# Patient Record
Sex: FEMALE | Race: WHITE | NOT HISPANIC OR LATINO | Employment: FULL TIME | URBAN - METROPOLITAN AREA
[De-identification: names, ages, dates, MRNs, and addresses within clinical notes are randomized per-mention and may not be internally consistent; named-entity substitution may affect disease eponyms.]

---

## 2017-01-03 ENCOUNTER — CLINICAL SUPPORT (OUTPATIENT)
Dept: CARDIOLOGY | Facility: CLINIC | Age: 62
End: 2017-01-03

## 2017-01-03 ENCOUNTER — TELEPHONE (OUTPATIENT)
Dept: CARDIOLOGY | Facility: CLINIC | Age: 62
End: 2017-01-03

## 2017-01-03 DIAGNOSIS — I48.0 PAROXYSMAL ATRIAL FIBRILLATION (HCC): Primary | ICD-10-CM

## 2017-01-03 PROCEDURE — 93000 ELECTROCARDIOGRAM COMPLETE: CPT | Performed by: INTERNAL MEDICINE

## 2017-01-04 RX ORDER — SOTALOL HYDROCHLORIDE 80 MG/1
80 TABLET ORAL 2 TIMES DAILY
Qty: 60 TABLET | Refills: 5 | Status: SHIPPED | OUTPATIENT
Start: 2017-01-04 | End: 2017-01-09 | Stop reason: HOSPADM

## 2017-01-04 NOTE — TELEPHONE ENCOUNTER
Dr Johansen spoke with patient. She will start Sotalol 80 mg BID and come to the office on Friday to have an EKG. Patient is aware of instructions.

## 2017-01-05 ENCOUNTER — HOSPITAL ENCOUNTER (INPATIENT)
Facility: HOSPITAL | Age: 62
LOS: 4 days | Discharge: HOME | End: 2017-01-09
Attending: EMERGENCY MEDICINE | Admitting: HOSPITALIST

## 2017-01-05 ENCOUNTER — APPOINTMENT (OUTPATIENT)
Dept: GENERAL RADIOLOGY | Facility: HOSPITAL | Age: 62
End: 2017-01-05

## 2017-01-05 DIAGNOSIS — I48.91 ATRIAL FIBRILLATION, UNSPECIFIED TYPE (HCC): ICD-10-CM

## 2017-01-05 DIAGNOSIS — D64.9 SYMPTOMATIC ANEMIA: Primary | ICD-10-CM

## 2017-01-05 DIAGNOSIS — K92.2 GASTROINTESTINAL HEMORRHAGE, UNSPECIFIED GASTROINTESTINAL HEMORRHAGE TYPE: ICD-10-CM

## 2017-01-05 DIAGNOSIS — D64.9 ANEMIA: ICD-10-CM

## 2017-01-05 LAB
ABO GROUP BLD: NORMAL
ALBUMIN SERPL-MCNC: 4.2 G/DL (ref 3.2–4.8)
ALBUMIN/GLOB SERPL: 1.6 G/DL (ref 1.5–2.5)
ALP SERPL-CCNC: 122 U/L (ref 25–100)
ALT SERPL W P-5'-P-CCNC: 49 U/L (ref 7–40)
ANION GAP SERPL CALCULATED.3IONS-SCNC: 14 MMOL/L (ref 3–11)
AST SERPL-CCNC: 84 U/L (ref 0–33)
BASOPHILS # BLD AUTO: 0.03 10*3/MM3 (ref 0–0.2)
BASOPHILS NFR BLD AUTO: 0.4 % (ref 0–1)
BILIRUB CONJ SERPL-MCNC: 0.3 MG/DL (ref 0–0.2)
BILIRUB INDIRECT SERPL-MCNC: 0.6 MG/DL (ref 0.1–1.1)
BILIRUB SERPL-MCNC: 0.9 MG/DL (ref 0.3–1.2)
BILIRUB SERPL-MCNC: 1 MG/DL (ref 0.3–1.2)
BILIRUB UR QL STRIP: NEGATIVE
BLD GP AB SCN SERPL QL: NEGATIVE
BUN BLD-MCNC: 18 MG/DL (ref 9–23)
BUN/CREAT SERPL: 16.4 (ref 7–25)
CALCIUM SPEC-SCNC: 8.8 MG/DL (ref 8.7–10.4)
CHLORIDE SERPL-SCNC: 99 MMOL/L (ref 99–109)
CLARITY UR: CLEAR
CO2 SERPL-SCNC: 20 MMOL/L (ref 20–31)
COLOR UR: YELLOW
CREAT BLD-MCNC: 1.1 MG/DL (ref 0.6–1.3)
DAT POLY-SP REAG RBC QL: NEGATIVE
DEPRECATED RDW RBC AUTO: 57.2 FL (ref 37–54)
DEVELOPER EXPIRATION DATE: ABNORMAL
DEVELOPER LOT NUMBER: ABNORMAL
EOSINOPHIL # BLD AUTO: 0.03 10*3/MM3 (ref 0.1–0.3)
EOSINOPHIL NFR BLD AUTO: 0.4 % (ref 0–3)
ERYTHROCYTE [DISTWIDTH] IN BLOOD BY AUTOMATED COUNT: 19.1 % (ref 11.3–14.5)
EXPIRATION DATE: ABNORMAL
FECAL OCCULT BLOOD SCREEN, POC: POSITIVE
FERRITIN SERPL-MCNC: 9 NG/ML (ref 10–291)
FOLATE SERPL-MCNC: >24 NG/ML (ref 3.2–20)
GFR SERPL CREATININE-BSD FRML MDRD: 50 ML/MIN/1.73
GLOBULIN UR ELPH-MCNC: 2.6 GM/DL
GLUCOSE BLD-MCNC: 88 MG/DL (ref 70–100)
GLUCOSE UR STRIP-MCNC: NEGATIVE MG/DL
HCT VFR BLD AUTO: 16.2 % (ref 34.5–44)
HCT VFR BLD AUTO: 19.4 % (ref 34.5–44)
HGB BLD-MCNC: 4.8 G/DL (ref 11.5–15.5)
HGB BLD-MCNC: 5.8 G/DL (ref 11.5–15.5)
HGB UR QL STRIP.AUTO: NEGATIVE
HOLD SPECIMEN: NORMAL
HOLD SPECIMEN: NORMAL
IMM GRANULOCYTES # BLD: 0.04 10*3/MM3 (ref 0–0.03)
IMM GRANULOCYTES NFR BLD: 0.6 % (ref 0–0.6)
IRON 24H UR-MRATE: 98 MCG/DL (ref 50–175)
IRON 24H UR-MRATE: <2 MCG/DL (ref 50–175)
IRON SATN MFR SERPL: ABNORMAL % (ref 15–50)
IRON SATN SFR SERPL: 25.79 % (ref 15–50)
KETONES UR QL STRIP: ABNORMAL
LDH SERPL-CCNC: 334 U/L (ref 120–246)
LEUKOCYTE ESTERASE UR QL STRIP.AUTO: NEGATIVE
LYMPHOCYTES # BLD AUTO: 0.79 10*3/MM3 (ref 0.6–4.8)
LYMPHOCYTES NFR BLD AUTO: 11.6 % (ref 24–44)
Lab: ABNORMAL
MAGNESIUM SERPL-MCNC: 1.9 MG/DL (ref 1.3–2.7)
MCH RBC QN AUTO: 23.6 PG (ref 27–31)
MCHC RBC AUTO-ENTMCNC: 29.6 G/DL (ref 32–36)
MCV RBC AUTO: 79.8 FL (ref 80–99)
MONOCYTES # BLD AUTO: 0.58 10*3/MM3 (ref 0–1)
MONOCYTES NFR BLD AUTO: 8.5 % (ref 0–12)
NEGATIVE CONTROL: NEGATIVE
NEUTROPHILS # BLD AUTO: 5.33 10*3/MM3 (ref 1.5–8.3)
NEUTROPHILS NFR BLD AUTO: 78.5 % (ref 41–71)
NITRITE UR QL STRIP: NEGATIVE
NRBC BLD MANUAL-RTO: 0.4 /100 WBC (ref 0–0)
PH UR STRIP.AUTO: 6.5 [PH] (ref 5–8)
PLATELET # BLD AUTO: 372 10*3/MM3 (ref 150–450)
PMV BLD AUTO: 9.8 FL (ref 6–12)
POSITIVE CONTROL: POSITIVE
POTASSIUM BLD-SCNC: 4.3 MMOL/L (ref 3.5–5.5)
PROT SERPL-MCNC: 6.8 G/DL (ref 5.7–8.2)
PROT UR QL STRIP: NEGATIVE
RBC # BLD AUTO: 2.03 10*6/MM3 (ref 3.89–5.14)
RETICS/RBC NFR AUTO: 2.76 % (ref 0.5–1.5)
RH BLD: POSITIVE
SODIUM BLD-SCNC: 133 MMOL/L (ref 132–146)
SP GR UR STRIP: 1.01 (ref 1–1.03)
TIBC SERPL-MCNC: 380 MCG/DL (ref 250–450)
TIBC SERPL-MCNC: 394 MCG/DL (ref 250–450)
TROPONIN I SERPL-MCNC: 0.02 NG/ML (ref 0–0.07)
UROBILINOGEN UR QL STRIP: ABNORMAL
VIT B12 BLD-MCNC: 768 PG/ML (ref 211–911)
WBC NRBC COR # BLD: 6.8 10*3/MM3 (ref 3.5–10.8)
WHOLE BLOOD HOLD SPECIMEN: NORMAL
WHOLE BLOOD HOLD SPECIMEN: NORMAL

## 2017-01-05 PROCEDURE — 82607 VITAMIN B-12: CPT | Performed by: INTERNAL MEDICINE

## 2017-01-05 PROCEDURE — 84155 ASSAY OF PROTEIN SERUM: CPT | Performed by: INTERNAL MEDICINE

## 2017-01-05 PROCEDURE — 83921 ORGANIC ACID SINGLE QUANT: CPT | Performed by: INTERNAL MEDICINE

## 2017-01-05 PROCEDURE — 99285 EMERGENCY DEPT VISIT HI MDM: CPT

## 2017-01-05 PROCEDURE — 83655 ASSAY OF LEAD: CPT | Performed by: INTERNAL MEDICINE

## 2017-01-05 PROCEDURE — 84165 PROTEIN E-PHORESIS SERUM: CPT | Performed by: INTERNAL MEDICINE

## 2017-01-05 PROCEDURE — 85018 HEMOGLOBIN: CPT | Performed by: INTERNAL MEDICINE

## 2017-01-05 PROCEDURE — 80053 COMPREHEN METABOLIC PANEL: CPT | Performed by: EMERGENCY MEDICINE

## 2017-01-05 PROCEDURE — 86920 COMPATIBILITY TEST SPIN: CPT

## 2017-01-05 PROCEDURE — 99223 1ST HOSP IP/OBS HIGH 75: CPT | Performed by: INTERNAL MEDICINE

## 2017-01-05 PROCEDURE — 83550 IRON BINDING TEST: CPT | Performed by: INTERNAL MEDICINE

## 2017-01-05 PROCEDURE — 82248 BILIRUBIN DIRECT: CPT | Performed by: INTERNAL MEDICINE

## 2017-01-05 PROCEDURE — 86901 BLOOD TYPING SEROLOGIC RH(D): CPT

## 2017-01-05 PROCEDURE — 86900 BLOOD TYPING SEROLOGIC ABO: CPT

## 2017-01-05 PROCEDURE — 83540 ASSAY OF IRON: CPT | Performed by: INTERNAL MEDICINE

## 2017-01-05 PROCEDURE — 99254 IP/OBS CNSLTJ NEW/EST MOD 60: CPT | Performed by: INTERNAL MEDICINE

## 2017-01-05 PROCEDURE — 85045 AUTOMATED RETICULOCYTE COUNT: CPT | Performed by: INTERNAL MEDICINE

## 2017-01-05 PROCEDURE — 86334 IMMUNOFIX E-PHORESIS SERUM: CPT | Performed by: INTERNAL MEDICINE

## 2017-01-05 PROCEDURE — 82746 ASSAY OF FOLIC ACID SERUM: CPT | Performed by: INTERNAL MEDICINE

## 2017-01-05 PROCEDURE — 83735 ASSAY OF MAGNESIUM: CPT | Performed by: EMERGENCY MEDICINE

## 2017-01-05 PROCEDURE — 83021 HEMOGLOBIN CHROMOTOGRAPHY: CPT | Performed by: INTERNAL MEDICINE

## 2017-01-05 PROCEDURE — 86880 COOMBS TEST DIRECT: CPT

## 2017-01-05 PROCEDURE — 83010 ASSAY OF HAPTOGLOBIN QUANT: CPT | Performed by: INTERNAL MEDICINE

## 2017-01-05 PROCEDURE — 36430 TRANSFUSION BLD/BLD COMPNT: CPT

## 2017-01-05 PROCEDURE — 85025 COMPLETE CBC W/AUTO DIFF WBC: CPT | Performed by: EMERGENCY MEDICINE

## 2017-01-05 PROCEDURE — 85660 RBC SICKLE CELL TEST: CPT | Performed by: INTERNAL MEDICINE

## 2017-01-05 PROCEDURE — 81003 URINALYSIS AUTO W/O SCOPE: CPT | Performed by: EMERGENCY MEDICINE

## 2017-01-05 PROCEDURE — 86850 RBC ANTIBODY SCREEN: CPT

## 2017-01-05 PROCEDURE — 93005 ELECTROCARDIOGRAM TRACING: CPT | Performed by: EMERGENCY MEDICINE

## 2017-01-05 PROCEDURE — 84484 ASSAY OF TROPONIN QUANT: CPT

## 2017-01-05 PROCEDURE — 82247 BILIRUBIN TOTAL: CPT | Performed by: INTERNAL MEDICINE

## 2017-01-05 PROCEDURE — 85014 HEMATOCRIT: CPT | Performed by: INTERNAL MEDICINE

## 2017-01-05 PROCEDURE — P9016 RBC LEUKOCYTES REDUCED: HCPCS

## 2017-01-05 PROCEDURE — 83615 LACTATE (LD) (LDH) ENZYME: CPT | Performed by: INTERNAL MEDICINE

## 2017-01-05 PROCEDURE — 71010 HC CHEST PA OR AP: CPT

## 2017-01-05 PROCEDURE — 82728 ASSAY OF FERRITIN: CPT | Performed by: INTERNAL MEDICINE

## 2017-01-05 RX ORDER — PANTOPRAZOLE SODIUM 40 MG/10ML
80 INJECTION, POWDER, LYOPHILIZED, FOR SOLUTION INTRAVENOUS ONCE
Status: COMPLETED | OUTPATIENT
Start: 2017-01-05 | End: 2017-01-05

## 2017-01-05 RX ORDER — SOTALOL HYDROCHLORIDE 80 MG/1
80 TABLET ORAL
Status: DISCONTINUED | OUTPATIENT
Start: 2017-01-06 | End: 2017-01-09 | Stop reason: HOSPADM

## 2017-01-05 RX ORDER — PANTOPRAZOLE SODIUM 40 MG/10ML
40 INJECTION, POWDER, LYOPHILIZED, FOR SOLUTION INTRAVENOUS EVERY 12 HOURS SCHEDULED
Status: DISCONTINUED | OUTPATIENT
Start: 2017-01-05 | End: 2017-01-07 | Stop reason: ALTCHOICE

## 2017-01-05 RX ORDER — CITALOPRAM 20 MG/1
20 TABLET ORAL DAILY
Status: DISCONTINUED | OUTPATIENT
Start: 2017-01-05 | End: 2017-01-09 | Stop reason: HOSPADM

## 2017-01-05 RX ORDER — SODIUM CHLORIDE 0.9 % (FLUSH) 0.9 %
1-10 SYRINGE (ML) INJECTION AS NEEDED
Status: DISCONTINUED | OUTPATIENT
Start: 2017-01-05 | End: 2017-01-09 | Stop reason: HOSPADM

## 2017-01-05 RX ORDER — ACETAMINOPHEN 325 MG/1
650 TABLET ORAL EVERY 4 HOURS PRN
Status: DISCONTINUED | OUTPATIENT
Start: 2017-01-05 | End: 2017-01-09 | Stop reason: HOSPADM

## 2017-01-05 RX ORDER — SODIUM CHLORIDE 0.9 % (FLUSH) 0.9 %
10 SYRINGE (ML) INJECTION AS NEEDED
Status: DISCONTINUED | OUTPATIENT
Start: 2017-01-05 | End: 2017-01-09 | Stop reason: HOSPADM

## 2017-01-05 RX ORDER — SOTALOL HYDROCHLORIDE 80 MG/1
80 TABLET ORAL 2 TIMES DAILY
Status: DISCONTINUED | OUTPATIENT
Start: 2017-01-05 | End: 2017-01-05

## 2017-01-05 RX ORDER — PANTOPRAZOLE SODIUM 40 MG/10ML
40 INJECTION, POWDER, LYOPHILIZED, FOR SOLUTION INTRAVENOUS EVERY 12 HOURS SCHEDULED
Status: DISCONTINUED | OUTPATIENT
Start: 2017-01-05 | End: 2017-01-05

## 2017-01-05 RX ORDER — ATORVASTATIN CALCIUM 40 MG/1
40 TABLET, FILM COATED ORAL DAILY
Status: DISCONTINUED | OUTPATIENT
Start: 2017-01-05 | End: 2017-01-09 | Stop reason: HOSPADM

## 2017-01-05 RX ORDER — SODIUM CHLORIDE 9 MG/ML
50 INJECTION, SOLUTION INTRAVENOUS CONTINUOUS
Status: DISCONTINUED | OUTPATIENT
Start: 2017-01-05 | End: 2017-01-07

## 2017-01-05 RX ORDER — LEVOTHYROXINE SODIUM 0.1 MG/1
100 TABLET ORAL DAILY
Status: DISCONTINUED | OUTPATIENT
Start: 2017-01-05 | End: 2017-01-09 | Stop reason: HOSPADM

## 2017-01-05 RX ADMIN — LEVOTHYROXINE SODIUM 100 MCG: 100 TABLET ORAL at 18:51

## 2017-01-05 RX ADMIN — SODIUM CHLORIDE 500 ML: 9 INJECTION, SOLUTION INTRAVENOUS at 11:18

## 2017-01-05 RX ADMIN — PANTOPRAZOLE SODIUM 40 MG: 40 INJECTION, POWDER, FOR SOLUTION INTRAVENOUS at 20:35

## 2017-01-05 RX ADMIN — PANTOPRAZOLE SODIUM 80 MG: 40 INJECTION, POWDER, FOR SOLUTION INTRAVENOUS at 12:00

## 2017-01-05 RX ADMIN — ATORVASTATIN CALCIUM 40 MG: 40 TABLET, FILM COATED ORAL at 18:51

## 2017-01-05 NOTE — PLAN OF CARE
Problem: Cardiac Output, Decreased (Adult)  Goal: Identify Related Risk Factors and Signs and Symptoms  Outcome: Outcome(s) achieved Date Met:  01/05/17

## 2017-01-05 NOTE — IP AVS SNAPSHOT
AFTER VISIT SUMMARY             Halie Perez           About your hospitalization     You were admitted on:  January 5, 2017 You last received care in the:  [address]       Procedures & Surgeries      Procedure(s) (LRB):  ESOPHAGOGASTRODUODENOSCOPY (N/A)     1/5/2017 - 1/7/2017     Surgeon(s):  Abhishek Benton MD  -------------------     Procedure(s) (LRB):  COLONOSCOPY (N/A)     1/5/2017 - 1/9/2017     Surgeon(s):  Michel White MD  -------------------      Medications    If you or your caregiver advised us that you are currently taking a medication and that medication is marked below as “Resume”, this simply indicates that we have reviewed those medications to make sure our new therapy recommendations do not interfere.  If you have concerns about medications other than those new ones which we are prescribing today, please consult the physician who prescribed them (or your primary physician).  Our review of your home medications is not meant to indicate that we are directing their use.             Your Medications      START taking these medications     ascorbic acid 500 MG tablet   Take 1 tablet by mouth 2 (Two) Times a Day.   Last time this was given:  1/9/2017  8:43 AM   Commonly known as:  VITAMIN C           ferrous sulfate 325 (65 FE) MG tablet   Take 1 tablet by mouth 2 (Two) Times a Day With Meals.   Last time this was given:  1/9/2017  8:43 AM           pantoprazole 40 MG EC tablet   Take 1 tablet by mouth Every 12 (Twelve) Hours.   Last time this was given:  1/8/2017  9:36 PM   Commonly known as:  PROTONIX             CHANGE how you take these medications     sotalol 80 MG tablet   Take 1 tablet by mouth Daily.   Last time this was given:  1/9/2017  8:42 AM   Commonly known as:  BETAPACE   What changed:  when to take this             CONTINUE taking these medications     apixaban 5 MG tablet tablet   Take 1 tablet by mouth 2 (Two) Times a Day.   Commonly known as:   ELIQUIS           atorvastatin 40 MG tablet   Take 1 tablet by mouth Daily.   Last time this was given:  1/9/2017  8:42 AM   Commonly known as:  LIPITOR           citalopram 20 MG tablet   Take 20 mg by mouth Daily.   Last time this was given:  1/9/2017  8:43 AM   Commonly known as:  CeleXA           levothyroxine 100 MCG tablet   Take 100 mcg by mouth Daily.   Last time this was given:  1/9/2017  8:43 AM   Commonly known as:  SYNTHROID, LEVOTHROID           Melatonin 10 MG tablet   Take 30 mg by mouth Every Night.                Where to Get Your Medications      Information about where to get these medications is not yet available     ! Ask your nurse or doctor about these medications     ascorbic acid 500 MG tablet    ferrous sulfate 325 (65 FE) MG tablet    pantoprazole 40 MG EC tablet    sotalol 80 MG tablet                  Your Medications      Your Medication List           Morning Noon Evening Bedtime As Needed    apixaban 5 MG tablet tablet   Take 1 tablet by mouth 2 (Two) Times a Day.   Commonly known as:  ELIQUIS                                      ascorbic acid 500 MG tablet   Take 1 tablet by mouth 2 (Two) Times a Day.   Commonly known as:  VITAMIN C                                      atorvastatin 40 MG tablet   Take 1 tablet by mouth Daily.   Commonly known as:  LIPITOR                                      citalopram 20 MG tablet   Take 20 mg by mouth Daily.   Commonly known as:  CeleXA                                   ferrous sulfate 325 (65 FE) MG tablet   Take 1 tablet by mouth 2 (Two) Times a Day With Meals.                                      levothyroxine 100 MCG tablet   Take 100 mcg by mouth Daily.   Commonly known as:  SYNTHROID, LEVOTHROID                                   Melatonin 10 MG tablet   Take 30 mg by mouth Every Night.                                   pantoprazole 40 MG EC tablet   Take 1 tablet by mouth Every 12 (Twelve) Hours.   Commonly known as:  PROTONIX                                       sotalol 80 MG tablet   Take 1 tablet by mouth Daily.   Commonly known as:  BETAPACE                                            Instructions for After Discharge        Discharge References/Attachments     ANEMIA, NONSPECIFIC (ENGLISH)    BLOOD TRANSFUSION INFORMATION (ENGLISH)    ESOPHAGOGASTRODUODENOSCOPY, CARE AFTER (ENGLISH)    COLONOSCOPY, CARE AFTER (ENGLISH)    PLEURAL EFFUSION (ENGLISH)    ASCORBIC ACID, VITAMIN C TABLET (ENGLISH)    PANTOPRAZOLE TABLETS (ENGLISH)    IRON TABLETS, CAPSULES, EXTENDED-RELEASE TABLETS (ENGLISH)       Follow-ups for After Discharge        Follow-up Information     Follow up with Pradip Johansen MD Follow up in 4 week(s).    Specialties:  Cardiology, Cardiac Electrophysiology    Contact information:    [address]  [place] [id number]  [phone]          Follow up with Tori Iverson, APRN .    Specialty:  Family Medicine    Contact information:    [address]  [facility] [id number] [phone]        Referrals and Follow-ups to Schedule     Follow-Up    As directed    PCP on Friday, needs CBC on 1-12-17 (order given)  F/u with Dr. White per his recommendations             Scheduled Appointments     Jonah 10, 2017 10:30 AM EST   NEW HEMATOLOGY with René Valencia MD   [facility]  AND ONCOLOGY ([place])    [address]  [facility] [address] [phone]            Jan 18, 2017  7:45 AM EST   [facility] CARDIOLOGY NM INJ VT with [facility] ADMIN [facility] CARDIOLOGY ([place])    [address] [facility] [address] [phone]           No food or drink for 2 hours prior to your test. No caffeine or chocolate 24 hours prior to you test. (this includes coffee, tea, soda, all decaffeinated beverages, cappuccino flavorings, noooz or vivarian, diet medications, excedrin, anacin or any energy drinks) wear comfortable clothing and walking  shoes. Bring your insurance cards with you. Bring all medications in their original bottles. If you are diabetic, do not take your diabetic medications after consulting with your primary care physician. if you take insulin, only take half the dose after consulting with your primary care physician. please hold the following medications for 48 hours prior to your test after consulting with your primary care physician. (acebutolo, aggrenox, atenolol, bisoprolol, betaxolol, betapace, calan, cardizem, carvadilol, coreg, corgard, corzide, dilacor xr, diltiazem, inderal, inderal la, isoptin, karlone, labetolol, levatol, nadolol, normodyne, penbutolol, pindolol, propanolol, sectral, sotalol, tenoretic, tiazic, timolol, bystolic, toprol xl, lopressor, metoprolol, trandata, verapamil, verelan, visken, zebeta, zisc, theophylline, vincenzo-dur, sio-phyline, qulbron-t, primatene, persantine, dipyrimadiole)            Jan 18, 2017  9:30 AM EST   [facility] CARDIOLOGY NM SCAN VT with [facility] SCAN ROOM   [facility] CARDIOLOGY [address] [address] [phone]            Jan 18, 2017 10:30 AM EST   [facility] CARDIOLOGY NM STRESS VT with [facility] STRESS LAB 2   [facility] CARDIOLOGY [place] [id number] [phone]           No food or drink for 2 hours prior to your test. No caffeine or chocolate 24 hours prior to you test. (this includes coffee, tea, soda, all decaffeinated beverages, cappuccino flavorings, noooz or vivarian, diet medications, excedrin, anacin or any energy drinks) wear comfortable clothing and walking shoes. Bring your insurance cards with you. Bring all medications in their original bottles. If you are diabetic, do not take your diabetic medications after consulting with your primary care physician. if you take insulin, only take half the dose after consulting with your primary care physician.  please hold the following medications for 48 hours prior to your test after consulting with your primary care physician. (acebutolo, aggrenox, atenolol, bisoprolol, betaxolol, betapace, calan, cardizem, carvadilol, coreg, corgard, corzide, dilacor xr, diltiazem, inderal, inderal la, isoptin, karlone, labetolol, levatol, nadolol, normodyne, penbutolol, pindolol, propanolol, sectral, sotalol, tenoretic, tiazic, timolol, bystolic, toprol xl, lopressor, metoprolol, trandata, verapamil, verelan, visken, zebeta, zisc, theophylline, vincenzo-dur, sio-phyline, qulbron-t, primatene, persantine, dipyrimadiole)            Jan 18, 2017 12:00 PM [facility] CARDIOLOGY NM SCAN VT with [facility] NM SCAN ROOM   [facility] CARDIOLOGY ([address] [address] [phone]              [email or web address] Signup     Our records indicate that you have an active [employer] account.    You can view your After Visit Summary by going to [employer]! and logging in with your [email or web address] username and password.  If you don't have a [email or web address] username and password but a parent or guardian has access to your record, the parent or guardian should login with their own [email or web address] username and password and access your record to view the After Visit Summary.    If you have questions, you can email [email or web address] or call [phone] to talk to our [email or web address] staff.  Remember, [employer] is NOT to be used for urgent needs.  For medical emergencies, dial 911.           Summary of Your Hospitalization        Reason for Hospitalization     Your primary diagnosis was:  Not on File    Your diagnoses also included:  Symptomatic Anemia      Care Providers     Provider Service Role Specialty    Jon Cuba MD Medicine Attending Provider Hospitalist    Pradip Johansen MD Cardiology Consulting Physician  Cardiology     Abhishek Benton MD Gastroenterology Consulting Physician   Gastroenterology     Abhishek Benton MD Gastroenterology Surgeon  Gastroenterology    Michel White MD Colorectal Consulting Physician  Colon and Rectal Surgery     Michel White MD Colorectal Surgeon  Colon and Rectal Surgery    René Valencia MD Oncology Consulting Physician  Hematology and Oncology       Your Allergies  Date Reviewed: 1/9/2017    Allergen Reactions    Sulfa Antibiotics Anaphylaxis      Pending Labs     Order Current Status    Green Top (No Gel) Collected (01/05/17 0952)    Celiac Comprehensive Panel In process    Methylmalonic Acid, Serum In process    [place] Draw In process      Patient Belongings Returned     Document Return of Belongings Flowsheet     Were the patient bedside belongings sent home?   Yes   Belongings Retrieved from Security & Sent Home   No (Comment)    Belongings Sent to Safe   --   Medications Retrieved from Pharmacy & Sent Home   No (Comment)              More Information      Anemia, Nonspecific  Anemia is a condition in which the concentration of red blood cells or hemoglobin in the blood is below normal. Hemoglobin is a substance in red blood cells that carries oxygen to the tissues of the body. Anemia results in not enough oxygen reaching these tissues.   CAUSES   Common causes of anemia include:   · Excessive bleeding. Bleeding may be internal or external. This includes excessive bleeding from periods (in women) or from the intestine.    · Poor nutrition.    · Chronic kidney, thyroid, and liver disease.   · Bone marrow disorders that decrease red blood cell production.  · Cancer and treatments for cancer.  · HIV, AIDS, and their treatments.  · Spleen problems that increase red blood cell destruction.  · Blood disorders.  · Excess destruction of red blood cells due to infection, medicines, and autoimmune disorders.  SIGNS AND SYMPTOMS   · Minor weakness.    · Dizziness.    · Headache.  · Palpitations.    · Shortness of breath, especially with exercise.     · Paleness.  · Cold sensitivity.  · Indigestion.  · Nausea.  · Difficulty sleeping.  · Difficulty concentrating.  Symptoms may occur suddenly or they may develop slowly.   DIAGNOSIS   Additional blood tests are often needed. These help your health care provider determine the best treatment. Your health care provider will check your stool for blood and look for other causes of blood loss.   TREATMENT   Treatment varies depending on the cause of the anemia. Treatment can include:   · Supplements of iron, vitamin B12, or folic acid.    · Hormone medicines.    · A blood transfusion. This may be needed if blood loss is severe.    · Hospitalization. This may be needed if there is significant continual blood loss.    · Dietary changes.  · Spleen removal.  HOME CARE INSTRUCTIONS  Keep all follow-up appointments. It often takes many weeks to correct anemia, and having your health care provider check on your condition and your response to treatment is very important.  SEEK IMMEDIATE MEDICAL CARE IF:   · You develop extreme weakness, shortness of breath, or chest pain.    · You become dizzy or have trouble concentrating.  · You develop heavy vaginal bleeding.    · You develop a rash.    · You have bloody or black, tarry stools.    · You faint.    · You vomit up blood.    · You vomit repeatedly.    · You have abdominal pain.  · You have a fever or persistent symptoms for more than 2-3 days.    · You have a fever and your symptoms suddenly get worse.    · You are dehydrated.    MAKE SURE YOU:  · Understand these instructions.  · Will watch your condition.  · Will get help right away if you are not doing well or get worse.     This information is not intended to replace advice given to you by your health care provider. Make sure you discuss any questions you have with your health care provider.     Document Released: 01/25/2006 Document Revised: 08/20/2014 Document Reviewed: 06/13/2014  [employer] Patient Education  ©2016 [employer]          Blood Transfusion   A blood transfusion is a procedure in which you receive donated blood through an IV tube. You may need a blood transfusion because of illness, surgery, or injury. The blood may come from a donor, or it may be your own blood that you donated previously.  The blood given in a transfusion is made up of different types of cells. You may receive:  · Red blood cells. These carry oxygen and replace lost blood.  · Platelets. These control bleeding.  · Plasma. This helps blood to clot.  If you have hemophilia or another clotting disorder, you may also receive other types of blood products.  LET YOUR HEALTH CARE PROVIDER KNOW ABOUT:  · Any allergies you have.  · All medicines you are taking, including vitamins, herbs, eye drops, creams, and over-the-counter medicines.  · Previous problems you or members of your family have had with the use of anesthetics.  · Any blood disorders you have.  · Previous surgeries you have had.  · Any medical conditions you may have.  · Any previous reactions you have had during a blood transfusion.    RISKS AND COMPLICATIONS  Generally, this is a safe procedure. However, problems may occur, including:  · Having an allergic reaction to something in the donated blood.  · Fever. This may be a reaction to the white blood cells in the transfused blood.  · Iron overload. This can happen from having many transfusions.  · Transfusion-related acute lung injury (TRALI). This is a rare reaction that causes lung damage. The cause is not known. TRALI can occur within hours of a transfusion or several days later.  · Sudden (acute) or delayed hemolytic reactions. This happens if your blood does not match the cells in your transfusion. Your body's defense system (immune system) may try to attack the new cells. This complication is rare.  · Infection. This is rare.  BEFORE THE PROCEDURE  · You may have a blood test to determine your blood type. This is necessary to  know what kind of blood your body will accept.  · If you are going to have a planned surgery, you may donate your own blood. This may be done in case you need to have a transfusion.  · If you have had an allergic reaction to a transfusion in the past, you may be given medicine to help prevent a reaction. Take this medicine only as directed by your health care provider.  · You will have your temperature, blood pressure, and pulse monitored before the transfusion.  PROCEDURE   · An IV will be started in your hand or arm.  · The bag of donated blood will be attached to your IV tube and given into your vein.  · Your temperature, blood pressure, and pulse will be monitored regularly during the transfusion. This monitoring is done to detect early signs of a transfusion reaction.  · If you have any signs or symptoms of a reaction, your transfusion will be stopped and you may be given medicine.  · When the transfusion is over, your IV will be removed.  · Pressure may be applied to the IV site for a few minutes.  · A bandage (dressing) will be applied.  The procedure may vary among health care providers and hospitals.  AFTER THE PROCEDURE  · Your blood pressure, temperature, and pulse will be monitored regularly.     This information is not intended to replace advice given to you by your health care provider. Make sure you discuss any questions you have with your health care provider.     Document Released: 12/15/2001 Document Revised: 01/08/2016 Document Reviewed: 10/28/2015  [employer] Interactive Patient Education ©2016 [employer]          Esophagogastroduodenoscopy, Care After  Refer to this sheet in the next few weeks. These instructions provide you with information about caring for yourself after your procedure. Your health care provider may also give you more specific instructions. Your treatment has been planned according to current medical practices, but problems sometimes occur. Call your health care provider if  you have any problems or questions after your procedure.  WHAT TO EXPECT AFTER THE PROCEDURE  After your procedure, it is typical to feel:  · Soreness in your throat.  · Pain with swallowing.  · Sick to your stomach (nauseous).  · Bloated.  · Dizzy.  · Fatigued.  HOME CARE INSTRUCTIONS  · Do not eat or drink anything until the numbing medicine (local anesthetic) has worn off and your gag reflex has returned. You will know that the local anesthetic has worn off when you can swallow comfortably.  · Do not drive or operate machinery until directed by your health care provider.  · Take medicines only as directed by your health care provider.  SEEK MEDICAL CARE IF:   · You cannot stop coughing.  · You are not urinating at all or less than usual.  SEEK IMMEDIATE MEDICAL CARE IF:  · You have difficulty swallowing.  · You cannot eat or drink.  · You have worsening throat or chest pain.  · You have dizziness or lightheadedness or you faint.  · You have nausea or vomiting.  · You have chills.  · You have a fever.  · You have severe abdominal pain.  · You have black, tarry, or bloody stools.     This information is not intended to replace advice given to you by your health care provider. Make sure you discuss any questions you have with your health care provider.     Document Released: 12/04/2013 Document Revised: 01/08/2016 Document Reviewed: 12/04/2013  [employer] Interactive Patient Education ©2016 [employer]          Colonoscopy, Care After  Refer to this sheet in the next few weeks. These instructions provide you with information on caring for yourself after your procedure. Your health care provider may also give you more specific instructions. Your treatment has been planned according to current medical practices, but problems sometimes occur. Call your health care provider if you have any problems or questions after your procedure.  WHAT TO EXPECT AFTER THE PROCEDURE   After your procedure, it is typical to have the  following:  · A small amount of blood in your stool.  · Moderate amounts of gas and mild abdominal cramping or bloating.  HOME CARE INSTRUCTIONS  · Do not drive, operate machinery, or sign important documents for 24 hours.  · You may shower and resume your regular physical activities, but move at a slower pace for the first 24 hours.  · Take frequent rest periods for the first 24 hours.  · Walk around or put a warm pack on your abdomen to help reduce abdominal cramping and bloating.  · Drink enough fluids to keep your urine clear or pale yellow.  · You may resume your normal diet as instructed by your health care provider. Avoid heavy or fried foods that are hard to digest.  · Avoid drinking alcohol for 24 hours or as instructed by your health care provider.  · Only take over-the-counter or prescription medicines as directed by your health care provider.  · If a tissue sample (biopsy) was taken during your procedure:    Do not take aspirin or blood thinners for 7 days, or as instructed by your health care provider.    Do not drink alcohol for 7 days, or as instructed by your health care provider.    Eat soft foods for the first 24 hours.  SEEK MEDICAL CARE IF:  You have persistent spotting of blood in your stool 2-3 days after the procedure.  SEEK IMMEDIATE MEDICAL CARE IF:  · You have more than a small spotting of blood in your stool.  · You pass large blood clots in your stool.  · Your abdomen is swollen (distended).  · You have nausea or vomiting.  · You have a fever.  · You have increasing abdominal pain that is not relieved with medicine.     This information is not intended to replace advice given to you by your health care provider. Make sure you discuss any questions you have with your health care provider.     Document Released: 08/01/2005 Document Revised: 10/08/2014 Document Reviewed: 08/25/2014  [employer] Interactive Patient Education ©2016 [employer]          Pleural Effusion  A pleural effusion is an  abnormal buildup of fluid in the layers of tissue between your lungs and the inside of your chest (pleural space). These two layers of tissue that line both your lungs and the inside of your chest are called pleura. Usually, there is no air in the space between the pleura, only a thin layer of fluid. If left untreated, a large amount of fluid can build up and cause the lung to collapse. A pleural effusion is usually caused by another disease that requires treatment.  The two main types of pleural effusion are:  · Transudative pleural effusion. This happens when fluid leaks into the pleural space because of a low protein count in your blood or high blood pressure in your vessels. Heart failure often causes this.  · Exudative infusion. This occurs when fluid collects in the pleural space from blocked blood vessels or lymph vessels. Some lung diseases, injuries, and cancers can cause this type of effusion.  CAUSES  Pleural effusion can be caused by:  · Heart failure.  · A blood clot in the lung (pulmonary embolism).  · Pneumonia.  · Cancer.  · Liver failure (cirrhosis).  · Kidney disease.  · Complications from surgery, such as from open heart surgery.  SIGNS AND SYMPTOMS  In some cases, pleural effusion may cause no symptoms. Symptoms can include:  · Shortness of breath, especially when lying down.  · Chest pain, often worse when taking a deep breath.  · Fever.  · Dry cough that is lasting (chronic).  · Hiccups.  · Rapid breathing.  An underlying condition that is causing the pleural effusion (such as heart failure, pneumonia, blood clots, tuberculosis, or cancer) may also cause additional symptoms.  DIAGNOSIS  Your health care provider may suspect pleural effusion based on your symptoms and medical history. Your health care provider will also do a physical exam and a chest X-ray. If the X-ray shows there is fluid in your chest, you may need to have this fluid removed using a needle (thoracentesis) so it can be  tested.  You may also have:  · Imaging studies of the chest, such as:    Ultrasound.    CT scan.  · Blood tests for kidney and liver function.  TREATMENT  Treatment depends on the cause of the pleural effusion. Treatment may include:  · Taking antibiotic medicines to clear up an infection that is causing the pleural effusion.  · Placing a tube in the chest to drain the effusion (tube thoracostomy). This procedure is often used when there is an infection in the fluid.  · Surgery to remove the fibrous outer layer of tissue from the pleural space (decortication).  · Thoracentesis, which can improve cough and shortness of breath.  · A procedure to put medicine into the chest cavity to seal the pleural space to prevent fluid buildup (pleurodesis).  · Chemotherapy and radiation therapy. These may be required in the case of cancerous (malignant) pleural effusion.  HOME CARE INSTRUCTIONS  · Take medicines only as directed by your health care provider.  · Keep track of how long you can gently exercise before you get short of breath. Try simply walking at first.  · Do not use any tobacco products, including cigarettes, chewing tobacco, or electronic cigarettes. If you need help quitting, ask your health care provider.  · Keep all follow-up visits as directed by your health care provider. This is important.  SEEK MEDICAL CARE IF:  · The amount of time that you are able to exercise decreases or does not improve with time.  · You have pain or signs of infection at the puncture site if you had thoracentesis. Watch for:    Drainage.    Redness.    Swelling.  · You have a fever.  SEEK IMMEDIATE MEDICAL CARE IF:  · You are short of breath.  · You develop chest pain.  · You develop a new cough.  MAKE SURE YOU:  · Understand these instructions.  · Will watch your condition.  · Will get help right away if you are not doing well or get worse.     This information is not intended to replace advice given to you by your health care  provider. Make sure you discuss any questions you have with your health care provider.     Document Released: 12/18/2006 Document Revised: 01/08/2016 Document Reviewed: 05/13/2015  [employer] Interactive Patient Education ©2016 [employer]          Ascorbic Acid, Vitamin C tablet  What is this medicine?  ASCORBIC ACID (a SKOR bik AS id) is a naturally occurring form of vitamin C. It is used to treat or prevent low levels of vitamin C and to treat scurvy.  This medicine may be used for other purposes; ask your health care provider or pharmacist if you have questions.  What should I tell my health care provider before I take this medicine?  They need to know if you have any of the following conditions:  -anemia  -diabetes  -glucose-6-phosphate dehydrogenase (G6PD) deficiency  -kidney stones  -low sodium diet  -an unusual or allergic reaction to ascorbic acid, tartrazine, other medicines, foods, dyes, or preservatives  -pregnant or trying to get pregnant  -breast-feeding  How should I use this medicine?  Take this medicine by mouth with a glass of water. Follow the directions on the package or prescription label. You may take this medicine with or without food. If it upsets your stomach take it with food. Take your medicine at regular intervals. Do not take your medicine more often than directed.  Talk to your pediatrician regarding the use of this medicine in children. While this drug may be prescribed for selected conditions, precautions do apply.  Overdosage: If you think you have taken too much of this medicine contact a poison control center or emergency room at once.  NOTE: This medicine is only for you. Do not share this medicine with others.  What if I miss a dose?  If you miss a dose, take it as soon as you can. If it is almost time for your next dose, take only that dose. Do not take double or extra doses.  What may interact with this medicine?  -deferoxamine  -iron supplements  This list may not describe all  possible interactions. Give your health care provider a list of all the medicines, herbs, non-prescription drugs, or dietary supplements you use. Also tell them if you smoke, drink alcohol, or use illegal drugs. Some items may interact with your medicine.  What should I watch for while using this medicine?  Follow a good diet. Taking a vitamin supplement does not replace the need for a balanced diet. Some foods that have vitamin C naturally are citrus fruits, green peppers, broccoli, cabbage, and tomatoes.  If you are diabetic very high doses of ascorbic acid can interfere with tests for sugar in the urine. Talk to your doctor or nicky care professional if you check your urine glucose levels.  What side effects may I notice from receiving this medicine?  Side effects that you should report to your doctor or health care professional as soon as possible:  -allergic reactions like skin rash, itching or hives, swelling of the face, lips, or tongue  -breathing problems  -diarrhea with headache or nausea  -flushing or redness of skin  -pain in lower back, side, or stomach  Side effects that usually do not require medical attention (report to your doctor or health care professional if they continue or are bothersome):  -bad taste in the mouth  -stomach upset  This list may not describe all possible side effects. Call your doctor for medical advice about side effects. You may report side effects to FDA at 2-660-FDA-5097.  Where should I keep my medicine?  Keep out of the reach of children.  Store at room temperature between 15 and 30 degrees C (59 and 86 degrees F) or as directed on the package label. Protect from heat and moisture. Throw away any unused medicine after the expiration date.  NOTE: This sheet is a summary. It may not cover all possible information. If you have questions about this medicine, talk to your doctor, pharmacist, or health care provider.     © 2016, Else[employer]. (2009-03-12  10:29:58)          Pantoprazole tablets  What is this medicine?  PANTOPRAZOLE (pan TOE pra zole) prevents the production of acid in the stomach. It is used to treat gastroesophageal reflux disease (GERD), inflammation of the esophagus, and Zollinger-Fuller syndrome.  This medicine may be used for other purposes; ask your health care provider or pharmacist if you have questions.  What should I tell my health care provider before I take this medicine?  They need to know if you have any of these conditions:  -liver disease  -low levels of magnesium in the blood  -an unusual or allergic reaction to omeprazole, lansoprazole, pantoprazole, rabeprazole, other medicines, foods, dyes, or preservatives  -pregnant or trying to get pregnant  -breast-feeding  How should I use this medicine?  Take this medicine by mouth. Swallow the tablets whole with a drink of water. Follow the directions on the prescription label. Do not crush, break, or chew. Take your medicine at regular intervals. Do not take your medicine more often than directed.  Talk to your pediatrician regarding the use of this medicine in children. While this drug may be prescribed for children as young as 5 years for selected conditions, precautions do apply.  Overdosage: If you think you have taken too much of this medicine contact a poison control center or emergency room at once.  NOTE: This medicine is only for you. Do not share this medicine with others.  What if I miss a dose?  If you miss a dose, take it as soon as you can. If it is almost time for your next dose, take only that dose. Do not take double or extra doses.  What may interact with this medicine?  Do not take this medicine with any of the following medications:  -atazanavir  -nelfinavir  This medicine may also interact with the following medications:  -ampicillin  -delavirdine  -erlotinib  -iron salts  -medicines for fungal infections like ketoconazole, itraconazole and  voriconazole  -methotrexate  -mycophenolate mofetil  -warfarin  This list may not describe all possible interactions. Give your health care provider a list of all the medicines, herbs, non-prescription drugs, or dietary supplements you use. Also tell them if you smoke, drink alcohol, or use illegal drugs. Some items may interact with your medicine.  What should I watch for while using this medicine?  It can take several days before your stomach pain gets better. Check with your doctor or health care professional if your condition does not start to get better, or if it gets worse.  You may need blood work done while you are taking this medicine.  What side effects may I notice from receiving this medicine?  Side effects that you should report to your doctor or health care professional as soon as possible:  -allergic reactions like skin rash, itching or hives, swelling of the face, lips, or tongue  -bone, muscle or joint pain  -breathing problems  -chest pain or chest tightness  -dark yellow or brown urine  -dizziness  -fast, irregular heartbeat  -feeling faint or lightheaded  -fever or sore throat  -muscle spasm  -palpitations  -redness, blistering, peeling or loosening of the skin, including inside the mouth  -seizures  -tremors  -unusual bleeding or bruising  -unusually weak or tired  -yellowing of the eyes or skin  Side effects that usually do not require medical attention (Report these to your doctor or health care professional if they continue or are bothersome.):  -constipation  -diarrhea  -dry mouth  -headache  -nausea  This list may not describe all possible side effects. Call your doctor for medical advice about side effects. You may report side effects to FDA at 3-750-FDA-6371.  Where should I keep my medicine?  Keep out of the reach of children.  Store at room temperature between 15 and 30 degrees C (59 and 86 degrees F). Protect from light and moisture. Throw away any unused medicine after the expiration  date.  NOTE: This sheet is a summary. It may not cover all possible information. If you have questions about this medicine, talk to your doctor, pharmacist, or health care provider.     © 2016, [employer] Standard. (2016-02-05 14:45:56)          Iron tablets, capsules, extended-release tablets  What is this medicine?  IRON (AHY norma) replaces iron that is essential to healthy red blood cells. Iron is used to treat iron deficiency anemia. Anemia may cause problems like tiredness, shortness of breath, or slowed growth in children. Only take iron if your doctor has told you to. Do not treat yourself with iron if you are feeling tired. Most healthy people get enough iron in their diets, particularly if they eat cereals, meat, poultry, and fish.  This medicine may be used for other purposes; ask your health care provider or pharmacist if you have questions.  What should I tell my health care provider before I take this medicine?  They need to know if you have any of these conditions:  -frequently drink alcohol  -bowel disease  -hemolytic anemia  -iron overload (hemochromatosis, hemosiderosis)  -liver disease  -problems with swallowing  -stomach ulcer or other stomach problems  -an unusual or allergic reaction to iron, other medicines, foods, dyes, or preservatives  -pregnant or trying to get pregnant  -breast-feeding  How should I use this medicine?  Take this medicine by mouth with a glass of water or fruit juice. Follow the directions on the prescription label. Swallow whole. Do not crush or chew. Take this medicine in an upright or sitting position. Try to take any bedtime doses at least 10 minutes before lying down. You may take this medicine with food. Take your medicine at regular intervals. Do not take your medicine more often than directed. Do not stop taking except on your doctor's advice.  Talk to your pediatrician regarding the use of this medicine in children. While this drug may be prescribed for selected  conditions, precautions do apply.  Overdosage: If you think you have taken too much of this medicine contact a poison control center or emergency room at once.  NOTE: This medicine is only for you. Do not share this medicine with others.  What if I miss a dose?  If you miss a dose, take it as soon as you can. If it is almost time for your next dose, take only that dose. Do not take double or extra doses.  What may interact with this medicine?  If you are taking this iron product, you should not take iron in any other medicine or dietary supplement.  This medicine may also interact with the following medications:  -alendronate  -antacids  -cefdinir  -chloramphenicol  -cholestyramine  -deferoxamine  -dimercaprol  -etidronate  -medicines for stomach ulcers or other stomach problems  -pancreatic enzymes  -quinolone antibiotics (examples: Cipro, Floxin, Levaquin, Tequin and others)  -risedronate  -tetracycline antibiotics (examples: doxycycline, tetracycline, minocycline, and others)  -thyroid hormones  This list may not describe all possible interactions. Give your health care provider a list of all the medicines, herbs, non-prescription drugs, or dietary supplements you use. Also tell them if you smoke, drink alcohol, or use illegal drugs. Some items may interact with your medicine.  What should I watch for while using this medicine?  Use iron supplements only as directed by your health care professional. You will need important blood work while you are taking this medicine. It may take 3 to 6 months of therapy to treat low iron levels. Pregnant women should follow the dose and length of iron treatment as directed by their doctors.  Do not use iron longer than prescribed, and do not take a higher dose than recommended. Long-term use may cause excess iron to build-up in the body.  Do not take iron with antacids. If you need to take an antacid, take it 2 hours after a dose of iron.  What side effects may I notice from  receiving this medicine?  Side effects that you should report to your doctor or health care professional as soon as possible:  -allergic reactions like skin rash, itching or hives, swelling of the face, lips, or tongue  -blue lips, nails, or palms  -dark colored stools (this may be due to the iron, but can indicate a more serious condition)  -drowsiness  -pain with or difficulty swallowing  -pale or clammy skin  -seizures  -stomach pain  -unusually weak or tired  -vomiting  -weak, fast, or irregular heartbeat  Side effects that usually do not require medical attention (report to your doctor or health care professional if they continue or are bothersome):  -constipation  -indigestion  -nausea or stomach upset  This list may not describe all possible side effects. Call your doctor for medical advice about side effects. You may report side effects to FDA at 9-548-FDA-4476.  Where should I keep my medicine?  Keep out of the reach of children. Even small amounts of iron can be harmful to a child.  Store at room temperature between 15 and 30 degrees C (59 and 86 degrees F). Keep container tightly closed. Throw away any unused medicine after the expiration date.  NOTE: This sheet is a summary. It may not cover all possible information. If you have questions about this medicine, talk to your doctor, pharmacist, or health care provider.     © 2016, [employer]. (2009-05-05 17:03:41)         PREVENTING SURGICAL SITE INFECTIONS     Surgical Site Infections FAQs  What is a Surgical Site Infection (SSI)?  A surgical site infection is an infection that occurs after surgery in the part of the body where the surgery took place. Most patients who have surgery do not develop an infection. However, infections develop in about 1 to 3 out of every 100 patients who have surgery.  Some of the common symptoms of a surgical site infection are:  · Redness and pain around the area where you had surgery  · Drainage of cloudy fluid  from your surgical wound  · Fever  Can SSIs be treated?  Yes. Most surgical site infections can be treated with antibiotics. The antibiotic given to you depends on the bacteria (germs) causing the infection. Sometimes patients with SSIs also need another surgery to treat the infection.  What are some of the things that hospitals are doing to prevent SSIs?  To prevent SSIs, doctors, nurses, and other healthcare providers:  · Clean their hands and arms up to their elbows with an antiseptic agent just before the surgery.  · Clean their hands with soap and water or an alcohol-based hand rub before and after caring for each patient.  · May remove some of your hair immediately before your surgery using electric clippers if the hair is in the same area where the procedure will occur. They should not shave you with a razor.  · Wear special hair covers, masks, gowns, and gloves during surgery to keep the surgery area clean.  · Give you antibiotics before your surgery starts. In most cases, you should get antibiotics within 60 minutes before the surgery starts and the antibiotics should be stopped within 24 hours after surgery.  · Clean the skin at the site of your surgery with a special soap that kills germs.  What can I do to help prevent SSIs?  Before your surgery:  · Tell your doctor about other medical problems you may have. Health problems such as allergies, diabetes, and obesity could affect your surgery and your treatment.  · Quit smoking. Patients who smoke get more infections. Talk to your doctor about how you can quit before your surgery.  · Do not shave near where you will have surgery. Shaving with a razor can irritate your skin and make it easier to develop an infection.  At the time of your surgery:  · Speak up if someone tries to shave you with a razor before surgery. Ask why you need to be shaved and talk with your surgeon if you have any concerns.  · Ask if you will get antibiotics before surgery.  After  your surgery:  · Make sure that your healthcare providers clean their hands before examining you, either with soap and water or an alcohol-based hand rub.    If you do not see your providers clean their hands, please ask them to do so.  · Family and friends who visit you should not touch the surgical wound or dressings.  · Family and friends should clean their hands with soap and water or an alcohol-based hand rub before and after visiting you. If you do not see them clean their hands, ask them to clean their hands.  What do I need to do when I go home from the hospital?  · Before you go home, your doctor or nurse should explain everything you need to know about taking care of your wound. Make sure you understand how to care for your wound before you leave the hospital.  · Always clean your hands before and after caring for your wound.  · Before you go home, make sure you know who to contact if you have questions or problems after you get home.  · If you have any symptoms of an infection, such as redness and pain at the surgery site, drainage, or fever, call your doctor immediately.  If you have additional questions, please ask your doctor or nurse.  Developed and co-sponsored by The Society for Healthcare Epidemiology of [place] (SHEA); Infectious Diseases Society of [place] (IDSA); American Hospital Association; Association for Professionals in Infection Control and Epidemiology (APIC); Centers for Disease Control and Prevention (CDC); and The Joint Commission.     This information is not intended to replace advice given to you by your health care provider. Make sure you discuss any questions you have with your health care provider.     Document Released: 12/23/2014 Document Revised: 01/08/2016 Document Reviewed: 03/02/2016  [employer] Interactive Patient Education ©2016 [employer]             SYMPTOMS OF A STROKE    Call 911 or have someone take you to the Emergency Department if you have any of the  following:    · Sudden numbness or weakness of your face, arm or leg especially on one side of the body  · Sudden confusion, diffiiculty speaking or trouble understanding   · Changes in your vision or loss of sight in one eye  · Sudden severe headache with no known cause  · sudden dizziness, trouble walking, loss of balance or coordination    It is important to seek emergency care right away if you have further stroke symptoms. If you get emergency help quickly, the powerful clot-dissolving medicines can reduce the disabilities caused by a stroke.     For more information:    American Stroke Association  0-476-2-STROKE  www.strokeassociation.org           IF YOU SMOKE OR USE TOBACCO PLEASE READ THE FOLLOWING:    Why is smoking bad for me?  Smoking increases the risk of heart disease, lung disease, vascular disease, stroke, and cancer.     If you smoke, STOP!    If you would like more information on quitting smoking, please visit the [employer] website: [email or web address]   This link will provide additional resources including the QUIT line and the [employer] the [employer] support groups.     For more information:    American Cancer Society  ([phone]    American Heart Association  1-975.834.5882               YOU ARE THE MOST IMPORTANT FACTOR IN YOUR RECOVERY.     Follow all instructions carefully.     I have reviewed my discharge instructions with my nurse, including the following information, if applicable:     Information about my illness and diagnosis   Follow up appointments (including lab draws)   Wound Care   Equipment Needs   Medications (new and continuing) along with side effects   Preventative information such as vaccines and smoking cessations   Diet   Pain   I know when to contact my Doctor's office or seek emergency care      I want my nurse to describe the side effects of my medications: YES NO   If the answer is no, I understand the side effects of my  medications: YES NO   My nurse described the side effects of my medications in a way that I could understand: YES NO   I have taken my personal belongings and my own medications with me at discharge: YES NO            I have received this information and my questions have been answered. I have discussed any concerns I see with this plan with the nurse or physician. I understand these instructions.    Signature of Patient or Responsible Person: _____________________________________    Date: _________________  Time: __________________    Signature of Healthcare Provider: _______________________________________  Date: _________________  Time: __________________

## 2017-01-05 NOTE — ED PROVIDER NOTES
Subjective   HPI Comments: This patient is a very nice 61-year-old female who is had atrial fibrillation ×8 weeks.  She is currently seen Dr. Pradip Johansen MD.  The patient has had weakness and presyncope over the last 2 months as well.  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 Keon Espinoza called me today and told me that the patient was recently found to be in atrial flutter and had a presyncopal episode today.  She appeared very weak and he was concerned that she might require intervention or perhaps cardioversion.  The patient corrected the story and indicated that her last known episode of atrial fibrillation, noticed on EKG, was last Friday.  Her EKG done here shows a normal sinus rhythm with a rate of 72.  She tells me she is simply felt very weak and very tired and has had no energy for last couple of weeks.  The symptoms are getting worse.  Patient has no known history of GI bleed.  Noted blood in her stools.  No active chest pain no shortness of breath no swelling of the legs recently.  She did have edema in the ankles with the initiation of Cardizem.  This has resolved after Cardizem was discontinued.  She recently was started on sotalol for her atrial fibrillation.  In general, patient states that she has not done very well for the last 8 weeks.  Current complaints primarily focused on being weak and tired.    Past medical history  Atrial fibrillation, anticoagulation, hypertension, thyroid disease, hypercholesterolemia    Family history  Anemia    Patient is a 61 y.o. female presenting with weakness.   History provided by:  Patient  Weakness - Generalized   Severity:  Moderate  Onset quality:  Gradual  Duration:  1 day  Timing:  Constant  Progression:  Worsening  Chronicity:  New  Relieved by:  Nothing  Worsened by:  Nothing  Ineffective treatments:  None tried  Associated symptoms: near-syncope    Associated symptoms: no abdominal pain, no chest  pain, no cough, no diarrhea, no dysuria, no fever, no frequency, no headaches, no hematochezia, no loss of consciousness, no melena, no myalgias, no nausea, no seizures, no shortness of breath, no stroke symptoms, no syncope, no urgency and no vomiting    Risk factors: anemia and new medications        Review of Systems   Constitutional: Positive for fatigue. Negative for fever and unexpected weight change.   HENT: Negative for dental problem, hearing loss and sinus pressure.    Eyes: Negative for visual disturbance.   Respiratory: Negative for cough, chest tightness and shortness of breath.    Cardiovascular: Positive for near-syncope. Negative for chest pain, palpitations, leg swelling and syncope.   Gastrointestinal: Negative for abdominal pain, blood in stool, diarrhea, hematochezia, melena, nausea and vomiting.   Genitourinary: Negative for difficulty urinating, dysuria, frequency, hematuria and urgency.   Musculoskeletal: Negative for myalgias, neck pain and neck stiffness.   Skin: Positive for pallor.   Neurological: Positive for weakness (generalized) and light-headedness. Negative for seizures, loss of consciousness, syncope, speech difficulty and headaches.   Psychiatric/Behavioral: Negative for confusion.   All other systems reviewed and are negative.      Past Medical History   Diagnosis Date   • History of anemia    • Hypertension    • Hypothyroidism    • Osteoarthritis        Allergies   Allergen Reactions   • Sulfa Antibiotics Anaphylaxis       Past Surgical History   Procedure Laterality Date   • Augmentation mammaplasty Bilateral 2008   • Hip arthroscopy Right 2012   • Tonsillectomy  1964       Family History   Problem Relation Age of Onset   • Atrial fibrillation Mother    • Diabetes Mother    • Atrial fibrillation Brother    • Abnormal EKG Brother    • Heart attack Father    • Stroke Father    • Diabetes Sister    • Breast cancer Neg Hx    • Ovarian cancer Neg Hx        Social History     Social  History   • Marital status:      Spouse name: N/A   • Number of children: N/A   • Years of education: N/A     Occupational History   • ARNP      Social History Main Topics   • Smoking status: Former Smoker     Types: Cigarettes   • Smokeless tobacco: Never Used      Comment: quit 35 years ago.    • Alcohol use 4.8 oz/week     8 Shots of liquor per week   • Drug use: No   • Sexual activity: Not Asked     Other Topics Concern   • None     Social History Narrative    5-10 servings of caffeine per day.          Objective   Physical Exam   Constitutional: She is oriented to person, place, and time. She appears well-developed.  Non-toxic appearance. No distress.   HENT:   Head: Normocephalic and atraumatic.   Right Ear: Tympanic membrane, external ear and ear canal normal.   Left Ear: Tympanic membrane, external ear and ear canal normal.   Nose: Nose normal. No nasal septal hematoma.   Mouth/Throat: Oropharynx is clear and moist. Mucous membranes are not dry. No oral lesions. No trismus in the jaw. No dental abscesses or uvula swelling. No posterior oropharyngeal erythema or tonsillar abscesses. No tonsillar exudate.   Eyes: EOM are normal. Pupils are equal, round, and reactive to light. Right conjunctiva is not injected. Left conjunctiva is not injected.   Neck: Normal range of motion. Neck supple. No JVD present. No tracheal tenderness present. No rigidity. Normal range of motion present.   Cardiovascular: Normal rate, regular rhythm and normal heart sounds.  Exam reveals no gallop and no friction rub.    Pulmonary/Chest: Effort normal and breath sounds normal. She has no wheezes. She has no rales. She exhibits no tenderness.   Abdominal: Soft. Bowel sounds are normal. She exhibits no distension, no pulsatile midline mass and no mass. There is no tenderness. There is no rigidity, no rebound, no guarding and no tenderness at McBurney's point.   No signs of acute abdomen.   Musculoskeletal: Normal range of motion.  She exhibits edema (mild lower extremity edema). She exhibits no tenderness or deformity.   Lymphadenopathy:     She has no cervical adenopathy.   Neurological: She is alert and oriented to person, place, and time. She has normal strength. She displays no tremor. No cranial nerve deficit. She exhibits normal muscle tone.   5/5 strength bilaterally with flexion and extension of fingers, wrist, elbows, knees and hips as well as plantar and dorsiflexion of the foot.   Skin: Skin is warm and dry. No rash noted. No erythema. There is pallor.   No diaphoresis, lesions, nevi, petechia, purpura   Psychiatric: She has a normal mood and affect. Her speech is normal and behavior is normal. Judgment and thought content normal. She is attentive.   Nursing note and vitals reviewed.      Procedures         ED Course  ED Course   Comment By Time   I have talked in great detail with the patient and her .  They're both very appreciative for care and also able to verbalize an understanding of the plan.  We reviewed the patient's most recent laboratory studies from November and found that the patient's hemoglobin and hematocrit were proximally 10 and 33 respectively.  Patient states that she is a vegetarian and is often times had anemia with no formal diagnosis or known etiology/causation.   confirms this.  The patient's anemia today is quite severe with a hemoglobin of 4.8 and hematocrit of 16.2.  I think this explains her presyncope earlier today as well as her generalized weakness over the last couple of months.  I've ordered a type and screen, IV fluid rehydration, as well as a stool occult blood test.  Patient has no history of vaginal bleeding blood in the stool hemoptysis or hematemesis.  No abdominal pain.  Of interesting note, she did recently start the medication eliquis.  I plan to admit the patient to the hospitalist on telemetry and we have been paged.  He'll have cardiology follow along as well.  Patient  will require transfusion. Jordan Saenz MD 01/05 1042   I discussed the case personally with Dr. Keon Irby M.D., with the patient's permission as well as the 's permission.  Dr. Espinoza now aware of the patient's symptomatically anemia. Jordan Saenz MD 01/05 1047   Dr. Saenz discussed case with Dr. Rios who will admit to telemetry. Guerrero Baldwin 01/05 1111   I talked to the hospitalist as noted below.  He requested 2 units of packed red blood cells.  These have been ordered.  He will admit the patient to telemetry.  Patient and family aware.  Vital signs stable with a blood pressure 120/66.  Heart rate in the 70s.  Oxygen saturation 99% as of the time of this dictation. Jordan Saenz MD 01/05 1113       Course of Care      Lab Results (last 24 hours)     Procedure Component Value Units Date/Time    CBC & Differential [[id number]] Collected:  01/05/17 0952    Specimen:  Blood Updated:  01/05/17 1021    Narrative:       The following orders were created for panel order CBC & Differential.  Procedure                               Abnormality         Status                     ---------                               -----------         ------                     CBC Auto Differential[[id number]]         Abnormal            Final result                 Please view results for these tests on the individual orders.    Comprehensive Metabolic Panel [[id number]]  (Abnormal) Collected:  01/05/17 0952    Specimen:  Blood Updated:  01/05/17 1024     Glucose 88 mg/dL      BUN 18 mg/dL      Creatinine 1.10 mg/dL      Sodium 133 mmol/L      Potassium 4.3 mmol/L      Chloride 99 mmol/L      CO2 20.0 mmol/L      Calcium 8.8 mg/dL      Total Protein 6.8 g/dL      Albumin 4.20 g/dL      ALT (SGPT) 49 (H) U/L      AST (SGOT) 84 (H) U/L      Alkaline Phosphatase 122 (H) U/L      Total Bilirubin 1.0 mg/dL      eGFR Non African Amer 50 (L) mL/min/1.73      Globulin 2.6 gm/dL      A/G Ratio 1.6 g/dL       BUN/Creatinine Ratio 16.4      Anion Gap 14.0 (H) mmol/L     Narrative:       [employer] Guidelines    Stage                           Description                             GFR                      1                               Normal or High                          90+  2                               Mild decrease                            60-89  3                               Moderate decrease                   30-59  4                               Severe decrease                       15-29  5                               Kidney failure                             <15    Magnesium [[id number]]  (Normal) Collected:  01/05/17 0952    Specimen:  Blood Updated:  01/05/17 1024     Magnesium 1.9 mg/dL     CBC Auto Differential [[id number]]  (Abnormal) Collected:  01/05/17 0952    Specimen:  Blood Updated:  01/05/17 1021     WBC 6.80 10*3/mm3      RBC 2.03 (L) 10*6/mm3      Hemoglobin 4.8 (C) g/dL       Verified by repeat analysis.        Hematocrit 16.2 (C) %       Verified by repeat analysis.        MCV 79.8 (L) fL      MCH 23.6 (L) pg      MCHC 29.6 (L) g/dL      RDW 19.1 (H) %      RDW-SD 57.2 (H) fl      MPV 9.8 fL      Platelets 372 10*3/mm3      Neutrophil % 78.5 (H) %      Lymphocyte % 11.6 (L) %      Monocyte % 8.5 %      Eosinophil % 0.4 %      Basophil % 0.4 %      Immature Grans % 0.6 %      Neutrophils, Absolute 5.33 10*3/mm3      Lymphocytes, Absolute 0.79 10*3/mm3      Monocytes, Absolute 0.58 10*3/mm3      Eosinophils, Absolute 0.03 (L) 10*3/mm3      Basophils, Absolute 0.03 10*3/mm3      Immature Grans, Absolute 0.04 (H) 10*3/mm3      nRBC 0.4 (H) /100 WBC     POC Troponin, Rapid [[id number]]  (Normal) Collected:  01/05/17 1011    Specimen:  Blood Updated:  01/05/17 1025     Troponin I 0.02 ng/mL       Serial Number: [id number]    : [id number]             Note: In addition to lab results from this visit, the labs listed above may include labs taken at another facility or during a  "different encounter within the last 24 hours. Please correlate lab times with ED admission and discharge times for further clarification of the services performed during this visit.    XR Chest 1 View    (Results Pending)       Vitals:    01/05/17 0939 01/05/17 0940   BP:  113/52   BP Location:  Right arm   Patient Position:  Sitting   Pulse: 75 72   Resp:  16   Temp:  98.4 °F (36.9 °C)   TempSrc:  Oral   SpO2:  97%   Weight:  160 lb (72.6 kg)   Height:  70\" (177.8 cm)       Medications   sodium chloride 0.9 % flush 10 mL (not administered)   sodium chloride 0.9 % bolus 500 mL (not administered)       ECG/EMG Results (last 24 hours)     Procedure Component Value Units Date/Time    ECG 12 Lead [[id number]] Collected:  01/05/17 0941     Updated:  01/05/17 1031                       MDM    Final diagnoses:   Symptomatic anemia   Atrial fibrillation, unspecified type     EMR Dragon/Transcription disclaimer:   Much of this encounter note is an electronic transcription/translation of spoken language to printed text. The electronic translation of spoken language may permit erroneous, or at times, nonsensical words or phrases to be inadvertently transcribed; Although I have reviewed the note for such errors, some may still exist.      Documentation assistance provided by lucas Baldwin.  Information recorded by the lucas was done at my direction and has been verified and validated by me.     Guerrero Baldwin  01/05/17 1040       Guerrero Baldwin  01/05/17 1049       Jordan Saenz MD  01/05/17 1707    "

## 2017-01-05 NOTE — CONSULTS
Halie Perez  1955  [phone]      01/05/17      [facility] EMERGENCY DEPARTMENT    Tori KENYONMarciano Kishor, APRN  [address] / [facility] [address]    Chief Complaint   Patient presents with   • Fatigue     Problem List:    1. Atrial Fibrillation  A. CHADSvasc =2 On Eliquis  B. Echocardiogram 11/18/2016: EF 60%, moderate MR  C. Event Monitor 11/18/16-12/17/16 showing episodes of PAF up to 110 bpm  D. Intolerant to Flecainide due to QRS widening  E. Started on Sotalol 1/4/16  2. HTN  3. Hypothyroidism  4. HLP  5. Surgical History  A. Tonsillectomy  B. Total hip replacement  C. Mammoplasy  6. Symptomatic Anemia with Hemoglobin 4.8      History of Present Illness:   61 year old WF known to cardiology for recent diagnosis of atrial fibrillation who is admitted due to symptomatic anemia. She was recently seen in clinic for atrial fibrillation and started on flecainide. However, this was discontinued due to QRS widening. She called our office complaining of severe SOB and fatigue. EKG was obtained showing atrial flutter and it was felt her symptoms were from this. Therefore, she was started on Sotalol yesterday and planned for EKG and possible ECV tomorrow. However, today she became acutely worse and was very weak with presyncope. She went to the ER and found to be severely anemic. She is admitted to the hospitalists. She has already been started on blood products and BP is improved. It was 80 systolic when she first came in.     Allergies   Allergen Reactions   • Sulfa Antibiotics Anaphylaxis       Prior to Admission Medications     Prescriptions Last Dose Informant Patient Reported? Taking?    apixaban (ELIQUIS) 5 MG tablet tablet 1/5/2017  No Yes    Take 1 tablet by mouth 2 (Two) Times a Day.    atorvastatin (LIPITOR) 40 MG tablet   No Yes    Take 1 tablet by mouth Daily.    citalopram (CeleXA) 20 MG tablet 1/5/2017  Yes Yes    Take 20 mg by mouth Daily.    levothyroxine (SYNTHROID,  LEVOTHROID) 100 MCG tablet 1/5/2017  Yes Yes    Take 100 mcg by mouth Daily.    Melatonin 10 MG tablet   Yes Yes    Take 30 mg by mouth Every Night.    sotalol (BETAPACE) 80 MG tablet 1/5/2017  No Yes    Take 1 tablet by mouth 2 (Two) Times a Day.            Current Facility-Administered Medications:   •  acetaminophen (TYLENOL) tablet 650 mg, 650 mg, Oral, Q4H PRN, Cr Earl MD  •  pantoprazole (PROTONIX) injection 40 mg, 40 mg, Intravenous, Q12H, Cr Earl MD  •  sodium chloride 0.9 % flush 1-10 mL, 1-10 mL, Intravenous, PRN, Cr Earl MD  •  sodium chloride 0.9 % flush 10 mL, 10 mL, Intravenous, PRN, Jordan Saenz MD  •  sodium chloride 0.9 % infusion, 75 mL/hr, Intravenous, Continuous, Cr Earl MD    Current Outpatient Prescriptions:   •  apixaban (ELIQUIS) 5 MG tablet tablet, Take 1 tablet by mouth 2 (Two) Times a Day., Disp: 30 tablet, Rfl: 1  •  atorvastatin (LIPITOR) 40 MG tablet, Take 1 tablet by mouth Daily., Disp: 30 tablet, Rfl: 1  •  citalopram (CeleXA) 20 MG tablet, Take 20 mg by mouth Daily., Disp: , Rfl:   •  levothyroxine (SYNTHROID, LEVOTHROID) 100 MCG tablet, Take 100 mcg by mouth Daily., Disp: , Rfl:   •  Melatonin 10 MG tablet, Take 30 mg by mouth Every Night., Disp: , Rfl:   •  sotalol (BETAPACE) 80 MG tablet, Take 1 tablet by mouth 2 (Two) Times a Day., Disp: 60 tablet, Rfl: 5    Social History     Social History   • Marital status:      Spouse name: N/A   • Number of children: N/A   • Years of education: N/A     Occupational History   • [employer]      Social History Main Topics   • Smoking status: Former Smoker     Types: Cigarettes   • Smokeless tobacco: Never Used      Comment: quit 35 years ago.    • Alcohol use 4.8 oz/week     8 Shots of liquor per week   • Drug use: No   • Sexual activity: Not Asked     Other Topics Concern   • None     Social History Narrative    5-10 servings of caffeine per day.        Family History   Problem  Relation Age of Onset   • Atrial fibrillation Mother    • Diabetes Mother    • Atrial fibrillation Brother    • Abnormal EKG Brother    • Heart attack Father    • Stroke Father    • Diabetes Sister    • Breast cancer Neg Hx    • Ovarian cancer Neg Hx        Review of Systems: Pertinent positives noted above in the HPI and problem list.  All other reviewed systems negative.     Vitals:    01/05/17 1145 01/05/17 1200 01/05/17 1227 01/05/17 1457   BP: 113/59 122/72 119/71 118/56   BP Location: Left arm Right arm Right arm Right arm   Patient Position: Sitting Sitting Lying Sitting   Pulse: 70 68 69 69   Resp:   18 18   Temp:   98.2 °F (36.8 °C) 97.9 °F (36.6 °C)   TempSrc:   Oral Oral   SpO2: 100% 100% 100%    Weight:       Height:           Physical Exam:  GEN: Well nourished, Well- developed  No acute distress, pale  HEENT: Normocephalic, Atraumatic, PERRLA, moist mucous membranes  NECK: supple, NO JVD, no thyromegaly, no lymphadenopathy  CARD: S1S2  RRR no murmur, gallop, rub  LUNGS: Clear to auscultataion, normal respiratory effort  ABDOMEN: Soft, nontender, normal bowel sounds  EXTREMITIES:No gross deformities,  No clubbing, cyanosis, or edema  SKIN: Warm, dry  NEURO: No focal deficits  PSYCHIATRIC: Normal affect and mood      Data:     Results from last 7 days  Lab Units 01/05/17  0952   WBC 10*3/mm3 6.80   HEMOGLOBIN g/dL 4.8*   HEMATOCRIT % 16.2*   PLATELETS 10*3/mm3 372       Results from last 7 days  Lab Units 01/05/17  0952   SODIUM mmol/L 133   POTASSIUM mmol/L 4.3   CHLORIDE mmol/L 99   TOTAL CO2 mmol/L 20.0   BUN mg/dL 18   CREATININE mg/dL 1.10   GLUCOSE mg/dL 88                                Tele: NSR   EKG: NSR 72 bpm, QTc 480      Assessment and Plan:   1. Symptomatic anemia    2. Atrial fibrillation, unspecified type    3. Gastrointestinal hemorrhage, unspecified gastrointestinal hemorrhage type      1. PAF/flutter- on Soltalol with acceptable QTc, continue  CHADSvasc = 2 Eliquis on hold due to GI  Bleed  2. Severe anemia with GIB, per hospitalists, transfuse3 units and consult GI.    Janine Reeves PA-C  [facility]  1/5/2017   3:09 PM      61-year-old white female with a recent history of atrial fibrillation with rapid ventricular rates as well as hypertensive heart disease and hyperlipidemia.  The patient was briefly treated with flecainide for her atrial fibrillation however developed QRS widening which was subsided discontinued.  Sotalol therapy was initiated last evening as the patient was extremely symptomatic from her atrial fibrillation.  The plan initially was for her to undergo external cardioversion tomorrow.  Today however she became very lightheaded and weak and dizzy and was found to have substantial low blood pressure.  She was subsequently admitted and was found to have severe anemia which is presently being evaluated.  She did convert to normal sinus rhythm however on her EKG she does have QT prolongation today.    Physical Exam   Constitutional: oriented to person, place, and time.  well-developed and well-nourished. No distress.   HENT: Normocephalic.   Eyes: Conjunctivae are normal. No scleral icterus.   Neck: Normal carotid pulses, no hepatojugular reflux and no JVD present. Carotid bruit is not present. No tracheal deviation, no edema and no erythema present. No thyromegaly present.   Cardiovascular: Normal rate, regular rhythm, S1 normal, S2 normal, normal heart sounds and intact distal pulses.   No extrasystoles are present. PMI is not displaced.  Exam reveals no gallop, no distant heart sounds and no friction rub.    No murmur heard.  Pulses:       Radial pulses are 2+ on the right side, and 2+ on the left side.       Dorsalis pedis pulses are 2+ on the right side, and 2+ on the left side.   Pulmonary/Chest: Effort normal and breath sounds normal. No respiratory distress. She has no decreased breath sounds.  no wheezes,  Rhonchi or rales.  no tenderness.    Abdominal: Soft. Bowel sounds are normal. She exhibits no distension and no mass. There is no hepatosplenomegaly. There is no tenderness. There is no rebound and no guarding.   Musculoskeletal:  exhibits no edema, tenderness or deformity.   Neurological: is alert and oriented to person, place, and time.   Skin: Skin is warm and dry. No rash noted. No diaphoretic. No cyanosis or erythema. No pallor. Nails show no clubbing.   Psychiatric: Normal mood and affect.Speech is normal and behavior is normal.    Laboratory evaluation as per PAs note.  Hemoglobin of 4.8.     Twelve-lead EKG shows normal sinus rhythm ventricular rate of 70 bpm /490 ms.    Impression/plan: #1 atrial fibrillation symptomatic in nature with intermittent rapid ventricular rates now in sinus rhythm on low dose sotalol.  However QT interval prolonged in the setting of significant anemia.  We'll lower her sotalol to 80 mg daily.  Repeat EKG in the morning.  #2 severe anemia with hemoglobin of approximately 5 in a patient on Eliquis.  Agree with holding Eliquis for now and await GI evaluation.      I, Pradip Johansen MD, personally performed the services described as documented by the above named individual. I have made any necessary edits and it is both accurate and complete 1/5/2017  5:13 PM

## 2017-01-05 NOTE — PLAN OF CARE
Problem: Patient Care Overview (Adult)  Goal: Plan of Care Review  Outcome: Ongoing (interventions implemented as appropriate)  PATIENT RECEIVING BLOOD

## 2017-01-05 NOTE — LETTER
January 9, 2017     Patient: Halie Perez   YOB: 1955   Date of Visit: 1/5/2017       To Whom It May Concern:    It is my medical opinion that Halie Perez {Work release (duty restriction):95402}.           Sincerely,        No name on file.    CC: No Recipients

## 2017-01-05 NOTE — H&P
CHIEF COMPLAINT: Weakness, dyspnea, severe anemia    HPI:  This is a pleasant 61-year-old  female who presents with progressive dyspnea, weakness of 2 months duration with acute worsening over the past 24 hours accompanied by orthostatic symptoms.  She was found to have low blood pressure with systolics in the 80s as well as a hemoglobin of 4 and a positive fecal occult blood testing    She denies melena, hematemesis or hematochezia.  She denies nonsteroidal anti-inflammatory drug usage or aspirin . she is on Eliquis for primary prevention of thromboembolic events in association with atrial fibrillation  She denies prior gastrointestinal bleeding episodes and she underwent a routine screening colonoscopy approximately 5-6 years ago with Dr. White that was reportedly normal  The patient apparently has a history of chronic anemia of unknown etiology.  Her last hemoglobin in November 2016 was 10    The patient is vegetarian and does not take B12 folate supplementation    Past medical history:  1.  Essential hypertension  2.  Hyperlipidemia on statin  3.  Hypothyroidism on levothyroxine  4.  Atrial fibrillation.  Paroxysmal. [id number] score of 2 on Eliquis  5.  Moderate mitral valve regurgitation with preserved ejection fraction 60%    Past surgical history:  Tonsillectomy, total hip replacement ×2, breast augmentation    Medications:  Sotalol 80 mg twice a day, apixaban or Eliquis 5 mg twice a day, Synthroid 100 µg daily and statin    Allergies:  Sulfa drugs causes anaphylaxis    Family history:  Mom with history of iron deficiency anemia    Review of systems:  14 systems were reviewed and are significant for weakness, dyspnea, orthostatic symptoms    Physical examination:  Vital signs reviewed and within normal range  Gen. appearance: Pleasant  female in no distress.  She is awake and alert. She is oriented to person place and time.  She appears pale and weak  HEENT: Pupils reactive and responsive to  light. Extraocular muscles are intact. Dry mucous membrane: Diminished skin turgor. No oral lesions thrush.  Chest: Clear to auscultation bilaterally.  No wheezing, rales or rhonchi  Cardiovascular: Regular rate and rhythm. No murmurs rubs or gallop no increased jugular venous pulsation  Abdomen: Soft. Non tender to palpation. No palpable masses. No CVA tenderness. Normal bowel sounds.   Extremities: No skin lesions or rashes. No edema. Intact peripheral pulses  Neurologic examination: Motor tone and strength are normal. Intact deep tendon reflexes. No focal neurological deficit.  Psychiatric: appropriate affect    LABORATORY DATA:  1.  Troponin of 0.02  2.  Creatinine of 1.1 mg/dL.  Bicarbonate is 20.  Anion gap 14.  Previous creatinine was 0.7  3.  Mildly elevated alkaline phosphatase at 122, mildly elevated AST and AST at 84 and 49.  Normal bilirubin  4.  TSH is 0.455  5.  Elevated total cholesterol at 267 with an LDL of 134  6.  Anemia with a hemoglobin of 4.8 and an MCV of 79.8.  Normal white blood cell count and platelet count.  Last hemoglobin from November 18, 2016 was 10.3  7.  Positive fecal occult blood testing      EKG:  Normal sinus rhythm.  No acute ischemic changes.  Prolonged QT corrected 519 ms    Transthoracic echocardiogram shows an ejection fraction of 60%.  Moderate mitral valve regurgitation.  Right ventricular systolic pressure at 41      RADIOLOGICAL STUDIES:  Chest x-ray shows no evidence of edema or effusions.  No pneumothorax.  Small right pleural effusion and right lower lobe atelectasis versus small interstitial infiltrates    ASSESSMENT AND PLAN:  This is a delightful 61-year-old  female with a past medical history significant for long-standing essential hypertension, hyperlipidemia on statin, hypothyroidism on levothyroxine, paroxysmal atrial fibrillation on chronic anticoagulation with Eliquis, chronic microcytic anemia of undetermined etiology with last known hemoglobin of  10 in November 2016 who presents with progressive weakness, dyspnea, orthostatic symptoms and found to have    1.  Acute on chronic microcytic anemia with evidence of positive fecal occult blood testing possibly consistent with occult gastrointestinal bleeding in the setting of oral anticoagulation with Eliquis for primary prevention of thromboembolic events due to her underlying paroxysmal atrial fibrillation  The chronic component could be due to B12 or folate deficiency as the patient is vegetarian  Plan:  Full workup to rule out hemolytic anemia, vitamin deficiencies, hemoglobinopathies, iron studies, reticulocyte count, haptoglobin, LDH, [employer], serum protein electro freeze with immunofixation  Transfuse 3 units of packed red blood cells  Monitor hemoglobin and hematocrit  Hold apixaban for now  Consider GI consultation based on above workup    2.  Hypertension, essential.  Patient's blood pressures borderline low.  We will hold her sotalol    3.  Paroxysmal atrial fibrillation.  Currently in sinus rhythm.  chadsvasc score of 2.  Hold Eliquis to have acute on chronic anemia with possible occult gastrointestinal bleeding.  Hold sotalol due to prolonged QT intervals.  Consult cardiology    4.  Hypothyroidism, continue levothyroxine    The plan of care was discussed with the patient.

## 2017-01-05 NOTE — Clinical Note
Level of Care: Telemetry [5]   Diagnosis: Symptomatic anemia [[id number]]   Admitting Physician: FIOR LANIER [[id number]]   Attending Physician: FIRO LANIER [[id number]]

## 2017-01-06 LAB
ABO + RH BLD: NORMAL
ALBUMIN SERPL-MCNC: 3.6 G/DL (ref 3.2–4.8)
ANION GAP SERPL CALCULATED.3IONS-SCNC: 7 MMOL/L (ref 3–11)
BH BB BLOOD EXPIRATION DATE: NORMAL
BH BB BLOOD TYPE BARCODE: 5100
BH BB DISPENSE STATUS: NORMAL
BH BB PRODUCT CODE: NORMAL
BH BB UNIT NUMBER: NORMAL
BUN BLD-MCNC: 18 MG/DL (ref 9–23)
BUN/CREAT SERPL: 18 (ref 7–25)
CALCIUM SPEC-SCNC: 8.7 MG/DL (ref 8.7–10.4)
CHLORIDE SERPL-SCNC: 108 MMOL/L (ref 99–109)
CO2 SERPL-SCNC: 24 MMOL/L (ref 20–31)
CREAT BLD-MCNC: 1 MG/DL (ref 0.6–1.3)
CROSSMATCH INTERPRETATION: NORMAL
GFR SERPL CREATININE-BSD FRML MDRD: 56 ML/MIN/1.73
GLUCOSE BLD-MCNC: 91 MG/DL (ref 70–100)
HCT VFR BLD AUTO: 23.6 % (ref 34.5–44)
HCT VFR BLD AUTO: 25.5 % (ref 34.5–44)
HCT VFR BLD AUTO: 26.3 % (ref 34.5–44)
HGB BLD-MCNC: 7.4 G/DL (ref 11.5–15.5)
HGB BLD-MCNC: 8 G/DL (ref 11.5–15.5)
HGB BLD-MCNC: 8.1 G/DL (ref 11.5–15.5)
PHOSPHATE SERPL-MCNC: 4.2 MG/DL (ref 2.4–5.1)
POTASSIUM BLD-SCNC: 3.8 MMOL/L (ref 3.5–5.5)
SODIUM BLD-SCNC: 139 MMOL/L (ref 132–146)
UNIT  ABO: NORMAL
UNIT  RH: NORMAL

## 2017-01-06 PROCEDURE — 93010 ELECTROCARDIOGRAM REPORT: CPT | Performed by: INTERNAL MEDICINE

## 2017-01-06 PROCEDURE — 83520 IMMUNOASSAY QUANT NOS NONAB: CPT | Performed by: INTERNAL MEDICINE

## 2017-01-06 PROCEDURE — 88346 IMFLUOR 1ST 1ANTB STAIN PX: CPT | Performed by: INTERNAL MEDICINE

## 2017-01-06 PROCEDURE — 80069 RENAL FUNCTION PANEL: CPT | Performed by: INTERNAL MEDICINE

## 2017-01-06 PROCEDURE — 83516 IMMUNOASSAY NONANTIBODY: CPT | Performed by: INTERNAL MEDICINE

## 2017-01-06 PROCEDURE — 85018 HEMOGLOBIN: CPT | Performed by: INTERNAL MEDICINE

## 2017-01-06 PROCEDURE — 99232 SBSQ HOSP IP/OBS MODERATE 35: CPT | Performed by: PHYSICIAN ASSISTANT

## 2017-01-06 PROCEDURE — 81382 HLA II TYPING 1 LOC HR: CPT | Performed by: INTERNAL MEDICINE

## 2017-01-06 PROCEDURE — 85018 HEMOGLOBIN: CPT | Performed by: FAMILY MEDICINE

## 2017-01-06 PROCEDURE — 93005 ELECTROCARDIOGRAM TRACING: CPT | Performed by: INTERNAL MEDICINE

## 2017-01-06 PROCEDURE — 93005 ELECTROCARDIOGRAM TRACING: CPT | Performed by: PHYSICIAN ASSISTANT

## 2017-01-06 PROCEDURE — 99232 SBSQ HOSP IP/OBS MODERATE 35: CPT | Performed by: HOSPITALIST

## 2017-01-06 PROCEDURE — 85014 HEMATOCRIT: CPT | Performed by: INTERNAL MEDICINE

## 2017-01-06 PROCEDURE — 85014 HEMATOCRIT: CPT | Performed by: FAMILY MEDICINE

## 2017-01-06 RX ORDER — ASCORBIC ACID 500 MG
500 TABLET ORAL 2 TIMES DAILY
Status: DISCONTINUED | OUTPATIENT
Start: 2017-01-06 | End: 2017-01-09 | Stop reason: HOSPADM

## 2017-01-06 RX ORDER — FERROUS SULFATE 325(65) MG
325 TABLET ORAL 2 TIMES DAILY WITH MEALS
Status: DISCONTINUED | OUTPATIENT
Start: 2017-01-06 | End: 2017-01-09 | Stop reason: HOSPADM

## 2017-01-06 RX ADMIN — CITALOPRAM HYDROBROMIDE 20 MG: 20 TABLET ORAL at 08:46

## 2017-01-06 RX ADMIN — SOTALOL HYDROCHLORIDE 80 MG: 80 TABLET ORAL at 08:46

## 2017-01-06 RX ADMIN — FERROUS SULFATE TAB 325 MG (65 MG ELEMENTAL FE) 325 MG: 325 (65 FE) TAB at 20:48

## 2017-01-06 RX ADMIN — LEVOTHYROXINE SODIUM 100 MCG: 100 TABLET ORAL at 08:46

## 2017-01-06 RX ADMIN — ATORVASTATIN CALCIUM 40 MG: 40 TABLET, FILM COATED ORAL at 08:46

## 2017-01-06 RX ADMIN — PANTOPRAZOLE SODIUM 40 MG: 40 INJECTION, POWDER, FOR SOLUTION INTRAVENOUS at 20:48

## 2017-01-06 RX ADMIN — OXYCODONE HYDROCHLORIDE AND ACETAMINOPHEN 500 MG: 500 TABLET ORAL at 20:48

## 2017-01-06 NOTE — PROGRESS NOTES
"[facility]  Progress Note       LOS: 1 day   Patient Care Team:  ARMANDO Pugh as PCP - General (Family Medicine)    Chief Complaint:  Follow up atrial fibrillation    Subjective   Feeling better today. Still a little dizzy when she first stands up. No CP or SOB. Sitting up in chair. No palpitations. Still in NSR. No side effects to sotalol so far      Review of Systems:   Pertinent positives in HPI, all others reviewed and negative.      Objective       Current Facility-Administered Medications:   •  acetaminophen (TYLENOL) tablet 650 mg, 650 mg, Oral, Q4H PRN, Cr Earl MD  •  atorvastatin (LIPITOR) tablet 40 mg, 40 mg, Oral, Daily, RAÚL Padron, 40 mg at 01/06/17 0846  •  citalopram (CeleXA) tablet 20 mg, 20 mg, Oral, Daily, RAÚL Padron, 20 mg at 01/06/17 0846  •  levothyroxine (SYNTHROID, LEVOTHROID) tablet 100 mcg, 100 mcg, Oral, Daily, RAÚL Padron, 100 mcg at 01/06/17 0846  •  melatonin sublingual tablet 5 mg, 5 mg, Sublingual, Nightly PRN, Jon Cuba MD  •  pantoprazole (PROTONIX) injection 40 mg, 40 mg, Intravenous, Q12H, Cr Earl MD, 40 mg at 01/05/17 2035  •  sodium chloride 0.9 % flush 1-10 mL, 1-10 mL, Intravenous, PRN, Cr Earl MD  •  sodium chloride 0.9 % flush 10 mL, 10 mL, Intravenous, PRN, Jordan Saenz MD  •  sodium chloride 0.9 % infusion, 75 mL/hr, Intravenous, Continuous, Cr Earl MD, 75 mL/hr at 01/06/17 0000  •  sotalol (BETAPACE) tablet 80 mg, 80 mg, Oral, Q24H, Pradip Johansen MD, 80 mg at 01/06/17 0846    Vital Sign Min/Max for last 24 hours  Temp  Min: 97.5 °F (36.4 °C)  Max: 98.5 °F (36.9 °C)   BP  Min: 106/63  Max: 132/75   Pulse  Min: 68  Max: 73   Resp  Min: 18  Max: 18   SpO2  Min: 99 %  Max: 100 %   No Data Recorded   Weight  Min: 168 lb (76.2 kg)  Max: 168 lb (76.2 kg)     Flowsheet Rows         First Filed Value    Admission Height  70\" (177.8 cm) Documented at " 01/05/2017 0940    Admission Weight  160 lb (72.6 kg) Documented at 01/05/2017 0940            Intake/Output Summary (Last 24 hours) at 01/06/17 1223  Last data filed at 01/06/17 0900   Gross per 24 hour   Intake 1424.17 ml   Output      0 ml   Net 1424.17 ml       Physical Exam:     General Appearance:    Alert, cooperative, in no acute distress   Lungs:     CTA    Heart:    RRR Nl S1 and S2 No murmurs   Chest Wall:    No abnormalities observed   Abdomen:     Normal bowel sounds, no masses, no organomegaly, soft        non-tender, non-distended, no guarding, no rebound                tenderness   Extremities:   Moves all extremities well, no edema, no cyanosis, no             redness   Pulses:   Pulses palpable and equal bilaterally   Skin:   No bleeding, bruising or rash        Results Review:     Results from last 7 days  Lab Units 01/06/17  0741 01/06/17  0320 01/05/17 2009 01/05/17  0952   WBC 10*3/mm3  --   --   --  6.80   HEMOGLOBIN g/dL 8.1* 7.4* 5.8* 4.8*   HEMATOCRIT % 25.5* 23.6* 19.4* 16.2*   PLATELETS 10*3/mm3  --   --   --  372       Results from last 7 days  Lab Units 01/06/17  0320 01/05/17  0952   SODIUM mmol/L 139 133   POTASSIUM mmol/L 3.8 4.3   CHLORIDE mmol/L 108 99   TOTAL CO2 mmol/L 24.0 20.0   BUN mg/dL 18 18   CREATININE mg/dL 1.00 1.10   GLUCOSE mg/dL 91 88                                Intake/Output Summary (Last 24 hours) at 01/06/17 1223  Last data filed at 01/06/17 0900   Gross per 24 hour   Intake 1424.17 ml   Output      0 ml   Net 1424.17 ml       I personally viewed and interpreted the patient's EKG/Telemetry data    EKG: NSR, HR 66  ms    Telemetry: NSR, No AF      Echo EF Estimated  Lab Results   Component Value Date    ECHOEFEST 60 11/18/2016         Present on Admission:  • Symptomatic anemia    Assessment/Plan   1. PAF- in NSR on Sotalol 80 daily, decreased yesterday from BID due to prolonged QT  CHADSvasc = 2 Eliquis on hold due to GIB. QTc better today Keep on daily  sotalol Needs EKG on Monday as outpt if goes home tomorrow. Ok to hold eliquis as needed  2. Severe anemia due to GIB- s/p 3 units blood with Hgb of 8 now up from 4.8  EGD planned tomorrow with RAÚL Brown  01/06/17  12:23 PM      I, Pradip Johansen MD, personally performed the services described as documented by the above named individual. I have made any necessary edits and it is both accurate and complete 1/6/2017  4:26 PM

## 2017-01-06 NOTE — PROGRESS NOTES
Discharge Planning Assessment  [facility]     Patient Name: Halie Perez  MRN: [id number]  Today's Date: 1/6/2017    Admit Date: 1/5/2017          Discharge Needs Assessment       01/06/17 1135    Living Environment    Lives With spouse    Living Arrangements house    Provides Primary Care For no one    Able to Return to Prior Living Arrangements yes    Discharge Needs Assessment    Concerns To Be Addressed no discharge needs identified    Readmission Within The Last 30 Days no previous admission in last 30 days    Equipment Currently Used at Home none    Equipment Needed After Discharge none    Transportation Available car;family or friend will provide    Discharge Disposition home or self-care            Discharge Plan       01/06/17 1136    Case Management/Social Work Plan    Plan Home    Patient/Family In Agreement With Plan yes    Additional Comments Mrs. Perez plans on returning home after discharge. She doesn't anticipate having any discharge needs.        Discharge Placement     No information found        Expected Discharge Date and Time     Expected Discharge Date Expected Discharge Time    Jan 8, 2017               Demographic Summary       01/06/17 1134    Referral Information    Admission Type inpatient    Arrived From home or self-care    Referral Source admission list    Record Reviewed history and physical            Functional Status       01/06/17 1135    Functional Status Current    Current Functional Level Comment see nursing assessment    Functional Status Prior    Ambulation 0-->independent    Transferring 0-->independent    Toileting 0-->independent    Bathing 0-->independent    Dressing 0-->independent    Eating 0-->independent    Communication 0-->understands/communicates without difficulty    Swallowing 0-->swallows foods/liquids without difficulty    IADL    Medications independent    Meal Preparation independent    Housekeeping independent    Laundry independent    Shopping  independent    Oral Care independent            Psychosocial     None            Abuse/Neglect     None            Legal     None            Substance Abuse     None            Patient Forms     None          Rafi Haq

## 2017-01-06 NOTE — CONSULTS
Gastroenterology Consult Note    Referring Provider: RYAN Iverson  Reason for Consultation: Anemia    Patient Care Team:  ARMANDO Pugh as PCP - General (Family Medicine)    Chief complaint Heme positive stool and anemia        History of present illness:   The patient is a 61-year-old female who presents with progressive dyspnea, weakness, for about 2 months duration.  Apparently, she is chronically anemic with iron deficiency.  It does not sound like she has ever been tested for celiac disease.  She was found to also have positive hemoccult tests and apparently a hemoglobin of 4.  She received several units of blood.  She denies any melena, hematemesis or hematochezia.  She denies nonsteroidal anti-inflammatories.  She does not take any aspirin.  She has been on Eliquis and she was diagnosed with atrial fibrillation in 11/2016.  She has had colonoscopy 5 or 6 years ago by Dr. White, which was reportedly normal.  She denies any family history of colon polyps or cancer.  Interestingly, her mother also has iron deficiency anemia and requires transfusions.  She also has noted some ankle swelling and again she thought this was related to the atrial fibrillation, which delayed her presentation.  She has otherwise been pretty healthy with really just hypertension, hyperlipidemia, hypothyroidism, new onset atrial fibrillation in 11/2016.        Allergies   Allergen Reactions   • Sulfa Antibiotics Anaphylaxis     [unfilled]  Past Medical History   Diagnosis Date   • History of anemia    • Hypertension    • Hypothyroidism    • Osteoarthritis      Past Surgical History   Procedure Laterality Date   • Augmentation mammaplasty Bilateral 2008   • Hip arthroscopy Right 2012   • Tonsillectomy  1964     Family History   Problem Relation Age of Onset   • Atrial fibrillation Mother    • Diabetes Mother    • Atrial fibrillation Brother    • Abnormal EKG Brother    • Heart attack Father    • Stroke Father    • Diabetes Sister     • Breast cancer Neg Hx    • Ovarian cancer Neg Hx      Social History     Social History   • Marital status:      Spouse name: N/A   • Number of children: N/A   • Years of education: N/A     Occupational History   • ARNP      Social History Main Topics   • Smoking status: Former Smoker     Types: Cigarettes   • Smokeless tobacco: Never Used      Comment: quit 35 years ago.    • Alcohol use 4.8 oz/week     8 Shots of liquor per week   • Drug use: No   • Sexual activity: Not on file     Other Topics Concern   • Not on file     Social History Narrative    5-10 servings of caffeine per day.      ------    Vital Signs   Temp:  [97.5 °F (36.4 °C)-98.5 °F (36.9 °C)] 97.5 °F (36.4 °C)  Heart Rate:  [68-78] 69  Resp:  [18] 18  BP: (101-129)/(46-74) 123/72    Physical Examination:  General Appearance:  Alert, cooperative, in no acute distress  Head: Normocephalic, without obvious abnormality, atraumatic  Eyes: Conjunctivae and sclerae normal, no icterus, no pallor, corneas clear, PERRLA  Ears: Ears appear intact with no abnormalities noted  Throat: oral mucosa moist  Neck: No adenopathy, supple, trachea midline, no thyromegaly,  no JVD  Lungs: Clear to auscultation,respirations regular, even and unlabored  Heart: Regular rhythm and normal rate, normal S1 and S2, no murmur, no gallop, no rub, no click  Chest Wall: No abnormalities observed  Abdomen:  Normal bowel sounds, no masses, no organomegaly, soft non-tender, non-distended, no guarding, no rebound tenderness  Extremities: Moves all extremities well, no edema, no cyanosis, no redness  Skin: No bleeding, bruising or rash  Neurologic: Cranial nerves 2 - 12 grossly intact, no focal neurological deficits    Review of Systems:  Per HPI and H and P    Results for orders placed or performed during the hospital encounter of 01/05/17   Comprehensive Metabolic Panel   Result Value Ref Range    Glucose 88 70 - 100 mg/dL    BUN 18 9 - 23 mg/dL    Creatinine 1.10 0.60 - 1.30  mg/dL    Sodium 133 132 - 146 mmol/L    Potassium 4.3 3.5 - 5.5 mmol/L    Chloride 99 99 - 109 mmol/L    CO2 20.0 20.0 - 31.0 mmol/L    Calcium 8.8 8.7 - 10.4 mg/dL    Total Protein 6.8 5.7 - 8.2 g/dL    Albumin 4.20 3.20 - 4.80 g/dL    ALT (SGPT) 49 (H) 7 - 40 U/L    AST (SGOT) 84 (H) 0 - 33 U/L    Alkaline Phosphatase 122 (H) 25 - 100 U/L    Total Bilirubin 1.0 0.3 - 1.2 mg/dL    eGFR Non African Amer 50 (L) >60 mL/min/1.73    Globulin 2.6 gm/dL    A/G Ratio 1.6 1.5 - 2.5 g/dL    BUN/Creatinine Ratio 16.4 7.0 - 25.0    Anion Gap 14.0 (H) 3.0 - 11.0 mmol/L   Magnesium   Result Value Ref Range    Magnesium 1.9 1.3 - 2.7 mg/dL   Urinalysis With / Culture If Indicated   Result Value Ref Range    Color, UA Yellow Yellow, Straw    Appearance, UA Clear Clear    pH, UA 6.5 5.0 - 8.0    Specific Gravity, UA 1.010 1.005 - 1.030    Glucose, UA Negative Negative    Ketones, UA Trace (A) Negative    Bilirubin, UA Negative Negative    Blood, UA Negative Negative    Protein, UA Negative Negative    Leuk Esterase, UA Negative Negative    Nitrite, UA Negative Negative    Urobilinogen, UA 0.2 E.U./dL 0.2 - 1.0 E.U./dL   CBC Auto Differential   Result Value Ref Range    WBC 6.80 3.50 - 10.80 10*3/mm3    RBC 2.03 (L) 3.89 - 5.14 10*6/mm3    Hemoglobin 4.8 (C) 11.5 - 15.5 g/dL    Hematocrit 16.2 (C) 34.5 - 44.0 %    MCV 79.8 (L) 80.0 - 99.0 fL    MCH 23.6 (L) 27.0 - 31.0 pg    MCHC 29.6 (L) 32.0 - 36.0 g/dL    RDW 19.1 (H) 11.3 - 14.5 %    RDW-SD 57.2 (H) 37.0 - 54.0 fl    MPV 9.8 6.0 - 12.0 fL    Platelets 372 150 - 450 10*3/mm3    Neutrophil % 78.5 (H) 41.0 - 71.0 %    Lymphocyte % 11.6 (L) 24.0 - 44.0 %    Monocyte % 8.5 0.0 - 12.0 %    Eosinophil % 0.4 0.0 - 3.0 %    Basophil % 0.4 0.0 - 1.0 %    Immature Grans % 0.6 0.0 - 0.6 %    Neutrophils, Absolute 5.33 1.50 - 8.30 10*3/mm3    Lymphocytes, Absolute 0.79 0.60 - 4.80 10*3/mm3    Monocytes, Absolute 0.58 0.00 - 1.00 10*3/mm3    Eosinophils, Absolute 0.03 (L) 0.10 - 0.30 10*3/mm3     Basophils, Absolute 0.03 0.00 - 0.20 10*3/mm3    Immature Grans, Absolute 0.04 (H) 0.00 - 0.03 10*3/mm3    nRBC 0.4 (H) 0.0 - 0.0 /100 WBC   Vitamin B12   Result Value Ref Range    Vitamin B-12 768 211 - 911 pg/mL   Bilirubin, Total & Direct   Result Value Ref Range    Total Bilirubin 0.9 0.3 - 1.2 mg/dL    Bilirubin, Direct 0.3 (H) 0.0 - 0.2 mg/dL    Bilirubin, Indirect 0.6 0.1 - 1.1 mg/dL   Folate   Result Value Ref Range    Folate >24.00 (H) 3.20 - 20.00 ng/mL   Ferritin   Result Value Ref Range    Ferritin 9.00 (L) 10.00 - 291.00 ng/mL   Iron Profile   Result Value Ref Range    Iron <2 (L) 50 - 175 mcg/dL    TIBC 394 250 - 450 mcg/dL    Iron Saturation  15 - 50 %   Lactate Dehydrogenase   Result Value Ref Range     (H) 120 - 246 U/L   Reticulocytes   Result Value Ref Range    Reticulocyte % 2.76 (H) 0.50 - 1.50 %   Transferrin Saturation   Result Value Ref Range    Iron 98 50 - 175 mcg/dL    TIBC 380 250 - 450 mcg/dL    Transferrin % 25.79 15.00 - 50.00 %   Hemoglobin & Hematocrit, Blood   Result Value Ref Range    Hemoglobin 5.8 (C) 11.5 - 15.5 g/dL    Hematocrit 19.4 (L) 34.5 - 44.0 %   Renal Function Panel   Result Value Ref Range    Glucose 91 70 - 100 mg/dL    BUN 18 9 - 23 mg/dL    Creatinine 1.00 0.60 - 1.30 mg/dL    Sodium 139 132 - 146 mmol/L    Potassium 3.8 3.5 - 5.5 mmol/L    Chloride 108 99 - 109 mmol/L    CO2 24.0 20.0 - 31.0 mmol/L    Calcium 8.7 8.7 - 10.4 mg/dL    Albumin 3.60 3.20 - 4.80 g/dL    Phosphorus 4.2 2.4 - 5.1 mg/dL    Anion Gap 7.0 3.0 - 11.0 mmol/L    BUN/Creatinine Ratio 18.0 7.0 - 25.0    eGFR Non African Amer 56 (L) >60 mL/min/1.73   Hemoglobin & Hematocrit, Blood   Result Value Ref Range    Hemoglobin 8.1 (L) 11.5 - 15.5 g/dL    Hematocrit 25.5 (L) 34.5 - 44.0 %   Hemoglobin & Hematocrit, Blood   Result Value Ref Range    Hemoglobin 7.4 (L) 11.5 - 15.5 g/dL    Hematocrit 23.6 (L) 34.5 - 44.0 %   POC Troponin, Rapid   Result Value Ref Range    Troponin I 0.02 0.00 -  0.07 ng/mL   POCT Occult Blood, stool   Result Value Ref Range    Fecal Occult Blood Positive     Lot Number [id number]     Expiration Date 12-18     DEVELOPER LOT NUMBER [id number]     DEVELOPER EXPIRATION DATE 11-19     Positive Control Positive Positive    Negative Control Negative Negative   Type & Screen   Result Value Ref Range    ABO Type O     RH type Positive     Antibody Screen Negative    Direct Antiglobulin Test   Result Value Ref Range    DELMY Negative    Prepare RBC, 3 Units   Result Value Ref Range    Product Code A4238H99     Unit Number [id number]     UNIT  ABO O     UNIT  RH POS     CROSSMATCH 1 INTERPRETATION Compatible     Dispense Status IS     Blood Type OPOS     Blood Expiration Date 201701252359     Blood Type Barcode 5100     Product Code M1845D87     Unit Number [id number]     UNIT  ABO O     UNIT  RH POS     CROSSMATCH 1 INTERPRETATION Compatible     Dispense Status IS     Blood Type OPOS     Blood Expiration Date 201701252359     Blood Type Barcode 5100     Product Code K8454M76     Unit Number [id number]     UNIT  ABO O     UNIT  RH POS     CROSSMATCH 1 INTERPRETATION Compatible     Dispense Status IS     Blood Type OPOS     Blood Expiration Date 201701252359     Blood Type Barcode 5100    Light Blue Top   Result Value Ref Range    Extra Tube hold for add-on    Green Top (Gel)   Result Value Ref Range    Extra Tube Hold for add-ons.    Lavender Top   Result Value Ref Range    Extra Tube hold for add-on    Gold Top - SST   Result Value Ref Range    Extra Tube Hold for add-ons.        Results Review:  Reviewed      Assessment/Plan   1.  Anemia and heme positive stool.  She has iron deficiency anemia and I certainly will check a celiac panel.  We will send off for comprehensive celiac panel.  She needs an upper endoscopy to rule out peptic ulcer disease.  On Eliquis even a relatively minor lesion could cause bleeding.  We will set the EGD up for the morning.  She will  be off Eliquis  for 48 hours and we will keep her n.p.o. after midnight tonight.  Ultimately, she probably will be best to have a repeat colonoscopy, although I would start with the upper endoscopy.  I discussed this with her.  I agree with the Protonix for now.  She seems very stable.     Thank you very much.        Active Problems:    Symptomatic anemia        I discussed the patients findings and my recommendations with patient and IM.     Abhishek Benton MD  01/06/17  10:17 AM

## 2017-01-06 NOTE — PLAN OF CARE
Problem: Patient Care Overview (Adult)  Goal: Plan of Care Review  Outcome: Ongoing (interventions implemented as appropriate)    01/06/17 0259   Coping/Psychosocial Response Interventions   Plan Of Care Reviewed With patient   Patient Care Overview   Progress improving

## 2017-01-06 NOTE — PROGRESS NOTES
Hospitalist Daily Progress Note    Date of Admission: 1/5/2017   LOS: 1 day   PCP: ARMANDO Edwards    Chief Complaint: acute on chronic anemia    Subjective      Sitting up today.  No fevers.  No chills.  No bleeding reported per rectum or other places.  Last colonoscopy was about 5 yrs ago with Dr. White.    History of Present Illness    Review of Systems  General: no fevers, chills  Respiratory: no cough, dyspnea  Cardiovascular: no chest pain, palpitations  Abdomen: No abdominal pain, nausea, vomiting, or diarrhea  Neurologic: No focal weakness    Objective   Physical Exam:  I have reviewed the vital signs.  Temp:  [97.5 °F (36.4 °C)-98.6 °F (37 °C)] 98.6 °F (37 °C)  Heart Rate:  [64-81] 64  Resp:  [18] 18  BP: (106-132)/(46-75) 113/62    Objective  General Appearance:    Alert, cooperative, no distress  Head:    Normocephalic, atraumatic  Eyes:    Sclerae anicteric  Neck:   Supple, no mass  Lungs: Clear to auscultation bilaterally, respirations unlabored  Heart: Regular rate and rhythm, S1 and S2 normal, no murmur, rub or gallop  Abdomen:  Soft, non-tender, bowel sounds active, nondistended  Extremities: No clubbing, cyanosis, or edema to lower extremities  Pulses:  2+ and symmetric in distal lower extremities  Skin: No rashes   Neurologic: Oriented x3, Normal strength to extremities    Results Review:    I have reviewed the labs, radiology results and diagnostic studies.      Results from last 7 days  Lab Units 01/06/17  1546  01/05/17  0952   WBC 10*3/mm3  --   --  6.80   HEMOGLOBIN g/dL 8.0*  < > 4.8*   PLATELETS 10*3/mm3  --   --  372   < > = values in this interval not displayed.    Results from last 7 days  Lab Units 01/06/17  0320   SODIUM mmol/L 139   POTASSIUM mmol/L 3.8   TOTAL CO2 mmol/L 24.0   CREATININE mg/dL 1.00   GLUCOSE mg/dL 91       I have reviewed the medications.    ---------------------------------------------------------------------------------------------  Assessment/Plan    Assessment & Plan  Assessment/Problem List    Active Problems:    Symptomatic anemia    61 year old female with severe, symptomatic anemia.     Plan    Acute on Chronic Anemia  - complex situation on anticoagulation at baseline  A Fib  - anticoagulation on hold until GI evaluation  - on Sotalol  GI blood Loss   - continue Protonix 40 mg IV BID  Trace Right Pleural Effusion  - unclear significance  Elevated LDH  - check retic / haptoglobin    Consulted Gastroenterology today  Discussed case with Dr. Benton    DVT prophylaxis:   SCDs / STACIE hose  Discharge Planning: I expect patient to be discharged to be determined    Jon Cuba MD 01/06/17 6:09 PM

## 2017-01-07 LAB
HAPTOGLOB SERPL-MCNC: 140 MG/DL (ref 34–200)
HCT VFR BLD AUTO: 24.7 % (ref 34.5–44)
HGB BLD-MCNC: 7.6 G/DL (ref 11.5–15.5)
LEAD BLD-MCNC: 1 UG/DL (ref 0–19)
RETICS/RBC NFR AUTO: 2.24 % (ref 0.5–1.5)

## 2017-01-07 PROCEDURE — 99232 SBSQ HOSP IP/OBS MODERATE 35: CPT | Performed by: HOSPITALIST

## 2017-01-07 PROCEDURE — 88305 TISSUE EXAM BY PATHOLOGIST: CPT | Performed by: INTERNAL MEDICINE

## 2017-01-07 PROCEDURE — 93005 ELECTROCARDIOGRAM TRACING: CPT | Performed by: INTERNAL MEDICINE

## 2017-01-07 PROCEDURE — 0DB98ZX EXCISION OF DUODENUM, VIA NATURAL OR ARTIFICIAL OPENING ENDOSCOPIC, DIAGNOSTIC: ICD-10-PCS | Performed by: INTERNAL MEDICINE

## 2017-01-07 PROCEDURE — 83010 ASSAY OF HAPTOGLOBIN QUANT: CPT | Performed by: HOSPITALIST

## 2017-01-07 PROCEDURE — 25010000002 FENTANYL CITRATE (PF) 100 MCG/2ML SOLUTION: Performed by: INTERNAL MEDICINE

## 2017-01-07 PROCEDURE — 85014 HEMATOCRIT: CPT | Performed by: HOSPITALIST

## 2017-01-07 PROCEDURE — 25010000002 MIDAZOLAM PER 1 MG: Performed by: INTERNAL MEDICINE

## 2017-01-07 PROCEDURE — 85045 AUTOMATED RETICULOCYTE COUNT: CPT | Performed by: HOSPITALIST

## 2017-01-07 PROCEDURE — 85018 HEMOGLOBIN: CPT | Performed by: HOSPITALIST

## 2017-01-07 RX ORDER — PANTOPRAZOLE SODIUM 40 MG/1
40 TABLET, DELAYED RELEASE ORAL EVERY 12 HOURS SCHEDULED
Status: DISCONTINUED | OUTPATIENT
Start: 2017-01-07 | End: 2017-01-09 | Stop reason: HOSPADM

## 2017-01-07 RX ORDER — MIDAZOLAM HYDROCHLORIDE 5 MG/ML
INJECTION INTRAMUSCULAR; INTRAVENOUS AS NEEDED
Status: COMPLETED | OUTPATIENT
Start: 2017-01-07 | End: 2017-01-07

## 2017-01-07 RX ORDER — FENTANYL CITRATE 50 UG/ML
INJECTION, SOLUTION INTRAMUSCULAR; INTRAVENOUS AS NEEDED
Status: COMPLETED | OUTPATIENT
Start: 2017-01-07 | End: 2017-01-07

## 2017-01-07 RX ADMIN — FERROUS SULFATE TAB 325 MG (65 MG ELEMENTAL FE) 325 MG: 325 (65 FE) TAB at 17:05

## 2017-01-07 RX ADMIN — FENTANYL CITRATE 25 MCG: 50 INJECTION, SOLUTION INTRAMUSCULAR; INTRAVENOUS at 10:19

## 2017-01-07 RX ADMIN — PANTOPRAZOLE SODIUM 40 MG: 40 TABLET, DELAYED RELEASE ORAL at 20:54

## 2017-01-07 RX ADMIN — MIDAZOLAM HYDROCHLORIDE 1 MG: 5 INJECTION, SOLUTION INTRAMUSCULAR; INTRAVENOUS at 10:20

## 2017-01-07 RX ADMIN — OXYCODONE HYDROCHLORIDE AND ACETAMINOPHEN 500 MG: 500 TABLET ORAL at 17:05

## 2017-01-07 RX ADMIN — MIDAZOLAM HYDROCHLORIDE 2 MG: 5 INJECTION, SOLUTION INTRAMUSCULAR; INTRAVENOUS at 10:23

## 2017-01-07 RX ADMIN — MIDAZOLAM HYDROCHLORIDE 2 MG: 5 INJECTION, SOLUTION INTRAMUSCULAR; INTRAVENOUS at 10:17

## 2017-01-07 RX ADMIN — SOTALOL HYDROCHLORIDE 80 MG: 80 TABLET ORAL at 09:02

## 2017-01-07 RX ADMIN — FENTANYL CITRATE 25 MCG: 50 INJECTION, SOLUTION INTRAMUSCULAR; INTRAVENOUS at 10:16

## 2017-01-07 NOTE — PLAN OF CARE
Problem: GI Endoscopy (Adult)  Goal: Signs and Symptoms of Listed Potential Problems Will be Absent or Manageable (GI Endoscopy)  Outcome: Ongoing (interventions implemented as appropriate)

## 2017-01-07 NOTE — OP NOTE
DATE OF PROCEDURE:  01/07/2017    REFERRING PHYSICIAN: Jon Cuba MD    PROCEDURE PERFORMED: Esophagogastroduodenoscopy.    INSTRUMENT: Olympus video gastroscope.    MEDICATIONS: Fentanyl 50 mcg, Versed 5 mg.     PREOPERATIVE DIAGNOSIS: Anemia.     POSTOPERATIVE DIAGNOSES:   1. Hiatal hernia.   2. Gastritis, no active bleeding.     INDICATIONS: The patient is a 61-year-old with severe anemia with a hemoglobin of 4 on admission. She is on Eliquis. She is here for an upper endoscopy to evaluate.     DESCRIPTION OF PROCEDURE: After the patient was informed of the risks, benefits, and alternatives to the procedure, informed consent was obtained. The endoscope was inserted in the oropharynx, down the esophagus, into the stomach and duodenum. The esophageal mucosa looked normal. I did not see any sign of esophagitis or varices. There was a crisp Z line. There was a hiatal hernia. The stomach was remarkable for gastritis. There were no ulcerations detected. There was no active bleeding. There was no heme in the stomach. I saw no AVMs. The duodenal bulb and descending duodenum appeared normal.  I  advanced a little further until about the 3rd portion of the duodenum. I could find no AVMs, active bleeding, or ulceration. Biopsies were taken to rule out celiac disease. Retroflexion was performed in the stomach. The patient tolerated the procedure well. No immediate complications.     IMPRESSION:  1. Hiatal hernia.   2. Gastritis.   3. Biopsies taken to rule out celiac disease.     PLAN: Followup biopsies.  We will prep her for a colonoscopy on Monday while she is off Eliquis.       Abhishek Benton M.D.  Carlito  DD: 01/07/2017 10:32:59  DT: 01/07/2017 11:27:41  Voice Rec. ID #[id number]  Voice Original ID #[id number]  Doc ID #[id number]  Rev. #0  cc:    Jon Cuba MD

## 2017-01-07 NOTE — PLAN OF CARE
Problem: Patient Care Overview (Adult)  Goal: Plan of Care Review  Outcome: Ongoing (interventions implemented as appropriate)    01/07/17 0303 01/07/17 0902 01/07/17 1511   Coping/Psychosocial Response Interventions   Plan Of Care Reviewed With --  patient --    Patient Care Overview   Progress improving --  --    Outcome Evaluation   Outcome Summary/Follow up Plan --  --  Pt went for EGD today. Pt will go for colonoscopy on Monday. Continue current care and monitor H/H. Possible transfusion tomorrow morning.          Problem: Cardiac Output, Decreased (Adult)  Goal: Adequate Cardiac Output/Effective Tissue Perfusion  Outcome: Ongoing (interventions implemented as appropriate)    Problem: GI Endoscopy (Adult)  Goal: Signs and Symptoms of Listed Potential Problems Will be Absent or Manageable (GI Endoscopy)  Outcome: Ongoing (interventions implemented as appropriate)

## 2017-01-07 NOTE — PROGRESS NOTES
Hospitalist Daily Progress Note    Date of Admission: 1/5/2017   LOS: 2 days   PCP: ARMANDO Edwards    Chief Complaint: acute on chronic anemia    Subjective      Seen before and after EGD.  Looks well.  Denies any blood loss over the last 24 hours.  Discussed need for every 12 hour hemoglobin testing.  Hemoglobin lower today with no evidence of blood loss.    Fatigue   Associated symptoms include fatigue.       Review of Systems   Constitutional: Positive for fatigue.     General: no fevers, chills  Respiratory: no cough, dyspnea  Cardiovascular: no chest pain, palpitations  Abdomen: No abdominal pain, nausea, vomiting, or diarrhea  Neurologic: No focal weakness    Objective   Physical Exam:  I have reviewed the vital signs.  Heart Rate:  [62-77] 67  Resp:  [16-17] 17  BP: (129-170)/(59-95) 140/87    Objective  General Appearance:    Alert, cooperative, no distress  Head:    Normocephalic, atraumatic  Eyes:    Sclerae anicteric  Neck:   Supple, no mass  Lungs: Clear to auscultation bilaterally, respirations unlabored  Heart: Regular rate and rhythm, S1 and S2 normal, no murmur, rub or gallop  Abdomen:  Soft, non-tender, bowel sounds active, nondistended  Extremities: No clubbing, cyanosis, or edema to lower extremities  Pulses:  2+ and symmetric in distal lower extremities  Skin: No rashes   Neurologic: Oriented x3, Normal strength to extremities    Results Review:    I have reviewed the labs, radiology results and diagnostic studies.      Results from last 7 days  Lab Units 01/07/17  0552  01/05/17  0952   WBC 10*3/mm3  --   --  6.80   HEMOGLOBIN g/dL 7.6*  < > 4.8*   PLATELETS 10*3/mm3  --   --  372   < > = values in this interval not displayed.    Results from last 7 days  Lab Units 01/06/17  0320   SODIUM mmol/L 139   POTASSIUM mmol/L 3.8   TOTAL CO2 mmol/L 24.0   CREATININE mg/dL 1.00   GLUCOSE mg/dL 91       I have reviewed the  medications.    ---------------------------------------------------------------------------------------------  Assessment/Plan   Assessment & Plan  Assessment/Problem List    Active Problems:    Symptomatic anemia    61 year old female with severe, symptomatic anemia.     Plan    Acute on Chronic Anemia  - appears to have multifactorial anemia  - clearly she appears iron deficient, but also has evidence of gi blood loss with occult + stools  - complex situation because she is on anticoagulation at baseline  A Fib  - anticoagulation on hold until GI evaluation  - on Sotalol daily now  GI blood Loss   - continue Protonix 40 mg IV BID  - unclear source of blood loss  Trace Right Pleural Effusion  - unclear significance  - CXR (PA/Lateral) as follow up on process  Elevated LDH  - unclear significance    Since no active bleeding or visible blood loss with discontinue H and H ever 12 hours and make daily  If bleeding seen may need to increase frequency of H and H monitoring again    Consulted Gastroenterology - Planned for Colonoscopy on Monday  Discussed case with Dr. Benton    DVT prophylaxis:   SCDs / STACIE hose  Discharge Planning: I expect patient to be discharged home Monday afternoon    Jon Cuba MD 01/07/17 2:38 PM

## 2017-01-07 NOTE — PLAN OF CARE
Problem: Patient Care Overview (Adult)  Goal: Plan of Care Review  Outcome: Ongoing (interventions implemented as appropriate)    01/07/17 0303   Coping/Psychosocial Response Interventions   Plan Of Care Reviewed With patient   Patient Care Overview   Progress improving

## 2017-01-07 NOTE — PROGRESS NOTES
Gastroenterology Note     LOS: 2 days   Patient Care Team:  ARMANDO Pugh as PCP - General (Family Medicine)      Subjective     Patient Complaints: None  Patient Denies:  Pain        Objective   NAD    Vital Signs  Temp:  [98.6 °F (37 °C)] 98.6 °F (37 °C)  Heart Rate:  [64-76] 75  Resp:  [16-18] 16  BP: (113-170)/(59-91) 148/74    Physical Exam:   General Appearance: Alert, cooperative, in no acute distress  Head: Normocephalic, without obvious abnormality, atraumatic  Throat: No oral lesions, no thrush, oral mucosa moist  Lungs: Clear to auscultation,respirations regular, even and unlabored Heart: Regular rhythm and normal rate, normal S1 and S2, no murmur, no gallop, no rub, no click  Abdomen: Normal bowel sounds, no masses, no organomegaly, soft non-tender, non-distended, no guarding, no rebound tenderness  Extremities:  Moves all extremities well, no edema, no cyanosis, no redness  Neurologic: Cranial nerves 2 - 12 grossly intact, no focal deficits       Results Review:       Results for orders placed or performed during the hospital encounter of 01/05/17   Comprehensive Metabolic Panel   Result Value Ref Range    Glucose 88 70 - 100 mg/dL    BUN 18 9 - 23 mg/dL    Creatinine 1.10 0.60 - 1.30 mg/dL    Sodium 133 132 - 146 mmol/L    Potassium 4.3 3.5 - 5.5 mmol/L    Chloride 99 99 - 109 mmol/L    CO2 20.0 20.0 - 31.0 mmol/L    Calcium 8.8 8.7 - 10.4 mg/dL    Total Protein 6.8 5.7 - 8.2 g/dL    Albumin 4.20 3.20 - 4.80 g/dL    ALT (SGPT) 49 (H) 7 - 40 U/L    AST (SGOT) 84 (H) 0 - 33 U/L    Alkaline Phosphatase 122 (H) 25 - 100 U/L    Total Bilirubin 1.0 0.3 - 1.2 mg/dL    eGFR Non African Amer 50 (L) >60 mL/min/1.73    Globulin 2.6 gm/dL    A/G Ratio 1.6 1.5 - 2.5 g/dL    BUN/Creatinine Ratio 16.4 7.0 - 25.0    Anion Gap 14.0 (H) 3.0 - 11.0 mmol/L   Magnesium   Result Value Ref Range    Magnesium 1.9 1.3 - 2.7 mg/dL   Urinalysis With / Culture If Indicated   Result Value Ref Range    Color, UA Yellow  Yellow, Straw    Appearance, UA Clear Clear    pH, UA 6.5 5.0 - 8.0    Specific Gravity, UA 1.010 1.005 - 1.030    Glucose, UA Negative Negative    Ketones, UA Trace (A) Negative    Bilirubin, UA Negative Negative    Blood, UA Negative Negative    Protein, UA Negative Negative    Leuk Esterase, UA Negative Negative    Nitrite, UA Negative Negative    Urobilinogen, UA 0.2 E.U./dL 0.2 - 1.0 E.U./dL   CBC Auto Differential   Result Value Ref Range    WBC 6.80 3.50 - 10.80 10*3/mm3    RBC 2.03 (L) 3.89 - 5.14 10*6/mm3    Hemoglobin 4.8 (C) 11.5 - 15.5 g/dL    Hematocrit 16.2 (C) 34.5 - 44.0 %    MCV 79.8 (L) 80.0 - 99.0 fL    MCH 23.6 (L) 27.0 - 31.0 pg    MCHC 29.6 (L) 32.0 - 36.0 g/dL    RDW 19.1 (H) 11.3 - 14.5 %    RDW-SD 57.2 (H) 37.0 - 54.0 fl    MPV 9.8 6.0 - 12.0 fL    Platelets 372 150 - 450 10*3/mm3    Neutrophil % 78.5 (H) 41.0 - 71.0 %    Lymphocyte % 11.6 (L) 24.0 - 44.0 %    Monocyte % 8.5 0.0 - 12.0 %    Eosinophil % 0.4 0.0 - 3.0 %    Basophil % 0.4 0.0 - 1.0 %    Immature Grans % 0.6 0.0 - 0.6 %    Neutrophils, Absolute 5.33 1.50 - 8.30 10*3/mm3    Lymphocytes, Absolute 0.79 0.60 - 4.80 10*3/mm3    Monocytes, Absolute 0.58 0.00 - 1.00 10*3/mm3    Eosinophils, Absolute 0.03 (L) 0.10 - 0.30 10*3/mm3    Basophils, Absolute 0.03 0.00 - 0.20 10*3/mm3    Immature Grans, Absolute 0.04 (H) 0.00 - 0.03 10*3/mm3    nRBC 0.4 (H) 0.0 - 0.0 /100 WBC   Vitamin B12   Result Value Ref Range    Vitamin B-12 768 211 - 911 pg/mL   Bilirubin, Total & Direct   Result Value Ref Range    Total Bilirubin 0.9 0.3 - 1.2 mg/dL    Bilirubin, Direct 0.3 (H) 0.0 - 0.2 mg/dL    Bilirubin, Indirect 0.6 0.1 - 1.1 mg/dL   Folate   Result Value Ref Range    Folate >24.00 (H) 3.20 - 20.00 ng/mL   Ferritin   Result Value Ref Range    Ferritin 9.00 (L) 10.00 - 291.00 ng/mL   Haptoglobin   Result Value Ref Range    Haptoglobin 140 34 - 200 mg/dL   Iron Profile   Result Value Ref Range    Iron <2 (L) 50 - 175 mcg/dL    TIBC 394 250 - 450  mcg/dL    Iron Saturation  15 - 50 %   Lactate Dehydrogenase   Result Value Ref Range     (H) 120 - 246 U/L   Reticulocytes   Result Value Ref Range    Reticulocyte % 2.76 (H) 0.50 - 1.50 %   Transferrin Saturation   Result Value Ref Range    Iron 98 50 - 175 mcg/dL    TIBC 380 250 - 450 mcg/dL    Transferrin % 25.79 15.00 - 50.00 %   Hemoglobin & Hematocrit, Blood   Result Value Ref Range    Hemoglobin 5.8 (C) 11.5 - 15.5 g/dL    Hematocrit 19.4 (L) 34.5 - 44.0 %   Renal Function Panel   Result Value Ref Range    Glucose 91 70 - 100 mg/dL    BUN 18 9 - 23 mg/dL    Creatinine 1.00 0.60 - 1.30 mg/dL    Sodium 139 132 - 146 mmol/L    Potassium 3.8 3.5 - 5.5 mmol/L    Chloride 108 99 - 109 mmol/L    CO2 24.0 20.0 - 31.0 mmol/L    Calcium 8.7 8.7 - 10.4 mg/dL    Albumin 3.60 3.20 - 4.80 g/dL    Phosphorus 4.2 2.4 - 5.1 mg/dL    Anion Gap 7.0 3.0 - 11.0 mmol/L    BUN/Creatinine Ratio 18.0 7.0 - 25.0    eGFR Non African Amer 56 (L) >60 mL/min/1.73   Hemoglobin & Hematocrit, Blood   Result Value Ref Range    Hemoglobin 8.1 (L) 11.5 - 15.5 g/dL    Hematocrit 25.5 (L) 34.5 - 44.0 %   Hemoglobin & Hematocrit, Blood   Result Value Ref Range    Hemoglobin 7.4 (L) 11.5 - 15.5 g/dL    Hematocrit 23.6 (L) 34.5 - 44.0 %   Hemoglobin & Hematocrit, Blood   Result Value Ref Range    Hemoglobin 8.0 (L) 11.5 - 15.5 g/dL    Hematocrit 26.3 (L) 34.5 - 44.0 %   Hemoglobin & Hematocrit, Blood   Result Value Ref Range    Hemoglobin 7.6 (L) 11.5 - 15.5 g/dL    Hematocrit 24.7 (L) 34.5 - 44.0 %   Reticulocytes   Result Value Ref Range    Reticulocyte % 2.24 (H) 0.50 - 1.50 %   POC Troponin, Rapid   Result Value Ref Range    Troponin I 0.02 0.00 - 0.07 ng/mL   POCT Occult Blood, stool   Result Value Ref Range    Fecal Occult Blood Positive     Lot Number [id number]     Expiration Date 12-18     DEVELOPER LOT NUMBER [id number]     DEVELOPER EXPIRATION DATE 11-19     Positive Control Positive Positive    Negative Control Negative Negative   Type &  Screen   Result Value Ref Range    ABO Type O     RH type Positive     Antibody Screen Negative    Direct Antiglobulin Test   Result Value Ref Range    DELMY Negative    Prepare RBC, 3 Units   Result Value Ref Range    Product Code G0454A94     Unit Number [id number]     UNIT  ABO O     UNIT  RH POS     CROSSMATCH 1 INTERPRETATION Compatible     Dispense Status PT     Blood Type OPOS     Blood Expiration Date 201701252359     Blood Type Barcode 5100     Product Code L6310V36     Unit Number [id number]     UNIT  ABO O     UNIT  RH POS     CROSSMATCH 1 INTERPRETATION Compatible     Dispense Status PT     Blood Type OPOS     Blood Expiration Date 201701252359     Blood Type Barcode 5100     Product Code N6645U63     Unit Number [id number]     UNIT  ABO O     UNIT  RH POS     CROSSMATCH 1 INTERPRETATION Compatible     Dispense Status PT     Blood Type OPOS     Blood Expiration Date 201701252359     Blood Type Barcode 5100    Light Blue Top   Result Value Ref Range    Extra Tube hold for add-on    Green Top (Gel)   Result Value Ref Range    Extra Tube Hold for add-ons.    Lavender Top   Result Value Ref Range    Extra Tube hold for add-on    Gold Top - SST   Result Value Ref Range    Extra Tube Hold for add-ons.          Assessment/Plan   1) Anemia, EGD done and dictated. Hiatal hernia noted, as well as gastritis. No ulcer seen. No heme or active bleeding. No Angiodysplasia noted. Biopsies of the duodenum taken to rule out celiac disease. Plan Colonoscopy Monday.   Abhishek Benton MD  01/07/17  10:33 AM

## 2017-01-08 ENCOUNTER — APPOINTMENT (OUTPATIENT)
Dept: GENERAL RADIOLOGY | Facility: HOSPITAL | Age: 62
End: 2017-01-08

## 2017-01-08 LAB
ALBUMIN SERPL-MCNC: 3.4 G/DL (ref 3.2–4.8)
ALBUMIN/GLOB SERPL: 1.5 G/DL (ref 1.5–2.5)
ALP SERPL-CCNC: 94 U/L (ref 25–100)
ALT SERPL W P-5'-P-CCNC: 20 U/L (ref 7–40)
ANION GAP SERPL CALCULATED.3IONS-SCNC: 9 MMOL/L (ref 3–11)
AST SERPL-CCNC: 20 U/L (ref 0–33)
BILIRUB SERPL-MCNC: 0.5 MG/DL (ref 0.3–1.2)
BUN BLD-MCNC: 6 MG/DL (ref 9–23)
BUN/CREAT SERPL: 8.6 (ref 7–25)
CALCIUM SPEC-SCNC: 8.9 MG/DL (ref 8.7–10.4)
CHLORIDE SERPL-SCNC: 108 MMOL/L (ref 99–109)
CO2 SERPL-SCNC: 26 MMOL/L (ref 20–31)
CREAT BLD-MCNC: 0.7 MG/DL (ref 0.6–1.3)
DEPRECATED RDW RBC AUTO: 54.4 FL (ref 37–54)
ERYTHROCYTE [DISTWIDTH] IN BLOOD BY AUTOMATED COUNT: 18.9 % (ref 11.3–14.5)
GFR SERPL CREATININE-BSD FRML MDRD: 85 ML/MIN/1.73
GLOBULIN UR ELPH-MCNC: 2.3 GM/DL
GLUCOSE BLD-MCNC: 91 MG/DL (ref 70–100)
HAPTOGLOB SERPL-MCNC: 135 MG/DL (ref 34–200)
HCT VFR BLD AUTO: 26.9 % (ref 34.5–44)
HGB BLD-MCNC: 8.2 G/DL (ref 11.5–15.5)
MCH RBC QN AUTO: 24 PG (ref 27–31)
MCHC RBC AUTO-ENTMCNC: 30.5 G/DL (ref 32–36)
MCV RBC AUTO: 78.9 FL (ref 80–99)
PLATELET # BLD AUTO: 298 10*3/MM3 (ref 150–450)
PMV BLD AUTO: 9.9 FL (ref 6–12)
POTASSIUM BLD-SCNC: 4.1 MMOL/L (ref 3.5–5.5)
PROT SERPL-MCNC: 5.7 G/DL (ref 5.7–8.2)
RBC # BLD AUTO: 3.41 10*6/MM3 (ref 3.89–5.14)
SODIUM BLD-SCNC: 143 MMOL/L (ref 132–146)
WBC NRBC COR # BLD: 4.49 10*3/MM3 (ref 3.5–10.8)

## 2017-01-08 PROCEDURE — 85027 COMPLETE CBC AUTOMATED: CPT | Performed by: HOSPITALIST

## 2017-01-08 PROCEDURE — 99233 SBSQ HOSP IP/OBS HIGH 50: CPT | Performed by: HOSPITALIST

## 2017-01-08 PROCEDURE — 93010 ELECTROCARDIOGRAM REPORT: CPT | Performed by: INTERNAL MEDICINE

## 2017-01-08 PROCEDURE — 71020 HC CHEST PA AND LATERAL: CPT

## 2017-01-08 PROCEDURE — 80053 COMPREHEN METABOLIC PANEL: CPT | Performed by: HOSPITALIST

## 2017-01-08 PROCEDURE — 93005 ELECTROCARDIOGRAM TRACING: CPT | Performed by: INTERNAL MEDICINE

## 2017-01-08 RX ADMIN — OXYCODONE HYDROCHLORIDE AND ACETAMINOPHEN 500 MG: 500 TABLET ORAL at 17:53

## 2017-01-08 RX ADMIN — PANTOPRAZOLE SODIUM 40 MG: 40 TABLET, DELAYED RELEASE ORAL at 21:36

## 2017-01-08 RX ADMIN — FERROUS SULFATE TAB 325 MG (65 MG ELEMENTAL FE) 325 MG: 325 (65 FE) TAB at 08:47

## 2017-01-08 RX ADMIN — CITALOPRAM HYDROBROMIDE 20 MG: 20 TABLET ORAL at 08:40

## 2017-01-08 RX ADMIN — FERROUS SULFATE TAB 325 MG (65 MG ELEMENTAL FE) 325 MG: 325 (65 FE) TAB at 17:52

## 2017-01-08 RX ADMIN — PANTOPRAZOLE SODIUM 40 MG: 40 TABLET, DELAYED RELEASE ORAL at 08:47

## 2017-01-08 RX ADMIN — Medication 5 MG: at 21:36

## 2017-01-08 RX ADMIN — SOTALOL HYDROCHLORIDE 80 MG: 80 TABLET ORAL at 08:40

## 2017-01-08 RX ADMIN — LEVOTHYROXINE SODIUM 100 MCG: 100 TABLET ORAL at 08:40

## 2017-01-08 RX ADMIN — ATORVASTATIN CALCIUM 40 MG: 40 TABLET, FILM COATED ORAL at 08:40

## 2017-01-08 RX ADMIN — POLYETHYLENE GLYCOL 3350, SODIUM SULFATE ANHYDROUS, SODIUM BICARBONATE, SODIUM CHLORIDE, POTASSIUM CHLORIDE 4000 ML: 236; 22.74; 6.74; 5.86; 2.97 POWDER, FOR SOLUTION ORAL at 15:04

## 2017-01-08 RX ADMIN — OXYCODONE HYDROCHLORIDE AND ACETAMINOPHEN 500 MG: 500 TABLET ORAL at 08:40

## 2017-01-08 RX ADMIN — ACETAMINOPHEN 650 MG: 325 TABLET, FILM COATED ORAL at 08:39

## 2017-01-08 NOTE — PROGRESS NOTES
Hospitalist Daily Progress Note    Date of Admission: 1/5/2017   LOS: 3 days   PCP: Tori Iverson, ARMANDO    Chief Complaint: acute on chronic anemia    Subjective       in room today.  Asking who will be doing colonoscopy.  No overnight events.  CXR (PA/Lateral) done today.  Denies Fevers.    Fatigue   Associated symptoms include fatigue.       Review of Systems   Constitutional: Positive for fatigue.     General: no fevers, chills  Respiratory: no cough, dyspnea  Cardiovascular: no chest pain, palpitations  Abdomen: No abdominal pain, nausea, vomiting, or diarrhea  Neurologic: No focal weakness    Objective   Physical Exam:  I have reviewed the vital signs.  Temp:  [97.9 °F (36.6 °C)-98.3 °F (36.8 °C)] 98.1 °F (36.7 °C)  Heart Rate:  [58-73] 60  Resp:  [16-17] 16  BP: (142-150)/(73-81) 147/73    Objective  General Appearance:    Alert, cooperative, no distress  Head:    Normocephalic, atraumatic  Eyes:    Sclerae anicteric  Neck:   Supple, no mass  Lungs: Clear to auscultation bilaterally, respirations unlabored  Heart: Regular rate and rhythm, S1 and S2 normal, no murmur, rub or gallop  Abdomen:  Soft, non-tender, bowel sounds active, nondistended  Extremities: No clubbing, cyanosis, or edema to lower extremities  Pulses:  2+ and symmetric in distal lower extremities  Skin: No rashes   Neurologic: Oriented x3, Normal strength to extremities    Results Review:    I have reviewed the labs, radiology results and diagnostic studies.      Results from last 7 days  Lab Units 01/08/17  0645   WBC 10*3/mm3 4.49   HEMOGLOBIN g/dL 8.2*   PLATELETS 10*3/mm3 298       Results from last 7 days  Lab Units 01/08/17  0645   SODIUM mmol/L 143   POTASSIUM mmol/L 4.1   TOTAL CO2 mmol/L 26.0   CREATININE mg/dL 0.70   GLUCOSE mg/dL 91       I have reviewed the medications.    ---------------------------------------------------------------------------------------------  Assessment/Plan   Assessment & Plan  Assessment/Problem  List    Active Problems:    Symptomatic anemia    61 year old female with severe, symptomatic anemia.     Plan    Acute on Chronic Anemia  - appears to have multifactorial anemia  - clearly she appears iron deficient, but also has evidence of gi blood loss with occult + stools  - complex situation because she is on anticoagulation at baseline  A Fib  - anticoagulation on hold until GI evaluation  - on Sotalol daily now  GI blood Loss   - continue Protonix 40 mg IV BID  - unclear source of blood loss  Bilateral Pleural Effusions  - unclear significance  - will defer further workup at this time after discussion with patient  Elevated LDH  - unclear significance    Since no active bleeding or visible blood loss with discontinue H and H ever 12 hours and make daily  If bleeding seen may need to increase frequency of H and H monitoring again    Bowel Prep for Colonoscopy today.  Colonoscopy tomorrow per GI  Discussed case with Dr. Benton today  If colonoscopy negative will need small bowel evaluation  Duodenal Biopsy - pending  Celiac Panel Pending    DVT prophylaxis:   SCDs / STACIE hose  Discharge Planning: I expect patient to be discharged home Monday afternoon    Jon Cuba MD 01/08/17 5:22 PM

## 2017-01-08 NOTE — PLAN OF CARE
Problem: Patient Care Overview (Adult)  Goal: Plan of Care Review  Outcome: Ongoing (interventions implemented as appropriate)    01/08/17 9168   Coping/Psychosocial Response Interventions   Plan Of Care Reviewed With patient   Patient Care Overview   Progress progress toward functional goals as expected   Outcome Evaluation   Outcome Summary/Follow up Plan Pt is prepping for colonoscopy tomorrow morning and possibly home afterwards.          Problem: Cardiac Output, Decreased (Adult)  Goal: Adequate Cardiac Output/Effective Tissue Perfusion  Outcome: Ongoing (interventions implemented as appropriate)    Problem: GI Endoscopy (Adult)  Goal: Signs and Symptoms of Listed Potential Problems Will be Absent or Manageable (GI Endoscopy)  Outcome: Ongoing (interventions implemented as appropriate)

## 2017-01-08 NOTE — PROGRESS NOTES
Gastroenterology Note     LOS: 3 days   Patient Care Team:  ARMANDO Pugh as PCP - General (Family Medicine)      Subjective     Patient Complaints: No complaints        Objective   NAD    Vital Signs  Temp:  [97.5 °F (36.4 °C)-98.3 °F (36.8 °C)] 97.9 °F (36.6 °C)  Heart Rate:  [58-73] 73  Resp:  [16-18] 16  BP: (140-150)/(78-87) 142/81    Physical Exam:   General Appearance: Alert, cooperative, in no acute distress  Head: Normocephalic, without obvious abnormality, atraumatic  Eyes: sclerae normal, no icterus, no pallor, corneas clear, PERRLA  Neurologic: Cranial nerves 2 - 12 grossly intact, no focal deficits       Results Review:       Results for orders placed or performed during the hospital encounter of 01/05/17   Comprehensive Metabolic Panel   Result Value Ref Range    Glucose 88 70 - 100 mg/dL    BUN 18 9 - 23 mg/dL    Creatinine 1.10 0.60 - 1.30 mg/dL    Sodium 133 132 - 146 mmol/L    Potassium 4.3 3.5 - 5.5 mmol/L    Chloride 99 99 - 109 mmol/L    CO2 20.0 20.0 - 31.0 mmol/L    Calcium 8.8 8.7 - 10.4 mg/dL    Total Protein 6.8 5.7 - 8.2 g/dL    Albumin 4.20 3.20 - 4.80 g/dL    ALT (SGPT) 49 (H) 7 - 40 U/L    AST (SGOT) 84 (H) 0 - 33 U/L    Alkaline Phosphatase 122 (H) 25 - 100 U/L    Total Bilirubin 1.0 0.3 - 1.2 mg/dL    eGFR Non African Amer 50 (L) >60 mL/min/1.73    Globulin 2.6 gm/dL    A/G Ratio 1.6 1.5 - 2.5 g/dL    BUN/Creatinine Ratio 16.4 7.0 - 25.0    Anion Gap 14.0 (H) 3.0 - 11.0 mmol/L   Magnesium   Result Value Ref Range    Magnesium 1.9 1.3 - 2.7 mg/dL   Urinalysis With / Culture If Indicated   Result Value Ref Range    Color, UA Yellow Yellow, Straw    Appearance, UA Clear Clear    pH, UA 6.5 5.0 - 8.0    Specific Gravity, UA 1.010 1.005 - 1.030    Glucose, UA Negative Negative    Ketones, UA Trace (A) Negative    Bilirubin, UA Negative Negative    Blood, UA Negative Negative    Protein, UA Negative Negative    Leuk Esterase, UA Negative Negative    Nitrite, UA Negative Negative     Urobilinogen, UA 0.2 E.U./dL 0.2 - 1.0 E.U./dL   CBC Auto Differential   Result Value Ref Range    WBC 6.80 3.50 - 10.80 10*3/mm3    RBC 2.03 (L) 3.89 - 5.14 10*6/mm3    Hemoglobin 4.8 (C) 11.5 - 15.5 g/dL    Hematocrit 16.2 (C) 34.5 - 44.0 %    MCV 79.8 (L) 80.0 - 99.0 fL    MCH 23.6 (L) 27.0 - 31.0 pg    MCHC 29.6 (L) 32.0 - 36.0 g/dL    RDW 19.1 (H) 11.3 - 14.5 %    RDW-SD 57.2 (H) 37.0 - 54.0 fl    MPV 9.8 6.0 - 12.0 fL    Platelets 372 150 - 450 10*3/mm3    Neutrophil % 78.5 (H) 41.0 - 71.0 %    Lymphocyte % 11.6 (L) 24.0 - 44.0 %    Monocyte % 8.5 0.0 - 12.0 %    Eosinophil % 0.4 0.0 - 3.0 %    Basophil % 0.4 0.0 - 1.0 %    Immature Grans % 0.6 0.0 - 0.6 %    Neutrophils, Absolute 5.33 1.50 - 8.30 10*3/mm3    Lymphocytes, Absolute 0.79 0.60 - 4.80 10*3/mm3    Monocytes, Absolute 0.58 0.00 - 1.00 10*3/mm3    Eosinophils, Absolute 0.03 (L) 0.10 - 0.30 10*3/mm3    Basophils, Absolute 0.03 0.00 - 0.20 10*3/mm3    Immature Grans, Absolute 0.04 (H) 0.00 - 0.03 10*3/mm3    nRBC 0.4 (H) 0.0 - 0.0 /100 WBC   Vitamin B12   Result Value Ref Range    Vitamin B-12 768 211 - 911 pg/mL   Bilirubin, Total & Direct   Result Value Ref Range    Total Bilirubin 0.9 0.3 - 1.2 mg/dL    Bilirubin, Direct 0.3 (H) 0.0 - 0.2 mg/dL    Bilirubin, Indirect 0.6 0.1 - 1.1 mg/dL   Folate   Result Value Ref Range    Folate >24.00 (H) 3.20 - 20.00 ng/mL   Ferritin   Result Value Ref Range    Ferritin 9.00 (L) 10.00 - 291.00 ng/mL   Haptoglobin   Result Value Ref Range    Haptoglobin 140 34 - 200 mg/dL   Iron Profile   Result Value Ref Range    Iron <2 (L) 50 - 175 mcg/dL    TIBC 394 250 - 450 mcg/dL    Iron Saturation  15 - 50 %   Lactate Dehydrogenase   Result Value Ref Range     (H) 120 - 246 U/L   Reticulocytes   Result Value Ref Range    Reticulocyte % 2.76 (H) 0.50 - 1.50 %   Transferrin Saturation   Result Value Ref Range    Iron 98 50 - 175 mcg/dL    TIBC 380 250 - 450 mcg/dL    Transferrin % 25.79 15.00 - 50.00 %   Lead, Blood    Result Value Ref Range    Lead 1 0 - 19 ug/dL   Hemoglobin & Hematocrit, Blood   Result Value Ref Range    Hemoglobin 5.8 (C) 11.5 - 15.5 g/dL    Hematocrit 19.4 (L) 34.5 - 44.0 %   Renal Function Panel   Result Value Ref Range    Glucose 91 70 - 100 mg/dL    BUN 18 9 - 23 mg/dL    Creatinine 1.00 0.60 - 1.30 mg/dL    Sodium 139 132 - 146 mmol/L    Potassium 3.8 3.5 - 5.5 mmol/L    Chloride 108 99 - 109 mmol/L    CO2 24.0 20.0 - 31.0 mmol/L    Calcium 8.7 8.7 - 10.4 mg/dL    Albumin 3.60 3.20 - 4.80 g/dL    Phosphorus 4.2 2.4 - 5.1 mg/dL    Anion Gap 7.0 3.0 - 11.0 mmol/L    BUN/Creatinine Ratio 18.0 7.0 - 25.0    eGFR Non African Amer 56 (L) >60 mL/min/1.73   Hemoglobin & Hematocrit, Blood   Result Value Ref Range    Hemoglobin 8.1 (L) 11.5 - 15.5 g/dL    Hematocrit 25.5 (L) 34.5 - 44.0 %   Hemoglobin & Hematocrit, Blood   Result Value Ref Range    Hemoglobin 7.4 (L) 11.5 - 15.5 g/dL    Hematocrit 23.6 (L) 34.5 - 44.0 %   Hemoglobin & Hematocrit, Blood   Result Value Ref Range    Hemoglobin 8.0 (L) 11.5 - 15.5 g/dL    Hematocrit 26.3 (L) 34.5 - 44.0 %   Hemoglobin & Hematocrit, Blood   Result Value Ref Range    Hemoglobin 7.6 (L) 11.5 - 15.5 g/dL    Hematocrit 24.7 (L) 34.5 - 44.0 %   Reticulocytes   Result Value Ref Range    Reticulocyte % 2.24 (H) 0.50 - 1.50 %   Haptoglobin   Result Value Ref Range    Haptoglobin 135 34 - 200 mg/dL   CBC (No Diff)   Result Value Ref Range    WBC 4.49 3.50 - 10.80 10*3/mm3    RBC 3.41 (L) 3.89 - 5.14 10*6/mm3    Hemoglobin 8.2 (L) 11.5 - 15.5 g/dL    Hematocrit 26.9 (L) 34.5 - 44.0 %    MCV 78.9 (L) 80.0 - 99.0 fL    MCH 24.0 (L) 27.0 - 31.0 pg    MCHC 30.5 (L) 32.0 - 36.0 g/dL    RDW 18.9 (H) 11.3 - 14.5 %    RDW-SD 54.4 (H) 37.0 - 54.0 fl    MPV 9.9 6.0 - 12.0 fL    Platelets 298 150 - 450 10*3/mm3   Comprehensive Metabolic Panel   Result Value Ref Range    Glucose 91 70 - 100 mg/dL    BUN 6 (L) 9 - 23 mg/dL    Creatinine 0.70 0.60 - 1.30 mg/dL    Sodium 143 132 - 146 mmol/L     Potassium 4.1 3.5 - 5.5 mmol/L    Chloride 108 99 - 109 mmol/L    CO2 26.0 20.0 - 31.0 mmol/L    Calcium 8.9 8.7 - 10.4 mg/dL    Total Protein 5.7 5.7 - 8.2 g/dL    Albumin 3.40 3.20 - 4.80 g/dL    ALT (SGPT) 20 7 - 40 U/L    AST (SGOT) 20 0 - 33 U/L    Alkaline Phosphatase 94 25 - 100 U/L    Total Bilirubin 0.5 0.3 - 1.2 mg/dL    eGFR Non African Amer 85 >60 mL/min/1.73    Globulin 2.3 gm/dL    A/G Ratio 1.5 1.5 - 2.5 g/dL    BUN/Creatinine Ratio 8.6 7.0 - 25.0    Anion Gap 9.0 3.0 - 11.0 mmol/L   POC Troponin, Rapid   Result Value Ref Range    Troponin I 0.02 0.00 - 0.07 ng/mL   POCT Occult Blood, stool   Result Value Ref Range    Fecal Occult Blood Positive     Lot Number [id number]     Expiration Date 12-18     DEVELOPER LOT NUMBER [id number]     DEVELOPER EXPIRATION DATE 11-19     Positive Control Positive Positive    Negative Control Negative Negative   Type & Screen   Result Value Ref Range    ABO Type O     RH type Positive     Antibody Screen Negative    Direct Antiglobulin Test   Result Value Ref Range    DELMY Negative    Prepare RBC, 3 Units   Result Value Ref Range    Product Code K3247X90     Unit Number [id number]     UNIT  ABO O     UNIT  RH POS     CROSSMATCH 1 INTERPRETATION Compatible     Dispense Status PT     Blood Type OPOS     Blood Expiration Date 201701252359     Blood Type Barcode 5100     Product Code M7507P86     Unit Number [id number]     UNIT  ABO O     UNIT  RH POS     CROSSMATCH 1 INTERPRETATION Compatible     Dispense Status PT     Blood Type OPOS     Blood Expiration Date 201701252359     Blood Type Barcode 5100     Product Code P9054R48     Unit Number [id number]     UNIT  ABO O     UNIT  RH POS     CROSSMATCH 1 INTERPRETATION Compatible     Dispense Status PT     Blood Type OPOS     Blood Expiration Date 201701252359     Blood Type Barcode 5100    Light Blue Top   Result Value Ref Range    Extra Tube hold for add-on    Green Top (Gel)   Result Value Ref Range    Extra Tube  Hold for add-ons.    Lavender Top   Result Value Ref Range    Extra Tube hold for add-on    Gold Top - SST   Result Value Ref Range    Extra Tube Hold for add-ons.    Results reviewed    Assessment/Plan   Anemia, will set up for a colonoscopy tomorrow. She would likely be best to have a small bowel evaluation if colon is negative.     Abhishek Benton MD  01/08/17  11:12 AM

## 2017-01-08 NOTE — PLAN OF CARE
Problem: Patient Care Overview (Adult)  Goal: Plan of Care Review  Outcome: Ongoing (interventions implemented as appropriate)    01/08/17 0359   Coping/Psychosocial Response Interventions   Plan Of Care Reviewed With patient   Patient Care Overview   Progress progress towards functional goals is fair

## 2017-01-09 ENCOUNTER — ANESTHESIA EVENT (OUTPATIENT)
Dept: GASTROENTEROLOGY | Facility: HOSPITAL | Age: 62
End: 2017-01-09

## 2017-01-09 ENCOUNTER — ANESTHESIA (OUTPATIENT)
Dept: GASTROENTEROLOGY | Facility: HOSPITAL | Age: 62
End: 2017-01-09

## 2017-01-09 VITALS
HEART RATE: 59 BPM | BODY MASS INDEX: 24.05 KG/M2 | WEIGHT: 168 LBS | HEIGHT: 70 IN | TEMPERATURE: 97.7 F | DIASTOLIC BLOOD PRESSURE: 58 MMHG | OXYGEN SATURATION: 95 % | SYSTOLIC BLOOD PRESSURE: 124 MMHG | RESPIRATION RATE: 18 BRPM

## 2017-01-09 LAB
ALBUMIN SERPL-MCNC: 3.5 G/DL (ref 2.9–4.4)
ALBUMIN SERPL-MCNC: 3.5 G/DL (ref 2.9–4.4)
ALBUMIN/GLOB SERPL: 1.3 {RATIO} (ref 0.7–1.7)
ALBUMIN/GLOB SERPL: 1.4 {RATIO} (ref 0.7–1.7)
ALPHA1 GLOB FLD ELPH-MCNC: 0.3 G/DL (ref 0–0.4)
ALPHA1 GLOB FLD ELPH-MCNC: 0.3 G/DL (ref 0–0.4)
ALPHA2 GLOB SERPL ELPH-MCNC: 0.7 G/DL (ref 0.4–1)
ALPHA2 GLOB SERPL ELPH-MCNC: 0.7 G/DL (ref 0.4–1)
B-GLOBULIN SERPL ELPH-MCNC: 0.8 G/DL (ref 0.7–1.3)
B-GLOBULIN SERPL ELPH-MCNC: 0.8 G/DL (ref 0.7–1.3)
CYTO UR: NORMAL
DEPRECATED RDW RBC AUTO: 53.3 FL (ref 37–54)
ERYTHROCYTE [DISTWIDTH] IN BLOOD BY AUTOMATED COUNT: 18.7 % (ref 11.3–14.5)
GAMMA GLOB SERPL ELPH-MCNC: 0.8 G/DL (ref 0.4–1.8)
GAMMA GLOB SERPL ELPH-MCNC: 0.8 G/DL (ref 0.4–1.8)
GLOBULIN SER CALC-MCNC: 2.6 G/DL (ref 2.2–3.9)
GLOBULIN SER CALC-MCNC: 2.6 G/DL (ref 2.2–3.9)
HCT VFR BLD AUTO: 28.3 % (ref 34.5–44)
HGB A MFR BLD: 98.2 % (ref 94–98)
HGB A2 MFR BLD COLUMN CHROM: 1.8 % (ref 0.7–3.1)
HGB BLD-MCNC: 8.8 G/DL (ref 11.5–15.5)
HGB C MFR BLD: 0 %
HGB F MFR BLD: 0 % (ref 0–2)
HGB FRACT BLD-IMP: ABNORMAL
HGB S BLD QL SOLY: NEGATIVE
HGB S MFR BLD: 0 %
IGA SERPL-MCNC: 108 MG/DL (ref 87–352)
IGG SERPL-MCNC: 708 MG/DL (ref 700–1600)
IGM SERPL-MCNC: 48 MG/DL (ref 26–217)
INTERPRETATION SERPL IEP-IMP: ABNORMAL
LAB AP CASE REPORT: NORMAL
LAB AP CLINICAL INFORMATION: NORMAL
Lab: ABNORMAL
Lab: ABNORMAL
Lab: NORMAL
M-SPIKE: 0.4 G/DL
M-SPIKE: 0.4 G/DL
MCH RBC QN AUTO: 24.7 PG (ref 27–31)
MCHC RBC AUTO-ENTMCNC: 31.1 G/DL (ref 32–36)
MCV RBC AUTO: 79.5 FL (ref 80–99)
PATH REPORT.FINAL DX SPEC: NORMAL
PATH REPORT.GROSS SPEC: NORMAL
PLATELET # BLD AUTO: 310 10*3/MM3 (ref 150–450)
PMV BLD AUTO: 10 FL (ref 6–12)
PROT SERPL-MCNC: 6.1 G/DL (ref 6–8.5)
PROT SERPL-MCNC: 6.1 G/DL (ref 6–8.5)
RBC # BLD AUTO: 3.56 10*6/MM3 (ref 3.89–5.14)
WBC NRBC COR # BLD: 3.61 10*3/MM3 (ref 3.5–10.8)

## 2017-01-09 PROCEDURE — 0DJD8ZZ INSPECTION OF LOWER INTESTINAL TRACT, VIA NATURAL OR ARTIFICIAL OPENING ENDOSCOPIC: ICD-10-PCS | Performed by: COLON & RECTAL SURGERY

## 2017-01-09 PROCEDURE — 85027 COMPLETE CBC AUTOMATED: CPT | Performed by: HOSPITALIST

## 2017-01-09 PROCEDURE — 99239 HOSP IP/OBS DSCHRG MGMT >30: CPT | Performed by: NURSE PRACTITIONER

## 2017-01-09 PROCEDURE — 25010000002 PROPOFOL 10 MG/ML EMULSION: Performed by: NURSE ANESTHETIST, CERTIFIED REGISTERED

## 2017-01-09 RX ORDER — LIDOCAINE HYDROCHLORIDE 10 MG/ML
1 INJECTION, SOLUTION EPIDURAL; INFILTRATION; INTRACAUDAL; PERINEURAL ONCE
Status: CANCELLED | OUTPATIENT
Start: 2017-01-09 | End: 2017-01-09

## 2017-01-09 RX ORDER — PROPOFOL 10 MG/ML
VIAL (ML) INTRAVENOUS AS NEEDED
Status: DISCONTINUED | OUTPATIENT
Start: 2017-01-09 | End: 2017-01-09 | Stop reason: SURG

## 2017-01-09 RX ORDER — PANTOPRAZOLE SODIUM 40 MG/1
40 TABLET, DELAYED RELEASE ORAL EVERY 12 HOURS SCHEDULED
Start: 2017-01-09 | End: 2017-03-20

## 2017-01-09 RX ORDER — FENTANYL CITRATE 50 UG/ML
50 INJECTION, SOLUTION INTRAMUSCULAR; INTRAVENOUS
Status: DISCONTINUED | OUTPATIENT
Start: 2017-01-09 | End: 2017-01-09

## 2017-01-09 RX ORDER — FAMOTIDINE 20 MG/1
20 TABLET, FILM COATED ORAL ONCE
Status: CANCELLED | OUTPATIENT
Start: 2017-01-09 | End: 2017-01-09

## 2017-01-09 RX ORDER — ASCORBIC ACID 500 MG
500 TABLET ORAL 2 TIMES DAILY
Start: 2017-01-09 | End: 2018-04-03

## 2017-01-09 RX ORDER — SODIUM CHLORIDE 0.9 % (FLUSH) 0.9 %
1-10 SYRINGE (ML) INJECTION AS NEEDED
Status: CANCELLED | OUTPATIENT
Start: 2017-01-09

## 2017-01-09 RX ORDER — ONDANSETRON 2 MG/ML
4 INJECTION INTRAMUSCULAR; INTRAVENOUS ONCE AS NEEDED
Status: DISCONTINUED | OUTPATIENT
Start: 2017-01-09 | End: 2017-01-09

## 2017-01-09 RX ORDER — SOTALOL HYDROCHLORIDE 80 MG/1
80 TABLET ORAL
Start: 2017-01-09 | End: 2017-02-15 | Stop reason: SDUPTHER

## 2017-01-09 RX ORDER — PROPOFOL 10 MG/ML
VIAL (ML) INTRAVENOUS CONTINUOUS PRN
Status: DISCONTINUED | OUTPATIENT
Start: 2017-01-09 | End: 2017-01-09 | Stop reason: SURG

## 2017-01-09 RX ORDER — SODIUM CHLORIDE, SODIUM LACTATE, POTASSIUM CHLORIDE, CALCIUM CHLORIDE 600; 310; 30; 20 MG/100ML; MG/100ML; MG/100ML; MG/100ML
9 INJECTION, SOLUTION INTRAVENOUS CONTINUOUS
Status: CANCELLED | OUTPATIENT
Start: 2017-01-09

## 2017-01-09 RX ORDER — FERROUS SULFATE 325(65) MG
325 TABLET ORAL 2 TIMES DAILY WITH MEALS
Start: 2017-01-09 | End: 2019-06-19

## 2017-01-09 RX ADMIN — OXYCODONE HYDROCHLORIDE AND ACETAMINOPHEN 500 MG: 500 TABLET ORAL at 08:43

## 2017-01-09 RX ADMIN — PROPOFOL 50 MG: 10 INJECTION, EMULSION INTRAVENOUS at 14:16

## 2017-01-09 RX ADMIN — ATORVASTATIN CALCIUM 40 MG: 40 TABLET, FILM COATED ORAL at 08:42

## 2017-01-09 RX ADMIN — CITALOPRAM HYDROBROMIDE 20 MG: 20 TABLET ORAL at 08:43

## 2017-01-09 RX ADMIN — LEVOTHYROXINE SODIUM 100 MCG: 100 TABLET ORAL at 08:43

## 2017-01-09 RX ADMIN — SOTALOL HYDROCHLORIDE 80 MG: 80 TABLET ORAL at 08:42

## 2017-01-09 RX ADMIN — FERROUS SULFATE TAB 325 MG (65 MG ELEMENTAL FE) 325 MG: 325 (65 FE) TAB at 08:43

## 2017-01-09 RX ADMIN — PROPOFOL 150 MCG/KG/MIN: 10 INJECTION, EMULSION INTRAVENOUS at 14:16

## 2017-01-09 NOTE — ANESTHESIA PREPROCEDURE EVALUATION
Anesthesia Evaluation      Airway   Mallampati: II  TM distance: >3 FB  Neck ROM: full  no difficulty expected  Dental      Pulmonary     breath sounds clear to auscultation  Cardiovascular   (+) hypertension, dysrhythmias,     ECG reviewed  Rhythm: regular  Rate: normal    Neuro/Psych  GI/Hepatic/Renal/Endo    (+)  hypothyroidism,     Musculoskeletal     Abdominal    Substance History      OB/GYN          Other       ROS/Med Hx Other: TTE: ef 60%                        Anesthesia Plan    ASA 2     general     intravenous induction   Anesthetic plan and risks discussed with patient.    Plan discussed with CRNA.

## 2017-01-09 NOTE — DISCHARGE SUMMARY
[facility] Medicine Services  DISCHARGE SUMMARY       Date of Admission: 1/5/2017  Date of Discharge:  1/9/2017  Primary Care Physician: ARMANDO Edwards    Discharge Diagnoses:  Active Hospital Problems (** Indicates Principal Problem)    Diagnosis Date Noted   • Symptomatic anemia [D64.9]  Atrial Fibrillation  GI blood Loss  Bilateral Pleural Effusions  Elevated LDH 01/05/2017      Resolved Hospital Problems    Diagnosis Date Noted Date Resolved   No resolved problems to display.       Presenting Problem/History of Present Illness  Symptomatic anemia [D64.9]     Chief Complaint on Day of Discharge: feels better, wants to go home    History of Present Illness on Day of Discharge: sitting on side of bed, wants to go home, has passed gas since colonoscopy, no abdominal pain, no other complaints    Hospital Course  Patient is a 61 y.o. female presented with progressive dyspnea, weakness of 2 months duration with acute worsening over 24 hours prior to admission accompanied by orthostatic symptoms.  She presented to the emergency department for further management.  There she was found to have to be hypotensive with systolic blood pressure in the 80s as well as a hemoglobin of 4.8 with a positive fecal occult blood testing.  Of note she is on Eliquis for primary prevention of thrombi oh embolic events and associated with atrial fibrillation.  She also takes sotalol.  She was admitted to the hospitalist service.  She was found to have a QTC prolongation.  Sotalol was decreased to 80 mg once a day.  Cardiology did follow along.  QTC has corrected.  She is status post 3 units of blood.  Hemoglobin has remained stable since transfusion.  She did undergo a EGD that showed a hiatal hernia and gastritis.  No source for bleeding.  On day of discharge she underwent a colonoscopy.  This showed  pandiverticulosis with internal and external hemorrhoids.  No source for bleeding.  She will follow-up  with Dr. White for a PillCam study.  Her anemia appears multifactorial.  She had a significant iron deficiency anemia as well as positive fecal occult.  Patient is also a vegetarian and has poor iron intake in her diet.  She's been started on oral iron and counseled on foods high in iron.  She is to follow-up with Dr. Valencia with hematology on 1/10/17 to evaluate anemia.  Long discussion between patient, attending (Dr. Cuba), Dr. White about resuming Eliquis.  All involved feel that it is prudent to resume Eliquis as her anemia appears multifactorial and CVA would be detremental given her high functioning status. She will have close f/u including monitoring blood counts.  She was also incidentally found to have bilateral pleural effusions.  Unclear significance.  Patient wishes to defer further workup at this time.     Procedures Performed  Procedure(s):  COLONOSCOPY       Consults:   Consults     Date and Time Order Name Status Description    1/6/2017 0907 Inpatient Consult to Gastroenterology      1/5/2017 1252 Inpatient Consult to Cardiology Completed           Pertinent Test Results:  Results for DOMONIQUE BECKFORD (MRN [id number]) as of 1/9/2017 16:04   Ref. Range 1/9/2017 06:43   WBC Latest Ref Range: 3.50 - 10.80 10*3/mm3 3.61   RBC Latest Ref Range: 3.89 - 5.14 10*6/mm3 3.56 (L)   Hemoglobin Latest Ref Range: 11.5 - 15.5 g/dL 8.8 (L)   Hematocrit Latest Ref Range: 34.5 - 44.0 % 28.3 (L)   RDW Latest Ref Range: 11.3 - 14.5 % 18.7 (H)   MCV Latest Ref Range: 80.0 - 99.0 fL 79.5 (L)   MCH Latest Ref Range: 27.0 - 31.0 pg 24.7 (L)   MCHC Latest Ref Range: 32.0 - 36.0 g/dL 31.1 (L)   MPV Latest Ref Range: 6.0 - 12.0 fL 10.0   Platelets Latest Ref Range: 150 - 450 10*3/mm3 310   RDW-SD Latest Ref Range: 37.0 - 54.0 fl 53.3   Results for DOMONIQUE BECKFORD (MRN [id number]) as of 1/9/2017 16:04   Ref. Range 1/8/2017 06:45   Glucose Latest Ref Range: 70 - 100 mg/dL 91   Sodium Latest Ref Range: 132 - 146 mmol/L 143    Potassium Latest Ref Range: 3.5 - 5.5 mmol/L 4.1   CO2 Latest Ref Range: 20.0 - 31.0 mmol/L 26.0   Chloride Latest Ref Range: 99 - 109 mmol/L 108   Anion Gap Latest Ref Range: 3.0 - 11.0 mmol/L 9.0   Creatinine Latest Ref Range: 0.60 - 1.30 mg/dL 0.70   BUN Latest Ref Range: 9 - 23 mg/dL 6 (L)   BUN/Creatinine Ratio Latest Ref Range: 7.0 - 25.0  8.6   Calcium Latest Ref Range: 8.7 - 10.4 mg/dL 8.9   eGFR Non African Amer Latest Ref Range: >60 mL/min/1.73 85   Alkaline Phosphatase Latest Ref Range: 25 - 100 U/L 94   Total Protein Latest Ref Range: 5.7 - 8.2 g/dL 5.7   ALT (SGPT) Latest Ref Range: 7 - 40 U/L 20   AST (SGOT) Latest Ref Range: 0 - 33 U/L 20   Total Bilirubin Latest Ref Range: 0.3 - 1.2 mg/dL 0.5   Albumin Latest Ref Range: 3.20 - 4.80 g/dL 3.40   Globulin Latest Units: gm/dL 2.3   A/G Ratio Latest Ref Range: 1.5 - 2.5 g/dL 1.5   Results for DOMONIQUE BECKFORD (MRN [id number]) as of 1/9/2017 16:04   Ref. Range 1/5/2017 09:52   Iron Latest Ref Range: 50 - 175 mcg/dL <2 (L)   Ferritin Latest Ref Range: 10.00 - 291.00 ng/mL 9.00 (L)   Iron Saturation Latest Ref Range: 15 - 50 % Pend   TIBC Latest Ref Range: 250 - 450 mcg/dL 394   Folate Latest Ref Range: 3.20 - 20.00 ng/mL >24.00 (H)     Results for DOMONIQUE BECKFORD (MRN [id number]) as of 1/9/2017 16:04   Ref. Range 1/5/2017 09:52   Magnesium Latest Ref Range: 1.3 - 2.7 mg/dL 1.9   Vitamin B-12 Latest Ref Range: 211 - 911 pg/mL 768   Results for DOMONIQUE BECKFORD (MRN [id number]) as of 1/9/2017 16:04   Ref. Range 1/5/2017 09:52   External LDH Latest Ref Range: 120 - 246 U/L 334 (H)         EXAMINATION: XR CHEST PA AND LATERAL - 01/08/2017      INDICATION: D64.9-Anemia, unspecified; I48.91-Unspecified atrial  fibrillation; K92.2-Gastrointestinal hemorrhage, unspecified;  D64.9-Anemia, unspecified.        COMPARISON: 01/05/2017.      FINDINGS: PA and lateral chest reveals slight worsening of the disease  at the left lung base. There are small  "bilateral pleural effusions left  greater than right. Degenerative change is seen within the spine.  Chronic and emphysematous changes are seen in the lung fields  bilaterally. Slight improvement is seen in aeration of the right lung  base.        IMPRESSION:  Worsening of the aeration of the left lung base in the  interval. Small bilateral pleural effusions, left greater than right.  Slight improvement seen in aeration of the right lung base.      DICTATED: 01/08/2017  EDITED: 01/08/2017      This report was finalized on 1/9/2017 9:11 AM by Dr. Jayashree Aguilar MD  Condition on Discharge: stable    Physical Exam on Discharge:  Visit Vitals   • /58   • Pulse 59   • Temp 97.7 °F (36.5 °C) (Temporal Artery )   • Resp 18   • Ht 70\" (177.8 cm)   • Wt 168 lb (76.2 kg)  Comment: stand   • LMP  (LMP Unknown)   • SpO2 95%   • BMI 24.11 kg/m2     Physical Exam   Constitutional: She is oriented to person, place, and time. No distress.   thin   HENT:   Head: Normocephalic and atraumatic.   Eyes: Pupils are equal, round, and reactive to light. No scleral icterus.   Neck: Neck supple. No tracheal deviation present.   Cardiovascular: Normal rate, regular rhythm and normal heart sounds.    Pulmonary/Chest: Effort normal and breath sounds normal.   Abdominal: Soft. Bowel sounds are normal.   Musculoskeletal: She exhibits no edema.   Neurological: She is alert and oriented to person, place, and time.   Skin: Skin is warm and dry.   Psychiatric: She has a normal mood and affect. Her behavior is normal.         Discharge Disposition  Home or Self Care    Discharge Medications   Halie Perez   Home Medication Instructions AASHISH:[id number]    Printed on:01/09/17 5243   Medication Information                      apixaban (ELIQUIS) 5 MG tablet tablet  Take 1 tablet by mouth 2 (Two) Times a Day.             ascorbic acid (VITAMIN C) 500 MG tablet  Take 1 tablet by mouth 2 (Two) Times a Day.             atorvastatin " (LIPITOR) 40 MG tablet  Take 1 tablet by mouth Daily.             citalopram (CeleXA) 20 MG tablet  Take 20 mg by mouth Daily.             ferrous sulfate 325 (65 FE) MG tablet  Take 1 tablet by mouth 2 (Two) Times a Day With Meals.             levothyroxine (SYNTHROID, LEVOTHROID) 100 MCG tablet  Take 100 mcg by mouth Daily.             Melatonin 10 MG tablet  Take 30 mg by mouth Every Night.             pantoprazole (PROTONIX) 40 MG EC tablet  Take 1 tablet by mouth Every 12 (Twelve) Hours.             sotalol (BETAPACE) 80 MG tablet  Take 1 tablet by mouth Daily.                 Discharge Diet: Iron rich foods    Discharge Care Plan / Instructions: resume Eliquis with close hematology and PCP follow up, repeat CBC, Continue PPI, Iron, Vitamin C    Activity at Discharge: as tolerated    Follow-up Appointments  Future Appointments  Date Time Provider Department Center   1/10/2017 10:30 AM René Valencia MD [facility]   1/18/2017 7:45 AM [facility]   1/18/2017 9:30 AM [facility] SC[facility]OM  [facility]   1/18/2017 10:30 AM [facility]   1/18/2017 12:00 PM [facility] SC[facility]  [facility]     Referrals and Follow-ups to Schedule     Follow-Up    As directed    PCP on Friday, needs CBC on 1-12-17 (order given)  F/u with Dr. White per his recommendations  Dr. Valencia 1-10-17 @ 10am                  ARMANDO Phelps 01/09/17 3:59 PM    Time: 35 minutes    Please note that portions of this note may have been completed with a voice recognition program. Efforts were made to edit the dictations, but occasionally words are mistranscribed.

## 2017-01-09 NOTE — PROGRESS NOTES
Continued Stay Note  [facility]     Patient Name: Halie Perez  MRN: [id number]  Today's Date: 1/9/2017    Admit Date: 1/5/2017          Discharge Plan       01/09/17 1022    Case Management/Social Work Plan    Plan Home    Patient/Family In Agreement With Plan yes    Additional Comments Spoke with patient at bedside.  Plan remains to return home at discharge, anticipates discharge later today.  Denies any discharge needs.  CM will continue to follow.               Discharge Codes     None        Expected Discharge Date and Time     Expected Discharge Date Expected Discharge Time    Jan 8, 2017             Melania Hernández RN

## 2017-01-09 NOTE — BRIEF OP NOTE
COLONOSCOPY  Procedure Note    Halie Perez  1/5/2017 - 1/9/2017    Pre-op Diagnosis:   Profound anemia  Guaiac-positive stool  Post-op Diagnosis:     Post-Op Diagnosis Codes:     * Hemorrhoids, external [K64.4]     * Internal hemorrhoids [K64.8]     * Diverticulosis large intestine w/o perforation or abscess w/o bleeding [K57.30]    Procedure/CPT® Codes:  MT COLONOSCOPY,DIAGNOSTIC [48835]    Procedure(s):  COLONOSCOPY    Surgeon(s):  Michel White MD    Anesthesia: Monitor Anesthesia Care    Staff:   Endo Nurse: Nicky Vivas, JAVED; Melania Rasheed RN    Estimated Blood Loss: none  Specimens:   none                       Findings: 1.  Pandiverticulosis                    2.  Internal and external hemorrhoids    Complications: none  Summary:  After the patient was properly identified she was brought to the procedure room and placed in a comfortable left lateral position.  After timeout was performed from a fall was administered.  A digital rectal exams performed.  The patient had large external hemorrhoids.  Sphincter tone was normal.  There are no masses palpated.  Following this the Olympus video colonoscope was inserted into the lubricated anus and advanced to the cecum without difficulty.  The ileocecal valve and appendiceal orifice were identified and the terminal ileum was intubated for approximately 10 cm.  The colonoscope was then slowly withdrawn examining the mucosal surfaces.  There were diverticula noted throughout the colon.  There is no dark or fresh blood noted.  There were prominent internal hemorrhoids noted.  There was no stigmata of bleeding.  The colonoscope was fully withdrawn.  Plan will be to arrange for outpatient pill camera study to evaluate the small bowel.    Michel White MD     Date: 1/9/2017  Time: 3:28 PM

## 2017-01-09 NOTE — PLAN OF CARE
Problem: Patient Care Overview (Adult)  Goal: Plan of Care Review  Outcome: Ongoing (interventions implemented as appropriate)    01/09/17 1233   Coping/Psychosocial Response Interventions   Plan Of Care Reviewed With patient   Patient Care Overview   Progress progress toward functional goals as expected   Outcome Evaluation   Outcome Summary/Follow up Plan Colonoscopy today and possible discharge afterwards.          Problem: Cardiac Output, Decreased (Adult)  Goal: Adequate Cardiac Output/Effective Tissue Perfusion  Outcome: Ongoing (interventions implemented as appropriate)    Problem: GI Endoscopy (Adult)  Goal: Signs and Symptoms of Listed Potential Problems Will be Absent or Manageable (GI Endoscopy)  Outcome: Ongoing (interventions implemented as appropriate)

## 2017-01-09 NOTE — ANESTHESIA POSTPROCEDURE EVALUATION
Patient: Halie Perez    Procedure Summary     Date Anesthesia Start Anesthesia Stop Room / Location    01/09/17 1412 1446  [facility] /  [facility]       Procedure Diagnosis Surgeon Provider    COLONOSCOPY (N/A ) No diagnosis on file. MD Jon Hunter MD          Anesthesia Type: general  Last vitals  BP      Temp      Pulse     Resp      SpO2        Post Anesthesia Care and Evaluation    Patient location during evaluation: PACU  Patient participation: complete - patient participated  Level of consciousness: awake and alert  Pain management: adequate  Airway patency: patent  Anesthetic complications: No anesthetic complications  Cardiovascular status: hemodynamically stable and acceptable  Respiratory status: nonlabored ventilation, acceptable and nasal cannula  Hydration status: acceptable    Comments: Pt transported to PACU with O2 via nasal cannula at 4 L/MIN. Vital signs stable.  Pt shows no signs of distress.  Report given to PACU RN.

## 2017-01-09 NOTE — PLAN OF CARE
Problem: Cardiac Output, Decreased (Adult)  Goal: Adequate Cardiac Output/Effective Tissue Perfusion  Outcome: Ongoing (interventions implemented as appropriate)

## 2017-01-10 ENCOUNTER — CONSULT (OUTPATIENT)
Dept: ONCOLOGY | Facility: CLINIC | Age: 62
End: 2017-01-10

## 2017-01-10 ENCOUNTER — LAB (OUTPATIENT)
Dept: LAB | Facility: HOSPITAL | Age: 62
End: 2017-01-10

## 2017-01-10 VITALS
TEMPERATURE: 97.2 F | HEIGHT: 70 IN | DIASTOLIC BLOOD PRESSURE: 67 MMHG | WEIGHT: 161 LBS | RESPIRATION RATE: 16 BRPM | SYSTOLIC BLOOD PRESSURE: 156 MMHG | HEART RATE: 67 BPM | BODY MASS INDEX: 23.05 KG/M2

## 2017-01-10 DIAGNOSIS — D64.9 SYMPTOMATIC ANEMIA: ICD-10-CM

## 2017-01-10 DIAGNOSIS — D64.9 SYMPTOMATIC ANEMIA: Primary | ICD-10-CM

## 2017-01-10 DIAGNOSIS — D47.2 MONOCLONAL GAMMOPATHY: ICD-10-CM

## 2017-01-10 LAB
ERYTHROCYTE [DISTWIDTH] IN BLOOD BY AUTOMATED COUNT: 20.7 % (ref 11.3–14.5)
HCT VFR BLD AUTO: 32 % (ref 34.5–44)
HGB BLD-MCNC: 9.5 G/DL (ref 11.5–15.5)
LYMPHOCYTES # BLD AUTO: 1 10*3/MM3 (ref 0.6–4.8)
LYMPHOCYTES NFR BLD AUTO: 25.4 % (ref 24–44)
MCH RBC QN AUTO: 23.9 PG (ref 27–31)
MCHC RBC AUTO-ENTMCNC: 29.7 G/DL (ref 32–36)
MCV RBC AUTO: 80.4 FL (ref 80–99)
METHYLMALONATE SERPL-SCNC: 253 NMOL/L (ref 0–378)
MONOCYTES # BLD AUTO: 0.5 10*3/MM3 (ref 0–1)
MONOCYTES NFR BLD AUTO: 11.9 % (ref 0–12)
NEUTROPHILS # BLD AUTO: 2.4 10*3/MM3 (ref 1.5–8.3)
NEUTROPHILS NFR BLD AUTO: 62.7 % (ref 41–71)
PLATELET # BLD AUTO: 352 10*3/MM3 (ref 150–450)
PMV BLD AUTO: 8.2 FL (ref 6–12)
RBC # BLD AUTO: 3.98 10*6/MM3 (ref 3.89–5.14)
WBC NRBC COR # BLD: 3.8 10*3/MM3 (ref 3.5–10.8)

## 2017-01-10 PROCEDURE — 83883 ASSAY NEPHELOMETRY NOT SPEC: CPT | Performed by: INTERNAL MEDICINE

## 2017-01-10 PROCEDURE — 36415 COLL VENOUS BLD VENIPUNCTURE: CPT

## 2017-01-10 PROCEDURE — 85025 COMPLETE CBC W/AUTO DIFF WBC: CPT

## 2017-01-10 PROCEDURE — 99205 OFFICE O/P NEW HI 60 MIN: CPT | Performed by: INTERNAL MEDICINE

## 2017-01-10 NOTE — PROGRESS NOTES
CHIEF COMPLAINT: Iron deficiency anemia with monoclonal gammopathy    REASON FOR REFERRAL: Same      RECORDS OBTAINED  Records of the patients history including those obtained from  recent hospital records were reviewed and summarized in detail.    HISTORY OF PRESENT ILLNESS:  The patient is a 61 y.o.  female, referred for post recent hospitalization management of iron deficiency anemia and 400 mg/dL monoclonal protein in the serum.  Her mother has had a history of GI bleeding due to gastric antral vascular ectasia.  The patient has atrial fibrillation and since being on Eliquis her hemoglobin has dropped.  Since being transfused up for hemoglobin down into the force her hemoglobin now is up to 8.8 as of yesterday.  EGD showed no evidence of celiac disease on duodenal biopsy by Dr. [place].  Dr. White found hemorrhoids on colonoscopy but no bleeding.  She is awaiting approval for capsule endoscopy.  She is taking ferrous sulfate 1 tablet twice a day with vitamin C and is on a proton pump inhibitor.  She had not noted any melanotic or frankly bloody stools but she is heme positive.  Her iron level was less than 2 with a ferritin of 9 and a transferrin saturation of 25 with a normal total iron binding capacity.  Her bilirubin was normal as were her liver enzymes.  Hemoglobin electrophoresis was essentially normal other than a mildly elevated hemoglobin a.  Her reticulocyte count was an adequate at 2.24% with a haptoglobin of 135.  Her folate was greater than 24 with a B12 of 768.  Her methylmalonic acid is pending.       REVIEW OF SYSTEMS:  A 14 point review of systems was performed and is negative except as noted above.    Past Medical History   Diagnosis Date   • History of anemia    • Hypertension    • Hypothyroidism    • Osteoarthritis      Past Surgical History   Procedure Laterality Date   • Augmentation mammaplasty Bilateral 2008   • Hip arthroscopy Right      2005 & 2012   • Tonsillectomy  1964   • Endoscopy N/A  1/7/2017     Procedure: ESOPHAGOGASTRODUODENOSCOPY;  Surgeon: Abhishek [place] MD;  Location: [facility] ENDOSCOPY;  Service:    • Colonoscopy  01/09/2017       Current Outpatient Prescriptions on File Prior to Visit   Medication Sig Dispense Refill   • apixaban (ELIQUIS) 5 MG tablet tablet Take 1 tablet by mouth 2 (Two) Times a Day. 30 tablet 1   • ascorbic acid (VITAMIN C) 500 MG tablet Take 1 tablet by mouth 2 (Two) Times a Day.     • atorvastatin (LIPITOR) 40 MG tablet Take 1 tablet by mouth Daily. 30 tablet 1   • citalopram (CeleXA) 20 MG tablet Take 20 mg by mouth Daily.     • ferrous sulfate 325 (65 FE) MG tablet Take 1 tablet by mouth 2 (Two) Times a Day With Meals.     • levothyroxine (SYNTHROID, LEVOTHROID) 100 MCG tablet Take 100 mcg by mouth Daily.     • Melatonin 10 MG tablet Take 30 mg by mouth Every Night.     • pantoprazole (PROTONIX) 40 MG EC tablet Take 1 tablet by mouth Every 12 (Twelve) Hours.     • sotalol (BETAPACE) 80 MG tablet Take 1 tablet by mouth Daily.       Current Facility-Administered Medications on File Prior to Visit   Medication Dose Route Frequency Provider Last Rate Last Dose   • [DISCONTINUED] acetaminophen (TYLENOL) tablet 650 mg  650 mg Oral Q4H PRN Cr Earl MD   650 mg at 01/08/17 0839   • [DISCONTINUED] atorvastatin (LIPITOR) tablet 40 mg  40 mg Oral Daily RAÚL Padron   40 mg at 01/09/17 0842   • [DISCONTINUED] citalopram (CeleXA) tablet 20 mg  20 mg Oral Daily RAÚL Padron   20 mg at 01/09/17 0843   • [DISCONTINUED] FentaNYL Citrate (PF) (SUBLIMAZE) injection 50 mcg  50 mcg Intravenous Q5 Min PRN Cierra Horton CRNA       • [DISCONTINUED] ferrous sulfate tablet 325 mg  325 mg Oral BID With Meals Jon Cuba MD   325 mg at 01/09/17 0843   • [DISCONTINUED] lactated ringers bolus 500 mL  500 mL Intravenous Once PRN Cierra Horton CRNA       • [DISCONTINUED] levothyroxine (SYNTHROID, LEVOTHROID) tablet 100 mcg  100 mcg Oral Daily Janine MORRIS  RAÚL Reeves   100 mcg at 01/09/17 0843   • [DISCONTINUED] melatonin sublingual tablet 5 mg  5 mg Sublingual Nightly PRN Jon Cuba MD   5 mg at 01/08/17 2136   • [DISCONTINUED] ondansetron (ZOFRAN) injection 4 mg  4 mg Intravenous Once PRN Cierra Horton, CRNA       • [DISCONTINUED] pantoprazole (PROTONIX) EC tablet 40 mg  40 mg Oral Q12H ARMANDO Webster   40 mg at 01/08/17 2136   • [DISCONTINUED] Propofol (DIPRIVAN) injection   Intravenous PRN Cierra Horton CRNA   50 mg at 01/09/17 1416   • [DISCONTINUED] Propofol (DIPRIVAN) injection   Intravenous Continuous PRN Cierra Horton CRNA   Stopped at 01/09/17 1440   • [DISCONTINUED] sodium chloride 0.9 % flush 1-10 mL  1-10 mL Intravenous PRN Cr Earl MD       • [DISCONTINUED] sodium chloride 0.9 % flush 10 mL  10 mL Intravenous PRN Jordan Saenz MD       • [DISCONTINUED] sotalol (BETAPACE) tablet 80 mg  80 mg Oral Q24H Pradip Johansen MD   80 mg at 01/09/17 0842   • [DISCONTINUED] vitamin C (ASCORBIC ACID) tablet 500 mg  500 mg Oral BID Jon Cuba MD   500 mg at 01/09/17 0843       Allergies   Allergen Reactions   • Sulfa Antibiotics Anaphylaxis       Social History     Social History   • Marital status:      Spouse name: N/A   • Number of children: N/A   • Years of education: N/A     Occupational History   • [employer]      Social History Main Topics   • Smoking status: Former Smoker     Packs/day: 1.00     Years: 20.00     Types: Cigarettes   • Smokeless tobacco: Never Used      Comment: quit 35+ years ago.    • Alcohol use 4.8 oz/week     8 Shots of liquor per week   • Drug use: No   • Sexual activity: Not Asked     Other Topics Concern   • None     Social History Narrative    5-10 servings of caffeine per day.        Family History   Problem Relation Age of Onset   • Atrial fibrillation Mother    • Diabetes Mother    • Atrial fibrillation Brother    • Abnormal EKG Brother    • Heart attack Father    • Stroke Father    •  "Diabetes Sister    • Breast cancer Neg Hx    • Ovarian cancer Neg Hx        PHYSICAL EXAM:    Visit Vitals   • /67   • Pulse 67   • Temp 97.2 °F (36.2 °C) (Temporal Artery )   • Resp 16   • Ht 70\" (177.8 cm)   • Wt 161 lb (73 kg)   • LMP  (LMP Unknown)   • BMI 23.1 kg/m2       ECOG: (0) Fully active, able to carry on all predisease performance without restriction  General: well appearing female in no acute distress  HEENT: sclera anicteric, oropharynx clear  Lymphatics: no cervical, supraclavicular, inguinal, or axillary adenopathy  Cardiovascular: regular rate and rhythm, no murmurs  Neck: Supple; No thyromegaly  Lungs: clear to auscultation bilaterally. No respiratory distress.   Abdomen: soft, nontender, nondistended.  No palpable organomegaly  Extremities: no cyanosis, clubbing, edema, or cords  Skin: no rashes, lesions, bruising, or petechiae  Neuro: Alert and oriented x 4; Moving all extremities.  Psych: No anxiety or depression        Assessment/Plan     1. Iron deficiency anemia  2. Monoclonal gammopathy    Discussion: I would not be surprised if she had arteriovenous malformation bleeding such as what her mother dealt with and that the Eliquis has complicated this.  She needs the Eliquis for her atrial fibrillation.  There is no antidote for this however.  The risk of bleeding on any given day is less with Eliquis than with Coumadin but there is no antidote available for Eliquis.  For the time being we'll see how she does with aggressive oral iron and she will go to 2 tablets twice a day of ferrous sulfate along with vitamin C to improve absorption.  With reference to the monoclonal gammopathy, this is a relatively low level and she has no abnormalities in her creatinine or total protein or albumin levels and her quantitative immunoglobulins are not significantly abnormal.  For the time being were going to check her serum free light chains along with a 24-hour urine immunoelectrophoresis now and we'll " repeat her serum protein electrophoresis, 24-hour urine immunoelectrophoresis, and serum free light chains prior to return in 2 months.  In the meantime I'll check her blood counts weekly and transfuse as need be.  If we aren't able to keep her iron up with oral iron due to losses better faster than we can replace, then we will give consideration to parenteral iron though there is some risk of anaphylaxis with that.  If that does not work, then we will have to give consideration to other measures up to and perhaps including ablative measures to do away with the atrial fibrillation that would avoid the need for anticoagulation but hopefully won't come to that and we can just do anticoagulation with rate control as we are doing presently.  60 of total 65 minutes of visits spent discussing the complexity of care with the patient and decision tree as outlined above as well as her history and her mother's history.    Errors in dictation may reflect use of voice recognition software and not all errors in transcription may have been detected prior to signing.    René Valencia MD    1/10/2017

## 2017-01-10 NOTE — OP NOTE
COLONOSCOPY Procedure Note     Halie Perez  1/5/2017 - 1/9/2017     Pre-op Diagnosis:   Profound anemia  Guaiac-positive stool  Post-op Diagnosis:   Post-Op Diagnosis Codes:  * Hemorrhoids, external [K64.4]  * Internal hemorrhoids [K64.8]  * Diverticulosis large intestine w/o perforation or abscess w/o bleeding [K57.30]     Procedure/CPT® Codes:  GA COLONOSCOPY,DIAGNOSTIC [40177]     Procedure(s):  COLONOSCOPY     Surgeon(s):  Michel White MD     Anesthesia: Monitor Anesthesia Care     Staff:   Endo Nurse: Nicky Vivas, JAVED; Melania Rasheed RN     Estimated Blood Loss: none  Specimens: none                 Findings: 1.  Pandiverticulosis  2.  Internal and external hemorrhoids     Complications: none  Summary:  After the patient was properly identified she was brought to the procedure room and placed in a comfortable left lateral position. After timeout was performed from a fall was administered. A digital rectal exams performed. The patient had large external hemorrhoids. Sphincter tone was normal. There are no masses palpated. Following this the Olympus video colonoscope was inserted into the lubricated anus and advanced to the cecum without difficulty. The ileocecal valve and appendiceal orifice were identified and the terminal ileum was intubated for approximately 10 cm. The colonoscope was then slowly withdrawn examining the mucosal surfaces. There were diverticula noted throughout the colon. There is no dark or fresh blood noted. There were prominent internal hemorrhoids noted. There was no stigmata of bleeding. The colonoscope was fully withdrawn. Plan will be to arrange for outpatient pill camera study to evaluate the small bowel.     Michel White MD      Date: 1/9/2017 Time: 3:28 PM

## 2017-01-10 NOTE — MR AVS SNAPSHOT
Halie CAICEDO KyraJasmynradu   1/10/2017 10:30 AM   Consult    Dept Phone:  [phone]   Encounter #:  [id number]    Provider:  René Valencia MD   Department:  [facility] HEMATOLOGY  AND ONCOLOGY                Your Full Care Plan              Your Updated Medication List          This list is accurate as of: 1/10/17 11:33 AM.  Always use your most recent med list.                apixaban 5 MG tablet tablet   Commonly known as:  ELIQUIS   Take 1 tablet by mouth 2 (Two) Times a Day.       ascorbic acid 500 MG tablet   Commonly known as:  VITAMIN C   Take 1 tablet by mouth 2 (Two) Times a Day.       atorvastatin 40 MG tablet   Commonly known as:  LIPITOR   Take 1 tablet by mouth Daily.       citalopram 20 MG tablet   Commonly known as:  CeleXA       ferrous sulfate 325 (65 FE) MG tablet   Take 1 tablet by mouth 2 (Two) Times a Day With Meals.       levothyroxine 100 MCG tablet   Commonly known as:  SYNTHROID, LEVOTHROID       Melatonin 10 MG tablet       pantoprazole 40 MG EC tablet   Commonly known as:  PROTONIX   Take 1 tablet by mouth Every 12 (Twelve) Hours.       sotalol 80 MG tablet   Commonly known as:  BETAPACE   Take 1 tablet by mouth Daily.               Instructions     None    Patient Instructions History      MyChart Signup     Our records indicate that you have an active [employer] account.    You can view your After Visit Summary by going to [employer] and logging in with your [employer] username and password.  If you don't have a [employer] username and password but a parent or guardian has access to your record, the parent or guardian should login with their own [employer] username and password and access your record to view the After Visit Summary.    If you have questions, you can email [email or web address] or call [phone] to talk to our [employer] staff.  Remember, [employer] is NOT to be used for urgent needs.  For medical emergencies,  dial 911.               Other Info from Your Visit           Your appointments     Date & Time Provider Appointment Department    Jan 18, 2017  7:45 AM [facility] ADMIN RM [facility] CARDIOLOGY NM INJ [facility] CARDIOLOGY    No food or drink for 2 hours prior to your test. No caffeine or chocolate 24 hours prior to you test. (this includes coffee, tea, soda, all decaffeinated beverages, cappuccino flavorings, noooz or vivarian, diet medications, excedrin, anacin or any energy drinks) wear comfortable clothing and walking shoes. Bring your insurance cards with you. Bring all medications in their original bottles. If you are diabetic, do not take your diabetic medications after consulting with your primary care physician. if you take insulin, only take half the dose after consulting with your primary care physician. please hold the following medications for 48 hours prior to your test after consulting with your primary care physician. (acebutolo, aggrenox, atenolol, bisoprolol, betaxolol, betapace, calan, cardizem, carvadilol, coreg, corgard, corzide, dilacor xr, diltiazem, inderal, inderal la, isoptin, karlone, labetolol, levatol, nadolol, normodyne, penbutolol, pindolol, propanolol, sectral, sotalol, tenoretic, tiazic, timolol, bystolic, toprol xl, lopressor, metoprolol, trandata, verapamil, verelan, visken, zebeta, zisc, theophylline, vincenzo-dur, sio-phyline, qulbron-t, primatene, persantine, dipyrimadiole)    Jan 18, 2017  9:30 AM [facility] SCAN ROOM [facility] CARDIOLOGY NM SCAN [facility] CARDIOLOGY    [facility] 2017 10:30 [facility] STRESS [facility] CARDIOLOGY    No food or drink for 2 hours prior to your test. No caffeine or chocolate 24 hours prior to you test. (this includes coffee, tea, soda, all decaffeinated beverages, cappuccino flavorings, noooz or vivarian, diet medications, excedrin, anacin or any  "energy drinks) wear comfortable clothing and walking shoes. Bring your insurance cards with you. Bring all medications in their original bottles. If you are diabetic, do not take your diabetic medications after consulting with your primary care physician. if you take insulin, only take half the dose after consulting with your primary care physician. please hold the following medications for 48 hours prior to your test after consulting with your primary care physician. (acebutolo, aggrenox, atenolol, bisoprolol, betaxolol, betapace, calan, cardizem, carvadilol, coreg, corgard, corzide, dilacor xr, diltiazem, inderal, inderal la, isoptin, karlone, labetolol, levatol, nadolol, normodyne, penbutolol, pindolol, propanolol, sectral, sotalol, tenoretic, tiazic, timolol, bystolic, toprol xl, lopressor, metoprolol, trandata, verapamil, verelan, visken, zebeta, zisc, theophylline, vincenzo-dur, sio-phyline, qulbron-t, primatene, persantine, dipyrimadiole)    Jan 18, 2017 12:00 PM EST [facility] NM SCAN ROOM [facility] CARDIOLOGY NM SCAN [facility] CARDIOLOGY    Mar 07, 2017  3:15 PM SAMANTHA Valencia MD FOLLOW UP [facility] HEMATOLOGY  AND ONCOLOGY        [facility] CARDIOLOGY  [place]  [address] [place], [place] [id number] [phone] [facility] HEMATOLOGY  AND ONCOLOGY  [place]  [address] [place] [address] [address]  [place] [id number] [phone]          Vital Signs     Blood Pressure Pulse Temperature Respirations Height Weight    156/67 67 97.2 °F (36.2 °C) (Temporal Artery ) 16 70\" (177.8 cm) 161 lb (73 kg)    Last Menstrual Period Body Mass Index Smoking Status             (LMP Unknown) 23.1 kg/m2 Former Smoker         Problems and Diagnoses Noted     Monoclonal gammopathy    Symptomatic anemia        "

## 2017-01-10 NOTE — LETTER
January 10, 2017     No Recipients    Patient: Halie Perez   YOB: 1955   Date of Visit: 1/10/2017       Dear Dr. Moreira Recipients:    Halie Perez was in my office today. Below is a copy of my note.    If you have questions, please do not hesitate to call me. I look forward to following Halie along with you.         Sincerely,        René Valencia MD        CC: No Recipients    CHIEF COMPLAINT: Iron deficiency anemia with monoclonal gammopathy    REASON FOR REFERRAL: Same      RECORDS OBTAINED  Records of the patients history including those obtained from  recent hospital records were reviewed and summarized in detail.    HISTORY OF PRESENT ILLNESS:  The patient is a 61 y.o.  female, referred for post recent hospitalization management of iron deficiency anemia and 400 mg/dL monoclonal protein in the serum.  Her mother has had a history of GI bleeding due to gastric antral vascular ectasia.  The patient has atrial fibrillation and since being on Eliquis her hemoglobin has dropped.  Since being transfused up for hemoglobin down into the force her hemoglobin now is up to 8.8 as of yesterday.  EGD showed no evidence of celiac disease on duodenal biopsy by Dr. Benton.  Dr. White found hemorrhoids on colonoscopy but no bleeding.  She is awaiting approval for capsule endoscopy.  She is taking ferrous sulfate 1 tablet twice a day with vitamin C and is on a proton pump inhibitor.  She had not noted any melanotic or frankly bloody stools but she is heme positive.  Her iron level was less than 2 with a ferritin of 9 and a transferrin saturation of 25 with a normal total iron binding capacity.  Her bilirubin was normal as were her liver enzymes.  Hemoglobin electrophoresis was essentially normal other than a mildly elevated hemoglobin a.  Her reticulocyte count was an adequate at 2.24% with a haptoglobin of 135.  Her folate was greater than 24 with a B12 of 768.  Her methylmalonic acid is pending.            REVIEW OF SYSTEMS:  A 14 point review of systems was performed and is negative except as noted above.    Past Medical History   Diagnosis Date   • History of anemia    • Hypertension    • Hypothyroidism    • Osteoarthritis      Past Surgical History   Procedure Laterality Date   • Augmentation mammaplasty Bilateral 2008   • Hip arthroscopy Right      2005 & 2012   • Tonsillectomy  1964   • Endoscopy N/A 1/7/2017     Procedure: ESOPHAGOGASTRODUODENOSCOPY;  Surgeon: Abhishek Benton MD;  Location: [facility] ENDOSCOPY;  Service:    • Colonoscopy  01/09/2017       Current Outpatient Prescriptions on File Prior to Visit   Medication Sig Dispense Refill   • apixaban (ELIQUIS) 5 MG tablet tablet Take 1 tablet by mouth 2 (Two) Times a Day. 30 tablet 1   • ascorbic acid (VITAMIN C) 500 MG tablet Take 1 tablet by mouth 2 (Two) Times a Day.     • atorvastatin (LIPITOR) 40 MG tablet Take 1 tablet by mouth Daily. 30 tablet 1   • citalopram (CeleXA) 20 MG tablet Take 20 mg by mouth Daily.     • ferrous sulfate 325 (65 FE) MG tablet Take 1 tablet by mouth 2 (Two) Times a Day With Meals.     • levothyroxine (SYNTHROID, LEVOTHROID) 100 MCG tablet Take 100 mcg by mouth Daily.     • Melatonin 10 MG tablet Take 30 mg by mouth Every Night.     • pantoprazole (PROTONIX) 40 MG EC tablet Take 1 tablet by mouth Every 12 (Twelve) Hours.     • sotalol (BETAPACE) 80 MG tablet Take 1 tablet by mouth Daily.       Current Facility-Administered Medications on File Prior to Visit   Medication Dose Route Frequency Provider Last Rate Last Dose   • [DISCONTINUED] acetaminophen (TYLENOL) tablet 650 mg  650 mg Oral Q4H PRN Cr Earl MD   650 mg at 01/08/17 0839   • [DISCONTINUED] atorvastatin (LIPITOR) tablet 40 mg  40 mg Oral Daily RAÚL Padron   40 mg at 01/09/17 0842   • [DISCONTINUED] citalopram (CeleXA) tablet 20 mg  20 mg Oral Daily RAÚL Padron   20 mg at 01/09/17 0843   • [DISCONTINUED] FentaNYL Citrate (PF)  (SUBLIMAZE) injection 50 mcg  50 mcg Intravenous Q5 Min PRN Cierra Horton CRNA       • [DISCONTINUED] ferrous sulfate tablet 325 mg  325 mg Oral BID With Meals Jon Cuba MD   325 mg at 01/09/17 0843   • [DISCONTINUED] lactated ringers bolus 500 mL  500 mL Intravenous Once PRN Cierra Horton CRNA       • [DISCONTINUED] levothyroxine (SYNTHROID, LEVOTHROID) tablet 100 mcg  100 mcg Oral Daily RAÚL Padron   100 mcg at 01/09/17 0843   • [DISCONTINUED] melatonin sublingual tablet 5 mg  5 mg Sublingual Nightly PRN Jon Cuba MD   5 mg at 01/08/17 2136   • [DISCONTINUED] ondansetron (ZOFRAN) injection 4 mg  4 mg Intravenous Once PRN Cierra Horton CRNA       • [DISCONTINUED] pantoprazole (PROTONIX) EC tablet 40 mg  40 mg Oral Q12H Rose Sebastian APRN   40 mg at 01/08/17 2136   • [DISCONTINUED] Propofol (DIPRIVAN) injection   Intravenous PRN Cierra Horton CRNA   50 mg at 01/09/17 1416   • [DISCONTINUED] Propofol (DIPRIVAN) injection   Intravenous Continuous PRN Cierra Horton CRNA   Stopped at 01/09/17 1440   • [DISCONTINUED] sodium chloride 0.9 % flush 1-10 mL  1-10 mL Intravenous PRN Cr Earl MD       • [DISCONTINUED] sodium chloride 0.9 % flush 10 mL  10 mL Intravenous PRN Jordan Saenz MD       • [DISCONTINUED] sotalol (BETAPACE) tablet 80 mg  80 mg Oral Q24H Pradip Johansen MD   80 mg at 01/09/17 0842   • [DISCONTINUED] vitamin C (ASCORBIC ACID) tablet 500 mg  500 mg Oral BID Jon Cuba MD   500 mg at 01/09/17 0843       Allergies   Allergen Reactions   • Sulfa Antibiotics Anaphylaxis       Social History     Social History   • Marital status:      Spouse name: N/A   • Number of children: N/A   • Years of education: N/A     Occupational History   • [employer]      Social History Main Topics   • Smoking status: Former Smoker     Packs/day: 1.00     Years: 20.00     Types: Cigarettes   • Smokeless tobacco: Never Used      Comment: quit 35+ years ago.    •  "Alcohol use 4.8 oz/week     8 Shots of liquor per week   • Drug use: No   • Sexual activity: Not Asked     Other Topics Concern   • None     Social History Narrative    5-10 servings of caffeine per day.        Family History   Problem Relation Age of Onset   • Atrial fibrillation Mother    • Diabetes Mother    • Atrial fibrillation Brother    • Abnormal EKG Brother    • Heart attack Father    • Stroke Father    • Diabetes Sister    • Breast cancer Neg Hx    • Ovarian cancer Neg Hx        PHYSICAL EXAM:    Visit Vitals   • /67   • Pulse 67   • Temp 97.2 °F (36.2 °C) (Temporal Artery )   • Resp 16   • Ht 70\" (177.8 cm)   • Wt 161 lb (73 kg)   • LMP  (LMP Unknown)   • BMI 23.1 kg/m2       ECOG: (0) Fully active, able to carry on all predisease performance without restriction  General: well appearing female in no acute distress  HEENT: sclera anicteric, oropharynx clear  Lymphatics: no cervical, supraclavicular, inguinal, or axillary adenopathy  Cardiovascular: regular rate and rhythm, no murmurs  Neck: Supple; No thyromegaly  Lungs: clear to auscultation bilaterally. No respiratory distress.   Abdomen: soft, nontender, nondistended.  No palpable organomegaly  Extremities: no cyanosis, clubbing, edema, or cords  Skin: no rashes, lesions, bruising, or petechiae  Neuro: Alert and oriented x 4; Moving all extremities.  Psych: No anxiety or depression        Assessment/Plan     1. Iron deficiency anemia  2. Monoclonal gammopathy    Discussion: I would not be surprised if she had arteriovenous malformation bleeding such as what her mother dealt with and that the Eliquis has complicated this.  She needs the Eliquis for her atrial fibrillation.  There is no antidote for this however.  The risk of bleeding on any given day is less with Eliquis than with Coumadin but there is no antidote available for Eliquis.  For the time being we'll see how she does with aggressive oral iron and she will go to 2 tablets twice a day of " ferrous sulfate along with vitamin C to improve absorption.  With reference to the monoclonal gammopathy, this is a relatively low level and she has no abnormalities in her creatinine or total protein or albumin levels and her quantitative immunoglobulins are not significantly abnormal.  For the time being were going to check her serum free light chains along with a 24-hour urine immunoelectrophoresis now and we'll repeat her serum protein electrophoresis, 24-hour urine immunoelectrophoresis, and serum free light chains prior to return in 2 months.  In the meantime I'll check her blood counts weekly and transfuse as need be.  If we aren't able to keep her iron up with oral iron due to losses better faster than we can replace, then we will give consideration to parenteral iron though there is some risk of anaphylaxis with that.  If that does not work, then we will have to give consideration to other measures up to and perhaps including ablative measures to do away with the atrial fibrillation that would avoid the need for anticoagulation but hopefully won't come to that and we can just do anticoagulation with rate control as we are doing presently.    Errors in dictation may reflect use of voice recognition software and not all errors in transcription may have been detected prior to signing.    René Valencia MD    1/10/2017

## 2017-01-11 LAB
KAPPA LC SERPL-MCNC: 22.74 MG/L (ref 3.3–19.4)
KAPPA LC/LAMBDA SER: 0.17 {RATIO} (ref 0.26–1.65)
LAMBDA LC FREE SERPL-MCNC: 132.98 MG/L (ref 5.71–26.3)

## 2017-01-12 ENCOUNTER — LAB (OUTPATIENT)
Dept: LAB | Facility: HOSPITAL | Age: 62
End: 2017-01-12

## 2017-01-12 DIAGNOSIS — D47.2 MONOCLONAL GAMMOPATHY: ICD-10-CM

## 2017-01-12 DIAGNOSIS — D64.9 SYMPTOMATIC ANEMIA: ICD-10-CM

## 2017-01-12 PROCEDURE — 86335 IMMUNFIX E-PHORSIS/URINE/CSF: CPT | Performed by: INTERNAL MEDICINE

## 2017-01-12 PROCEDURE — 84156 ASSAY OF PROTEIN URINE: CPT | Performed by: INTERNAL MEDICINE

## 2017-01-12 PROCEDURE — 84166 PROTEIN E-PHORESIS/URINE/CSF: CPT | Performed by: INTERNAL MEDICINE

## 2017-01-12 PROCEDURE — 81050 URINALYSIS VOLUME MEASURE: CPT | Performed by: INTERNAL MEDICINE

## 2017-01-13 LAB — REF LAB TEST METHOD: NORMAL

## 2017-01-16 LAB
ALBUMIN 24H MFR UR ELPH: 11 %
ALPHA1 GLOB 24H MFR UR ELPH: 7.4 %
ALPHA2 GLOB 24H MFR UR ELPH: 15.1 %
B-GLOBULIN MFR UR ELPH: 24.1 %
GAMMA GLOB 24H MFR UR ELPH: 42.3 %
HIV 1 & 2 AB SER-IMP: NORMAL
INTERPRETATION UR IFE-IMP: NORMAL
M PROTEIN 24H MFR UR ELPH: NORMAL %
PROT 24H UR-MRATE: <88 MG/24 HR (ref 30–150)
PROT UR-MCNC: <4 MG/DL

## 2017-01-18 ENCOUNTER — HOSPITAL ENCOUNTER (OUTPATIENT)
Dept: CARDIOLOGY | Facility: HOSPITAL | Age: 62
Discharge: HOME | End: 2017-01-18
Attending: INTERNAL MEDICINE

## 2017-01-18 ENCOUNTER — HOSPITAL ENCOUNTER (OUTPATIENT)
Dept: CARDIOLOGY | Facility: HOSPITAL | Age: 62
Discharge: HOME | End: 2017-01-18
Attending: INTERNAL MEDICINE | Admitting: INTERNAL MEDICINE

## 2017-01-18 ENCOUNTER — LAB (OUTPATIENT)
Dept: LAB | Facility: HOSPITAL | Age: 62
End: 2017-01-18

## 2017-01-18 VITALS
RESPIRATION RATE: 20 BRPM | WEIGHT: 160 LBS | HEIGHT: 70 IN | HEART RATE: 61 BPM | SYSTOLIC BLOOD PRESSURE: 138 MMHG | OXYGEN SATURATION: 100 % | BODY MASS INDEX: 22.9 KG/M2 | DIASTOLIC BLOOD PRESSURE: 80 MMHG

## 2017-01-18 DIAGNOSIS — D47.2 MONOCLONAL GAMMOPATHY: ICD-10-CM

## 2017-01-18 DIAGNOSIS — D64.9 SYMPTOMATIC ANEMIA: ICD-10-CM

## 2017-01-18 DIAGNOSIS — R06.02 SHORTNESS OF BREATH: ICD-10-CM

## 2017-01-18 LAB
BASOPHILS # BLD AUTO: 0.04 10*3/MM3 (ref 0–0.2)
BASOPHILS NFR BLD AUTO: 0.8 % (ref 0–1)
BH CV NUCLEAR PRIOR STUDY: 2
BH CV STRESS BP STAGE 1: NORMAL
BH CV STRESS BP STAGE 2: NORMAL
BH CV STRESS BP STAGE 4: NORMAL
BH CV STRESS COMMENTS STAGE 1: NORMAL
BH CV STRESS COMMENTS STAGE 2: 1
BH CV STRESS DOSE REGADENOSON STAGE 1: 0.4
BH CV STRESS DURATION MIN STAGE 1: 1
BH CV STRESS DURATION MIN STAGE 2: 1
BH CV STRESS DURATION MIN STAGE 3: 1
BH CV STRESS DURATION MIN STAGE 4: 1
BH CV STRESS DURATION SEC STAGE 1: 0
BH CV STRESS DURATION SEC STAGE 2: 0
BH CV STRESS DURATION SEC STAGE 4: 0
BH CV STRESS HR STAGE 1: 98
BH CV STRESS HR STAGE 2: 98
BH CV STRESS HR STAGE 3: 92
BH CV STRESS HR STAGE 4: 88
BH CV STRESS O2 STAGE 1: 100
BH CV STRESS O2 STAGE 2: 100
BH CV STRESS O2 STAGE 3: 100
BH CV STRESS O2 STAGE 4: 97
BH CV STRESS PROTOCOL 1: NORMAL
BH CV STRESS RECOVERY BP: NORMAL MMHG
BH CV STRESS RECOVERY HR: 89 BPM
BH CV STRESS RECOVERY O2: 97 %
BH CV STRESS STAGE 1: 1
BH CV STRESS STAGE 2: 2
BH CV STRESS STAGE 3: 3
BH CV STRESS STAGE 4: 4
DEPRECATED RDW RBC AUTO: 77.7 FL (ref 37–54)
EOSINOPHIL # BLD AUTO: 0.24 10*3/MM3 (ref 0.1–0.3)
EOSINOPHIL NFR BLD AUTO: 4.9 % (ref 0–3)
ERYTHROCYTE [DISTWIDTH] IN BLOOD BY AUTOMATED COUNT: 24.9 % (ref 11.3–14.5)
FERRITIN SERPL-MCNC: 76 NG/ML (ref 10–291)
HCT VFR BLD AUTO: 39.4 % (ref 34.5–44)
HGB BLD-MCNC: 11.5 G/DL (ref 11.5–15.5)
IMM GRANULOCYTES # BLD: 0.02 10*3/MM3 (ref 0–0.03)
IMM GRANULOCYTES NFR BLD: 0.4 % (ref 0–0.6)
IRON 24H UR-MRATE: 344 MCG/DL (ref 50–175)
IRON SATN MFR SERPL: 96 % (ref 15–50)
LV EF NUC BP: 60 %
LYMPHOCYTES # BLD AUTO: 1.25 10*3/MM3 (ref 0.6–4.8)
LYMPHOCYTES NFR BLD AUTO: 25.4 % (ref 24–44)
MAXIMAL PREDICTED HEART RATE: 158 BPM
MCH RBC QN AUTO: 25.8 PG (ref 27–31)
MCHC RBC AUTO-ENTMCNC: 29.2 G/DL (ref 32–36)
MCV RBC AUTO: 88.3 FL (ref 80–99)
MONOCYTES # BLD AUTO: 0.47 10*3/MM3 (ref 0–1)
MONOCYTES NFR BLD AUTO: 9.6 % (ref 0–12)
NEUTROPHILS # BLD AUTO: 2.9 10*3/MM3 (ref 1.5–8.3)
NEUTROPHILS NFR BLD AUTO: 58.9 % (ref 41–71)
PERCENT MAX PREDICTED HR: 63.29 %
PLATELET # BLD AUTO: 403 10*3/MM3 (ref 150–450)
PMV BLD AUTO: 9.7 FL (ref 6–12)
RBC # BLD AUTO: 4.46 10*6/MM3 (ref 3.89–5.14)
STRESS BASELINE BP: NORMAL MMHG
STRESS BASELINE HR: 61 BPM
STRESS O2 SAT REST: 100 %
STRESS PERCENT HR: 74 %
STRESS POST ESTIMATED WORKLOAD: 1 METS
STRESS POST EXERCISE DUR MIN: 4 MIN
STRESS POST EXERCISE DUR SEC: 0 SEC
STRESS POST O2 SAT PEAK: 100 %
STRESS POST PEAK BP: NORMAL MMHG
STRESS POST PEAK HR: 100 BPM
STRESS TARGET HR: 134 BPM
TIBC SERPL-MCNC: 360 MCG/DL (ref 250–450)
WBC NRBC COR # BLD: 4.92 10*3/MM3 (ref 3.5–10.8)

## 2017-01-18 PROCEDURE — 83540 ASSAY OF IRON: CPT | Performed by: INTERNAL MEDICINE

## 2017-01-18 PROCEDURE — 86334 IMMUNOFIX E-PHORESIS SERUM: CPT | Performed by: INTERNAL MEDICINE

## 2017-01-18 PROCEDURE — 93017 CV STRESS TEST TRACING ONLY: CPT

## 2017-01-18 PROCEDURE — 83883 ASSAY NEPHELOMETRY NOT SPEC: CPT | Performed by: INTERNAL MEDICINE

## 2017-01-18 PROCEDURE — 78452 HT MUSCLE IMAGE SPECT MULT: CPT

## 2017-01-18 PROCEDURE — A9500 TC99M SESTAMIBI: HCPCS | Performed by: INTERNAL MEDICINE

## 2017-01-18 PROCEDURE — 84165 PROTEIN E-PHORESIS SERUM: CPT | Performed by: INTERNAL MEDICINE

## 2017-01-18 PROCEDURE — 0 TECHNETIUM SESTAMIBI: Performed by: INTERNAL MEDICINE

## 2017-01-18 PROCEDURE — 85025 COMPLETE CBC W/AUTO DIFF WBC: CPT | Performed by: INTERNAL MEDICINE

## 2017-01-18 PROCEDURE — 78452 HT MUSCLE IMAGE SPECT MULT: CPT | Performed by: INTERNAL MEDICINE

## 2017-01-18 PROCEDURE — 83550 IRON BINDING TEST: CPT | Performed by: INTERNAL MEDICINE

## 2017-01-18 PROCEDURE — 36415 COLL VENOUS BLD VENIPUNCTURE: CPT

## 2017-01-18 PROCEDURE — 25010000002 REGADENOSON 0.4 MG/5ML SOLUTION: Performed by: INTERNAL MEDICINE

## 2017-01-18 PROCEDURE — 93018 CV STRESS TEST I&R ONLY: CPT | Performed by: INTERNAL MEDICINE

## 2017-01-18 PROCEDURE — 84155 ASSAY OF PROTEIN SERUM: CPT | Performed by: INTERNAL MEDICINE

## 2017-01-18 PROCEDURE — 82728 ASSAY OF FERRITIN: CPT | Performed by: INTERNAL MEDICINE

## 2017-01-18 RX ADMIN — Medication 1 DOSE: at 07:45

## 2017-01-18 RX ADMIN — Medication 1 DOSE: at 09:25

## 2017-01-18 RX ADMIN — REGADENOSON 0.4 MG: 0.08 INJECTION, SOLUTION INTRAVENOUS at 09:25

## 2017-01-19 LAB
ALBUMIN SERPL-MCNC: 4 G/DL (ref 2.9–4.4)
ALBUMIN/GLOB SERPL: 1.4 {RATIO} (ref 0.7–1.7)
ALPHA1 GLOB FLD ELPH-MCNC: 0.3 G/DL (ref 0–0.4)
ALPHA2 GLOB SERPL ELPH-MCNC: 0.7 G/DL (ref 0.4–1)
B-GLOBULIN SERPL ELPH-MCNC: 1 G/DL (ref 0.7–1.3)
GAMMA GLOB SERPL ELPH-MCNC: 1.1 G/DL (ref 0.4–1.8)
GLOBULIN SER CALC-MCNC: 3 G/DL (ref 2.2–3.9)
IGA SERPL-MCNC: 125 MG/DL (ref 87–352)
IGG SERPL-MCNC: 929 MG/DL (ref 700–1600)
IGM SERPL-MCNC: 68 MG/DL (ref 26–217)
INTERPRETATION SERPL IEP-IMP: ABNORMAL
KAPPA LC SERPL-MCNC: 24.43 MG/L (ref 3.3–19.4)
KAPPA LC/LAMBDA SER: 0.23 {RATIO} (ref 0.26–1.65)
LAMBDA LC FREE SERPL-MCNC: 106.27 MG/L (ref 5.71–26.3)
Lab: ABNORMAL
M-SPIKE: 0.6 G/DL
PROT SERPL-MCNC: 7 G/DL (ref 6–8.5)

## 2017-02-01 ENCOUNTER — LAB (OUTPATIENT)
Dept: LAB | Facility: HOSPITAL | Age: 62
End: 2017-02-01

## 2017-02-01 DIAGNOSIS — D47.2 MONOCLONAL GAMMOPATHY: ICD-10-CM

## 2017-02-01 DIAGNOSIS — D64.9 SYMPTOMATIC ANEMIA: ICD-10-CM

## 2017-02-01 LAB
BASOPHILS # BLD AUTO: 0.03 10*3/MM3 (ref 0–0.2)
BASOPHILS NFR BLD AUTO: 0.8 % (ref 0–1)
DEPRECATED RDW RBC AUTO: ABNORMAL FL (ref 37–54)
EOSINOPHIL # BLD AUTO: 0.1 10*3/MM3 (ref 0.1–0.3)
EOSINOPHIL NFR BLD AUTO: 2.8 % (ref 0–3)
ERYTHROCYTE [DISTWIDTH] IN BLOOD BY AUTOMATED COUNT: ABNORMAL % (ref 11.3–14.5)
HCT VFR BLD AUTO: 38.1 % (ref 34.5–44)
HGB BLD-MCNC: 11.7 G/DL (ref 11.5–15.5)
IMM GRANULOCYTES # BLD: 0 10*3/MM3 (ref 0–0.03)
IMM GRANULOCYTES NFR BLD: 0 % (ref 0–0.6)
LYMPHOCYTES # BLD AUTO: 1.56 10*3/MM3 (ref 0.6–4.8)
LYMPHOCYTES NFR BLD AUTO: 43.3 % (ref 24–44)
MACROCYTES BLD QL SMEAR: NORMAL
MCH RBC QN AUTO: 27.8 PG (ref 27–31)
MCHC RBC AUTO-ENTMCNC: 30.7 G/DL (ref 32–36)
MCV RBC AUTO: 90.5 FL (ref 80–99)
MICROCYTES BLD QL: NORMAL
MONOCYTES # BLD AUTO: 0.36 10*3/MM3 (ref 0–1)
MONOCYTES NFR BLD AUTO: 10 % (ref 0–12)
NEUTROPHILS # BLD AUTO: 1.55 10*3/MM3 (ref 1.5–8.3)
NEUTROPHILS NFR BLD AUTO: 43.1 % (ref 41–71)
PLAT MORPH BLD: NORMAL
PLATELET # BLD AUTO: 230 10*3/MM3 (ref 150–450)
PMV BLD AUTO: 9.9 FL (ref 6–12)
RBC # BLD AUTO: 4.21 10*6/MM3 (ref 3.89–5.14)
WBC MORPH BLD: NORMAL
WBC NRBC COR # BLD: 3.6 10*3/MM3 (ref 3.5–10.8)

## 2017-02-01 PROCEDURE — 36415 COLL VENOUS BLD VENIPUNCTURE: CPT

## 2017-02-01 PROCEDURE — 85025 COMPLETE CBC W/AUTO DIFF WBC: CPT | Performed by: INTERNAL MEDICINE

## 2017-02-01 PROCEDURE — 85007 BL SMEAR W/DIFF WBC COUNT: CPT | Performed by: INTERNAL MEDICINE

## 2017-02-15 ENCOUNTER — OFFICE VISIT (OUTPATIENT)
Dept: CARDIOLOGY | Facility: CLINIC | Age: 62
End: 2017-02-15

## 2017-02-15 VITALS
DIASTOLIC BLOOD PRESSURE: 86 MMHG | HEIGHT: 70 IN | HEART RATE: 56 BPM | SYSTOLIC BLOOD PRESSURE: 130 MMHG | BODY MASS INDEX: 23.05 KG/M2 | WEIGHT: 161 LBS

## 2017-02-15 DIAGNOSIS — I48.0 PAROXYSMAL ATRIAL FIBRILLATION (HCC): Primary | ICD-10-CM

## 2017-02-15 DIAGNOSIS — I10 ESSENTIAL HYPERTENSION: ICD-10-CM

## 2017-02-15 PROCEDURE — 99213 OFFICE O/P EST LOW 20 MIN: CPT | Performed by: INTERNAL MEDICINE

## 2017-02-15 PROCEDURE — 93000 ELECTROCARDIOGRAM COMPLETE: CPT | Performed by: INTERNAL MEDICINE

## 2017-02-15 RX ORDER — SOTALOL HYDROCHLORIDE 80 MG/1
80 TABLET ORAL
Qty: 30 TABLET | Refills: 5
Start: 2017-02-15 | End: 2018-04-05 | Stop reason: HOSPADM

## 2017-02-15 NOTE — PROGRESS NOTES
Halie Perez  1955  [phone]      02/15/2017    [facility] CARDIOLOGY     Tori KENYONMarciano Kishor, APRN  [address] [place] [id number]    Chief Complaint   Patient presents with   • Atrial Fibrillation       Problem List:   Problem List:     1. Atrial Fibrillation  A. CHADSvasc =2 On Eliquis  B. Echocardiogram 11/18/2016: EF 60%, moderate MR  C. Event Monitor 11/18/16-12/17/16 showing episodes of PAF up to 110 bpm  D. Intolerant to Flecainide due to QRS widening  E. Started on Sotalol 1/4/16, now on Sotalol 80 mg daily  F. Lexiscan Cardiolite 1/18/17 : EF 60%, Abnormal myocardial perfusion scan showing medium sized area of moderate to severe hypoperfusion involving the mid anterior wall and the anterior apical segment with mild reversibility. This finding is consistent with breast attenuation artifact versus infarction with ischemia. Clinical correlation is recommended    2. HTN  3. Hypothyroidism  4. HLP  5. Surgical History  A. Tonsillectomy  B. Total hip replacement  C. Mammoplasy  6. Symptomatic Anemia with Hemoglobin 4.8   1/2017- normal colonscopy, EGD, and pill camera    Allergies  Allergies   Allergen Reactions   • Sulfa Antibiotics Anaphylaxis       Current Medications    Current Outpatient Prescriptions:   •  apixaban (ELIQUIS) 5 MG tablet tablet, Take 1 tablet by mouth 2 (Two) Times a Day., Disp: 30 tablet, Rfl: 1  •  ascorbic acid (VITAMIN C) 500 MG tablet, Take 1 tablet by mouth 2 (Two) Times a Day., Disp: , Rfl:   •  atorvastatin (LIPITOR) 40 MG tablet, Take 1 tablet by mouth Daily., Disp: 30 tablet, Rfl: 1  •  citalopram (CeleXA) 20 MG tablet, Take 20 mg by mouth Daily., Disp: , Rfl:   •  ferrous sulfate 325 (65 FE) MG tablet, Take 1 tablet by mouth 2 (Two) Times a Day With Meals., Disp: , Rfl:   •  levothyroxine (SYNTHROID, LEVOTHROID) 100 MCG tablet, Take 100 mcg by mouth Daily., Disp: , Rfl:   •  Melatonin 10 MG tablet, Take 30 mg by mouth Every  "Night., Disp: , Rfl:   •  pantoprazole (PROTONIX) 40 MG EC tablet, Take 1 tablet by mouth Every 12 (Twelve) Hours., Disp: , Rfl:   •  sotalol (BETAPACE) 80 MG tablet, Take 1 tablet by mouth Daily., Disp: , Rfl:     History of Present Illness   HPI    Pt presents for follow up of atrial fibrillation on Sotalol. Since we last saw the pt in the hospital, pt denies any AF episodes on Sotalol, SOB, CP, LH, and dizziness. Denies any hospitalizations, ER visits, bleeding, or TIA/CVA symptoms. Overall feels well. She has cut back on her caffeine to 2 coffee's a day.     ROS:  General:  Denies fatigue, weight gain or loss  Cardiovascular:  Denies CP, PND, syncope, near syncope, edema or palpitations.  Pulmonary:  Denies HOFFMAN, cough, or wheezing      Vitals:    02/15/17 1356   BP: 130/86   BP Location: Left arm   Patient Position: Sitting   Pulse: 56   Weight: 161 lb (73 kg)   Height: 70\" (177.8 cm)     PE:  NAD  Neck: no JVD, no carotid bruits, no TM  Heart RRR, NL S1, S2, S4 present, no rubs, murmurs  Lungs: CTAB,   Abd: soft, non-tender, NL BS  Ext: No musculoskeletal deformities    Diagnostic Data:    ECG 12 Lead  Date/Time: 2/15/2017 2:15 PM  Performed by: KI JOHANSEN  Authorized by: KI JOHANSEN   Rhythm: sinus rhythm  BPM: 56              1. Paroxysmal atrial fibrillation    2. Essential hypertension          Plan:  1) AF- controlled on Sotalol  Continue present medications. She has also significantly cut back on her caffeine.   2) Anticoagulation  Continue Eliquis  3) HTN- controlled  Wt loss, exercise, salt reduction  4) Abnormal stress test: prob artifact: asymptomatic will monitor for now    F/up in 6 months    Scribed for Ki Johansen MD by Janine Reeves PA-C. 2/15/2017  2:15 PM     Ki WRIGHT MD, personally performed the services described in this documentation as scribed by the above named individual in my presence, and it is both accurate and complete.  2/15/2017  2:32 PM    "

## 2017-02-22 ENCOUNTER — LAB (OUTPATIENT)
Dept: LAB | Facility: HOSPITAL | Age: 62
End: 2017-02-22

## 2017-02-22 DIAGNOSIS — D64.9 SYMPTOMATIC ANEMIA: ICD-10-CM

## 2017-02-22 DIAGNOSIS — D47.2 MONOCLONAL GAMMOPATHY: ICD-10-CM

## 2017-02-22 LAB
BASOPHILS # BLD AUTO: 0.02 10*3/MM3 (ref 0–0.2)
BASOPHILS NFR BLD AUTO: 0.5 % (ref 0–1)
DEPRECATED RDW RBC AUTO: 74.4 FL (ref 37–54)
EOSINOPHIL # BLD AUTO: 0.12 10*3/MM3 (ref 0.1–0.3)
EOSINOPHIL NFR BLD AUTO: 2.8 % (ref 0–3)
ERYTHROCYTE [DISTWIDTH] IN BLOOD BY AUTOMATED COUNT: 22.6 % (ref 11.3–14.5)
HCT VFR BLD AUTO: 32.4 % (ref 34.5–44)
HGB BLD-MCNC: 10.1 G/DL (ref 11.5–15.5)
IMM GRANULOCYTES # BLD: 0.01 10*3/MM3 (ref 0–0.03)
IMM GRANULOCYTES NFR BLD: 0.2 % (ref 0–0.6)
LYMPHOCYTES # BLD AUTO: 0.93 10*3/MM3 (ref 0.6–4.8)
LYMPHOCYTES NFR BLD AUTO: 21.4 % (ref 24–44)
MCH RBC QN AUTO: 28.9 PG (ref 27–31)
MCHC RBC AUTO-ENTMCNC: 31.2 G/DL (ref 32–36)
MCV RBC AUTO: 92.6 FL (ref 80–99)
MONOCYTES # BLD AUTO: 0.57 10*3/MM3 (ref 0–1)
MONOCYTES NFR BLD AUTO: 13.1 % (ref 0–12)
NEUTROPHILS # BLD AUTO: 2.7 10*3/MM3 (ref 1.5–8.3)
NEUTROPHILS NFR BLD AUTO: 62 % (ref 41–71)
PLATELET # BLD AUTO: 230 10*3/MM3 (ref 150–450)
PMV BLD AUTO: 9.7 FL (ref 6–12)
RBC # BLD AUTO: 3.5 10*6/MM3 (ref 3.89–5.14)
WBC NRBC COR # BLD: 4.35 10*3/MM3 (ref 3.5–10.8)

## 2017-02-22 PROCEDURE — 36415 COLL VENOUS BLD VENIPUNCTURE: CPT

## 2017-02-22 PROCEDURE — 85025 COMPLETE CBC W/AUTO DIFF WBC: CPT | Performed by: INTERNAL MEDICINE

## 2017-02-27 ENCOUNTER — LAB REQUISITION (OUTPATIENT)
Dept: LAB | Facility: HOSPITAL | Age: 62
End: 2017-02-27

## 2017-02-27 DIAGNOSIS — D47.2 MONOCLONAL GAMMOPATHY: ICD-10-CM

## 2017-02-27 DIAGNOSIS — D64.9 ANEMIA: ICD-10-CM

## 2017-02-27 LAB
BASOPHILS # BLD AUTO: 0.02 10*3/MM3 (ref 0–0.2)
BASOPHILS NFR BLD AUTO: 0.5 % (ref 0–1)
DEPRECATED RDW RBC AUTO: 76.2 FL (ref 37–54)
EOSINOPHIL # BLD AUTO: 0.13 10*3/MM3 (ref 0.1–0.3)
EOSINOPHIL NFR BLD AUTO: 3.4 % (ref 0–3)
ERYTHROCYTE [DISTWIDTH] IN BLOOD BY AUTOMATED COUNT: 23 % (ref 11.3–14.5)
HCT VFR BLD AUTO: 25.6 % (ref 34.5–44)
HGB BLD-MCNC: 8.1 G/DL (ref 11.5–15.5)
IMM GRANULOCYTES # BLD: 0.01 10*3/MM3 (ref 0–0.03)
IMM GRANULOCYTES NFR BLD: 0.3 % (ref 0–0.6)
LYMPHOCYTES # BLD AUTO: 1.27 10*3/MM3 (ref 0.6–4.8)
LYMPHOCYTES NFR BLD AUTO: 32.7 % (ref 24–44)
MCH RBC QN AUTO: 29.7 PG (ref 27–31)
MCHC RBC AUTO-ENTMCNC: 31.6 G/DL (ref 32–36)
MCV RBC AUTO: 93.8 FL (ref 80–99)
MONOCYTES # BLD AUTO: 0.56 10*3/MM3 (ref 0–1)
MONOCYTES NFR BLD AUTO: 14.4 % (ref 0–12)
NEUTROPHILS # BLD AUTO: 1.89 10*3/MM3 (ref 1.5–8.3)
NEUTROPHILS NFR BLD AUTO: 48.7 % (ref 41–71)
PLATELET # BLD AUTO: 267 10*3/MM3 (ref 150–450)
PMV BLD AUTO: 9.4 FL (ref 6–12)
RBC # BLD AUTO: 2.73 10*6/MM3 (ref 3.89–5.14)
WBC NRBC COR # BLD: 3.88 10*3/MM3 (ref 3.5–10.8)

## 2017-02-27 PROCEDURE — 36415 COLL VENOUS BLD VENIPUNCTURE: CPT | Performed by: INTERNAL MEDICINE

## 2017-02-27 PROCEDURE — 85025 COMPLETE CBC W/AUTO DIFF WBC: CPT | Performed by: INTERNAL MEDICINE

## 2017-03-02 ENCOUNTER — APPOINTMENT (OUTPATIENT)
Dept: LAB | Facility: HOSPITAL | Age: 62
End: 2017-03-02

## 2017-03-05 ENCOUNTER — LAB (OUTPATIENT)
Dept: LAB | Facility: HOSPITAL | Age: 62
End: 2017-03-05

## 2017-03-05 ENCOUNTER — HOSPITAL ENCOUNTER (INPATIENT)
Facility: HOSPITAL | Age: 62
LOS: 4 days | Discharge: HOME | End: 2017-03-09
Attending: INTERNAL MEDICINE | Admitting: INTERNAL MEDICINE

## 2017-03-05 DIAGNOSIS — D47.2 MONOCLONAL GAMMOPATHY: ICD-10-CM

## 2017-03-05 DIAGNOSIS — K64.9 BLEEDING HEMORRHOID: ICD-10-CM

## 2017-03-05 DIAGNOSIS — D64.9 SYMPTOMATIC ANEMIA: ICD-10-CM

## 2017-03-05 LAB
ABO GROUP BLD: NORMAL
BASOPHILS # BLD AUTO: 0.04 10*3/MM3 (ref 0–0.2)
BASOPHILS NFR BLD AUTO: 0.6 % (ref 0–1)
BLD GP AB SCN SERPL QL: NEGATIVE
DEPRECATED RDW RBC AUTO: 86 FL (ref 37–54)
EOSINOPHIL # BLD AUTO: 0.08 10*3/MM3 (ref 0.1–0.3)
EOSINOPHIL NFR BLD AUTO: 1.1 % (ref 0–3)
ERYTHROCYTE [DISTWIDTH] IN BLOOD BY AUTOMATED COUNT: 23.8 % (ref 11.3–14.5)
HCT VFR BLD AUTO: 21.5 % (ref 34.5–44)
HGB BLD-MCNC: 6.6 G/DL (ref 11.5–15.5)
IMM GRANULOCYTES # BLD: 0.02 10*3/MM3 (ref 0–0.03)
IMM GRANULOCYTES NFR BLD: 0.3 % (ref 0–0.6)
LYMPHOCYTES # BLD AUTO: 0.89 10*3/MM3 (ref 0.6–4.8)
LYMPHOCYTES NFR BLD AUTO: 12.3 % (ref 24–44)
MCH RBC QN AUTO: 30.8 PG (ref 27–31)
MCHC RBC AUTO-ENTMCNC: 30.7 G/DL (ref 32–36)
MCV RBC AUTO: 100.5 FL (ref 80–99)
MONOCYTES # BLD AUTO: 0.58 10*3/MM3 (ref 0–1)
MONOCYTES NFR BLD AUTO: 8 % (ref 0–12)
NEUTROPHILS # BLD AUTO: 5.61 10*3/MM3 (ref 1.5–8.3)
NEUTROPHILS NFR BLD AUTO: 77.7 % (ref 41–71)
PLATELET # BLD AUTO: 280 10*3/MM3 (ref 150–450)
PMV BLD AUTO: 7.7 FL (ref 6–12)
RBC # BLD AUTO: 2.14 10*6/MM3 (ref 3.89–5.14)
RH BLD: POSITIVE
WBC NRBC COR # BLD: 7.22 10*3/MM3 (ref 3.5–10.8)

## 2017-03-05 PROCEDURE — 85025 COMPLETE CBC W/AUTO DIFF WBC: CPT

## 2017-03-05 PROCEDURE — 86920 COMPATIBILITY TEST SPIN: CPT

## 2017-03-05 PROCEDURE — P9016 RBC LEUKOCYTES REDUCED: HCPCS

## 2017-03-05 PROCEDURE — 99223 1ST HOSP IP/OBS HIGH 75: CPT | Performed by: INTERNAL MEDICINE

## 2017-03-05 PROCEDURE — 36430 TRANSFUSION BLD/BLD COMPNT: CPT

## 2017-03-05 PROCEDURE — 86900 BLOOD TYPING SEROLOGIC ABO: CPT | Performed by: INTERNAL MEDICINE

## 2017-03-05 PROCEDURE — 86850 RBC ANTIBODY SCREEN: CPT | Performed by: INTERNAL MEDICINE

## 2017-03-05 PROCEDURE — 86900 BLOOD TYPING SEROLOGIC ABO: CPT

## 2017-03-05 PROCEDURE — 36415 COLL VENOUS BLD VENIPUNCTURE: CPT

## 2017-03-05 PROCEDURE — 86901 BLOOD TYPING SEROLOGIC RH(D): CPT | Performed by: INTERNAL MEDICINE

## 2017-03-05 RX ORDER — ATORVASTATIN CALCIUM 40 MG/1
40 TABLET, FILM COATED ORAL DAILY
Status: DISCONTINUED | OUTPATIENT
Start: 2017-03-05 | End: 2017-03-09 | Stop reason: HOSPADM

## 2017-03-05 RX ORDER — DOCUSATE SODIUM 100 MG/1
100 CAPSULE, LIQUID FILLED ORAL 2 TIMES DAILY PRN
Status: DISCONTINUED | OUTPATIENT
Start: 2017-03-05 | End: 2017-03-09 | Stop reason: HOSPADM

## 2017-03-05 RX ORDER — LEVOTHYROXINE SODIUM 0.1 MG/1
100 TABLET ORAL
Status: DISCONTINUED | OUTPATIENT
Start: 2017-03-05 | End: 2017-03-09 | Stop reason: HOSPADM

## 2017-03-05 RX ORDER — SOTALOL HYDROCHLORIDE 80 MG/1
80 TABLET ORAL
Status: DISCONTINUED | OUTPATIENT
Start: 2017-03-05 | End: 2017-03-09 | Stop reason: HOSPADM

## 2017-03-05 RX ORDER — PANTOPRAZOLE SODIUM 40 MG/1
40 TABLET, DELAYED RELEASE ORAL EVERY 12 HOURS SCHEDULED
Status: DISCONTINUED | OUTPATIENT
Start: 2017-03-05 | End: 2017-03-09 | Stop reason: HOSPADM

## 2017-03-05 RX ORDER — ACETAMINOPHEN 325 MG/1
650 TABLET ORAL EVERY 4 HOURS PRN
Status: DISCONTINUED | OUTPATIENT
Start: 2017-03-05 | End: 2017-03-08

## 2017-03-05 RX ORDER — SODIUM CHLORIDE 0.9 % (FLUSH) 0.9 %
1-10 SYRINGE (ML) INJECTION AS NEEDED
Status: DISCONTINUED | OUTPATIENT
Start: 2017-03-05 | End: 2017-03-09 | Stop reason: HOSPADM

## 2017-03-05 RX ORDER — CITALOPRAM 20 MG/1
20 TABLET ORAL DAILY
Status: DISCONTINUED | OUTPATIENT
Start: 2017-03-05 | End: 2017-03-09 | Stop reason: HOSPADM

## 2017-03-05 RX ADMIN — ATORVASTATIN CALCIUM 40 MG: 40 TABLET, FILM COATED ORAL at 20:23

## 2017-03-05 RX ADMIN — Medication 5 MG: at 20:23

## 2017-03-06 LAB
ANION GAP SERPL CALCULATED.3IONS-SCNC: 5 MMOL/L (ref 3–11)
BUN BLD-MCNC: 10 MG/DL (ref 9–23)
BUN/CREAT SERPL: 12.5 (ref 7–25)
CALCIUM SPEC-SCNC: 8.3 MG/DL (ref 8.7–10.4)
CHLORIDE SERPL-SCNC: 111 MMOL/L (ref 99–109)
CO2 SERPL-SCNC: 26 MMOL/L (ref 20–31)
CREAT BLD-MCNC: 0.8 MG/DL (ref 0.6–1.3)
DEPRECATED RDW RBC AUTO: 73.9 FL (ref 37–54)
ERYTHROCYTE [DISTWIDTH] IN BLOOD BY AUTOMATED COUNT: 22.7 % (ref 11.3–14.5)
GFR SERPL CREATININE-BSD FRML MDRD: 73 ML/MIN/1.73
GLUCOSE BLD-MCNC: 70 MG/DL (ref 70–100)
HCT VFR BLD AUTO: 27.8 % (ref 34.5–44)
HGB BLD-MCNC: 8.9 G/DL (ref 11.5–15.5)
MCH RBC QN AUTO: 29.7 PG (ref 27–31)
MCHC RBC AUTO-ENTMCNC: 32 G/DL (ref 32–36)
MCV RBC AUTO: 92.7 FL (ref 80–99)
PLATELET # BLD AUTO: 237 10*3/MM3 (ref 150–450)
PMV BLD AUTO: 9.1 FL (ref 6–12)
POTASSIUM BLD-SCNC: 4.4 MMOL/L (ref 3.5–5.5)
RBC # BLD AUTO: 3 10*6/MM3 (ref 3.89–5.14)
SODIUM BLD-SCNC: 142 MMOL/L (ref 132–146)
WBC NRBC COR # BLD: 3.91 10*3/MM3 (ref 3.5–10.8)

## 2017-03-06 PROCEDURE — 86900 BLOOD TYPING SEROLOGIC ABO: CPT

## 2017-03-06 PROCEDURE — 99253 IP/OBS CNSLTJ NEW/EST LOW 45: CPT | Performed by: INTERNAL MEDICINE

## 2017-03-06 PROCEDURE — 36430 TRANSFUSION BLD/BLD COMPNT: CPT

## 2017-03-06 PROCEDURE — P9016 RBC LEUKOCYTES REDUCED: HCPCS

## 2017-03-06 PROCEDURE — 99232 SBSQ HOSP IP/OBS MODERATE 35: CPT | Performed by: FAMILY MEDICINE

## 2017-03-06 PROCEDURE — 85027 COMPLETE CBC AUTOMATED: CPT | Performed by: INTERNAL MEDICINE

## 2017-03-06 PROCEDURE — 80048 BASIC METABOLIC PNL TOTAL CA: CPT | Performed by: INTERNAL MEDICINE

## 2017-03-06 RX ORDER — SODIUM PHOSPHATE, DIBASIC AND SODIUM PHOSPHATE, MONOBASIC 7; 19 G/133ML; G/133ML
2 ENEMA RECTAL ONCE
Status: COMPLETED | OUTPATIENT
Start: 2017-03-08 | End: 2017-03-08

## 2017-03-06 RX ADMIN — SOTALOL HYDROCHLORIDE 80 MG: 80 TABLET ORAL at 08:36

## 2017-03-06 RX ADMIN — ATORVASTATIN CALCIUM 40 MG: 40 TABLET, FILM COATED ORAL at 08:36

## 2017-03-06 RX ADMIN — CITALOPRAM HYDROBROMIDE 20 MG: 20 TABLET ORAL at 08:37

## 2017-03-06 RX ADMIN — PANTOPRAZOLE SODIUM 40 MG: 40 TABLET, DELAYED RELEASE ORAL at 20:39

## 2017-03-06 RX ADMIN — PANTOPRAZOLE SODIUM 40 MG: 40 TABLET, DELAYED RELEASE ORAL at 08:36

## 2017-03-06 RX ADMIN — Medication 5 MG: at 20:39

## 2017-03-06 RX ADMIN — LEVOTHYROXINE SODIUM 100 MCG: 100 TABLET ORAL at 05:50

## 2017-03-06 NOTE — PROGRESS NOTES
Discharge Planning Assessment  [facility]     Patient Name: Halie Perez  MRN: [id number]  Today's Date: 3/6/2017    Admit Date: 3/5/2017          Discharge Needs Assessment       03/06/17 0922    Living Environment    Lives With spouse    Living Arrangements house    Provides Primary Care For no one    Quality Of Family Relationships supportive;helpful;involved    Able to Return to Prior Living Arrangements yes    Discharge Needs Assessment    Concerns To Be Addressed no discharge needs identified    Readmission Within The Last 30 Days no previous admission in last 30 days    Anticipated Changes Related to Illness none    Equipment Currently Used at Home none    Equipment Needed After Discharge none    Transportation Available car;family or friend will provide            Discharge Plan       03/06/17 0922    Case Management/Social Work Plan    Plan Pt reports her goal for discharge is to return home    Patient/Family In Agreement With Plan yes    Additional Comments Pt lives with her spouse in [facility]. She denies use of DME, HH drug/alcohol abuse or history of falls. She was independent with all ADLs prior to admit.  She is followed by her PCP and reports her medications are covered by her insurance.  She reports her goal for discharge is to return home.  CM to follow.        Discharge Placement     No information found        Expected Discharge Date and Time     Expected Discharge Date Expected Discharge Time    Mar 7, 2017               Demographic Summary       03/06/17 0921    Referral Information    Admission Type inpatient    Referral Source physician    Reason For Consult discharge planning    Record Reviewed history and physical    Contact Information    Permission Granted to Share Information With ;family/designee    Primary Care Physician Information    Name Dr Iverson            Functional Status       03/06/17 0921    Functional Status Prior    Ambulation 0-->independent     Transferring 0-->independent    Toileting 0-->independent    Bathing 0-->independent    Dressing 0-->independent    Eating 0-->independent    Communication 0-->understands/communicates without difficulty    Swallowing 0-->swallows foods/liquids without difficulty    IADL    Medications independent    Meal Preparation independent    Housekeeping independent    Laundry independent    Shopping independent    Oral Care independent    Activity Tolerance    Current Activity Limitations none    Cognitive/Perceptual/Developmental    Current Mental Status/Cognitive Functioning no deficits noted    Recent Changes in Mental Status/Cognitive Functioning no changes    Employment/Financial    Shift Worked first shift            Psychosocial     None            Abuse/Neglect     None            Legal     None            Substance Abuse     None            Patient Forms     None          Kenna Royal RN

## 2017-03-06 NOTE — PLAN OF CARE
Problem: Patient Care Overview (Adult)  Goal: Plan of Care Review  Outcome: Ongoing (interventions implemented as appropriate)  Goal: Adult Individualization and Mutuality  Outcome: Ongoing (interventions implemented as appropriate)  Goal: Discharge Needs Assessment  Outcome: Ongoing (interventions implemented as appropriate)    Problem: Fluid Volume Deficit (Adult)  Goal: Identify Related Risk Factors and Signs and Symptoms  Outcome: Ongoing (interventions implemented as appropriate)  Goal: Fluid/Electrolyte Balance  Outcome: Ongoing (interventions implemented as appropriate)  Goal: Comfort/Well Being  Outcome: Ongoing (interventions implemented as appropriate)    Problem: Activity Intolerance (Adult)  Goal: Identify Related Risk Factors and Signs and Symptoms  Outcome: Ongoing (interventions implemented as appropriate)  Goal: Activity Tolerance  Outcome: Ongoing (interventions implemented as appropriate)  Goal: Effective Energy Conservation Techniques  Outcome: Ongoing (interventions implemented as appropriate)

## 2017-03-06 NOTE — CONSULTS
HEMATOLOGY/ONCOLOGY INPATIENT CONSULT    REASON FOR CONSULT: Anemia    HISTORY OF PRESENT ILLNESS; I am asked to see this 61 y.o.  female, referred for normochromic to slightly macrocytic anemia.She has large prolapsing internal and external hemorrhoids with active bleeding on Eliquis for a fib due for hemorrhoidectomy.    Prior hematologic workup as follows:  3/5/17 hemoglobin 6.6  with normal white count and platelet count 77% neutrophils.  Free kappa light chains 24 with lambda light chains of 106 on January 18 compared to 22 and 133 respectively on January 10 with a kappa to lambda ratio of 0.23 and 0.17 respectively with normal immunoglobulin G a and M levels.  Normal hemoglobin electrophoresis save for a slight elevation of hemoglobin A January 5, 2017 iron low at less than 2 up to 344 by January 18.  Was 98 on January 5.  Ferritin low on January 5 at 9 up to 76 by January 18.  Total iron binding capacity was normal throughout.  Folic acid level level high at   >24.  January 5, 2017 monoclonal spike of 400 mg/dL of immunoglobulin G lambda monoclonal protein.  Was 600 on January 18.  Bilirubin has been normal as been liver enzymes.  Reticulocyte count inadequate at 2.24% haptoglobin normal at 135.  Transferrin saturation was normal 26% on January 5.  [employer] celiac panel was negative duodenal biopsies 1/9/17 showed no abnormality.    Past Medical History   Diagnosis Date   • History of anemia    • Hypertension    • Hypothyroidism    • Osteoarthritis      Past Surgical History   Procedure Laterality Date   • Augmentation mammaplasty Bilateral 2008   • Hip arthroscopy Right      2005 & 2012   • Tonsillectomy  1964   • Endoscopy N/A 1/7/2017     Procedure: ESOPHAGOGASTRODUODENOSCOPY;  Surgeon: Abhishek Benton MD;  Location:  [facility] ENDOSCOPY;  Service:    • Colonoscopy  01/09/2017   • Colonoscopy N/A 1/9/2017     Procedure: COLONOSCOPY;  Surgeon: Michel White MD;  Location:  [facility] ENDOSCOPY;   Service:    • Joint replacement       2 right hips       No current facility-administered medications on file prior to encounter.      Current Outpatient Prescriptions on File Prior to Encounter   Medication Sig Dispense Refill   • apixaban (ELIQUIS) 5 MG tablet tablet Take 1 tablet by mouth 2 (Two) Times a Day. 30 tablet 1   • ascorbic acid (VITAMIN C) 500 MG tablet Take 1 tablet by mouth 2 (Two) Times a Day.     • atorvastatin (LIPITOR) 40 MG tablet Take 1 tablet by mouth Daily. 30 tablet 1   • citalopram (CeleXA) 20 MG tablet Take 20 mg by mouth Daily.     • ferrous sulfate 325 (65 FE) MG tablet Take 1 tablet by mouth 2 (Two) Times a Day With Meals.     • levothyroxine (SYNTHROID, LEVOTHROID) 100 MCG tablet Take 100 mcg by mouth Daily.     • Melatonin 10 MG tablet Take 30 mg by mouth Every Night.     • pantoprazole (PROTONIX) 40 MG EC tablet Take 1 tablet by mouth Every 12 (Twelve) Hours. (Patient taking differently: Take 40 mg by mouth 1 (One) Time.)     • sotalol (BETAPACE) 80 MG tablet Take 1 tablet by mouth Daily. 30 tablet 5       Allergies   Allergen Reactions   • Sulfa Antibiotics Anaphylaxis       Social History     Social History   • Marital status:      Spouse name: N/A   • Number of children: N/A   • Years of education: N/A     Occupational History   • [employer]      Social History Main Topics   • Smoking status: Former Smoker     Packs/day: 1.00     Years: 20.00     Types: Cigarettes   • Smokeless tobacco: Never Used      Comment: quit 35+ years ago.    • Alcohol use 1.2 oz/week     2 Shots of liquor per week      Comment: 2 drinks a day   • Drug use: No   • Sexual activity: Defer     Other Topics Concern   • Not on file     Social History Narrative    5-10 servings of caffeine per day.        Family History   Problem Relation Age of Onset   • Atrial fibrillation Mother    • Diabetes Mother    • Atrial fibrillation Brother    • Abnormal EKG Brother    • Heart attack Father    • Stroke Father    •  "Diabetes Sister    • Breast cancer Neg Hx    • Ovarian cancer Neg Hx          REVIEW OF SYSTEMS:  A 14 point review of systems was performed and is negative except as noted above.      PHYSICAL EXAM:    Visit Vitals   • /50   • Pulse 91   • Temp 98 °F (36.7 °C) (Oral)   • Resp 18   • Ht 70\" (177.8 cm)   • Wt 160 lb (72.6 kg)   • LMP  (LMP Unknown)   • SpO2 98%   • BMI 22.96 kg/m2       ECOG: (2) Ambulatory and capable of self care, unable to carry out work activity, up and about > 50% or waking hours  General: well appearing female in no acute distress  HEENT: sclera anicteric, oropharynx clear  Lymphatics: no cervical, supraclavicular, inguinal, or axillary adenopathy  Neck: Supple. No thyromegaly.  Cardiovascular: regular rate and rhythm, no murmurs  Lungs: clear to auscultation bilaterally. No respiratory distress  Abdomen: soft, nontender, nondistended.  No palpable organomegaly  Extremities: no lower extremity edema, cyanosis, or clubbing  Skin: no rashes, lesions, bruising, or petechiae  Neuro: Alert and oriented x3. Moves all extremities.        Results from last 7 days  Lab Units 03/06/17  0739 03/05/17  1318   WBC 10*3/mm3 3.91 7.22   HEMOGLOBIN g/dL 8.9* 6.6*   PLATELETS 10*3/mm3 237 280       Results from last 7 days  Lab Units 03/06/17  0739   SODIUM mmol/L 142   POTASSIUM mmol/L 4.4   TOTAL CO2 mmol/L 26.0   BUN mg/dL 10   CREATININE mg/dL 0.80   GLUCOSE mg/dL 70     Estimated Creatinine Clearance: 78.8 mL/min (by C-G formula based on Cr of 0.8).      Imaging Results (last 7 days)     ** No results found for the last 168 hours. **             ASSESSMENT & PLAN:      1. Macrocytic anemia  2. Monoclonal gammopathy    Discussion:  I suspect her anemia is due to acute blood loss given the relatively rapid Decrement in counts.  Nonetheless, when she does have a procedure in the OR for her hemorrhoids, I would like to coordinate a bone marrow biopsy in light of her persistent monoclonal gammopathy and I " will check a 24 hour urine immunoelectrophoresis while she is here.    Errors in dictation may reflect use of voice recognition software and not all errors in transcription may have been detected prior to signing.    René Valencia MD    3/6/2017

## 2017-03-06 NOTE — H&P
Hospital Medicine History and Physical    Primary Care Physician: ARMANDO Edwrads    Chief complaint :  Fatigue, anemia    Subjective     History of Present Illness     61 yr old, chronically anticoagulated, has known bleeding hemorrhoids with a pending plan for surgical repair.  Has been bleeding further and feeling very tired lately and was found to have hgb of 6.6 by colorectal surgeon at an office visit today.  No chest pain.  No syncope.  No other complaints.    During recent admission she had EGD and colonoscopy showing no bleeding source aside from hemorrhoids.  PillCam was also unrevealing for other sources.    She denies recent abd pain, back pain, chest pain, fevers, or other symptoms    Review of Systems   Otherwise complete ROS is negative except as mentioned in the HPI.    Past Medical History:   Past Medical History   Diagnosis Date   • History of anemia    • Hypertension    • Hypothyroidism    • Osteoarthritis        Past Surgical History:  Past Surgical History   Procedure Laterality Date   • Augmentation mammaplasty Bilateral 2008   • Hip arthroscopy Right      2005 & 2012   • Tonsillectomy  1964   • Endoscopy N/A 1/7/2017     Procedure: ESOPHAGOGASTRODUODENOSCOPY;  Surgeon: Abhishek Benton MD;  Location:  [facility];  Service:    • Colonoscopy  01/09/2017   • Colonoscopy N/A 1/9/2017     Procedure: COLONOSCOPY;  Surgeon: Michel White MD;  Location:  [facility];  Service:        Family History: family history includes Abnormal EKG in her brother; Atrial fibrillation in her brother and mother; Diabetes in her mother and sister; Heart attack in her father; Stroke in her father. There is no history of Breast cancer or Ovarian cancer.     Social History:  reports that she has quit smoking. Her smoking use included Cigarettes. She has a 20.00 pack-year smoking history. She has never used smokeless tobacco. She reports that she drinks about 4.8 oz of alcohol per week  She reports  that she does not use illicit drugs.    Medications:  Prescriptions Prior to Admission   Medication Sig Dispense Refill Last Dose   • apixaban (ELIQUIS) 5 MG tablet tablet Take 1 tablet by mouth 2 (Two) Times a Day. 30 tablet 1 Taking   • ascorbic acid (VITAMIN C) 500 MG tablet Take 1 tablet by mouth 2 (Two) Times a Day.   Taking   • atorvastatin (LIPITOR) 40 MG tablet Take 1 tablet by mouth Daily. 30 tablet 1 Taking   • citalopram (CeleXA) 20 MG tablet Take 20 mg by mouth Daily.   Taking   • ferrous sulfate 325 (65 FE) MG tablet Take 1 tablet by mouth 2 (Two) Times a Day With Meals.   Taking   • levothyroxine (SYNTHROID, LEVOTHROID) 100 MCG tablet Take 100 mcg by mouth Daily.   Taking   • Melatonin 10 MG tablet Take 30 mg by mouth Every Night.   Taking   • pantoprazole (PROTONIX) 40 MG EC tablet Take 1 tablet by mouth Every 12 (Twelve) Hours.   Taking   • sotalol (BETAPACE) 80 MG tablet Take 1 tablet by mouth Daily. 30 tablet 5      Allergies   Allergen Reactions   • Sulfa Antibiotics Anaphylaxis       Objective    Physical Exam:   Vital Signs:   Visit Vitals   • LMP  (LMP Unknown)     Physical Exam  Gen:  WD/WN WF in NAD at rest, very pleasant  Neuro: alert and oriented, clear speech, follows commands, grossly nonfocal, good historian  HEENT:  NC/AT PERRL, OP benign  Neck:  Supple, no LAD  Heart RRR no murmur, rub, or gallop  Lungs CTA nonlabord n w r r  Back no spine tenderness or CVA tenderness  Abd:  Soft, nontender, no rebound or guarding, pos BS  Extrem:  No c/c/e  Skin warm and dry  Psych nl mood and affect, good judgment and insight      Results Reviewed:  I have personally reviewed current lab, radiology, and data and agree.    Results from last 7 days  Lab Units 03/05/17  1318   WBC 10*3/mm3 7.22   HEMOGLOBIN g/dL 6.6*   PLATELETS 10*3/mm3 280               Assessment/Plan   Assessment & Plan  Active Problems:    Anemia      61 yr old woman with history of recurent anemia, admitted from home at the  request of her CRS surgeon due to fatigue and malaise.  She is chronically anticoagulated and has bleeding hemorrhoids.     Plan:    Anemia  -- transfuse two units prbc  -- consider iv iron    Hemorrhoids, bleeding on eliquis  -- hold eliquis   -- Dr White to operate this admission  -- full liquids    afib  -- tomassoni patient    Pleural effusions last admission  -- unclear etiol, small  -- not interested in a repeat CXR at this time    vte proph   -- [facility] only    I discussed the patients findings and my recommendations with:patient and spouse    Sirisha Lord MD 03/05/17 7:06 PM

## 2017-03-06 NOTE — PROGRESS NOTES
[facility] Medicine Services    ASSESSMENT / PLAN          Anemia of acute blood loss , acute on chronic, with recurrent bleeding  --s/p  transfusion two units prbc  --      Hemorrhoids, bleeding on eliquis  -- hold eliquis   -- Dr White to operate , pending comnsult -- full liquids     Paroxysmal afib, currently in sinus, per patinet she was on holter in January and did have 4 episodes recorded ifor afib   -- tomassoni patient        vte proph   -- [facility]     Length of Stay 1 Days   Code- fc     Condition--serious   Prognosis-- Fair   Expected Discharge disposition in        1-2    Days to home     SUBJECTIVE--HPI/ Events overnight / CC- Hospital Follow up visit/ ROS-not detailed ,  as performed below    No active bleeding today   Gen -no fevers, chills  CV-no chest pain, palpitations  Resp-no cough, dyspnea  GI-no N/V/D, abd pain      OBJECTIVE        Vital Sign Min/Max for last 24 hours  Temp  Min: 97.7 °F (36.5 °C)  Max: 98.9 °F (37.2 °C)   BP  Min: 85/68  Max: 138/71   Pulse  Min: 57  Max: 91   Resp  Min: 16  Max: 20   SpO2  Min: 97 %  Max: 100 %   No Data Recorded      Intake/Output Summary (Last 24 hours) at 03/06/17 1756  Last data filed at 03/06/17 1330   Gross per 24 hour   Intake 1200.84 ml   Output      0 ml   Net 1200.84 ml           Gen/Constitutional-no acute distress, + Pallor   CV-RRR, S1 S2 normal, no m/r/g  Resp-CTAB, no wheezes  Abd-soft, NT, ND, +BS  Ext-no edema  Neuro-A&Ox3, no focal deficits  Psych-appropriate mood  Skin, no new rashes over last 24 hours    Medications    Current Facility-Administered Medications:   •  acetaminophen (TYLENOL) tablet 650 mg, 650 mg, Oral, Q4H PRN, Sirisha Lord MD  •  atorvastatin (LIPITOR) tablet 40 mg, 40 mg, Oral, Daily, Sirisha Lord MD, 40 mg at 03/06/17 0836  •  citalopram (CeleXA) tablet 20 mg, 20 mg, Oral, Daily, Sirisha Lord MD, 20 mg at 03/06/17 0837  •  docusate sodium (COLACE) capsule 100 mg, 100 mg, Oral, BID PRN,  Sirisha Lord MD  •  [START ON 3/8/2017] fleet enema 2 enema, 2 enema, Rectal, Once, Michel White MD  •  levothyroxine (SYNTHROID, LEVOTHROID) tablet 100 mcg, 100 mcg, Oral, Q AM, Sirisha Lord MD, 100 mcg at 03/06/17 0550  •  melatonin sublingual tablet 5 mg, 5 mg, Sublingual, Nightly, Sirisha Lord MD, 5 mg at 03/05/17 2023  •  pantoprazole (PROTONIX) EC tablet 40 mg, 40 mg, Oral, Q12H, Sirisha Lord MD, 40 mg at 03/06/17 0836  •  sodium chloride 0.9 % flush 1-10 mL, 1-10 mL, Intravenous, PRN, Sirisha Lord MD  •  sotalol (BETAPACE) tablet 80 mg, 80 mg, Oral, Q24H, Sirisha Lord MD, 80 mg at 03/06/17 0836  I have reviewed the labs, culture data, radiology results, and diagnostic studies.    Results from last 7 days  Lab Units 03/06/17  0739   SODIUM mmol/L 142   POTASSIUM mmol/L 4.4   CHLORIDE mmol/L 111*   TOTAL CO2 mmol/L 26.0   BUN mg/dL 10   CREATININE mg/dL 0.80   CALCIUM mg/dL 8.3*   GLUCOSE mg/dL 70       Results from last 7 days  Lab Units 03/06/17  0739 03/05/17  1318   WBC 10*3/mm3 3.91 7.22   HEMOGLOBIN g/dL 8.9* 6.6*   HEMATOCRIT % 27.8* 21.5*   PLATELETS 10*3/mm3 237 280           Culture Data:    Radiology Results:  Imaging Results (last 24 hours)     ** No results found for the last 24 hours. **        *. Please note that portions of this note were completed with a voice recognition program. Efforts were made to edit the dictations, but occasionally words are mistranscribed.  Rafat Rios MD03/06/175:56 PM

## 2017-03-06 NOTE — PLAN OF CARE
Problem: Patient Care Overview (Adult)  Goal: Plan of Care Review  Outcome: Ongoing (interventions implemented as appropriate)    03/06/17 0985   Coping/Psychosocial Response Interventions   Plan Of Care Reviewed With patient   Patient Care Overview   Progress progress toward functional goals as expected

## 2017-03-06 NOTE — CONSULTS
LOS: 1 day   Patient Care Team:  ARMANDO Pugh as PCP - General (Family Medicine)  Michel White MD as Consulting Physician (Colon and Rectal Surgery)    Chief Complaint:    Anemia       Subjective     Interval History:   The patient is a 61-year-old white female who's had trouble with significant anemia and rectal bleeding mostly recently requiring blood transfusion.  She is on Eliquis for atrial fibrillation. She has had an extensive GI workup including upper and lower endoscopy and pill camera study. She has been found to prominent bleeding internal hemorrhoids. She was scheduled to have outpatient hemorrhoidectomy, but her hemoglobin has dropped back down to 6.6.  The patient had significant malaise and weakness. It was felt she needed admission for transfusion.  Plans are made to stop her Eliquis and schedule her for hemorrhoidectomy this week.              Past Medical History   Diagnosis Date   • History of anemia    • Hypertension    • Hypothyroidism    • Osteoarthritis      Past Surgical History   Procedure Laterality Date   • Augmentation mammaplasty Bilateral 2008   • Hip arthroscopy Right      2005 & 2012   • Tonsillectomy  1964   • Endoscopy N/A 1/7/2017     Procedure: ESOPHAGOGASTRODUODENOSCOPY;  Surgeon: Abhishek Benton MD;  Location:  [facility];  Service:    • Colonoscopy  01/09/2017   • Colonoscopy N/A 1/9/2017     Procedure: COLONOSCOPY;  Surgeon: Michel White MD;  Location:  [facility];  Service:    • Joint replacement       2 right hips     No current facility-administered medications on file prior to encounter.      Current Outpatient Prescriptions on File Prior to Encounter   Medication Sig Dispense Refill   • apixaban (ELIQUIS) 5 MG tablet tablet Take 1 tablet by mouth 2 (Two) Times a Day. 30 tablet 1   • ascorbic acid (VITAMIN C) 500 MG tablet Take 1 tablet by mouth 2 (Two) Times a Day.     • atorvastatin (LIPITOR) 40 MG tablet Take 1 tablet by mouth Daily.  "30 tablet 1   • citalopram (CeleXA) 20 MG tablet Take 20 mg by mouth Daily.     • ferrous sulfate 325 (65 FE) MG tablet Take 1 tablet by mouth 2 (Two) Times a Day With Meals.     • levothyroxine (SYNTHROID, LEVOTHROID) 100 MCG tablet Take 100 mcg by mouth Daily.     • Melatonin 10 MG tablet Take 30 mg by mouth Every Night.     • pantoprazole (PROTONIX) 40 MG EC tablet Take 1 tablet by mouth Every 12 (Twelve) Hours. (Patient taking differently: Take 40 mg by mouth 1 (One) Time.)     • sotalol (BETAPACE) 80 MG tablet Take 1 tablet by mouth Daily. 30 tablet 5     Allergies   Allergen Reactions   • Sulfa Antibiotics Anaphylaxis     Family History   Problem Relation Age of Onset   • Atrial fibrillation Mother    • Diabetes Mother    • Atrial fibrillation Brother    • Abnormal EKG Brother    • Heart attack Father    • Stroke Father    • Diabetes Sister    • Breast cancer Neg Hx    • Ovarian cancer Neg Hx      Social History     Social History   • Marital status:      Spouse name: N/A   • Number of children: N/A   • Years of education: N/A     Occupational History   • ARNP      Social History Main Topics   • Smoking status: Former Smoker     Packs/day: 1.00     Years: 20.00     Types: Cigarettes   • Smokeless tobacco: Never Used      Comment: quit 35+ years ago.    • Alcohol use 1.2 oz/week     2 Shots of liquor per week      Comment: 2 drinks a day   • Drug use: No   • Sexual activity: Defer     Other Topics Concern   • Not on file     Social History Narrative    5-10 servings of caffeine per day.        Review of Systems:    All systems were reviewed and negative except for:  Constitution:  positive for malaise    Objective     Vital Signs  Blood pressure 106/50, pulse 91, temperature 98 °F (36.7 °C), temperature source Oral, resp. rate 18, height 70\" (177.8 cm), weight 160 lb (72.6 kg), SpO2 98 %.    Physical Exam:  The patient is a well-developed well-nourished white female in no apparent distress  Neck exam-the " patient is atraumatic normocephalic.  Neck is supple without mass.  Chest is clear to auscultation  Heart currently has regular rate and rhythm  Abdomen is soft and nontender without mass or organomegaly.  There are no palpable inguinal lymph nodes.  Extremities WITH stenosis edema  Mental status- the patient is alert and oriented            Results Review:       WBC WBC   Date Value Ref Range Status   03/06/2017 3.91 3.50 - 10.80 10*3/mm3 Final   03/05/2017 7.22 3.50 - 10.80 10*3/mm3 Final      HGB HEMOGLOBIN   Date Value Ref Range Status   03/06/2017 8.9 (L) 11.5 - 15.5 g/dL Final   03/05/2017 6.6 (L) 11.5 - 15.5 g/dL Final      HCT HEMATOCRIT   Date Value Ref Range Status   03/06/2017 27.8 (L) 34.5 - 44.0 % Final   03/05/2017 21.5 (L) 34.5 - 44.0 % Final        Sodium SODIUM   Date Value Ref Range Status   03/06/2017 142 132 - 146 mmol/L Final      Potassium POTASSIUM   Date Value Ref Range Status   03/06/2017 4.4 3.5 - 5.5 mmol/L Final      Chloride CHLORIDE   Date Value Ref Range Status   03/06/2017 111 (H) 99 - 109 mmol/L Final      Bicarbonate No results found for: PLASMABICARB   BUN BUN   Date Value Ref Range Status   03/06/2017 10 9 - 23 mg/dL Final      Creatinine CREATININE   Date Value Ref Range Status   03/06/2017 0.80 0.60 - 1.30 mg/dL Final                                      Imaging Results (last 24 hours)     ** No results found for the last 24 hours. **            Assessment/Plan   Profound anemia with bleeding internal hemorrhoids in a patient onEliquis for atrial fibrillation   Plan will be for hemorrhoidectomy Wednesday afternoon.  Bone marrow aspirate will be obtained at the same time.  The patient will remain off of her anticoagulant.  I discussed the procedure with the patient including risks such as bleeding or infection.  I've also discussed the risk of remote postoperative bleeding.  The patient understands and desires proceed.        Michel White MD  03/06/17  5:24 PM

## 2017-03-07 LAB
ABO + RH BLD: NORMAL
ABO + RH BLD: NORMAL
BH BB BLOOD EXPIRATION DATE: NORMAL
BH BB BLOOD EXPIRATION DATE: NORMAL
BH BB BLOOD TYPE BARCODE: 5100
BH BB BLOOD TYPE BARCODE: 5100
BH BB DISPENSE STATUS: NORMAL
BH BB DISPENSE STATUS: NORMAL
BH BB PRODUCT CODE: NORMAL
BH BB PRODUCT CODE: NORMAL
BH BB UNIT NUMBER: NORMAL
BH BB UNIT NUMBER: NORMAL
CROSSMATCH INTERPRETATION: NORMAL
CROSSMATCH INTERPRETATION: NORMAL
HCT VFR BLD AUTO: 33.3 % (ref 34.5–44)
HGB BLD-MCNC: 10.5 G/DL (ref 11.5–15.5)
UNIT  ABO: NORMAL
UNIT  ABO: NORMAL
UNIT  RH: NORMAL
UNIT  RH: NORMAL

## 2017-03-07 PROCEDURE — 99232 SBSQ HOSP IP/OBS MODERATE 35: CPT | Performed by: INTERNAL MEDICINE

## 2017-03-07 PROCEDURE — 99231 SBSQ HOSP IP/OBS SF/LOW 25: CPT | Performed by: FAMILY MEDICINE

## 2017-03-07 PROCEDURE — 84156 ASSAY OF PROTEIN URINE: CPT | Performed by: INTERNAL MEDICINE

## 2017-03-07 PROCEDURE — 84166 PROTEIN E-PHORESIS/URINE/CSF: CPT | Performed by: INTERNAL MEDICINE

## 2017-03-07 PROCEDURE — 81050 URINALYSIS VOLUME MEASURE: CPT | Performed by: INTERNAL MEDICINE

## 2017-03-07 PROCEDURE — 85014 HEMATOCRIT: CPT | Performed by: FAMILY MEDICINE

## 2017-03-07 PROCEDURE — 86335 IMMUNFIX E-PHORSIS/URINE/CSF: CPT | Performed by: INTERNAL MEDICINE

## 2017-03-07 PROCEDURE — 85018 HEMOGLOBIN: CPT | Performed by: FAMILY MEDICINE

## 2017-03-07 RX ADMIN — PANTOPRAZOLE SODIUM 40 MG: 40 TABLET, DELAYED RELEASE ORAL at 21:18

## 2017-03-07 RX ADMIN — ATORVASTATIN CALCIUM 40 MG: 40 TABLET, FILM COATED ORAL at 08:04

## 2017-03-07 RX ADMIN — SOTALOL HYDROCHLORIDE 80 MG: 80 TABLET ORAL at 08:04

## 2017-03-07 RX ADMIN — ACETAMINOPHEN 650 MG: 325 TABLET, FILM COATED ORAL at 14:11

## 2017-03-07 RX ADMIN — PANTOPRAZOLE SODIUM 40 MG: 40 TABLET, DELAYED RELEASE ORAL at 08:04

## 2017-03-07 RX ADMIN — LEVOTHYROXINE SODIUM 100 MCG: 100 TABLET ORAL at 05:18

## 2017-03-07 RX ADMIN — CITALOPRAM HYDROBROMIDE 20 MG: 20 TABLET ORAL at 08:04

## 2017-03-07 RX ADMIN — Medication 5 MG: at 21:18

## 2017-03-07 NOTE — PROGRESS NOTES
HEMATOLOGY/ONCOLOGY PROGRESS NOTE     CC: Anemia    SUBJECTIVE: Feels pretty good today.  Due for hemorrhoidectomy tomorrow.        Past Medical History, Past Surgical History, Social History, Family History have been reviewed and are without significant changes except as mentioned.      Medications:  The current medication list was reviewed in the EMR    ALLERGIES:   Allergies   Allergen Reactions   • Sulfa Antibiotics Anaphylaxis       ROS:  A comprehensive 14 point review of systems was performed and was negative except as mentioned.      Objective      Vitals:    03/07/17 0603 03/07/17 0700 03/07/17 0806 03/07/17 1600   BP: 123/64 132/87 113/59 130/67   BP Location: Left arm Right arm Left arm Left arm   Patient Position: Lying Sitting Sitting Sitting   Pulse: 88  66    Resp: 18 15  17   Temp: 97.7 °F (36.5 °C) 98.1 °F (36.7 °C)  98.4 °F (36.9 °C)   TempSrc: Oral Oral  Oral   SpO2:       Weight:       Height:            ECOG: (1) Restricted in physically strenuous activity, ambulatory and able to do work of light nature    General: well appearing, in no acute distress  HEENT: sclera anicteric, oropharynx clear  Lymphatics: no cervical, supraclavicular, inguinal, or axillary adenopathy  Cardiovascular: regular rate and rhythm, no murmurs  Lungs: clear to auscultation bilaterally  Abdomen: soft, nontender, nondistended.  No palpable organomegaly  ExtremIties: no lower extremity edema  Skin: no rashes, lesions, bruising, or petechiae  Neuro: Alert and oriented x 3. Moves all extremities.    RECENT LABS:        Results from last 7 days  Lab Units 03/07/17  1053 03/06/17  0739 03/05/17  1318   WBC 10*3/mm3  --  3.91 7.22   HEMOGLOBIN g/dL 10.5* 8.9* 6.6*   PLATELETS 10*3/mm3  --  237 280       Results from last 7 days  Lab Units 03/06/17  0739   SODIUM mmol/L 142   POTASSIUM mmol/L 4.4   TOTAL CO2 mmol/L 26.0   BUN mg/dL 10   CREATININE mg/dL 0.80   GLUCOSE mg/dL 70     Estimated Creatinine Clearance: 78.8 mL/min (by  C-G formula based on Cr of 0.8).      Imaging Results (last 72 hours)     ** No results found for the last 72 hours. **          ASSESSMENT & PLAN:    1. Anemia  2. Monoclonal gammopathy  3. Hemorrhoids  4. Atrial fibrillation    Discussion: I spoken with pathology today to try to coordinate her bone marrow biopsy during her hemorrhoidectomy tomorrow.  That may mean keeping hematology staff around a little longer.  Hopefully we can accomplish this while she is under anesthesia.  I doubt this is going to be anything other than a benign monoclonal gammopathy but now is a good time to lay that to rest.  Without further transfusion, today her hemoglobin is over 10.  Of course she is off of the Eliquis in preparation for her surgery        Errors in dictation may reflect use of voice recognition software and not all errors in transcription may have been detected prior to signing.            René Valencia MD    3/7/2017

## 2017-03-07 NOTE — PROGRESS NOTES
[facility] Medicine Services    ASSESSMENT / PLAN          Anemia of acute blood loss , acute on chronic, with recurrent bleeding  --s/p  transfusion two units prbc  -- Hg now stable  Hemorrhoidectomy tomorrow.      Hemorrhoids, bleeding on eliquis  -- hold eliquis         Paroxysmal afib, currently in sinus, per patinet she was on holter in January and did have 4 episodes recorded ifor afib   -- tomassoni patient        vte proph   -- [facility] only     Length of Stay 2 Days   Code- fc     Condition--serious   Prognosis-- Fair   Expected Discharge disposition in        1-2    Days to home     SUBJECTIVE--HPI/ Events overnight / CC- Hospital Follow up visit/ ROS-not detailed ,  as performed below    No active bleeding today , doing well   Gen -no fevers, chills  CV-no chest pain, palpitations  Resp-no cough, dyspnea  GI-no N/V/D, abd pain      OBJECTIVE        Vital Sign Min/Max for last 24 hours  Temp  Min: 97.7 °F (36.5 °C)  Max: 98.1 °F (36.7 °C)   BP  Min: 113/59  Max: 132/87   Pulse  Min: 66  Max: 89   Resp  Min: 15  Max: 18   No Data Recorded   No Data Recorded      Intake/Output Summary (Last 24 hours) at 03/07/17 1446  Last data filed at 03/07/17 0603   Gross per 24 hour   Intake    360 ml   Output      0 ml   Net    360 ml           Gen/Constitutional-no acute distress,    CV-RRR, S1 S2 normal, no m/r/g  Resp-CTAB, no wheezes  Abd-soft, NT, ND, +BS  Ext-no edema  Neuro-A&Ox3, no focal deficits  Psych-appropriate mood  Skin, no new rashes over last 24 hours    Medications    Current Facility-Administered Medications:   •  acetaminophen (TYLENOL) tablet 650 mg, 650 mg, Oral, Q4H PRN, Sirisha Lord MD, 650 mg at 03/07/17 1411  •  atorvastatin (LIPITOR) tablet 40 mg, 40 mg, Oral, Daily, Sirisha Lord MD, 40 mg at 03/07/17 0804  •  citalopram (CeleXA) tablet 20 mg, 20 mg, Oral, Daily, Sirisha Lord MD, 20 mg at 03/07/17 0804  •  docusate sodium (COLACE) capsule 100 mg, 100 mg, Oral, BID  PRN, Sirisha Lord MD  •  [START ON 3/8/2017] fleet enema 2 enema, 2 enema, Rectal, Once, Michel White MD  •  levothyroxine (SYNTHROID, LEVOTHROID) tablet 100 mcg, 100 mcg, Oral, Q AM, Sirisha Lord MD, 100 mcg at 03/07/17 0518  •  melatonin sublingual tablet 5 mg, 5 mg, Sublingual, Nightly, Sirisha Lord MD, 5 mg at 03/06/17 2039  •  pantoprazole (PROTONIX) EC tablet 40 mg, 40 mg, Oral, Q12H, Sirisha Lord MD, 40 mg at 03/07/17 0804  •  sodium chloride 0.9 % flush 1-10 mL, 1-10 mL, Intravenous, PRN, Sirisha Lord MD  •  sotalol (BETAPACE) tablet 80 mg, 80 mg, Oral, Q24H, Sirisha Lord MD, 80 mg at 03/07/17 0804  I have reviewed the labs, culture data, radiology results, and diagnostic studies.    Results from last 7 days  Lab Units 03/06/17  0739   SODIUM mmol/L 142   POTASSIUM mmol/L 4.4   CHLORIDE mmol/L 111*   TOTAL CO2 mmol/L 26.0   BUN mg/dL 10   CREATININE mg/dL 0.80   CALCIUM mg/dL 8.3*   GLUCOSE mg/dL 70       Results from last 7 days  Lab Units 03/07/17  1053 03/06/17  0739 03/05/17  1318   WBC 10*3/mm3  --  3.91 7.22   HEMOGLOBIN g/dL 10.5* 8.9* 6.6*   HEMATOCRIT % 33.3* 27.8* 21.5*   PLATELETS 10*3/mm3  --  237 280           Culture Data:    Radiology Results:  Imaging Results (last 24 hours)     ** No results found for the last 24 hours. **        *. Please note that portions of this note were completed with a voice recognition program. Efforts were made to edit the dictations, but occasionally words are mistranscribed.  Rafat Rios MD03/07/172:46 PM

## 2017-03-07 NOTE — PROGRESS NOTES
LOS: 2 days     Subjective     No bleeding overnight  Objective     Vital Signs  Vitals:    03/07/17 0603   BP: 123/64   Pulse: 88   Resp: 18   Temp: 97.7 °F (36.5 °C)   SpO2:      Results Review:     I reviewed the patient's new clinical results.  WBC WBC   Date Value Ref Range Status   03/06/2017 3.91 3.50 - 10.80 10*3/mm3 Final   03/05/2017 7.22 3.50 - 10.80 10*3/mm3 Final      HGB HEMOGLOBIN   Date Value Ref Range Status   03/06/2017 8.9 (L) 11.5 - 15.5 g/dL Final   03/05/2017 6.6 (L) 11.5 - 15.5 g/dL Final      HCT HEMATOCRIT   Date Value Ref Range Status   03/06/2017 27.8 (L) 34.5 - 44.0 % Final   03/05/2017 21.5 (L) 34.5 - 44.0 % Final      Platlets No results found for: LABPLAT     PT/INR:  No results found for: PROTIME/No results found for: INR    Sodium SODIUM   Date Value Ref Range Status   03/06/2017 142 132 - 146 mmol/L Final      Potassium POTASSIUM   Date Value Ref Range Status   03/06/2017 4.4 3.5 - 5.5 mmol/L Final      Chloride CHLORIDE   Date Value Ref Range Status   03/06/2017 111 (H) 99 - 109 mmol/L Final      Bicarbonate No results found for: PLASMABICARB   BUN BUN   Date Value Ref Range Status   03/06/2017 10 9 - 23 mg/dL Final      Creatinine CREATININE   Date Value Ref Range Status   03/06/2017 0.80 0.60 - 1.30 mg/dL Final      Calcium CALCIUM   Date Value Ref Range Status   03/06/2017 8.3 (L) 8.7 - 10.4 mg/dL Final          Assessment/Plan     Active Problems:    Anemia    Profound anemia  Bleeding internal hemorrhoids  External hemorrhoids    Plan for hemorrhoidectomy tomorrow afternoon.  Dr. Valencia will coordinate bone marrow biopsy at the same time.  Patient had no additional questions this morning  Patient is currently off Slava White MD  03/07/17  7:25 AM

## 2017-03-07 NOTE — PLAN OF CARE
Problem: Patient Care Overview (Adult)  Goal: Plan of Care Review  Outcome: Ongoing (interventions implemented as appropriate)    03/07/17 0950   Coping/Psychosocial Response Interventions   Plan Of Care Reviewed With patient   Patient Care Overview   Progress progress toward functional goals as expected

## 2017-03-08 ENCOUNTER — ANESTHESIA EVENT (OUTPATIENT)
Dept: PERIOP | Facility: HOSPITAL | Age: 62
End: 2017-03-08

## 2017-03-08 ENCOUNTER — ANESTHESIA (OUTPATIENT)
Dept: PERIOP | Facility: HOSPITAL | Age: 62
End: 2017-03-08

## 2017-03-08 LAB
ANION GAP SERPL CALCULATED.3IONS-SCNC: 4 MMOL/L (ref 3–11)
BASOPHILS # BLD AUTO: 0.04 10*3/MM3 (ref 0–0.2)
BASOPHILS NFR BLD AUTO: 1.2 % (ref 0–1)
BUN BLD-MCNC: 9 MG/DL (ref 9–23)
BUN/CREAT SERPL: 11.3 (ref 7–25)
CALCIUM SPEC-SCNC: 9.2 MG/DL (ref 8.7–10.4)
CHLORIDE SERPL-SCNC: 109 MMOL/L (ref 99–109)
CO2 SERPL-SCNC: 29 MMOL/L (ref 20–31)
CREAT BLD-MCNC: 0.8 MG/DL (ref 0.6–1.3)
DEPRECATED RDW RBC AUTO: 76.6 FL (ref 37–54)
EOSINOPHIL # BLD AUTO: 0.28 10*3/MM3 (ref 0.1–0.3)
EOSINOPHIL NFR BLD AUTO: 8.3 % (ref 0–3)
ERYTHROCYTE [DISTWIDTH] IN BLOOD BY AUTOMATED COUNT: 21.8 % (ref 11.3–14.5)
GFR SERPL CREATININE-BSD FRML MDRD: 73 ML/MIN/1.73
GLUCOSE BLD-MCNC: 86 MG/DL (ref 70–100)
HCT VFR BLD AUTO: 32.9 % (ref 34.5–44)
HGB BLD-MCNC: 10.3 G/DL (ref 11.5–15.5)
IMM GRANULOCYTES # BLD: 0 10*3/MM3 (ref 0–0.03)
IMM GRANULOCYTES NFR BLD: 0 % (ref 0–0.6)
LYMPHOCYTES # BLD AUTO: 1 10*3/MM3 (ref 0.6–4.8)
LYMPHOCYTES NFR BLD AUTO: 29.7 % (ref 24–44)
MCH RBC QN AUTO: 30 PG (ref 27–31)
MCHC RBC AUTO-ENTMCNC: 31.3 G/DL (ref 32–36)
MCV RBC AUTO: 95.9 FL (ref 80–99)
MONOCYTES # BLD AUTO: 0.43 10*3/MM3 (ref 0–1)
MONOCYTES NFR BLD AUTO: 12.8 % (ref 0–12)
NEUTROPHILS # BLD AUTO: 1.62 10*3/MM3 (ref 1.5–8.3)
NEUTROPHILS NFR BLD AUTO: 48 % (ref 41–71)
PLATELET # BLD AUTO: 255 10*3/MM3 (ref 150–450)
PMV BLD AUTO: 9.1 FL (ref 6–12)
POTASSIUM BLD-SCNC: 4.8 MMOL/L (ref 3.5–5.5)
RBC # BLD AUTO: 3.43 10*6/MM3 (ref 3.89–5.14)
SODIUM BLD-SCNC: 142 MMOL/L (ref 132–146)
WBC NRBC COR # BLD: 3.37 10*3/MM3 (ref 3.5–10.8)

## 2017-03-08 PROCEDURE — 85097 BONE MARROW INTERPRETATION: CPT | Performed by: FAMILY MEDICINE

## 2017-03-08 PROCEDURE — 88342 IMHCHEM/IMCYTCHM 1ST ANTB: CPT | Performed by: FAMILY MEDICINE

## 2017-03-08 PROCEDURE — 07DR3ZX EXTRACTION OF ILIAC BONE MARROW, PERCUTANEOUS APPROACH, DIAGNOSTIC: ICD-10-PCS | Performed by: PATHOLOGY

## 2017-03-08 PROCEDURE — 88304 TISSUE EXAM BY PATHOLOGIST: CPT | Performed by: COLON & RECTAL SURGERY

## 2017-03-08 PROCEDURE — 88305 TISSUE EXAM BY PATHOLOGIST: CPT | Performed by: FAMILY MEDICINE

## 2017-03-08 PROCEDURE — 25010000002 PROPOFOL 10 MG/ML EMULSION: Performed by: NURSE ANESTHETIST, CERTIFIED REGISTERED

## 2017-03-08 PROCEDURE — C1751 CATH, INF, PER/CENT/MIDLINE: HCPCS

## 2017-03-08 PROCEDURE — 85025 COMPLETE CBC W/AUTO DIFF WBC: CPT | Performed by: FAMILY MEDICINE

## 2017-03-08 PROCEDURE — 88311 DECALCIFY TISSUE: CPT | Performed by: FAMILY MEDICINE

## 2017-03-08 PROCEDURE — 80048 BASIC METABOLIC PNL TOTAL CA: CPT | Performed by: FAMILY MEDICINE

## 2017-03-08 PROCEDURE — 99232 SBSQ HOSP IP/OBS MODERATE 35: CPT | Performed by: NURSE PRACTITIONER

## 2017-03-08 PROCEDURE — 88313 SPECIAL STAINS GROUP 2: CPT | Performed by: FAMILY MEDICINE

## 2017-03-08 PROCEDURE — 25010000002 DEXAMETHASONE PER 1 MG: Performed by: NURSE ANESTHETIST, CERTIFIED REGISTERED

## 2017-03-08 PROCEDURE — 06BY0ZC EXCISION OF HEMORRHOIDAL PLEXUS, OPEN APPROACH: ICD-10-PCS | Performed by: COLON & RECTAL SURGERY

## 2017-03-08 PROCEDURE — 25010000002 MEPERIDINE PER 100 MG: Performed by: NURSE ANESTHETIST, CERTIFIED REGISTERED

## 2017-03-08 PROCEDURE — 25010000002 FENTANYL CITRATE (PF) 100 MCG/2ML SOLUTION: Performed by: NURSE ANESTHETIST, CERTIFIED REGISTERED

## 2017-03-08 PROCEDURE — 02HV33Z INSERTION OF INFUSION DEVICE INTO SUPERIOR VENA CAVA, PERCUTANEOUS APPROACH: ICD-10-PCS | Performed by: FAMILY MEDICINE

## 2017-03-08 PROCEDURE — 25010000002 ONDANSETRON PER 1 MG: Performed by: NURSE ANESTHETIST, CERTIFIED REGISTERED

## 2017-03-08 PROCEDURE — C1894 INTRO/SHEATH, NON-LASER: HCPCS

## 2017-03-08 RX ORDER — MAGNESIUM HYDROXIDE 1200 MG/15ML
LIQUID ORAL AS NEEDED
Status: DISCONTINUED | OUTPATIENT
Start: 2017-03-08 | End: 2017-03-08 | Stop reason: HOSPADM

## 2017-03-08 RX ORDER — PROMETHAZINE HYDROCHLORIDE 25 MG/1
25 SUPPOSITORY RECTAL ONCE AS NEEDED
Status: DISCONTINUED | OUTPATIENT
Start: 2017-03-08 | End: 2017-03-08 | Stop reason: HOSPADM

## 2017-03-08 RX ORDER — PROMETHAZINE HYDROCHLORIDE 25 MG/ML
6.25 INJECTION, SOLUTION INTRAMUSCULAR; INTRAVENOUS ONCE AS NEEDED
Status: DISCONTINUED | OUTPATIENT
Start: 2017-03-08 | End: 2017-03-08 | Stop reason: HOSPADM

## 2017-03-08 RX ORDER — IPRATROPIUM BROMIDE AND ALBUTEROL SULFATE 2.5; .5 MG/3ML; MG/3ML
3 SOLUTION RESPIRATORY (INHALATION) ONCE AS NEEDED
Status: DISCONTINUED | OUTPATIENT
Start: 2017-03-08 | End: 2017-03-08 | Stop reason: HOSPADM

## 2017-03-08 RX ORDER — MORPHINE SULFATE 2 MG/ML
2 INJECTION, SOLUTION INTRAMUSCULAR; INTRAVENOUS
Status: DISCONTINUED | OUTPATIENT
Start: 2017-03-08 | End: 2017-03-09 | Stop reason: HOSPADM

## 2017-03-08 RX ORDER — SODIUM CHLORIDE 0.9 % (FLUSH) 0.9 %
1-10 SYRINGE (ML) INJECTION AS NEEDED
Status: DISCONTINUED | OUTPATIENT
Start: 2017-03-08 | End: 2017-03-08 | Stop reason: HOSPADM

## 2017-03-08 RX ORDER — NALOXONE HCL 0.4 MG/ML
0.4 VIAL (ML) INJECTION AS NEEDED
Status: DISCONTINUED | OUTPATIENT
Start: 2017-03-08 | End: 2017-03-08 | Stop reason: HOSPADM

## 2017-03-08 RX ORDER — LIDOCAINE HYDROCHLORIDE 10 MG/ML
INJECTION, SOLUTION INFILTRATION; PERINEURAL AS NEEDED
Status: DISCONTINUED | OUTPATIENT
Start: 2017-03-08 | End: 2017-03-08 | Stop reason: SURG

## 2017-03-08 RX ORDER — PROMETHAZINE HYDROCHLORIDE 25 MG/1
25 TABLET ORAL ONCE AS NEEDED
Status: DISCONTINUED | OUTPATIENT
Start: 2017-03-08 | End: 2017-03-08 | Stop reason: HOSPADM

## 2017-03-08 RX ORDER — SODIUM CHLORIDE, SODIUM LACTATE, POTASSIUM CHLORIDE, CALCIUM CHLORIDE 600; 310; 30; 20 MG/100ML; MG/100ML; MG/100ML; MG/100ML
9 INJECTION, SOLUTION INTRAVENOUS CONTINUOUS
Status: DISCONTINUED | OUTPATIENT
Start: 2017-03-08 | End: 2017-03-08

## 2017-03-08 RX ORDER — DEXAMETHASONE SODIUM PHOSPHATE 4 MG/ML
INJECTION, SOLUTION INTRA-ARTICULAR; INTRALESIONAL; INTRAMUSCULAR; INTRAVENOUS; SOFT TISSUE AS NEEDED
Status: DISCONTINUED | OUTPATIENT
Start: 2017-03-08 | End: 2017-03-08 | Stop reason: SURG

## 2017-03-08 RX ORDER — HYDRALAZINE HYDROCHLORIDE 20 MG/ML
5 INJECTION INTRAMUSCULAR; INTRAVENOUS
Status: DISCONTINUED | OUTPATIENT
Start: 2017-03-08 | End: 2017-03-08 | Stop reason: HOSPADM

## 2017-03-08 RX ORDER — MEPERIDINE HYDROCHLORIDE 25 MG/ML
12.5 INJECTION INTRAMUSCULAR; INTRAVENOUS; SUBCUTANEOUS
Status: DISCONTINUED | OUTPATIENT
Start: 2017-03-08 | End: 2017-03-08 | Stop reason: HOSPADM

## 2017-03-08 RX ORDER — FENTANYL CITRATE 50 UG/ML
INJECTION, SOLUTION INTRAMUSCULAR; INTRAVENOUS AS NEEDED
Status: DISCONTINUED | OUTPATIENT
Start: 2017-03-08 | End: 2017-03-08 | Stop reason: SURG

## 2017-03-08 RX ORDER — OXYCODONE AND ACETAMINOPHEN 7.5; 325 MG/1; MG/1
1 TABLET ORAL EVERY 4 HOURS PRN
Status: DISCONTINUED | OUTPATIENT
Start: 2017-03-08 | End: 2017-03-09 | Stop reason: HOSPADM

## 2017-03-08 RX ORDER — PROPOFOL 10 MG/ML
VIAL (ML) INTRAVENOUS AS NEEDED
Status: DISCONTINUED | OUTPATIENT
Start: 2017-03-08 | End: 2017-03-08 | Stop reason: SURG

## 2017-03-08 RX ORDER — BUPIVACAINE HYDROCHLORIDE AND EPINEPHRINE 2.5; 5 MG/ML; UG/ML
INJECTION, SOLUTION EPIDURAL; INFILTRATION; INTRACAUDAL; PERINEURAL AS NEEDED
Status: DISCONTINUED | OUTPATIENT
Start: 2017-03-08 | End: 2017-03-08 | Stop reason: HOSPADM

## 2017-03-08 RX ORDER — OXYCODONE AND ACETAMINOPHEN 7.5; 325 MG/1; MG/1
1 TABLET ORAL ONCE AS NEEDED
Status: DISCONTINUED | OUTPATIENT
Start: 2017-03-08 | End: 2017-03-08 | Stop reason: HOSPADM

## 2017-03-08 RX ORDER — OXYCODONE HYDROCHLORIDE AND ACETAMINOPHEN 5; 325 MG/1; MG/1
1 TABLET ORAL ONCE AS NEEDED
Status: DISCONTINUED | OUTPATIENT
Start: 2017-03-08 | End: 2017-03-08 | Stop reason: HOSPADM

## 2017-03-08 RX ORDER — FENTANYL CITRATE 50 UG/ML
50 INJECTION, SOLUTION INTRAMUSCULAR; INTRAVENOUS
Status: DISCONTINUED | OUTPATIENT
Start: 2017-03-08 | End: 2017-03-08 | Stop reason: HOSPADM

## 2017-03-08 RX ORDER — LIDOCAINE HYDROCHLORIDE 10 MG/ML
1 INJECTION, SOLUTION EPIDURAL; INFILTRATION; INTRACAUDAL; PERINEURAL ONCE
Status: DISCONTINUED | OUTPATIENT
Start: 2017-03-08 | End: 2017-03-08

## 2017-03-08 RX ORDER — HYDROMORPHONE HYDROCHLORIDE 1 MG/ML
0.5 INJECTION, SOLUTION INTRAMUSCULAR; INTRAVENOUS; SUBCUTANEOUS
Status: DISCONTINUED | OUTPATIENT
Start: 2017-03-08 | End: 2017-03-08 | Stop reason: HOSPADM

## 2017-03-08 RX ORDER — FAMOTIDINE 20 MG/1
20 TABLET, FILM COATED ORAL ONCE
Status: COMPLETED | OUTPATIENT
Start: 2017-03-08 | End: 2017-03-08

## 2017-03-08 RX ORDER — ONDANSETRON 2 MG/ML
INJECTION INTRAMUSCULAR; INTRAVENOUS AS NEEDED
Status: DISCONTINUED | OUTPATIENT
Start: 2017-03-08 | End: 2017-03-08 | Stop reason: SURG

## 2017-03-08 RX ORDER — ONDANSETRON 2 MG/ML
4 INJECTION INTRAMUSCULAR; INTRAVENOUS ONCE AS NEEDED
Status: DISCONTINUED | OUTPATIENT
Start: 2017-03-08 | End: 2017-03-08 | Stop reason: HOSPADM

## 2017-03-08 RX ORDER — SODIUM CHLORIDE 0.9 % (FLUSH) 0.9 %
10-20 SYRINGE (ML) INJECTION AS NEEDED
Status: DISCONTINUED | OUTPATIENT
Start: 2017-03-08 | End: 2017-03-09 | Stop reason: HOSPADM

## 2017-03-08 RX ORDER — LABETALOL HYDROCHLORIDE 5 MG/ML
5 INJECTION, SOLUTION INTRAVENOUS
Status: DISCONTINUED | OUTPATIENT
Start: 2017-03-08 | End: 2017-03-08 | Stop reason: HOSPADM

## 2017-03-08 RX ADMIN — ATORVASTATIN CALCIUM 40 MG: 40 TABLET, FILM COATED ORAL at 08:20

## 2017-03-08 RX ADMIN — ONDANSETRON 4 MG: 2 INJECTION INTRAMUSCULAR; INTRAVENOUS at 16:29

## 2017-03-08 RX ADMIN — OXYCODONE HYDROCHLORIDE AND ACETAMINOPHEN 1 TABLET: 7.5; 325 TABLET ORAL at 22:21

## 2017-03-08 RX ADMIN — LEVOTHYROXINE SODIUM 100 MCG: 100 TABLET ORAL at 05:24

## 2017-03-08 RX ADMIN — DEXAMETHASONE SODIUM PHOSPHATE 4 MG: 4 INJECTION, SOLUTION INTRAMUSCULAR; INTRAVENOUS at 15:40

## 2017-03-08 RX ADMIN — PANTOPRAZOLE SODIUM 40 MG: 40 TABLET, DELAYED RELEASE ORAL at 20:50

## 2017-03-08 RX ADMIN — FENTANYL CITRATE 50 MCG: 50 INJECTION, SOLUTION INTRAMUSCULAR; INTRAVENOUS at 16:12

## 2017-03-08 RX ADMIN — PANTOPRAZOLE SODIUM 40 MG: 40 TABLET, DELAYED RELEASE ORAL at 08:20

## 2017-03-08 RX ADMIN — MEPERIDINE HYDROCHLORIDE 12.5 MG: 25 INJECTION, SOLUTION INTRAMUSCULAR; INTRAVENOUS; SUBCUTANEOUS at 16:55

## 2017-03-08 RX ADMIN — SODIUM CHLORIDE, POTASSIUM CHLORIDE, SODIUM LACTATE AND CALCIUM CHLORIDE: 600; 310; 30; 20 INJECTION, SOLUTION INTRAVENOUS at 15:59

## 2017-03-08 RX ADMIN — SODIUM PHOSPHATE, DIBASIC AND SODIUM PHOSPHATE, MONOBASIC 2 ENEMA: 7; 19 ENEMA RECTAL at 10:16

## 2017-03-08 RX ADMIN — SODIUM CHLORIDE, POTASSIUM CHLORIDE, SODIUM LACTATE AND CALCIUM CHLORIDE 9 ML/HR: 600; 310; 30; 20 INJECTION, SOLUTION INTRAVENOUS at 12:45

## 2017-03-08 RX ADMIN — LIDOCAINE HYDROCHLORIDE 50 MG: 10 INJECTION, SOLUTION INFILTRATION; PERINEURAL at 15:10

## 2017-03-08 RX ADMIN — PROPOFOL 150 MG: 10 INJECTION, EMULSION INTRAVENOUS at 15:11

## 2017-03-08 RX ADMIN — FENTANYL CITRATE 50 MCG: 50 INJECTION, SOLUTION INTRAMUSCULAR; INTRAVENOUS at 15:08

## 2017-03-08 RX ADMIN — CITALOPRAM HYDROBROMIDE 20 MG: 20 TABLET ORAL at 08:20

## 2017-03-08 RX ADMIN — SOTALOL HYDROCHLORIDE 80 MG: 80 TABLET ORAL at 08:20

## 2017-03-08 RX ADMIN — FAMOTIDINE 20 MG: 20 TABLET, FILM COATED ORAL at 10:16

## 2017-03-08 NOTE — PLAN OF CARE
Problem: Patient Care Overview (Adult)  Goal: Plan of Care Review  Outcome: Ongoing (interventions implemented as appropriate)    03/08/17 0431   Coping/Psychosocial Response Interventions   Plan Of Care Reviewed With patient   Patient Care Overview   Progress progress toward functional goals as expected

## 2017-03-08 NOTE — PROGRESS NOTES
[facility] Medicine Services    ASSESSMENT / PLAN          Anemia of acute blood loss , acute on chronic, with recurrent bleeding  --s/p  transfusion two units prbc  -- Hg now stable, recheck in am       Hemorrhoids, bleeding on eliquis  -- hold eliquis   --s/p hemorrhoidectomy        Paroxysmal afib, currently in sinus, per patient she was on holter in January and did have 4 episodes recorded ifor afib   -- tomassoni patient  -- eliquis on hold due to surgery and bleeding on arrival        vte proph   -- [facility] only     Length of Stay 3 Days   Code- full code     Condition--serious   Prognosis-- Fair   Expected Discharge disposition hopefully home tomorrow    SUBJECTIVE--HPI/ Events overnight / CC- Hospital Follow up visit/ ROS-not detailed ,  as performed below    In PACu s/p hemorrhoidectomy  Gen -no fevers, chills  CV-no chest pain, palpitations  Resp-no cough, dyspnea  GI-no N/V/D, abd pain      OBJECTIVE        Vital Sign Min/Max for last 24 hours  Temp  Min: 97.6 °F (36.4 °C)  Max: 98.6 °F (37 °C)   BP  Min: 123/99  Max: 142/83   Pulse  Min: 53  Max: 71   Resp  Min: 16  Max: 18   SpO2  Min: 96 %  Max: 99 %   No Data Recorded      Intake/Output Summary (Last 24 hours) at 03/08/17 1712  Last data filed at 03/08/17 1559   Gross per 24 hour   Intake   1250 ml   Output      0 ml   Net   1250 ml           Gen/Constitutional-no acute distress, talkative in PACU   CV-RRR, S1 S2 normal, no m/r/g  Resp-CTAB, no wheezes  Abd-soft, NT, ND, BS sluggish  Ext-no edema, pulses palpable  Neuro-A&Ox3, no focal deficits  Psych-appropriate mood     Medications    Current Facility-Administered Medications:   •  [MAR Hold] acetaminophen (TYLENOL) tablet 650 mg, 650 mg, Oral, Q4H PRN, Sirisha Lord MD, 650 mg at 03/07/17 1411  •  [MAR Hold] atorvastatin (LIPITOR) tablet 40 mg, 40 mg, Oral, Daily, Sirisha Lord MD, 40 mg at 03/08/17 0820  •  [MAR Hold] citalopram (CeleXA) tablet 20 mg, 20 mg, Oral, Daily,  Sirisha Lord MD, 20 mg at 03/08/17 0820  •  [MAR Hold] docusate sodium (COLACE) capsule 100 mg, 100 mg, Oral, BID PRN, Sirisha Lord MD  •  FentaNYL Citrate (PF) (SUBLIMAZE) injection 50 mcg, 50 mcg, Intravenous, Q5 Min PRN, Roderick Garcia CRNA  •  hydrALAZINE (APRESOLINE) injection 5 mg, 5 mg, Intravenous, Q10 Min PRN, Roderick Garcia CRNA  •  HYDROmorphone (DILAUDID) injection 0.5 mg, 0.5 mg, Intravenous, Q10 Min PRN, Roderick Garcia CRNA  •  ipratropium-albuterol (DUO-NEB) nebulizer solution 3 mL, 3 mL, Nebulization, Once PRN, Roderick Garcia CRNA  •  labetalol (NORMODYNE,TRANDATE) injection 5 mg, 5 mg, Intravenous, Q5 Min PRN, Roderick Garcia CRNA  •  lactated ringers bolus 500 mL, 500 mL, Intravenous, Once PRN, Roderick Garcia CRNA  •  lactated ringers infusion, 9 mL/hr, Intravenous, Continuous, Yennifer Vasquez MD, Last Rate: 9 mL/hr at 03/08/17 1245  •  [MAR Hold] levothyroxine (SYNTHROID, LEVOTHROID) tablet 100 mcg, 100 mcg, Oral, Q AM, Sirisha Lord MD, 100 mcg at 03/08/17 0524  •  [MAR Hold] melatonin sublingual tablet 5 mg, 5 mg, Sublingual, Nightly, Sirisha Lord MD, 5 mg at 03/07/17 2118  •  meperidine (DEMEROL) injection 12.5 mg, 12.5 mg, Intravenous, Q5 Min PRN, Roderick Garcia CRNA, 12.5 mg at 03/08/17 1655  •  naloxone (NARCAN) injection 0.4 mg, 0.4 mg, Intravenous, PRN, Roderick Garcia CRNA  •  ondansetron (ZOFRAN) injection 4 mg, 4 mg, Intravenous, Once PRN, Roderick Garcia CRNA  •  oxyCODONE-acetaminophen (PERCOCET) 5-325 MG per tablet 1 tablet, 1 tablet, Oral, Once PRN, Roderick Garcia CRNA  •  oxyCODONE-acetaminophen (PERCOCET) 7.5-325 MG per tablet 1 tablet, 1 tablet, Oral, Once PRN, Roderick Garcia CRNA  •  [MAR Hold] pantoprazole (PROTONIX) EC tablet 40 mg, 40 mg, Oral, Q12H, Sirisha Lord MD, 40 mg at 03/08/17 0820  •  promethazine (PHENERGAN) injection 6.25 mg, 6.25 mg, Intravenous, Once PRN **OR** promethazine (PHENERGAN) injection 6.25 mg, 6.25 mg, Intramuscular, Once PRN **OR**  promethazine (PHENERGAN) suppository 25 mg, 25 mg, Rectal, Once PRN **OR** promethazine (PHENERGAN) tablet 25 mg, 25 mg, Oral, Once PRN, Roderick Garcia CRNA  •  [MAR Hold] sodium chloride 0.9 % flush 1-10 mL, 1-10 mL, Intravenous, PRN, Sirisha Lord MD  •  sodium chloride 0.9 % flush 1-10 mL, 1-10 mL, Intravenous, PRN, Yennifer Vasquez MD  •  sodium chloride 0.9 % flush 1-10 mL, 1-10 mL, Intravenous, PRN, Roderick Garcia CRNA  •  [MAR Hold] sodium chloride 0.9 % flush 10-20 mL, 10-20 mL, Intracatheter, PRN, Rafat Rios MD  •  sotalol (BETAPACE) tablet 80 mg, 80 mg, Oral, Q24H, Sirisha Lord MD, 80 mg at 03/08/17 0820  I have reviewed the labs, culture data, radiology results, and diagnostic studies.    Results from last 7 days  Lab Units 03/08/17  0747 03/06/17  0739   SODIUM mmol/L 142 142   POTASSIUM mmol/L 4.8 4.4   CHLORIDE mmol/L 109 111*   TOTAL CO2 mmol/L 29.0 26.0   BUN mg/dL 9 10   CREATININE mg/dL 0.80 0.80   CALCIUM mg/dL 9.2 8.3*   GLUCOSE mg/dL 86 70       Results from last 7 days  Lab Units 03/08/17  0747 03/07/17  1053 03/06/17  0739 03/05/17  1318   WBC 10*3/mm3 3.37*  --  3.91 7.22   HEMOGLOBIN g/dL 10.3* 10.5* 8.9* 6.6*   HEMATOCRIT % 32.9* 33.3* 27.8* 21.5*   PLATELETS 10*3/mm3 255  --  237 280           Culture Data: none    Radiology Results: none    * Please note that portions of this note were completed with a voice recognition program. Efforts were made to edit the dictations, but occasionally words are mistranscribed.  Rose Sebastian, APRN03/08/175:12 PM

## 2017-03-08 NOTE — ANESTHESIA PREPROCEDURE EVALUATION
Anesthesia Evaluation     Patient summary reviewed and Nursing notes reviewed      Airway   Mallampati: I  TM distance: >3 FB  Neck ROM: full  no difficulty expected  Dental - normal exam     Pulmonary - normal exam    breath sounds clear to auscultation  (+) a smoker Former,   Cardiovascular - normal exam  Exercise tolerance: good (4-7 METS)    Rhythm: regular  Rate: normal    (+) hypertension, dysrhythmias Atrial Fib,       Neuro/Psych- negative ROS  GI/Hepatic/Renal/Endo    (+)  hypothyroidism,     Musculoskeletal     Abdominal  - normal exam  (-) obese    Abdomen: soft.   Substance History      OB/GYN          Other   (+) arthritis     ROS/Med Hx Other: ANEMIC                              Anesthesia Plan    ASA 3     general     intravenous induction   Anesthetic plan and risks discussed with patient.    Plan discussed with CRNA.

## 2017-03-08 NOTE — ANESTHESIA POSTPROCEDURE EVALUATION
Patient: Halie Perez    Procedure Summary     Date Anesthesia Start Anesthesia Stop Room / Location    03/08/17 1503 1643  [facility] / [facility]       Procedure Diagnosis Surgeon Provider    HEMORRHOIDECTOMY, (N/A Anus); BONE MARROW BIOPSY (Left ) Hemorrhoids with complication; Blood loss anemia MD Bennie Hunter MD          Anesthesia Type: general  Last vitals  BP      Temp      Pulse     Resp      SpO2        Post Anesthesia Care and Evaluation    Patient location during evaluation: PACU  Patient participation: complete - patient participated  Level of consciousness: awake and alert  Pain score: 0  Pain management: adequate  Airway patency: patent  Anesthetic complications: No anesthetic complications  PONV Status: none  Cardiovascular status: hemodynamically stable and acceptable  Respiratory status: nonlabored ventilation, acceptable and nasal cannula  Hydration status: acceptable

## 2017-03-08 NOTE — OP NOTE
HEMORRHOIDECTOMY, BONE MARROW BIOPSY  Procedure Note    Halie Perez  3/5/2017 - 3/8/2017    Pre-op Diagnosis:   Bleeding internal and external hemorrhoids  Blood loss anemia        Post-op Diagnosis:     Post-Op Diagnosis Codes:     * Hemorrhoids with complication [K64.8]     * Blood loss anemia [D50.0]    Procedure(s):  HEMORRHOIDECTOMY,  BONE MARROW BIOPSY    Surgeon(s):  MD Srikanth Hunter M.D.  Anesthesia: General    Staff:   Circulator: Jennifer Coto RN; Nancy DUVALL RN  Scrub Person: Lina Carr; Nerissa Hernández    Estimated Blood Loss: * No values recorded between 3/8/2017  3:03 PM and 3/8/2017  4:31 PM *    Specimens: 1.  Bone marrow                         2.  Hemorrhoids      Findings:  Patient had friable large internal hemorrhoids in the left lateral, right posterior lateral right anterolateral try segments.  It were some secondary complexes directly posteriorly and in the right lateral location.  There was a skin tag in the left anterolateral location.      Complications: none    Procedure;  After the patient was properly identified she was brought to the operating room and placed a couple supine position.  General anesthesia was obtained and the laryngeal mask was placed.  Timeout was performed.  The patient was rolled to a left lateral position and bone marrow was aspiration was performed by Dr. Moreira per his note.  Following this the patient was placed back in a supine position.  She was then placed in lithotomy position in candycane stirrups.  The buttocks were prepped and sterilely draped.  Findings were noted as above.  Local anesthesia was injected in the perianal skin and submucosal planes consisting of quarter percent Marcaine with 1-200,000 epinephrine total of 15 mL.  Following this 20 mL of Exparil diluted in 40 mL of normal saline was injected in the perineal skin and submucosal planes to a total of 50 mL.  Each of the major hemorrhoidal complexes was excised  with elliptical incision.  The skin edges and mucosal edges were undermined and the mucosal and skin edges were approximated with running 4-0 Vicryl.  The posterior midline fold was primarily external was excised longitudinally and approximated with running 4-0 Vicryl.  The skin tag in the left anterolateral location was excised the secondary complex in the right lateral location was cauterized with the electrocautery device.  The anal canal allowed passage of the large [place] retractor at the completion of the procedure.  Good hemostasis was noted.  A sterile dressing was applied.  The patient tolerated procedure well and was taken the recovery room in good condition.  Estimated blood loss 50 mL.        Michel White MD     Date: 3/8/2017  Time: 4:38 PM

## 2017-03-09 VITALS
DIASTOLIC BLOOD PRESSURE: 83 MMHG | SYSTOLIC BLOOD PRESSURE: 137 MMHG | OXYGEN SATURATION: 98 % | BODY MASS INDEX: 22.9 KG/M2 | HEART RATE: 61 BPM | WEIGHT: 160 LBS | RESPIRATION RATE: 16 BRPM | TEMPERATURE: 98.3 F | HEIGHT: 70 IN

## 2017-03-09 PROBLEM — K64.8 HEMORRHOIDS WITH COMPLICATION: Status: ACTIVE | Noted: 2017-03-09

## 2017-03-09 LAB
ALBUMIN 24H MFR UR ELPH: 15.8 %
ALBUMIN 24H MFR UR ELPH: 21.3 %
ALPHA1 GLOB 24H MFR UR ELPH: 7.1 %
ALPHA1 GLOB 24H MFR UR ELPH: 8.5 %
ALPHA2 GLOB 24H MFR UR ELPH: 17.7 %
ALPHA2 GLOB 24H MFR UR ELPH: 6.5 %
ANION GAP SERPL CALCULATED.3IONS-SCNC: 4 MMOL/L (ref 3–11)
B-GLOBULIN MFR UR ELPH: 28.8 %
B-GLOBULIN MFR UR ELPH: 30.4 %
BASOPHILS # BLD AUTO: 0.03 10*3/MM3 (ref 0–0.2)
BASOPHILS NFR BLD AUTO: 0.3 % (ref 0–1)
BUN BLD-MCNC: 8 MG/DL (ref 9–23)
BUN/CREAT SERPL: 10 (ref 7–25)
CALCIUM SPEC-SCNC: 9.4 MG/DL (ref 8.7–10.4)
CHLORIDE SERPL-SCNC: 106 MMOL/L (ref 99–109)
CO2 SERPL-SCNC: 27 MMOL/L (ref 20–31)
CREAT BLD-MCNC: 0.8 MG/DL (ref 0.6–1.3)
DEPRECATED RDW RBC AUTO: 73.4 FL (ref 37–54)
EOSINOPHIL # BLD AUTO: 0.02 10*3/MM3 (ref 0.1–0.3)
EOSINOPHIL NFR BLD AUTO: 0.2 % (ref 0–3)
ERYTHROCYTE [DISTWIDTH] IN BLOOD BY AUTOMATED COUNT: 21.1 % (ref 11.3–14.5)
GAMMA GLOB 24H MFR UR ELPH: 23.5 %
GAMMA GLOB 24H MFR UR ELPH: 40.3 %
GFR SERPL CREATININE-BSD FRML MDRD: 73 ML/MIN/1.73
GLUCOSE BLD-MCNC: 94 MG/DL (ref 70–100)
HCT VFR BLD AUTO: 29.6 % (ref 34.5–44)
HGB BLD-MCNC: 9.3 G/DL (ref 11.5–15.5)
HIV 1 & 2 AB SER-IMP: NORMAL
IMM GRANULOCYTES # BLD: 0.03 10*3/MM3 (ref 0–0.03)
IMM GRANULOCYTES NFR BLD: 0.3 % (ref 0–0.6)
INTERPRETATION UR IFE-IMP: NORMAL
LYMPHOCYTES # BLD AUTO: 0.95 10*3/MM3 (ref 0.6–4.8)
LYMPHOCYTES NFR BLD AUTO: 7.9 % (ref 24–44)
Lab: NORMAL
M PROTEIN 24H MFR UR ELPH: NORMAL %
M PROTEIN MFR UR ELPH: NORMAL %
MCH RBC QN AUTO: 30.2 PG (ref 27–31)
MCHC RBC AUTO-ENTMCNC: 31.4 G/DL (ref 32–36)
MCV RBC AUTO: 96.1 FL (ref 80–99)
MONOCYTES # BLD AUTO: 0.69 10*3/MM3 (ref 0–1)
MONOCYTES NFR BLD AUTO: 5.8 % (ref 0–12)
NEUTROPHILS # BLD AUTO: 10.24 10*3/MM3 (ref 1.5–8.3)
NEUTROPHILS NFR BLD AUTO: 85.5 % (ref 41–71)
NRBC BLD MANUAL-RTO: 0 /100 WBC (ref 0–0)
PLATELET # BLD AUTO: 242 10*3/MM3 (ref 150–450)
PMV BLD AUTO: 9.2 FL (ref 6–12)
POTASSIUM BLD-SCNC: 4.4 MMOL/L (ref 3.5–5.5)
PROT 24H UR-MRATE: 57.8 MG/24 HR (ref 30–150)
PROT 24H UR-MRATE: 60.5 MG/24 HR (ref 30–150)
PROT UR-MCNC: 4.2 MG/DL
PROT UR-MCNC: 4.4 MG/DL
RBC # BLD AUTO: 3.08 10*6/MM3 (ref 3.89–5.14)
SODIUM BLD-SCNC: 137 MMOL/L (ref 132–146)
WBC NRBC COR # BLD: 11.96 10*3/MM3 (ref 3.5–10.8)

## 2017-03-09 PROCEDURE — 85025 COMPLETE CBC W/AUTO DIFF WBC: CPT | Performed by: NURSE PRACTITIONER

## 2017-03-09 PROCEDURE — 88271 CYTOGENETICS DNA PROBE: CPT

## 2017-03-09 PROCEDURE — 88237 TISSUE CULTURE BONE MARROW: CPT

## 2017-03-09 PROCEDURE — 88275 CYTOGENETICS 100-300: CPT

## 2017-03-09 PROCEDURE — 99239 HOSP IP/OBS DSCHRG MGMT >30: CPT | Performed by: NURSE PRACTITIONER

## 2017-03-09 PROCEDURE — 25010000002 MORPHINE SULFATE (PF) 2 MG/ML SOLUTION: Performed by: COLON & RECTAL SURGERY

## 2017-03-09 PROCEDURE — 80048 BASIC METABOLIC PNL TOTAL CA: CPT | Performed by: NURSE PRACTITIONER

## 2017-03-09 PROCEDURE — 88264 CHROMOSOME ANALYSIS 20-25: CPT

## 2017-03-09 RX ORDER — PSEUDOEPHEDRINE HCL 30 MG
100 TABLET ORAL 2 TIMES DAILY
Start: 2017-03-09 | End: 2017-08-30

## 2017-03-09 RX ORDER — OXYCODONE AND ACETAMINOPHEN 7.5; 325 MG/1; MG/1
1 TABLET ORAL EVERY 4 HOURS PRN
Qty: 21 TABLET | Refills: 0
Start: 2017-03-09 | End: 2017-08-30

## 2017-03-09 RX ADMIN — CITALOPRAM HYDROBROMIDE 20 MG: 20 TABLET ORAL at 10:07

## 2017-03-09 RX ADMIN — MORPHINE SULFATE 2 MG: 2 INJECTION, SOLUTION INTRAMUSCULAR; INTRAVENOUS at 02:31

## 2017-03-09 RX ADMIN — OXYCODONE HYDROCHLORIDE AND ACETAMINOPHEN 1 TABLET: 7.5; 325 TABLET ORAL at 14:17

## 2017-03-09 RX ADMIN — PANTOPRAZOLE SODIUM 40 MG: 40 TABLET, DELAYED RELEASE ORAL at 10:07

## 2017-03-09 RX ADMIN — ATORVASTATIN CALCIUM 40 MG: 40 TABLET, FILM COATED ORAL at 10:06

## 2017-03-09 RX ADMIN — LEVOTHYROXINE SODIUM 100 MCG: 100 TABLET ORAL at 05:05

## 2017-03-09 RX ADMIN — SOTALOL HYDROCHLORIDE 80 MG: 80 TABLET ORAL at 10:06

## 2017-03-09 RX ADMIN — OXYCODONE HYDROCHLORIDE AND ACETAMINOPHEN 1 TABLET: 7.5; 325 TABLET ORAL at 05:05

## 2017-03-09 RX ADMIN — OXYCODONE HYDROCHLORIDE AND ACETAMINOPHEN 1 TABLET: 7.5; 325 TABLET ORAL at 10:08

## 2017-03-09 NOTE — PROGRESS NOTES
Halie Perez  1955  [id number]    Surgery Progress Note    Post Op Day 1  Date of visit: 3/9/2017    Subjective:  She feels well.  She is up and ambulating.  Tolerating a regular diet.  He is well-controlled.      Objective:    I/O last 3 completed shifts:  In: 2330 [P.O.:1080; I.V.:1250]  Out: -     Vital Signs (last 24 hours)       03/08 0700  -  03/09 0659 03/09 0700  -  03/09 1501   Most Recent    Temp (°F) 97.6 -  98.6      98.3     98.3 (36.8)    Heart Rate 51 -  79      61     61    Resp 16 -  20      16     16    /99 -  143/89       137/83    SpO2 (%) 94 -  99      98     98        Exam:  Wound is healing well.  There is some perioperative ecchymosis but no significant erythema or swelling.      Database: hemoglobin 9.3, white blood cell count 11.9      Assessment/ Plan:    Good postoperative day #1  post-hemorrhoidectomy  Patient is acceptable from a surgical standpoint to go home.  I would like for her to return to see me in 2-3 weeks in the office.  She is to call for pain fever or bleeding.  I've asked her take a laxative until her first bowel movement and to call has not had a bowel movement by 48 hours.          Problem List Items Addressed This Visit     Symptomatic anemia    Relevant Orders    VERONIQUE+Protein Electro, 24-Hr Urine    Monoclonal gammopathy    Relevant Orders    VERONIQUE+Protein Electro, 24-Hr Urine      Other Visit Diagnoses     Bleeding hemorrhoid        Relevant Orders    Tissue Exam            Michel White MD  3/9/2017  3:00 PM

## 2017-03-09 NOTE — PROGRESS NOTES
Discharge Planning Assessment  [facility]     Patient Name: Halie Perez  MRN: [id number]  Today's Date: 3/9/2017    Admit Date: 3/5/2017          Discharge Needs Assessment     None            Discharge Plan       03/09/17 1003    Case Management/Social Work Plan    Additional Comments Pt reports plan for discharge remains to return home.  CM to follow for any discharge needs.        Discharge Placement     No information found        Expected Discharge Date and Time     Expected Discharge Date Expected Discharge Time    Mar 7, 2017               Demographic Summary     None            Functional Status     None            Psychosocial     None            Abuse/Neglect     None            Legal     None            Substance Abuse     None            Patient Forms     None          Kenna Royal RN

## 2017-03-09 NOTE — DISCHARGE SUMMARY
[facility] Medicine Services  DISCHARGE SUMMARY       Date of Admission: 3/5/2017  Date of Discharge:  3/9/2017  Primary Care Physician: ARMANDO Edwards    Discharge Diagnoses:  Active Hospital Problems (** Indicates Principal Problem)    Diagnosis Date Noted   • **Anemia [D64.9] 03/05/2017   • Hemorrhoids with complication [K64.8] 03/09/2017   • Paroxysmal atrial fibrillation [I48.0] 02/15/2017   • Monoclonal gammopathy [D47.2] 01/10/2017   • Hypothyroidism [E03.9]    • Osteoarthritis [M19.90]    • Hypertension [I10]       Resolved Hospital Problems    Diagnosis Date Noted Date Resolved   No resolved problems to display.       Presenting Problem/History of Present Illness  Anemia [D64.9]     Chief Complaint on Day of Discharge:   Hospital follow up    History of Present Illness on Day of Discharge:   No complaints  No bleeding  Pain controlled with percocet    Hospital Course  Patient is a 61 y.o. female presented as a direct admission from Dr. White's office with a hemoglobin of 6.6.  She has a PMH of afib on eliquis, and known hemorrhoids.  She was admitted to hospitalist service.  Hemorrhoidectomy was completed while inpatient due to continued bleeding from hemorrhoids.  Bone marrow biopsy was completed while under anesthesia and she has a follow up with Dr. Valencia for pathology result.  Eliquis was held on admission.  She tolerated the procedure well and is felt to be stable and ready for dc home on 3.9.17.  Eliquis will be restarted per surgery.       Procedures Performed  Procedure(s):  HEMORRHOIDECTOMY,  BONE MARROW BIOPSY       Consults:   Consults     No orders found from 2/4/2017 to 3/6/2017.          Pertinent Test Results:  Component      Latest Ref Rng 3/5/2017 3/6/2017 3/8/2017 3/9/2017   WBC      3.50 - 10.80 10*3/mm3 7.22 3.91 3.37 (L) 11.96 (H)   RBC      3.89 - 5.14 10*6/mm3 2.14 (L) 3.00 (L) 3.43 (L) 3.08 (L)   Hemoglobin      11.5 - 15.5 g/dL 6.6 (L) 8.9 (L) 10.3  "(L) 9.3 (L)   Hematocrit      34.5 - 44.0 % 21.5 (L) 27.8 (L) 32.9 (L) 29.6 (L)   MCV      80.0 - 99.0 fL 100.5 (H) 92.7 95.9 96.1   MCH      27.0 - 31.0 pg 30.8 29.7 30.0 30.2   MCHC      32.0 - 36.0 g/dL 30.7 (L) 32.0 31.3 (L) 31.4 (L)   RDW      11.3 - 14.5 % 23.8 (H) 22.7 (H) 21.8 (H) 21.1 (H)   RDW-SD      37.0 - 54.0 fl 86.0 (H) 73.9 (H) 76.6 (H) 73.4 (H)   MPV      6.0 - 12.0 fL 7.7 9.1 9.1 9.2   Platelets      150 - 450 10*3/mm3 280 237 255 242   Neutrophil %      41.0 - 71.0 % 77.7 (H)  48.0 85.5 (H)   Lymphocyte %      24.0 - 44.0 % 12.3 (L)  29.7 7.9 (L)   Monocyte %      0.0 - 12.0 % 8.0  12.8 (H) 5.8   Eosinophil %      0.0 - 3.0 % 1.1  8.3 (H) 0.2   Basophil %      0.0 - 1.0 % 0.6  1.2 (H) 0.3   Immature Grans %      0.0 - 0.6 % 0.3  0.0 0.3   Neutrophils, Absolute      1.50 - 8.30 10*3/mm3 5.61  1.62 10.24 (H)   Lymphocytes, Absolute      0.60 - 4.80 10*3/mm3 0.89  1.00 0.95   Monocytes, Absolute      0.00 - 1.00 10*3/mm3 0.58  0.43 0.69   Eosinophils, Absolute      0.10 - 0.30 10*3/mm3 0.08 (L)  0.28 0.02 (L)   Basophils, Absolute      0.00 - 0.20 10*3/mm3 0.04  0.04 0.03   Immature Grans, Absolute      0.00 - 0.03 10*3/mm3 0.02  0.00 0.03   nRBC      0.0 - 0.0 /100 WBC    0.0   Glucose      70 - 100 mg/dL  70 86 94   BUN      9 - 23 mg/dL  10 9 8 (L)   Creatinine      0.60 - 1.30 mg/dL  0.80 0.80 0.80   Sodium      132 - 146 mmol/L  142 142 137   Potassium      3.5 - 5.5 mmol/L  4.4 4.8 4.4   Chloride      99 - 109 mmol/L  111 (H) 109 106   CO2      20.0 - 31.0 mmol/L  26.0 29.0 27.0   Calcium      8.7 - 10.4 mg/dL  8.3 (L) 9.2 9.4   eGFR Non African Amer      >60 mL/min/1.73  73 73 73   BUN/Creatinine Ratio      7.0 - 25.0  12.5 11.3 10.0   Anion Gap      3.0 - 11.0 mmol/L  5.0 4.0 4.0     Condition on Discharge:  stable    Physical Exam on Discharge:  Visit Vitals   • /83   • Pulse 61   • Temp 98.3 °F (36.8 °C) (Oral)   • Resp 16   • Ht 70\" (177.8 cm)   • Wt 160 lb (72.6 kg)   • LMP  (LMP " Unknown)   • SpO2 98%   • BMI 22.96 kg/m2     Physical Exam   Constitutional: She is oriented to person, place, and time. She appears well-developed and well-nourished. No distress.   HENT:   Head: Normocephalic.   Eyes: EOM are normal.   Neck: Neck supple.   Cardiovascular: Normal rate.  Exam reveals no gallop and no friction rub.    No murmur heard.  Pulmonary/Chest: Effort normal. No respiratory distress. She has no wheezes.   Abdominal: Soft. Bowel sounds are normal. She exhibits no distension. There is no tenderness.   Musculoskeletal: She exhibits no edema.   Neurological: She is alert and oriented to person, place, and time.   Skin: Skin is warm and dry.   Psychiatric: She has a normal mood and affect. Her behavior is normal.   Laughing with visitor at bedside   Vitals reviewed.        Discharge Disposition  Home or Self Care    Discharge Medications   Halie Perez   Home Medication Instructions AASHISH:[id number]    Printed on:03/09/17 8312   Medication Information                      apixaban (ELIQUIS) 5 MG tablet tablet  Take 1 tablet by mouth 2 (Two) Times a Day. Restart after 48h and after talking with Dr. Johansen             ascorbic acid (VITAMIN C) 500 MG tablet  Take 1 tablet by mouth 2 (Two) Times a Day.             atorvastatin (LIPITOR) 40 MG tablet  Take 1 tablet by mouth Daily.             citalopram (CeleXA) 20 MG tablet  Take 20 mg by mouth Daily.             docusate sodium 100 MG capsule  Take 100 mg by mouth 2 (Two) Times a Day.             ferrous sulfate 325 (65 FE) MG tablet  Take 1 tablet by mouth 2 (Two) Times a Day With Meals.             levothyroxine (SYNTHROID, LEVOTHROID) 100 MCG tablet  Take 100 mcg by mouth Daily.             Melatonin 10 MG tablet  Take 30 mg by mouth Every Night.             oxyCODONE-acetaminophen (PERCOCET) 7.5-325 MG per tablet  Take 1 tablet by mouth Every 4 (Four) Hours As Needed for moderate pain (4-6).             pantoprazole (PROTONIX) 40 MG  EC tablet  Take 1 tablet by mouth Every 12 (Twelve) Hours.             sotalol (BETAPACE) 80 MG tablet  Take 1 tablet by mouth Daily.                 Discharge Diet:   Diet Instructions     Diet: Regular, Specialty Diet; Thin Liquids, No Restrictions; Vegetarian       Discharge Diet:   Regular  Specialty Diet      Fluid Consistency:  Thin Liquids, No Restrictions   Specialty Diets:  Vegetarian                 Discharge Care Plan / Instructions:    Activity at Discharge:   Activity Instructions     Activity as Tolerated                     Follow-up Appointments  Future Appointments  Date Time Provider Department Center   8/30/2017 10:45 AM Pradip Johansen MD [facility] None     Additional Instructions for the Follow-ups that You Need to Schedule     Additional Discharge Follow-Up (Specify Provider)    As directed    To:  Zenia   Follow Up Details:  as scheduled       Discharge Follow-Up With Specified Provider    As directed    To:  Christopher   Follow Up Details:  per his recs       Discharge Follow-up with PCP    As directed    Follow Up Details:  1 week                 Test Results Pending at Discharge   Order Current Status    Bone Marrow Exam In process    Tissue Exam In process           ARMANDO Webster 03/09/17 3:25 PM    Time: Discharge 35 min    Please note that portions of this note may have been completed with a voice recognition program. Efforts were made to edit the dictations, but occasionally words are mistranscribed.

## 2017-03-09 NOTE — PLAN OF CARE
Problem: Patient Care Overview (Adult)  Goal: Plan of Care Review  Outcome: Ongoing (interventions implemented as appropriate)    03/09/17 0142   Coping/Psychosocial Response Interventions   Plan Of Care Reviewed With patient   Patient Care Overview   Progress improving   Outcome Evaluation   Outcome Summary/Follow up Plan good night, pain under controll       Goal: Adult Individualization and Mutuality  Outcome: Ongoing (interventions implemented as appropriate)  Goal: Discharge Needs Assessment  Outcome: Ongoing (interventions implemented as appropriate)    Problem: Fluid Volume Deficit (Adult)  Goal: Identify Related Risk Factors and Signs and Symptoms  Outcome: Ongoing (interventions implemented as appropriate)  Goal: Fluid/Electrolyte Balance  Outcome: Ongoing (interventions implemented as appropriate)  Goal: Comfort/Well Being  Outcome: Ongoing (interventions implemented as appropriate)    Problem: Activity Intolerance (Adult)  Goal: Identify Related Risk Factors and Signs and Symptoms  Outcome: Ongoing (interventions implemented as appropriate)  Goal: Activity Tolerance  Outcome: Ongoing (interventions implemented as appropriate)  Goal: Effective Energy Conservation Techniques  Outcome: Ongoing (interventions implemented as appropriate)    Problem: Perioperative Period (Adult)  Goal: Signs and Symptoms of Listed Potential Problems Will be Absent or Manageable (Perioperative Period)  Outcome: Ongoing (interventions implemented as appropriate)

## 2017-03-10 LAB
CYTO UR: NORMAL
LAB AP CASE REPORT: NORMAL
LAB AP CLINICAL INFORMATION: NORMAL
Lab: NORMAL
PATH REPORT.FINAL DX SPEC: NORMAL
PATH REPORT.GROSS SPEC: NORMAL

## 2017-03-14 PROCEDURE — 88185 FLOWCYTOMETRY/TC ADD-ON: CPT

## 2017-03-14 PROCEDURE — 88184 FLOWCYTOMETRY/ TC 1 MARKER: CPT

## 2017-03-20 ENCOUNTER — OFFICE VISIT (OUTPATIENT)
Dept: ONCOLOGY | Facility: CLINIC | Age: 62
End: 2017-03-20

## 2017-03-20 ENCOUNTER — LAB (OUTPATIENT)
Dept: LAB | Facility: HOSPITAL | Age: 62
End: 2017-03-20
Attending: INTERNAL MEDICINE

## 2017-03-20 VITALS
HEART RATE: 68 BPM | BODY MASS INDEX: 22.33 KG/M2 | RESPIRATION RATE: 16 BRPM | WEIGHT: 156 LBS | TEMPERATURE: 97.5 F | SYSTOLIC BLOOD PRESSURE: 135 MMHG | DIASTOLIC BLOOD PRESSURE: 64 MMHG | HEIGHT: 70 IN

## 2017-03-20 DIAGNOSIS — D64.9 SYMPTOMATIC ANEMIA: Primary | ICD-10-CM

## 2017-03-20 DIAGNOSIS — D64.9 SYMPTOMATIC ANEMIA: ICD-10-CM

## 2017-03-20 DIAGNOSIS — D47.2 MONOCLONAL GAMMOPATHY: ICD-10-CM

## 2017-03-20 LAB
ERYTHROCYTE [DISTWIDTH] IN BLOOD BY AUTOMATED COUNT: 18.4 % (ref 11.3–14.5)
HCT VFR BLD AUTO: 33.7 % (ref 34.5–44)
HGB BLD-MCNC: 10.5 G/DL (ref 11.5–15.5)
LAB AP ASPIRATE SMEAR: NORMAL
LAB AP CASE REPORT: NORMAL
LAB AP CBC AND DIFFERENTIAL: NORMAL
LAB AP CLINICAL INFORMATION: NORMAL
LAB AP CLOT SECTION: NORMAL
LAB AP CORE BIOPSY: NORMAL
LAB AP CYTOGENETICS REPORT,ADDENDUM: NORMAL
LAB AP DIAGNOSIS COMMENT: NORMAL
LAB AP FLOW CYTOMETRY SUMMARY: NORMAL
LAB AP INTEGRATED ONCOLOGY, ADDENDUM: NORMAL
LYMPHOCYTES # BLD AUTO: 1.3 10*3/MM3 (ref 0.6–4.8)
LYMPHOCYTES NFR BLD AUTO: 22.1 % (ref 24–44)
Lab: NORMAL
MCH RBC QN AUTO: 29.3 PG (ref 27–31)
MCHC RBC AUTO-ENTMCNC: 31.3 G/DL (ref 32–36)
MCV RBC AUTO: 93.5 FL (ref 80–99)
MONOCYTES # BLD AUTO: 0.6 10*3/MM3 (ref 0–1)
MONOCYTES NFR BLD AUTO: 10.5 % (ref 0–12)
NEUTROPHILS # BLD AUTO: 4 10*3/MM3 (ref 1.5–8.3)
NEUTROPHILS NFR BLD AUTO: 67.4 % (ref 41–71)
PATH REPORT.FINAL DX SPEC: NORMAL
PLATELET # BLD AUTO: 427 10*3/MM3 (ref 150–450)
PMV BLD AUTO: 7 FL (ref 6–12)
RBC # BLD AUTO: 3.6 10*6/MM3 (ref 3.89–5.14)
WBC NRBC COR # BLD: 6 10*3/MM3 (ref 3.5–10.8)

## 2017-03-20 PROCEDURE — 99213 OFFICE O/P EST LOW 20 MIN: CPT | Performed by: INTERNAL MEDICINE

## 2017-03-20 PROCEDURE — 85025 COMPLETE CBC W/AUTO DIFF WBC: CPT

## 2017-03-20 PROCEDURE — 36415 COLL VENOUS BLD VENIPUNCTURE: CPT

## 2017-03-20 RX ORDER — LISINOPRIL 40 MG/1
40 TABLET ORAL DAILY
COMMUNITY
Start: 2017-01-02 | End: 2018-04-05 | Stop reason: HOSPADM

## 2017-03-20 NOTE — PROGRESS NOTES
CHIEF COMPLAINT: Follow-up anemia    Problem List:  1.)  Monoclonal gammopathy  2.)  Macrocytic anemia: 3/9/17 CBC hemoglobin 9.3 white count 11,960 platelets of 242,000 down from hemoglobin of 10.3 on 3/8/17 post transfusion while inpatient for internal and external hemorrhoidectomy by Dr. White.  Hemoglobin was 6.6 on 3/5/17 with MCV of 100.5.  24-hour urine immunoelectrophoresis showed no monoclonal spike lambda light chains 106 with kappa light chains 24 and a kappa to lambda ratio of 0.23 with normal IgA, G, and M levels.  January 2017 serum immunoelectrophoresis showed 600 mg/dL of immunoglobulin G lambda light chains in the serum.  3/8/17 bone marrow biopsy done at the time of her hemorrhoidectomy showed normal marrow with 3% plasma cells and a small lambda clonal population identified with no stainable iron.  There was erythroid expansion with mild maturation dyssynchrony and occasional atypical nuclear features that may represent the compensatory response to her anemia though myelodysplasia could not entirely be ruled out.  Head reactive aggregates of lymphoid tissue.  Macrocytic anemia    HISTORY OF PRESENT ILLNESS:  The patient is a 61 y.o. female, here for follow up on management of recurrent anemia and monoclonal gammopathy.  While she was in the hospital for her hemorrhoidectomy we did a marrow biopsy and this is a low probability for myeloma.  Data is above.  She's feeling better since her transfusion.  Blood work today is still pending.  Past Medical History   Diagnosis Date   • Atrial fibrillation    • Hemorrhoids      bleeding hemorrhoids   • History of anemia    • Hypertension    • Hypothyroidism    • Osteoarthritis      Past Surgical History   Procedure Laterality Date   • Augmentation mammaplasty Bilateral 2008   • Hip arthroscopy Right      2005 & 2012   • Tonsillectomy  1964   • Endoscopy N/A 1/7/2017     Procedure: ESOPHAGOGASTRODUODENOSCOPY;  Surgeon: Abhishek Benton MD;  Location:   "[facility];  Service:    • Colonoscopy  01/09/2017   • Colonoscopy N/A 1/9/2017     Procedure: COLONOSCOPY;  Surgeon: Michel White MD;  Location:  [facility];  Service:    • Joint replacement       2 right hips   • Hemorrhoidectomy N/A 3/8/2017     Procedure: HEMORRHOIDECTOMY,;  Surgeon: Michel White MD;  Location:  [facility];  Service:    • Bone marrow biopsy Left 3/8/2017     Procedure: BONE MARROW BIOPSY;  Surgeon: Michel White MD;  Location:  [facility];  Service:        Allergies   Allergen Reactions   • Sulfa Antibiotics Anaphylaxis       Family History and Social History reviewed and changed as necessary      REVIEW OF SYSTEM:   Review of Systems   Constitutional: Negative for appetite change, chills, diaphoresis, fatigue, fever and unexpected weight change.   HENT:   Negative for mouth sores, sore throat and trouble swallowing.    Eyes: Negative for icterus.   Respiratory: Negative for cough, hemoptysis and shortness of breath.    Cardiovascular: Negative for chest pain, leg swelling and palpitations.   Gastrointestinal: Negative for abdominal distention, abdominal pain, blood in stool, constipation, diarrhea, nausea and vomiting.   Endocrine: Negative for hot flashes.   Genitourinary: Negative for bladder incontinence, difficulty urinating, dysuria, frequency and hematuria.    Musculoskeletal: Negative for gait problem, neck pain and neck stiffness.   Skin: Negative for rash.   Neurological: Negative for dizziness, gait problem, headaches, light-headedness and numbness.   Hematological: Negative for adenopathy. Does not bruise/bleed easily.   Psychiatric/Behavioral: Negative for depression. The patient is not nervous/anxious.    All other systems reviewed and are negative.       PHYSICAL EXAM    Vitals:    03/20/17 0900   BP: 135/64   Pulse: 68   Resp: 16   Temp: 97.5 °F (36.4 °C)   TempSrc: Temporal Artery    Weight: 156 lb (70.8 kg)   Height: 70\" (177.8 cm)     Constitutional: Appears " well-developed and well-nourished. No distress.   ECOG: (0) Fully active, able to carry on all predisease performance without restriction  HENT:   Head: Normocephalic.   Mouth/Throat: Oropharynx is clear and moist.   Eyes: Conjunctivae are normal. Pupils are equal, round, and reactive to light. No scleral icterus.   Neck: Neck supple. No JVD present. No thyromegaly present.   Cardiovascular: Normal rate, regular rhythm and normal heart sounds.    Pulmonary/Chest: Breath sounds normal. No respiratory distress.   Abdominal: Soft. Exhibits no distension and no mass. There is no hepatosplenomegaly. There is no tenderness. There is no rebound and no guarding.   Musculoskeletal:Exhibits no edema, tenderness or deformity.   Neurological: Alert and oriented to person, place, and time. Exhibits normal muscle tone.   Skin: No ecchymosis, no petechiae and no rash noted. Not diaphoretic. No cyanosis. Nails show no clubbing.   Psychiatric: Normal mood and affect.   Vitals reviewed.      Admission on 03/05/2017, Discharged on 03/09/2017   Component Date Value Ref Range Status   • ABO Type 03/05/2017 O   Final   • RH type 03/05/2017 Positive   Final   • Antibody Screen 03/05/2017 Negative   Final   • Product Code 03/07/2017 [id number]   Final   • Unit Number 03/07/2017 [id number]   Final   • UNIT  ABO 03/07/2017 O   Final   • UNIT  RH 03/07/2017 POS   Final   • CROSSMATCH 1 INTERPRETATION 03/07/2017 Compatible   Final   • Dispense Status 03/07/2017 PT   Final   • Blood Type 03/07/2017 OPOS   Final   • Blood Expiration Date 03/07/2017 019969123490   Final   • Blood Type Barcode 03/07/2017 5100   Final   • Product Code 03/07/2017 [id number]   Final   • Unit Number 03/07/2017 [id number]   Final   • UNIT  ABO 03/07/2017 O   Final   • UNIT  RH 03/07/2017 POS   Final   • CROSSMATCH 1 INTERPRETATION 03/07/2017 Compatible   Final   • Dispense Status 03/07/2017 PT   Final   • Blood Type 03/07/2017 OPOS   Final   • Blood Expiration  Date 03/07/2017 075710182302   Final   • Blood Type Barcode 03/07/2017 5100   Final   • WBC 03/06/2017 3.91  3.50 - 10.80 10*3/mm3 Final   • RBC 03/06/2017 3.00* 3.89 - 5.14 10*6/mm3 Final   • Hemoglobin 03/06/2017 8.9* 11.5 - 15.5 g/dL Final   • Hematocrit 03/06/2017 27.8* 34.5 - 44.0 % Final   • MCV 03/06/2017 92.7  80.0 - 99.0 fL Final   • MCH 03/06/2017 29.7  27.0 - 31.0 pg Final   • MCHC 03/06/2017 32.0  32.0 - 36.0 g/dL Final   • RDW 03/06/2017 22.7* 11.3 - 14.5 % Final   • RDW-SD 03/06/2017 73.9* 37.0 - 54.0 fl Final   • MPV 03/06/2017 9.1  6.0 - 12.0 fL Final   • Platelets 03/06/2017 237  150 - 450 10*3/mm3 Final   • Glucose 03/06/2017 70  70 - 100 mg/dL Final   • BUN 03/06/2017 10  9 - 23 mg/dL Final   • Creatinine 03/06/2017 0.80  0.60 - 1.30 mg/dL Final   • Sodium 03/06/2017 142  132 - 146 mmol/L Final   • Potassium 03/06/2017 4.4  3.5 - 5.5 mmol/L Final   • Chloride 03/06/2017 111* 99 - 109 mmol/L Final   • CO2 03/06/2017 26.0  20.0 - 31.0 mmol/L Final   • Calcium 03/06/2017 8.3* 8.7 - 10.4 mg/dL Final   • eGFR Non African Amer 03/06/2017 73  >60 mL/min/1.73 Final   • BUN/Creatinine Ratio 03/06/2017 12.5  7.0 - 25.0 Final   • Anion Gap 03/06/2017 5.0  3.0 - 11.0 mmol/L Final   • Total Protein, Urine 03/07/2017 4.4  Not Estab. mg/dL Final   • Protein, 24H Urine 03/07/2017 60.5  30.0 - 150.0 mg/24 hr Final   • Albumin, U 03/07/2017 21.3  % Final   • Alpha-1-Globulin, U 03/07/2017 7.1  % Final   • Alpha-2-Globulin, U 03/07/2017 17.7  % Final   • Beta Globulin, U 03/07/2017 30.4  % Final   • Gamma Globulin, Urine 03/07/2017 23.5  % Final   • M-Merlin, % 03/07/2017 Not Observed  Not Observed % Final   • Please note 03/07/2017 Comment   Final    Protein electrophoresis scan will follow via computer, mail, or   delivery.   • Hemoglobin 03/07/2017 10.5* 11.5 - 15.5 g/dL Final   • Hematocrit 03/07/2017 33.3* 34.5 - 44.0 % Final   • Glucose 03/08/2017 86  70 - 100 mg/dL Final   • BUN 03/08/2017 9  9 - 23  mg/dL Final   • Creatinine 03/08/2017 0.80  0.60 - 1.30 mg/dL Final   • Sodium 03/08/2017 142  132 - 146 mmol/L Final   • Potassium 03/08/2017 4.8  3.5 - 5.5 mmol/L Final   • Chloride 03/08/2017 109  99 - 109 mmol/L Final   • CO2 03/08/2017 29.0  20.0 - 31.0 mmol/L Final   • Calcium 03/08/2017 9.2  8.7 - 10.4 mg/dL Final   • eGFR Non African Amer 03/08/2017 73  >60 mL/min/1.73 Final   • BUN/Creatinine Ratio 03/08/2017 11.3  7.0 - 25.0 Final   • Anion Gap 03/08/2017 4.0  3.0 - 11.0 mmol/L Final   • WBC 03/08/2017 3.37* 3.50 - 10.80 10*3/mm3 Final   • RBC 03/08/2017 3.43* 3.89 - 5.14 10*6/mm3 Final   • Hemoglobin 03/08/2017 10.3* 11.5 - 15.5 g/dL Final   • Hematocrit 03/08/2017 32.9* 34.5 - 44.0 % Final   • MCV 03/08/2017 95.9  80.0 - 99.0 fL Final   • MCH 03/08/2017 30.0  27.0 - 31.0 pg Final   • MCHC 03/08/2017 31.3* 32.0 - 36.0 g/dL Final   • RDW 03/08/2017 21.8* 11.3 - 14.5 % Final   • RDW-SD 03/08/2017 76.6* 37.0 - 54.0 fl Final   • MPV 03/08/2017 9.1  6.0 - 12.0 fL Final   • Platelets 03/08/2017 255  150 - 450 10*3/mm3 Final   • Neutrophil % 03/08/2017 48.0  41.0 - 71.0 % Final   • Lymphocyte % 03/08/2017 29.7  24.0 - 44.0 % Final   • Monocyte % 03/08/2017 12.8* 0.0 - 12.0 % Final   • Eosinophil % 03/08/2017 8.3* 0.0 - 3.0 % Final   • Basophil % 03/08/2017 1.2* 0.0 - 1.0 % Final   • Immature Grans % 03/08/2017 0.0  0.0 - 0.6 % Final   • Neutrophils, Absolute 03/08/2017 1.62  1.50 - 8.30 10*3/mm3 Final   • Lymphocytes, Absolute 03/08/2017 1.00  0.60 - 4.80 10*3/mm3 Final   • Monocytes, Absolute 03/08/2017 0.43  0.00 - 1.00 10*3/mm3 Final   • Eosinophils, Absolute 03/08/2017 0.28  0.10 - 0.30 10*3/mm3 Final   • Basophils, Absolute 03/08/2017 0.04  0.00 - 0.20 10*3/mm3 Final   • Immature Grans, Absolute 03/08/2017 0.00  0.00 - 0.03 10*3/mm3 Final   • Total Protein, Urine 03/07/2017 4.2  Not Estab. mg/dL Final   • Protein, 24H Urine 03/07/2017 57.8  30.0 - 150.0 mg/24 hr Final   • Albumin, U 03/07/2017 15.8  % Final    • Alpha-1-Globulin, U 03/07/2017 8.5  % Final   • Alpha-2-Globulin, U 03/07/2017 6.5  % Final   • Beta Globulin, U 03/07/2017 28.8  % Final   • Gamma Globulin, Urine 03/07/2017 40.3  % Final   • M-Merlin, % U 03/07/2017 Not Observed  Not Observed % Final   • Immunofixation Result, Urine 03/07/2017 Comment   Final    No monoclonality detected.   • Note: 03/07/2017 Comment   Final    Protein electrophoresis scan will follow via computer, mail, or   delivery.   • Case Report 03/08/2017    Final                    Value:Surgical Pathology Report                         Case: [id number]                                  Authorizing Provider:  Rafat Rios MD          Collected:           03/08/2017 03:44 PM          Ordering Location:     [facility]   Received:            03/08/2017 03:44 PM                                 OR                                                                           Pathologist:           Cr Duarte MD                                                         Specimens:   1) - Iliac Crest, Left - Aspirate                                                                   2) - Iliac Crest, Left - Biopsy                                                           • Cytogenetics Report, Addendum 03/08/2017    Final                    Value:This result contains rich text formatting which cannot be displayed here.   • Integrated Oncology, Addendum 03/08/2017    Final                    Value:This result contains rich text formatting which cannot be displayed here.   • Final Diagnosis 03/08/2017    Final                    Value:This result contains rich text formatting which cannot be displayed here.   • Clinical Information 03/08/2017    Final                    Value:This result contains rich text formatting which cannot be displayed here.   • Comment 03/08/2017    Final                    Value:This result contains rich text formatting which cannot be displayed  here.   • CBC and Differential 03/08/2017    Final                    Value:This result contains rich text formatting which cannot be displayed here.   • Aspirate Smear 03/08/2017    Final                    Value:This result contains rich text formatting which cannot be displayed here.   • Core Biopsy  03/08/2017    Final                    Value:This result contains rich text formatting which cannot be displayed here.   • Clot Section 03/08/2017    Final                    Value:This result contains rich text formatting which cannot be displayed here.   • Flow Cytometry Summary 03/08/2017    Final                    Value:This result contains rich text formatting which cannot be displayed here.   • Case Report 03/08/2017    Final                    Value:Surgical Pathology Report                         Case: [id number]                                  Authorizing Provider:  Michel White MD         Collected:           03/08/2017 04:27 PM          Ordering Location:     [facility]   Received:            03/09/2017 08:32 AM                                 OR                                                                           Pathologist:           Cr Duarte MD                                                         Specimen:    Hemorrhoid(s), Hemorrhoids                                                                • Clinical Information 03/08/2017    Final                    Value:This result contains rich text formatting which cannot be displayed here.   • Final Diagnosis 03/08/2017    Final                    Value:This result contains rich text formatting which cannot be displayed here.   • Gross Description 03/08/2017    Final                    Value:This result contains rich text formatting which cannot be displayed here.   • Microscopic Description 03/08/2017    Final                    Value:This result contains rich text formatting which cannot be displayed here.   •  Glucose 03/09/2017 94  70 - 100 mg/dL Final   • BUN 03/09/2017 8* 9 - 23 mg/dL Final   • Creatinine 03/09/2017 0.80  0.60 - 1.30 mg/dL Final   • Sodium 03/09/2017 137  132 - 146 mmol/L Final   • Potassium 03/09/2017 4.4  3.5 - 5.5 mmol/L Final   • Chloride 03/09/2017 106  99 - 109 mmol/L Final   • CO2 03/09/2017 27.0  20.0 - 31.0 mmol/L Final   • Calcium 03/09/2017 9.4  8.7 - 10.4 mg/dL Final   • eGFR Non African Amer 03/09/2017 73  >60 mL/min/1.73 Final   • BUN/Creatinine Ratio 03/09/2017 10.0  7.0 - 25.0 Final   • Anion Gap 03/09/2017 4.0  3.0 - 11.0 mmol/L Final   • WBC 03/09/2017 11.96* 3.50 - 10.80 10*3/mm3 Final    Verified by repeat analysis.    • RBC 03/09/2017 3.08* 3.89 - 5.14 10*6/mm3 Final   • Hemoglobin 03/09/2017 9.3* 11.5 - 15.5 g/dL Final   • Hematocrit 03/09/2017 29.6* 34.5 - 44.0 % Final   • MCV 03/09/2017 96.1  80.0 - 99.0 fL Final   • MCH 03/09/2017 30.2  27.0 - 31.0 pg Final   • MCHC 03/09/2017 31.4* 32.0 - 36.0 g/dL Final   • RDW 03/09/2017 21.1* 11.3 - 14.5 % Final   • RDW-SD 03/09/2017 73.4* 37.0 - 54.0 fl Final   • MPV 03/09/2017 9.2  6.0 - 12.0 fL Final   • Platelets 03/09/2017 242  150 - 450 10*3/mm3 Final   • Neutrophil % 03/09/2017 85.5* 41.0 - 71.0 % Final   • Lymphocyte % 03/09/2017 7.9* 24.0 - 44.0 % Final   • Monocyte % 03/09/2017 5.8  0.0 - 12.0 % Final   • Eosinophil % 03/09/2017 0.2  0.0 - 3.0 % Final   • Basophil % 03/09/2017 0.3  0.0 - 1.0 % Final   • Immature Grans % 03/09/2017 0.3  0.0 - 0.6 % Final   • Neutrophils, Absolute 03/09/2017 10.24* 1.50 - 8.30 10*3/mm3 Final   • Lymphocytes, Absolute 03/09/2017 0.95  0.60 - 4.80 10*3/mm3 Final   • Monocytes, Absolute 03/09/2017 0.69  0.00 - 1.00 10*3/mm3 Final   • Eosinophils, Absolute 03/09/2017 0.02* 0.10 - 0.30 10*3/mm3 Final   • Basophils, Absolute 03/09/2017 0.03  0.00 - 0.20 10*3/mm3 Final   • Immature Grans, Absolute 03/09/2017 0.03  0.00 - 0.03 10*3/mm3 Final   • nRBC 03/09/2017 0.0  0.0 - 0.0 /100 WBC Final   Lab on  03/05/2017   Component Date Value Ref Range Status   • WBC 03/05/2017 7.22  3.50 - 10.80 10*3/mm3 Final   • RBC 03/05/2017 2.14* 3.89 - 5.14 10*6/mm3 Final   • Hemoglobin 03/05/2017 6.6* 11.5 - 15.5 g/dL Final   • Hematocrit 03/05/2017 21.5* 34.5 - 44.0 % Final   • MCV 03/05/2017 100.5* 80.0 - 99.0 fL Final   • MCH 03/05/2017 30.8  27.0 - 31.0 pg Final   • MCHC 03/05/2017 30.7* 32.0 - 36.0 g/dL Final   • RDW 03/05/2017 23.8* 11.3 - 14.5 % Final   • RDW-SD 03/05/2017 86.0* 37.0 - 54.0 fl Final   • MPV 03/05/2017 7.7  6.0 - 12.0 fL Final   • Platelets 03/05/2017 280  150 - 450 10*3/mm3 Final   • Neutrophil % 03/05/2017 77.7* 41.0 - 71.0 % Final   • Lymphocyte % 03/05/2017 12.3* 24.0 - 44.0 % Final   • Monocyte % 03/05/2017 8.0  0.0 - 12.0 % Final   • Eosinophil % 03/05/2017 1.1  0.0 - 3.0 % Final   • Basophil % 03/05/2017 0.6  0.0 - 1.0 % Final   • Immature Grans % 03/05/2017 0.3  0.0 - 0.6 % Final   • Neutrophils, Absolute 03/05/2017 5.61  1.50 - 8.30 10*3/mm3 Final   • Lymphocytes, Absolute 03/05/2017 0.89  0.60 - 4.80 10*3/mm3 Final   • Monocytes, Absolute 03/05/2017 0.58  0.00 - 1.00 10*3/mm3 Final   • Eosinophils, Absolute 03/05/2017 0.08* 0.10 - 0.30 10*3/mm3 Final   • Basophils, Absolute 03/05/2017 0.04  0.00 - 0.20 10*3/mm3 Final   • Immature Grans, Absolute 03/05/2017 0.02  0.00 - 0.03 10*3/mm3 Final   Lab Requisition on 02/27/2017   Component Date Value Ref Range Status   • WBC 02/27/2017 3.88  3.50 - 10.80 10*3/mm3 Final   • RBC 02/27/2017 2.73* 3.89 - 5.14 10*6/mm3 Final   • Hemoglobin 02/27/2017 8.1* 11.5 - 15.5 g/dL Final   • Hematocrit 02/27/2017 25.6* 34.5 - 44.0 % Final   • MCV 02/27/2017 93.8  80.0 - 99.0 fL Final   • MCH 02/27/2017 29.7  27.0 - 31.0 pg Final   • MCHC 02/27/2017 31.6* 32.0 - 36.0 g/dL Final   • RDW 02/27/2017 23.0* 11.3 - 14.5 % Final   • RDW-SD 02/27/2017 76.2* 37.0 - 54.0 fl Final   • MPV 02/27/2017 9.4  6.0 - 12.0 fL Final   • Platelets 02/27/2017 267  150 - 450 10*3/mm3 Final   •  Neutrophil % 02/27/2017 48.7  41.0 - 71.0 % Final   • Lymphocyte % 02/27/2017 32.7  24.0 - 44.0 % Final   • Monocyte % 02/27/2017 14.4* 0.0 - 12.0 % Final   • Eosinophil % 02/27/2017 3.4* 0.0 - 3.0 % Final   • Basophil % 02/27/2017 0.5  0.0 - 1.0 % Final   • Immature Grans % 02/27/2017 0.3  0.0 - 0.6 % Final   • Neutrophils, Absolute 02/27/2017 1.89  1.50 - 8.30 10*3/mm3 Final   • Lymphocytes, Absolute 02/27/2017 1.27  0.60 - 4.80 10*3/mm3 Final   • Monocytes, Absolute 02/27/2017 0.56  0.00 - 1.00 10*3/mm3 Final   • Eosinophils, Absolute 02/27/2017 0.13  0.10 - 0.30 10*3/mm3 Final   • Basophils, Absolute 02/27/2017 0.02  0.00 - 0.20 10*3/mm3 Final   • Immature Grans, Absolute 02/27/2017 0.01  0.00 - 0.03 10*3/mm3 Final   Lab on 02/22/2017   Component Date Value Ref Range Status   • WBC 02/22/2017 4.35  3.50 - 10.80 10*3/mm3 Final   • RBC 02/22/2017 3.50* 3.89 - 5.14 10*6/mm3 Final   • Hemoglobin 02/22/2017 10.1* 11.5 - 15.5 g/dL Final   • Hematocrit 02/22/2017 32.4* 34.5 - 44.0 % Final   • MCV 02/22/2017 92.6  80.0 - 99.0 fL Final   • MCH 02/22/2017 28.9  27.0 - 31.0 pg Final   • MCHC 02/22/2017 31.2* 32.0 - 36.0 g/dL Final   • RDW 02/22/2017 22.6* 11.3 - 14.5 % Final   • RDW-SD 02/22/2017 74.4* 37.0 - 54.0 fl Final   • MPV 02/22/2017 9.7  6.0 - 12.0 fL Final   • Platelets 02/22/2017 230  150 - 450 10*3/mm3 Final   • Neutrophil % 02/22/2017 62.0  41.0 - 71.0 % Final   • Lymphocyte % 02/22/2017 21.4* 24.0 - 44.0 % Final   • Monocyte % 02/22/2017 13.1* 0.0 - 12.0 % Final   • Eosinophil % 02/22/2017 2.8  0.0 - 3.0 % Final   • Basophil % 02/22/2017 0.5  0.0 - 1.0 % Final   • Immature Grans % 02/22/2017 0.2  0.0 - 0.6 % Final   • Neutrophils, Absolute 02/22/2017 2.70  1.50 - 8.30 10*3/mm3 Final   • Lymphocytes, Absolute 02/22/2017 0.93  0.60 - 4.80 10*3/mm3 Final   • Monocytes, Absolute 02/22/2017 0.57  0.00 - 1.00 10*3/mm3 Final   • Eosinophils, Absolute 02/22/2017 0.12  0.10 - 0.30 10*3/mm3 Final   • Basophils,  Absolute 02/22/2017 0.02  0.00 - 0.20 10*3/mm3 Final   • Immature Grans, Absolute 02/22/2017 0.01  0.00 - 0.03 10*3/mm3 Final       Assessment/Plan     1.  Macrocytic anemia  2. MGUS    Discussion: I do not think that this is anything more than a benign monoclonal gammopathy likely inflammatory in nature.  We'll continue to watch her and repeat her myeloma panel in 3 months.  In the meantime she is going to take iron even though she is macrocytic given the lack of iron in her marrow and given that she was having significant GI blood loss that hopefully he has been dealt with surgically now.  Her mother has a history of arteriovenous malformation bleeding.  It is possible that this is early myelodysplasia but with normal cytogenetics and in the face of a hemoglobin of 6.6, I suspect the erythroid dyssynchrony was reactive.  Blood counts will be ordered weekly if she can get as she needs and feels that need.  Otherwise we'll see her back in 3 months.       Errors in dictation may reflect use of voice recognition software and not all errors in transcription may have been detected prior to signing.    René Valencia MD    03/20/2017

## 2017-06-13 ENCOUNTER — LAB (OUTPATIENT)
Dept: LAB | Facility: HOSPITAL | Age: 62
End: 2017-06-13

## 2017-06-13 DIAGNOSIS — D47.2 MONOCLONAL GAMMOPATHY: ICD-10-CM

## 2017-06-13 DIAGNOSIS — D64.9 SYMPTOMATIC ANEMIA: ICD-10-CM

## 2017-06-13 LAB
ALBUMIN SERPL-MCNC: 4.9 G/DL (ref 3.2–4.8)
ALBUMIN/GLOB SERPL: 1.5 G/DL (ref 1.5–2.5)
ALP SERPL-CCNC: 84 U/L (ref 25–100)
ALT SERPL W P-5'-P-CCNC: 20 U/L (ref 7–40)
ANION GAP SERPL CALCULATED.3IONS-SCNC: 8 MMOL/L (ref 3–11)
AST SERPL-CCNC: 30 U/L (ref 0–33)
BILIRUB SERPL-MCNC: 0.8 MG/DL (ref 0.3–1.2)
BUN BLD-MCNC: 21 MG/DL (ref 9–23)
BUN/CREAT SERPL: 19.1 (ref 7–25)
CA-I SERPL ISE-MCNC: 1.31 MMOL/L (ref 1.12–1.32)
CALCIUM SPEC-SCNC: 10.3 MG/DL (ref 8.7–10.4)
CHLORIDE SERPL-SCNC: 104 MMOL/L (ref 99–109)
CO2 SERPL-SCNC: 26 MMOL/L (ref 20–31)
CREAT BLD-MCNC: 1.1 MG/DL (ref 0.6–1.3)
CRP SERPL-MCNC: <0.01 MG/DL (ref 0–1)
ERYTHROCYTE [DISTWIDTH] IN BLOOD BY AUTOMATED COUNT: 16.5 % (ref 11.3–14.5)
ERYTHROCYTE [SEDIMENTATION RATE] IN BLOOD: 8 MM/HR (ref 0–30)
GFR SERPL CREATININE-BSD FRML MDRD: 50 ML/MIN/1.73
GLOBULIN UR ELPH-MCNC: 3.2 GM/DL
GLUCOSE BLD-MCNC: 91 MG/DL (ref 70–100)
HCT VFR BLD AUTO: 44.6 % (ref 34.5–44)
HGB BLD-MCNC: 14.2 G/DL (ref 11.5–15.5)
LYMPHOCYTES # BLD AUTO: 1.6 10*3/MM3 (ref 0.6–4.8)
LYMPHOCYTES NFR BLD AUTO: 30 % (ref 24–44)
MCH RBC QN AUTO: 30.2 PG (ref 27–31)
MCHC RBC AUTO-ENTMCNC: 31.8 G/DL (ref 32–36)
MCV RBC AUTO: 94.8 FL (ref 80–99)
MONOCYTES # BLD AUTO: 0.2 10*3/MM3 (ref 0–1)
MONOCYTES NFR BLD AUTO: 3.1 % (ref 0–12)
NEUTROPHILS # BLD AUTO: 3.7 10*3/MM3 (ref 1.5–8.3)
NEUTROPHILS NFR BLD AUTO: 66.9 % (ref 41–71)
PLATELET # BLD AUTO: 259 10*3/MM3 (ref 150–450)
PMV BLD AUTO: 7.8 FL (ref 6–12)
POTASSIUM BLD-SCNC: 5.3 MMOL/L (ref 3.5–5.5)
PROT SERPL-MCNC: 8.1 G/DL (ref 5.7–8.2)
RBC # BLD AUTO: 4.7 10*6/MM3 (ref 3.89–5.14)
SODIUM BLD-SCNC: 138 MMOL/L (ref 132–146)
WBC NRBC COR # BLD: 5.5 10*3/MM3 (ref 3.5–10.8)

## 2017-06-13 PROCEDURE — 85025 COMPLETE CBC W/AUTO DIFF WBC: CPT

## 2017-06-13 PROCEDURE — 86334 IMMUNOFIX E-PHORESIS SERUM: CPT | Performed by: INTERNAL MEDICINE

## 2017-06-13 PROCEDURE — 84155 ASSAY OF PROTEIN SERUM: CPT | Performed by: INTERNAL MEDICINE

## 2017-06-13 PROCEDURE — 36415 COLL VENOUS BLD VENIPUNCTURE: CPT

## 2017-06-13 PROCEDURE — 83883 ASSAY NEPHELOMETRY NOT SPEC: CPT | Performed by: INTERNAL MEDICINE

## 2017-06-13 PROCEDURE — 82232 ASSAY OF BETA-2 PROTEIN: CPT | Performed by: INTERNAL MEDICINE

## 2017-06-13 PROCEDURE — 85652 RBC SED RATE AUTOMATED: CPT | Performed by: INTERNAL MEDICINE

## 2017-06-13 PROCEDURE — 80053 COMPREHEN METABOLIC PANEL: CPT | Performed by: INTERNAL MEDICINE

## 2017-06-13 PROCEDURE — 86140 C-REACTIVE PROTEIN: CPT | Performed by: INTERNAL MEDICINE

## 2017-06-13 PROCEDURE — 84165 PROTEIN E-PHORESIS SERUM: CPT | Performed by: INTERNAL MEDICINE

## 2017-06-13 PROCEDURE — 82330 ASSAY OF CALCIUM: CPT | Performed by: INTERNAL MEDICINE

## 2017-06-14 LAB
ALBUMIN SERPL-MCNC: 4.2 G/DL (ref 2.9–4.4)
ALBUMIN/GLOB SERPL: 1.3 {RATIO} (ref 0.7–1.7)
ALPHA1 GLOB FLD ELPH-MCNC: 0.2 G/DL (ref 0–0.4)
ALPHA2 GLOB SERPL ELPH-MCNC: 0.8 G/DL (ref 0.4–1)
B-GLOBULIN SERPL ELPH-MCNC: 1 G/DL (ref 0.7–1.3)
GAMMA GLOB SERPL ELPH-MCNC: 1.3 G/DL (ref 0.4–1.8)
GLOBULIN SER CALC-MCNC: 3.3 G/DL (ref 2.2–3.9)
IGA SERPL-MCNC: 131 MG/DL (ref 87–352)
IGG SERPL-MCNC: 1186 MG/DL (ref 700–1600)
IGM SERPL-MCNC: 68 MG/DL (ref 26–217)
INTERPRETATION SERPL IEP-IMP: ABNORMAL
Lab: ABNORMAL
M-SPIKE: 0.9 G/DL
PROT SERPL-MCNC: 7.5 G/DL (ref 6–8.5)

## 2017-06-15 LAB
B2 MICROGLOB SERPL-MCNC: 3.1 MG/L (ref 0.6–2.4)
KAPPA LC SERPL-MCNC: 25.7 MG/L (ref 3.3–19.4)
KAPPA LC/LAMBDA SER: 0.15 {RATIO} (ref 0.26–1.65)
LAMBDA LC FREE SERPL-MCNC: 173.4 MG/L (ref 5.7–26.3)

## 2017-06-20 ENCOUNTER — RESULTS ENCOUNTER (OUTPATIENT)
Dept: ONCOLOGY | Facility: CLINIC | Age: 62
End: 2017-06-20

## 2017-06-20 DIAGNOSIS — D47.2 MONOCLONAL GAMMOPATHY: ICD-10-CM

## 2017-06-20 DIAGNOSIS — D64.9 SYMPTOMATIC ANEMIA: ICD-10-CM

## 2017-06-26 ENCOUNTER — LAB (OUTPATIENT)
Dept: LAB | Facility: HOSPITAL | Age: 62
End: 2017-06-26

## 2017-06-26 DIAGNOSIS — D47.2 MONOCLONAL GAMMOPATHY: ICD-10-CM

## 2017-06-26 DIAGNOSIS — D64.9 SYMPTOMATIC ANEMIA: ICD-10-CM

## 2017-06-28 ENCOUNTER — OFFICE VISIT (OUTPATIENT)
Dept: ONCOLOGY | Facility: CLINIC | Age: 62
End: 2017-06-28

## 2017-06-28 VITALS
HEIGHT: 70 IN | HEART RATE: 61 BPM | TEMPERATURE: 97.4 F | RESPIRATION RATE: 14 BRPM | WEIGHT: 167 LBS | SYSTOLIC BLOOD PRESSURE: 149 MMHG | BODY MASS INDEX: 23.91 KG/M2 | DIASTOLIC BLOOD PRESSURE: 68 MMHG

## 2017-06-28 DIAGNOSIS — Z86.2 HISTORY OF ANEMIA: ICD-10-CM

## 2017-06-28 DIAGNOSIS — D47.2 MONOCLONAL GAMMOPATHY: Primary | ICD-10-CM

## 2017-06-28 PROCEDURE — 99213 OFFICE O/P EST LOW 20 MIN: CPT | Performed by: NURSE PRACTITIONER

## 2017-06-28 NOTE — PROGRESS NOTES
CHIEF COMPLAINT: Follow-up monoclonal gammopathy and anemia    Problem List:  1.)  Monoclonal gammopathy    a) March 2017 24-hour urine immunoelectrophoresis showed no monoclonal spike, lambda light chains 106 with kappa light chains 24 and a kappa to lambda ratio of 0.23 with normal IgA, G, and M levels.  January 2017 serum immunoelectrophoresis showed 600 mg/dL of immunoglobulin G lambda light chains in the serum.  3/8/17 bone marrow biopsy showed normal marrow with 3% plasma cells and a small lambda clonal population identified with no stainable iron.  There was erythroid expansion with mild maturation dyssynchrony and occasional atypical nuclear features that may represent the compensatory response to her anemia though myelodysplasia could not entirely be ruled out.  Had reactive aggregates of lymphoid tissue.     b) 6/13/2017 sedimentation rate 8, immunoglobulin free light chains free kappa light chains 25.7, free lambda light chains 173.4, kappa/lambda ratio 0.15.  Serum immunoelectrophoresis M-spike 0.9g/dL, C-reactive protein less than 0.01, CMP normal, ionized calcium 1.31, beta-2 microglobulin 3.1, CBC WBC 5500, hemoglobin 14.2, hematocrit 44.6%, MCV 94.8, platelets 259,000.  24-hour urine immunoelectrophoresis pending.  2.)  History of anemia, resolved after hemorrhoidectomy repair and on oral iron.  CBC on 6/13/2017 with a hemoglobin of 14.2, hematocrit 44.6%, MCV of 94.8.    HISTORY OF PRESENT ILLNESS:  The patient is a 61 y.o. female, here for follow up on management of recurrent anemia and monoclonal gammopathy.  Overall she is doing well and feeling much better.  She remains active and has no unusual dyspnea or shortness of breath no significant fatigue.  No fevers or chills or any recurrent infections.        Past Medical History:   Diagnosis Date   • Atrial fibrillation    • Hemorrhoids     bleeding hemorrhoids   • History of anemia    • Hypertension    • Hypothyroidism    • Osteoarthritis       Past Surgical History:   Procedure Laterality Date   • AUGMENTATION MAMMAPLASTY Bilateral 2008   • BONE MARROW BIOPSY Left 3/8/2017    Procedure: BONE MARROW BIOPSY;  Surgeon: Michel White MD;  Location:  [facility];  Service:    • COLONOSCOPY  01/09/2017   • COLONOSCOPY N/A 1/9/2017    Procedure: COLONOSCOPY;  Surgeon: Michel White MD;  Location:  [facility];  Service:    • ENDOSCOPY N/A 1/7/2017    Procedure: ESOPHAGOGASTRODUODENOSCOPY;  Surgeon: Abhishek Benton MD;  Location:  [facility];  Service:    • HEMORRHOIDECTOMY N/A 3/8/2017    Procedure: HEMORRHOIDECTOMY,;  Surgeon: Michel White MD;  Location:  [facility];  Service:    • HIP ARTHROSCOPY Right     2005 & 2012   • JOINT REPLACEMENT      2 right hips   • TONSILLECTOMY  1964       Allergies   Allergen Reactions   • Sulfa Antibiotics Anaphylaxis       Family History and Social History reviewed and changed as necessary      REVIEW OF SYSTEM:   Review of Systems   Constitutional: Negative for appetite change, chills, diaphoresis, fatigue, fever and unexpected weight change.   HENT:   Negative for mouth sores, sore throat and trouble swallowing.    Eyes: Negative for icterus.   Respiratory: Negative for cough, hemoptysis and shortness of breath.    Cardiovascular: Negative for chest pain, leg swelling and palpitations.   Gastrointestinal: Negative for abdominal distention, abdominal pain, blood in stool, constipation, diarrhea, nausea and vomiting.   Endocrine: Negative for hot flashes.   Genitourinary: Negative for bladder incontinence, difficulty urinating, dysuria, frequency and hematuria.    Musculoskeletal: Negative for gait problem, neck pain and neck stiffness.   Skin: Negative for rash.   Neurological: Negative for dizziness, gait problem, headaches, light-headedness and numbness.   Hematological: Negative for adenopathy. Does not bruise/bleed easily.   Psychiatric/Behavioral: Negative for depression. The patient is not  "nervous/anxious.    All other systems reviewed and are negative.       PHYSICAL EXAM    Vitals:    06/28/17 1329   BP: 149/68   Pulse: 61   Resp: 14   Temp: 97.4 °F (36.3 °C)   Weight: 167 lb (75.8 kg)   Height: 70\" (177.8 cm)     Constitutional: Appears well-developed and well-nourished. No distress.   ECOG: (0) Fully active, able to carry on all predisease performance without restriction  HENT:   Head: Normocephalic.   Mouth/Throat: Oropharynx is clear and moist.   Eyes: Conjunctivae are normal. Pupils are equal, round, and reactive to light. No scleral icterus.   Neck: Neck supple. No JVD present. No thyromegaly present.   Cardiovascular: Normal rate, regular rhythm and normal heart sounds.    Pulmonary/Chest: Breath sounds normal. No respiratory distress.   Abdominal: Soft. Exhibits no distension and no mass. There is no hepatosplenomegaly. There is no tenderness. There is no rebound and no guarding.   Musculoskeletal:Exhibits no edema, tenderness or deformity.   Neurological: Alert and oriented to person, place, and time. Exhibits normal muscle tone.   Skin: No ecchymosis, no petechiae and no rash noted. Not diaphoretic. No cyanosis. Nails show no clubbing.   Psychiatric: Normal mood and affect.   Vitals reviewed.      Lab on 06/13/2017   Component Date Value Ref Range Status   • Beta-2 06/13/2017 3.1* 0.6 - 2.4 mg/L Final   • Ionized Calcium 06/13/2017 1.31  1.12 - 1.32 mmol/L Final   • Glucose 06/13/2017 91  70 - 100 mg/dL Final   • BUN 06/13/2017 21  9 - 23 mg/dL Final   • Creatinine 06/13/2017 1.10  0.60 - 1.30 mg/dL Final   • Sodium 06/13/2017 138  132 - 146 mmol/L Final   • Potassium 06/13/2017 5.3  3.5 - 5.5 mmol/L Final   • Chloride 06/13/2017 104  99 - 109 mmol/L Final   • CO2 06/13/2017 26.0  20.0 - 31.0 mmol/L Final   • Calcium 06/13/2017 10.3  8.7 - 10.4 mg/dL Final   • Total Protein 06/13/2017 8.1  5.7 - 8.2 g/dL Final   • Albumin 06/13/2017 4.90* 3.20 - 4.80 g/dL Final   • ALT (SGPT) 06/13/2017 20 "  7 - 40 U/L Final   • AST (SGOT) 06/13/2017 30  0 - 33 U/L Final   • Alkaline Phosphatase 06/13/2017 84  25 - 100 U/L Final   • Total Bilirubin 06/13/2017 0.8  0.3 - 1.2 mg/dL Final   • eGFR Non African Amer 06/13/2017 50* >60 mL/min/1.73 Final   • Globulin 06/13/2017 3.2  gm/dL Final   • A/G Ratio 06/13/2017 1.5  1.5 - 2.5 g/dL Final   • BUN/Creatinine Ratio 06/13/2017 19.1  7.0 - 25.0 Final   • Anion Gap 06/13/2017 8.0  3.0 - 11.0 mmol/L Final   • C-Reactive Protein 06/13/2017 <0.01  0.00 - 1.00 mg/dL Final   • IgG 06/13/2017 1186  700 - 1600 mg/dL Final   • IgA 06/13/2017 131  87 - 352 mg/dL Final   • IgM 06/13/2017 68  26 - 217 mg/dL Final   • Total Protein 06/13/2017 7.5  6.0 - 8.5 g/dL Final   • Albumin 06/13/2017 4.2  2.9 - 4.4 g/dL Final   • Alpha-1-Globulin 06/13/2017 0.2  0.0 - 0.4 g/dL Final   • Alpha-2-Globulin 06/13/2017 0.8  0.4 - 1.0 g/dL Final   • Beta Globulin 06/13/2017 1.0  0.7 - 1.3 g/dL Final   • Gamma Globulin 06/13/2017 1.3  0.4 - 1.8 g/dL Final   • M-Merlin 06/13/2017 0.9* Not Observed g/dL Final   • Globulin 06/13/2017 3.3  2.2 - 3.9 g/dL Final   • A/G Ratio 06/13/2017 1.3  0.7 - 1.7 Final   • Immunofixation Reflex, Serum 06/13/2017 Comment   Final    Immunofixation shows IgG monoclonal protein with lambda light chain  specificity.   • Please note 06/13/2017 Comment   Final    Protein electrophoresis scan will follow via computer, mail, or   delivery.   • Free Light Chain, Kappa 06/13/2017 25.7* 3.3 - 19.4 mg/L Final                  **Please note reference interval change**   • Free Lambda Light Chains 06/13/2017 173.4* 5.7 - 26.3 mg/L Final                  **Please note reference interval change**   • Kappa/Lambda Ratio 06/13/2017 0.15* 0.26 - 1.65 Final   • Sed Rate 06/13/2017 8  0 - 30 mm/hr Final   • WBC 06/13/2017 5.50  3.50 - 10.80 10*3/mm3 Final   • RBC 06/13/2017 4.70  3.89 - 5.14 10*6/mm3 Final   • Hemoglobin 06/13/2017 14.2  11.5 - 15.5 g/dL Final   • Hematocrit 06/13/2017  44.6* 34.5 - 44.0 % Final   • RDW 06/13/2017 16.5* 11.3 - 14.5 % Final   • MCV 06/13/2017 94.8  80.0 - 99.0 fL Final   • MCH 06/13/2017 30.2  27.0 - 31.0 pg Final   • MCHC 06/13/2017 31.8* 32.0 - 36.0 g/dL Final   • MPV 06/13/2017 7.8  6.0 - 12.0 fL Final   • Platelets 06/13/2017 259  150 - 450 10*3/mm3 Final   • Neutrophil % 06/13/2017 66.9  41.0 - 71.0 % Final   • Lymphocyte % 06/13/2017 30.0  24.0 - 44.0 % Final   • Monocyte % 06/13/2017 3.1  0.0 - 12.0 % Final   • Neutrophils, Absolute 06/13/2017 3.70  1.50 - 8.30 10*3/mm3 Final   • Lymphocytes, Absolute 06/13/2017 1.60  0.60 - 4.80 10*3/mm3 Final   • Monocytes, Absolute 06/13/2017 0.20  0.00 - 1.00 10*3/mm3 Final       Assessment/Plan     1. Macrocytic anemia  2. MGUS    Discussion:Her anemia has resolved after her hemorrhoidectomy and on oral iron.  She is taking only 1 iron tablet daily along with vitamin C and will continue that.  She has had no further rectal bleeding.  Her M spike is 0.9 g/dL, urine immunoelectrophoresis is pending, overall stable.  Again felt to be likely inflammatory in nature but we will continue to monitor.  I will get a bone survey for completeness sake and as long as her labs are stable with not repeat this a more than annually and may go longer than that.  We will see her back in 6 months for follow-up with repeat myeloma panel prior to return.     Errors in dictation may reflect use of voice recognition software and not all errors in transcription may have been detected prior to signing.    Radha Martinez, APRN    03/20/2017

## 2017-08-30 ENCOUNTER — OFFICE VISIT (OUTPATIENT)
Dept: CARDIOLOGY | Facility: CLINIC | Age: 62
End: 2017-08-30

## 2017-08-30 VITALS
HEIGHT: 69 IN | BODY MASS INDEX: 23.82 KG/M2 | DIASTOLIC BLOOD PRESSURE: 64 MMHG | SYSTOLIC BLOOD PRESSURE: 122 MMHG | HEART RATE: 57 BPM | WEIGHT: 160.8 LBS

## 2017-08-30 DIAGNOSIS — I10 ESSENTIAL HYPERTENSION: ICD-10-CM

## 2017-08-30 DIAGNOSIS — I48.0 PAROXYSMAL ATRIAL FIBRILLATION (HCC): Primary | ICD-10-CM

## 2017-08-30 PROCEDURE — 93000 ELECTROCARDIOGRAM COMPLETE: CPT | Performed by: INTERNAL MEDICINE

## 2017-08-30 PROCEDURE — 99213 OFFICE O/P EST LOW 20 MIN: CPT | Performed by: INTERNAL MEDICINE

## 2017-08-30 NOTE — PROGRESS NOTES
Halie Perez  1955  [phone]      08/30/2017    [facility] CARDIOLOGY     Tori KENYONMarciano Kishor, APRN  [address] [place] [id number]    Chief Complaint   Patient presents with   • Atrial Fibrillation       Problem List:   1. Atrial Fibrillation  A. CHADSvasc =2 On Eliquis  B. Echocardiogram 11/18/2016: EF 60%, moderate MR  C. Event Monitor 11/18/16-12/17/16 showing episodes of PAF up to 110 bpm  D. Intolerant to Flecainide due to QRS widening  E. Started on Sotalol 1/4/16, now on Sotalol 80 mg daily  F. Lexiscan Cardiolite 1/18/17 : EF 60%, Abnormal myocardial perfusion scan showing medium sized area of moderate to severe hypoperfusion involving the mid anterior wall and the anterior apical segment with mild reversibility. This finding is consistent with breast attenuation artifact versus infarction with ischemia. Clinical correlation is recommended    2. HTN  3. Hypothyroidism  4. HLP  5. Surgical History  A. Tonsillectomy  B. Total hip replacement  C. Mammoplasy  6. Symptomatic Anemia with Hemoglobin 4.8   1/2017- normal colonscopy, EGD, and pill camera  Internal Hemorrhoids s/p hemorrhoidectomy    Allergies  Allergies   Allergen Reactions   • Sulfa Antibiotics Anaphylaxis       Current Medications    Current Outpatient Prescriptions:   •  apixaban (ELIQUIS) 5 MG tablet tablet, Take 1 tablet by mouth 2 (Two) Times a Day. Restart after 48h and after talking with Dr. Johansen, Disp: 30 tablet, Rfl: 1  •  ascorbic acid (VITAMIN C) 500 MG tablet, Take 1 tablet by mouth 2 (Two) Times a Day., Disp: , Rfl:   •  atorvastatin (LIPITOR) 40 MG tablet, Take 1 tablet by mouth Daily., Disp: 30 tablet, Rfl: 1  •  citalopram (CeleXA) 20 MG tablet, Take 20 mg by mouth Daily., Disp: , Rfl:   •  ferrous sulfate 325 (65 FE) MG tablet, Take 1 tablet by mouth 2 (Two) Times a Day With Meals., Disp: , Rfl:   •  levothyroxine (SYNTHROID, LEVOTHROID) 100 MCG tablet, Take 100 mcg by  "mouth Daily., Disp: , Rfl:   •  lisinopril (PRINIVIL,ZESTRIL) 40 MG tablet, Take 40 mg by mouth Daily., Disp: , Rfl:   •  Melatonin 10 MG tablet, Take 30 mg by mouth Every Night., Disp: , Rfl:   •  sotalol (BETAPACE) 80 MG tablet, Take 1 tablet by mouth Daily., Disp: 30 tablet, Rfl: 5    History of Present Illness   HPI    Pt presents for follow up of atrial fibrillation on Sotalol. Since we last saw the pt, pt denies any AF episodes, SOB, CP, LH, and dizziness. Denies any hospitalizations, ER visits, bleeding, or TIA/CVA symptoms. Overall feels well. She is doing well on her Eliquis. BP has been good.     ROS:  General:  Denies fatigue, weight gain or loss  Cardiovascular:  Denies CP, PND, syncope, near syncope, edema or palpitations.  Pulmonary:  Denies HOFFMAN, cough, or wheezing      Vitals:    08/30/17 1050   BP: 122/64   BP Location: Right arm   Patient Position: Sitting   Pulse: 57   Weight: 160 lb 12.8 oz (72.9 kg)   Height: 69\" (175.3 cm)     PE:  General: NAD  Neck: no JVD, no carotid bruits, no TM  Heart RRR, NL S1, S2, S4 present, no rubs, murmurs  Lungs: CTA, no wheezes, rhonchi, or rales  Abd: soft, non-tender, NL BS  Ext: No musculoskeletal deformities, no edema, cyanosis, or clubbing  Psych: normal mood and affect    Diagnostic Data:        ECG 12 Lead  Date/Time: 8/30/2017 11:07 AM  Performed by: KI SRINIVASAN  Authorized by: KI SRINIVASAN   Rhythm: sinus rhythm  BPM: 57  Conduction: incomplete RBBB            1. Paroxysmal atrial fibrillation    2. Essential hypertension          Plan:  1) AF- well controlled on Sotalol  Continue present medications.   2) Anticoagulation  Continue Eliquis  3) HTN- controlled  Wt loss, exercise, salt reduction    F/up in 8 months      Ki WRIGHT MD, personally performed the services described in this documentation as scribed by the above named individual in my presence, and it is both accurate and complete.  8/30/2017  11:06 AM    "

## 2017-09-29 ENCOUNTER — TRANSCRIBE ORDERS (OUTPATIENT)
Dept: ADMINISTRATIVE | Facility: HOSPITAL | Age: 62
End: 2017-09-29

## 2017-09-29 DIAGNOSIS — Z12.31 VISIT FOR SCREENING MAMMOGRAM: Primary | ICD-10-CM

## 2017-10-27 ENCOUNTER — HOSPITAL ENCOUNTER (OUTPATIENT)
Dept: MAMMOGRAPHY | Facility: HOSPITAL | Age: 62
Discharge: HOME | End: 2017-10-27
Admitting: NURSE PRACTITIONER

## 2017-10-27 DIAGNOSIS — Z12.31 VISIT FOR SCREENING MAMMOGRAM: ICD-10-CM

## 2017-10-27 PROCEDURE — 77063 BREAST TOMOSYNTHESIS BI: CPT

## 2017-10-27 PROCEDURE — G0202 SCR MAMMO BI INCL CAD: HCPCS

## 2017-10-31 PROCEDURE — 77063 BREAST TOMOSYNTHESIS BI: CPT | Performed by: RADIOLOGY

## 2017-10-31 PROCEDURE — 77067 SCR MAMMO BI INCL CAD: CPT | Performed by: RADIOLOGY

## 2017-11-29 ENCOUNTER — HOSPITAL ENCOUNTER (OUTPATIENT)
Dept: GENERAL RADIOLOGY | Facility: HOSPITAL | Age: 62
Discharge: HOME | End: 2017-11-29
Admitting: NURSE PRACTITIONER

## 2017-11-29 DIAGNOSIS — D47.2 MONOCLONAL GAMMOPATHY: ICD-10-CM

## 2017-11-29 PROCEDURE — 77075 RADEX OSSEOUS SURVEY COMPL: CPT

## 2017-12-18 ENCOUNTER — LAB (OUTPATIENT)
Dept: LAB | Facility: HOSPITAL | Age: 62
End: 2017-12-18

## 2017-12-18 DIAGNOSIS — D47.2 MONOCLONAL GAMMOPATHY: ICD-10-CM

## 2017-12-18 LAB
ALBUMIN SERPL-MCNC: 4.4 G/DL (ref 3.2–4.8)
ALBUMIN/GLOB SERPL: 1.5 G/DL (ref 1.5–2.5)
ALP SERPL-CCNC: 183 U/L (ref 25–100)
ALT SERPL W P-5'-P-CCNC: 14 U/L (ref 7–40)
ANION GAP SERPL CALCULATED.3IONS-SCNC: 5 MMOL/L (ref 3–11)
AST SERPL-CCNC: 27 U/L (ref 0–33)
BILIRUB SERPL-MCNC: 0.6 MG/DL (ref 0.3–1.2)
BUN BLD-MCNC: 12 MG/DL (ref 9–23)
BUN/CREAT SERPL: 17.1 (ref 7–25)
CA-I SERPL ISE-MCNC: 1.29 MMOL/L (ref 1.12–1.32)
CALCIUM SPEC-SCNC: 9.1 MG/DL (ref 8.7–10.4)
CHLORIDE SERPL-SCNC: 108 MMOL/L (ref 99–109)
CO2 SERPL-SCNC: 27 MMOL/L (ref 20–31)
CREAT BLD-MCNC: 0.7 MG/DL (ref 0.6–1.3)
CRP SERPL-MCNC: 0.05 MG/DL (ref 0–1)
ERYTHROCYTE [DISTWIDTH] IN BLOOD BY AUTOMATED COUNT: 13.1 % (ref 11.3–14.5)
ERYTHROCYTE [SEDIMENTATION RATE] IN BLOOD: 15 MM/HR (ref 0–30)
GFR SERPL CREATININE-BSD FRML MDRD: 85 ML/MIN/1.73
GLOBULIN UR ELPH-MCNC: 2.9 GM/DL
GLUCOSE BLD-MCNC: 80 MG/DL (ref 70–100)
HCT VFR BLD AUTO: 40.1 % (ref 34.5–44)
HGB BLD-MCNC: 12.8 G/DL (ref 11.5–15.5)
LYMPHOCYTES # BLD AUTO: 1.6 10*3/MM3 (ref 0.6–4.8)
LYMPHOCYTES NFR BLD AUTO: 28.7 % (ref 24–44)
MCH RBC QN AUTO: 31.4 PG (ref 27–31)
MCHC RBC AUTO-ENTMCNC: 32 G/DL (ref 32–36)
MCV RBC AUTO: 98.2 FL (ref 80–99)
MONOCYTES # BLD AUTO: 0.5 10*3/MM3 (ref 0–1)
MONOCYTES NFR BLD AUTO: 9 % (ref 0–12)
NEUTROPHILS # BLD AUTO: 3.4 10*3/MM3 (ref 1.5–8.3)
NEUTROPHILS NFR BLD AUTO: 62.3 % (ref 41–71)
PLATELET # BLD AUTO: 254 10*3/MM3 (ref 150–450)
PMV BLD AUTO: 7.6 FL (ref 6–12)
POTASSIUM BLD-SCNC: 4.5 MMOL/L (ref 3.5–5.5)
PROT SERPL-MCNC: 7.3 G/DL (ref 5.7–8.2)
RBC # BLD AUTO: 4.09 10*6/MM3 (ref 3.89–5.14)
SODIUM BLD-SCNC: 140 MMOL/L (ref 132–146)
WBC NRBC COR # BLD: 5.5 10*3/MM3 (ref 3.5–10.8)

## 2017-12-18 PROCEDURE — 82330 ASSAY OF CALCIUM: CPT

## 2017-12-18 PROCEDURE — 83883 ASSAY NEPHELOMETRY NOT SPEC: CPT

## 2017-12-18 PROCEDURE — 85025 COMPLETE CBC W/AUTO DIFF WBC: CPT

## 2017-12-18 PROCEDURE — 86334 IMMUNOFIX E-PHORESIS SERUM: CPT

## 2017-12-18 PROCEDURE — 36415 COLL VENOUS BLD VENIPUNCTURE: CPT

## 2017-12-18 PROCEDURE — 86140 C-REACTIVE PROTEIN: CPT

## 2017-12-18 PROCEDURE — 80053 COMPREHEN METABOLIC PANEL: CPT

## 2017-12-18 PROCEDURE — 82232 ASSAY OF BETA-2 PROTEIN: CPT

## 2017-12-18 PROCEDURE — 85652 RBC SED RATE AUTOMATED: CPT

## 2017-12-18 PROCEDURE — 84165 PROTEIN E-PHORESIS SERUM: CPT

## 2017-12-19 ENCOUNTER — OFFICE VISIT (OUTPATIENT)
Dept: ONCOLOGY | Facility: CLINIC | Age: 62
End: 2017-12-19

## 2017-12-19 VITALS
HEIGHT: 69 IN | DIASTOLIC BLOOD PRESSURE: 72 MMHG | TEMPERATURE: 97.8 F | SYSTOLIC BLOOD PRESSURE: 142 MMHG | HEART RATE: 48 BPM | BODY MASS INDEX: 24.57 KG/M2 | RESPIRATION RATE: 16 BRPM | WEIGHT: 165.9 LBS

## 2017-12-19 DIAGNOSIS — D47.2 MONOCLONAL GAMMOPATHY: Primary | ICD-10-CM

## 2017-12-19 LAB
ALBUMIN SERPL-MCNC: 4.3 G/DL (ref 2.9–4.4)
ALBUMIN/GLOB SERPL: 1.4 {RATIO} (ref 0.7–1.7)
ALPHA1 GLOB FLD ELPH-MCNC: 0.2 G/DL (ref 0–0.4)
ALPHA2 GLOB SERPL ELPH-MCNC: 0.8 G/DL (ref 0.4–1)
B-GLOBULIN SERPL ELPH-MCNC: 0.9 G/DL (ref 0.7–1.3)
B2 MICROGLOB SERPL-MCNC: 2.4 MG/L (ref 0.6–2.4)
GAMMA GLOB SERPL ELPH-MCNC: 1.2 G/DL (ref 0.4–1.8)
GLOBULIN SER CALC-MCNC: 3.1 G/DL (ref 2.2–3.9)
IGA SERPL-MCNC: 131 MG/DL (ref 87–352)
IGG SERPL-MCNC: 1241 MG/DL (ref 700–1600)
IGM SERPL-MCNC: 60 MG/DL (ref 26–217)
INTERPRETATION SERPL IEP-IMP: ABNORMAL
KAPPA LC SERPL-MCNC: 17.9 MG/L (ref 3.3–19.4)
KAPPA LC/LAMBDA SER: 0.12 {RATIO} (ref 0.26–1.65)
LAMBDA LC FREE SERPL-MCNC: 150.2 MG/L (ref 5.7–26.3)
Lab: ABNORMAL
M-SPIKE: 0.8 G/DL
PROT SERPL-MCNC: 7.4 G/DL (ref 6–8.5)

## 2017-12-19 PROCEDURE — 99212 OFFICE O/P EST SF 10 MIN: CPT | Performed by: NURSE PRACTITIONER

## 2017-12-19 NOTE — PROGRESS NOTES
CHIEF COMPLAINT: Follow-up for MGUS    Problem List:  Oncology/Hematology History    1.)  Monoclonal gammopathy    a) March 2017 24-hour urine immunoelectrophoresis showed no monoclonal spike, lambda light chains 106 with kappa light chains 24 and a kappa to lambda ratio of 0.23 with normal IgA, G, and M levels.  January 2017 serum immunoelectrophoresis showed 600 mg/dL of immunoglobulin G lambda light chains in the serum.  3/8/17 bone marrow biopsy showed normal marrow with 3% plasma cells and a small lambda clonal population identified with no stainable iron.  There was erythroid expansion with mild maturation dyssynchrony and occasional atypical nuclear features that may represent the compensatory response to her anemia though myelodysplasia could not entirely be ruled out.  Had reactive aggregates of lymphoid tissue.     b) 6/13/2017 sedimentation rate 8, immunoglobulin free light chains free kappa light chains 25.7, free lambda light chains 173.4, kappa/lambda ratio 0.15.  Serum immunoelectrophoresis M-spike 0.9g/dL, C-reactive protein less than 0.01, CMP normal, ionized calcium 1.31, beta-2 microglobulin 3.1, CBC WBC 5500, hemoglobin 14.2, hematocrit 44.6%, MCV 94.8, platelets 259,000.  24-hour urine immunoelectrophoresis pending.  2.)  History of anemia, resolved after hemorrhoidectomy repair and on oral iron.  CBC on 6/13/2017 with a hemoglobin of 14.2, hematocrit 44.6%, MCV of 94.8.            Monoclonal gammopathy     Initial Diagnosis     Monoclonal gammopathy            HISTORY OF PRESENT ILLNESS:  The patient is a 62 y.o. female, here for follow up on management of Monoclonal gammopathy of unknown significance.  Halie has been doing well since we saw her last with no new complaints.  She has been active, no unusual shortness of breath or cough.  No new aches or pains.  She is no longer anemic.      Past Medical History:   Diagnosis Date   • Atrial fibrillation    • Hemorrhoids     bleeding hemorrhoids    • History of anemia    • Hypertension    • Hypothyroidism    • Osteoarthritis      Past Surgical History:   Procedure Laterality Date   • AUGMENTATION MAMMAPLASTY Bilateral 2008   • BONE MARROW BIOPSY Left 3/8/2017    Procedure: BONE MARROW BIOPSY;  Surgeon: Michel White MD;  Location:  [facility];  Service:    • COLONOSCOPY  01/09/2017   • COLONOSCOPY N/A 1/9/2017    Procedure: COLONOSCOPY;  Surgeon: Michel White MD;  Location:  [facility];  Service:    • ENDOSCOPY N/A 1/7/2017    Procedure: ESOPHAGOGASTRODUODENOSCOPY;  Surgeon: Abhishek Benton MD;  Location:  [facility];  Service:    • HEMORRHOIDECTOMY N/A 3/8/2017    Procedure: HEMORRHOIDECTOMY,;  Surgeon: Michel White MD;  Location:  [facility];  Service:    • HIP ARTHROSCOPY Right     2005 & 2012   • JOINT REPLACEMENT      2 right hips   • TONSILLECTOMY  1964       Allergies   Allergen Reactions   • Sulfa Antibiotics Anaphylaxis       Family History and Social History reviewed and changed as necessary      REVIEW OF SYSTEM:   Review of Systems   Constitutional: Negative for appetite change, chills, diaphoresis, fatigue, fever and unexpected weight change.   HENT:   Negative for mouth sores, sore throat and trouble swallowing.    Eyes: Negative for icterus.   Respiratory: Negative for cough, hemoptysis and shortness of breath.    Cardiovascular: Negative for chest pain, leg swelling and palpitations.   Gastrointestinal: Negative for abdominal distention, abdominal pain, blood in stool, constipation, diarrhea, nausea and vomiting.   Endocrine: Negative for hot flashes.   Genitourinary: Negative for bladder incontinence, difficulty urinating, dysuria, frequency and hematuria.    Musculoskeletal: Negative for gait problem, neck pain and neck stiffness.   Skin: Negative for rash.   Neurological: Negative for dizziness, gait problem, headaches, light-headedness and numbness.   Hematological: Negative for adenopathy. Does not bruise/bleed easily.  "  Psychiatric/Behavioral: Negative for depression. The patient is not nervous/anxious.    All other systems reviewed and are negative.       PHYSICAL EXAM    Vitals:    12/19/17 1341   BP: 142/72   Pulse: (!) 48   Resp: 16   Temp: 97.8 °F (36.6 °C)   TempSrc: Temporal Artery    Weight: 75.3 kg (165 lb 14.4 oz)   Height: 175.3 cm (69.02\")     Constitutional: Appears well-developed and well-nourished. No distress.   ECOG: (0) Fully active, able to carry on all predisease performance without restriction  HENT:   Head: Normocephalic.   Mouth/Throat: Oropharynx is clear and moist.   Eyes: Conjunctivae are normal. Pupils are equal, round.   Neck: Neck supple.    Pulmonary/Chest: No respiratory distress.   Neurological: Alert and oriented to person, place, and time. Exhibits normal muscle tone.   Psychiatric: Normal mood and affect.   Vitals reviewed.      Lab on 12/18/2017   Component Date Value Ref Range Status   • Ionized Calcium 12/18/2017 1.29  1.12 - 1.32 mmol/L Final   • Glucose 12/18/2017 80  70 - 100 mg/dL Final   • BUN 12/18/2017 12  9 - 23 mg/dL Final   • Creatinine 12/18/2017 0.70  0.60 - 1.30 mg/dL Final   • Sodium 12/18/2017 140  132 - 146 mmol/L Final   • Potassium 12/18/2017 4.5  3.5 - 5.5 mmol/L Final   • Chloride 12/18/2017 108  99 - 109 mmol/L Final   • CO2 12/18/2017 27.0  20.0 - 31.0 mmol/L Final   • Calcium 12/18/2017 9.1  8.7 - 10.4 mg/dL Final   • Total Protein 12/18/2017 7.3  5.7 - 8.2 g/dL Final   • Albumin 12/18/2017 4.40  3.20 - 4.80 g/dL Final   • ALT (SGPT) 12/18/2017 14  7 - 40 U/L Final   • AST (SGOT) 12/18/2017 27  0 - 33 U/L Final   • Alkaline Phosphatase 12/18/2017 183* 25 - 100 U/L Final   • Total Bilirubin 12/18/2017 0.6  0.3 - 1.2 mg/dL Final   • eGFR Non African Amer 12/18/2017 85  >60 mL/min/1.73 Final   • Globulin 12/18/2017 2.9  gm/dL Final   • A/G Ratio 12/18/2017 1.5  1.5 - 2.5 g/dL Final   • BUN/Creatinine Ratio 12/18/2017 17.1  7.0 - 25.0 Final   • Anion Gap 12/18/2017 5.0  3.0 " - 11.0 mmol/L Final   • C-Reactive Protein 12/18/2017 0.05  0.00 - 1.00 mg/dL Final   • Sed Rate 12/18/2017 15  0 - 30 mm/hr Final   • WBC 12/18/2017 5.50  3.50 - 10.80 10*3/mm3 Final   • RBC 12/18/2017 4.09  3.89 - 5.14 10*6/mm3 Final   • Hemoglobin 12/18/2017 12.8  11.5 - 15.5 g/dL Final   • Hematocrit 12/18/2017 40.1  34.5 - 44.0 % Final   • RDW 12/18/2017 13.1  11.3 - 14.5 % Final   • MCV 12/18/2017 98.2  80.0 - 99.0 fL Final   • MCH 12/18/2017 31.4* 27.0 - 31.0 pg Final   • MCHC 12/18/2017 32.0  32.0 - 36.0 g/dL Final   • MPV 12/18/2017 7.6  6.0 - 12.0 fL Final   • Platelets 12/18/2017 254  150 - 450 10*3/mm3 Final   • Neutrophil % 12/18/2017 62.3  41.0 - 71.0 % Final   • Lymphocyte % 12/18/2017 28.7  24.0 - 44.0 % Final   • Monocyte % 12/18/2017 9.0  0.0 - 12.0 % Final   • Neutrophils, Absolute 12/18/2017 3.40  1.50 - 8.30 10*3/mm3 Final   • Lymphocytes, Absolute 12/18/2017 1.60  0.60 - 4.80 10*3/mm3 Final   • Monocytes, Absolute 12/18/2017 0.50  0.00 - 1.00 10*3/mm3 Final       Assessment/Plan     1.  MGUS: The patient's serum immunoelectrophoresis and immunofixation results are pending and I will call her with any abnormalities.  Bone survey is negative.  All available labs are unremarkable and normal, she has no anemia, no hypercalcemia, no renal dysfunction.  Her M-spike in June was 900 mg/dL.  We will see her back for follow-up in 6 months with repeat urine and serum testing.  Would not do bone survey anymore then annually.  If everything is stable in 6 months we could consider also going to annual serum testing.      Radha Martinez, APRN    12/19/2017

## 2018-04-03 ENCOUNTER — HOSPITAL ENCOUNTER (INPATIENT)
Facility: HOSPITAL | Age: 63
LOS: 2 days | Discharge: HOME | End: 2018-04-05
Attending: EMERGENCY MEDICINE | Admitting: INTERNAL MEDICINE

## 2018-04-03 ENCOUNTER — APPOINTMENT (OUTPATIENT)
Dept: GENERAL RADIOLOGY | Facility: HOSPITAL | Age: 63
End: 2018-04-03

## 2018-04-03 ENCOUNTER — APPOINTMENT (OUTPATIENT)
Dept: ULTRASOUND IMAGING | Facility: HOSPITAL | Age: 63
End: 2018-04-03

## 2018-04-03 ENCOUNTER — APPOINTMENT (OUTPATIENT)
Dept: MRI IMAGING | Facility: HOSPITAL | Age: 63
End: 2018-04-03
Attending: EMERGENCY MEDICINE

## 2018-04-03 DIAGNOSIS — T50.905A ADVERSE EFFECT OF DRUG, INITIAL ENCOUNTER: ICD-10-CM

## 2018-04-03 DIAGNOSIS — R27.0 ATAXIA: ICD-10-CM

## 2018-04-03 DIAGNOSIS — N17.9 ACUTE RENAL FAILURE, UNSPECIFIED ACUTE RENAL FAILURE TYPE (HCC): Primary | ICD-10-CM

## 2018-04-03 DIAGNOSIS — R48.8 AGRAPHIA: ICD-10-CM

## 2018-04-03 DIAGNOSIS — R41.82 ALTERED MENTAL STATUS, UNSPECIFIED ALTERED MENTAL STATUS TYPE: ICD-10-CM

## 2018-04-03 DIAGNOSIS — R47.1 DYSARTHRIA: ICD-10-CM

## 2018-04-03 PROBLEM — R41.0 CONFUSION: Status: ACTIVE | Noted: 2018-04-03

## 2018-04-03 LAB
ALBUMIN SERPL-MCNC: 4.3 G/DL (ref 3.2–4.8)
ALBUMIN/GLOB SERPL: 1.4 G/DL (ref 1.5–2.5)
ALP SERPL-CCNC: 99 U/L (ref 25–100)
ALT SERPL W P-5'-P-CCNC: 12 U/L (ref 7–40)
ANION GAP SERPL CALCULATED.3IONS-SCNC: 14 MMOL/L (ref 3–11)
ANION GAP SERPL CALCULATED.3IONS-SCNC: 15 MMOL/L (ref 3–11)
APTT PPP: 31.1 SECONDS (ref 24–31)
AST SERPL-CCNC: 18 U/L (ref 0–33)
BACTERIA UR QL AUTO: ABNORMAL /HPF
BASOPHILS # BLD AUTO: 0.03 10*3/MM3 (ref 0–0.2)
BASOPHILS NFR BLD AUTO: 0.4 % (ref 0–1)
BILIRUB SERPL-MCNC: 0.5 MG/DL (ref 0.3–1.2)
BILIRUB UR QL STRIP: NEGATIVE
BUN BLD-MCNC: 37 MG/DL (ref 9–23)
BUN BLD-MCNC: 40 MG/DL (ref 9–23)
BUN/CREAT SERPL: 7.9 (ref 7–25)
BUN/CREAT SERPL: 8.5 (ref 7–25)
CALCIUM SPEC-SCNC: 8.7 MG/DL (ref 8.7–10.4)
CALCIUM SPEC-SCNC: 8.9 MG/DL (ref 8.7–10.4)
CHLORIDE SERPL-SCNC: 101 MMOL/L (ref 99–109)
CHLORIDE SERPL-SCNC: 102 MMOL/L (ref 99–109)
CLARITY UR: CLEAR
CO2 SERPL-SCNC: 17 MMOL/L (ref 20–31)
CO2 SERPL-SCNC: 18 MMOL/L (ref 20–31)
COLOR UR: YELLOW
CREAT BLD-MCNC: 4.7 MG/DL (ref 0.6–1.3)
CREAT BLD-MCNC: 4.7 MG/DL (ref 0.6–1.3)
CREAT UR-MCNC: 34.5 MG/DL
CRP SERPL-MCNC: 1.34 MG/DL (ref 0–1)
DEPRECATED RDW RBC AUTO: 44.3 FL (ref 37–54)
EOSINOPHIL # BLD AUTO: 0.07 10*3/MM3 (ref 0–0.3)
EOSINOPHIL NFR BLD AUTO: 0.9 % (ref 0–3)
ERYTHROCYTE [DISTWIDTH] IN BLOOD BY AUTOMATED COUNT: 12.5 % (ref 11.3–14.5)
ERYTHROCYTE [SEDIMENTATION RATE] IN BLOOD: 19 MM/HR (ref 0–30)
GFR SERPL CREATININE-BSD FRML MDRD: 9 ML/MIN/1.73
GFR SERPL CREATININE-BSD FRML MDRD: 9 ML/MIN/1.73
GLOBULIN UR ELPH-MCNC: 3.1 GM/DL
GLUCOSE BLD-MCNC: 81 MG/DL (ref 70–100)
GLUCOSE BLD-MCNC: 85 MG/DL (ref 70–100)
GLUCOSE UR STRIP-MCNC: NEGATIVE MG/DL
HCT VFR BLD AUTO: 40 % (ref 34.5–44)
HGB BLD-MCNC: 13.4 G/DL (ref 11.5–15.5)
HGB UR QL STRIP.AUTO: ABNORMAL
HOLD SPECIMEN: NORMAL
HOLD SPECIMEN: NORMAL
HYALINE CASTS UR QL AUTO: ABNORMAL /LPF
IMM GRANULOCYTES # BLD: 0.01 10*3/MM3 (ref 0–0.03)
IMM GRANULOCYTES NFR BLD: 0.1 % (ref 0–0.6)
INR PPP: 1.01 (ref 0.91–1.09)
KETONES UR QL STRIP: NEGATIVE
LEUKOCYTE ESTERASE UR QL STRIP.AUTO: NEGATIVE
LYMPHOCYTES # BLD AUTO: 0.98 10*3/MM3 (ref 0.6–4.8)
LYMPHOCYTES NFR BLD AUTO: 12.7 % (ref 24–44)
MAGNESIUM SERPL-MCNC: 2 MG/DL (ref 1.3–2.7)
MCH RBC QN AUTO: 32.4 PG (ref 27–31)
MCHC RBC AUTO-ENTMCNC: 33.5 G/DL (ref 32–36)
MCV RBC AUTO: 96.6 FL (ref 80–99)
MONOCYTES # BLD AUTO: 1.03 10*3/MM3 (ref 0–1)
MONOCYTES NFR BLD AUTO: 13.4 % (ref 0–12)
NEUTROPHILS # BLD AUTO: 5.59 10*3/MM3 (ref 1.5–8.3)
NEUTROPHILS NFR BLD AUTO: 72.5 % (ref 41–71)
NITRITE UR QL STRIP: NEGATIVE
PH UR STRIP.AUTO: <=5 [PH] (ref 5–8)
PLATELET # BLD AUTO: 225 10*3/MM3 (ref 150–450)
PMV BLD AUTO: 10.1 FL (ref 6–12)
POTASSIUM BLD-SCNC: 5.4 MMOL/L (ref 3.5–5.5)
POTASSIUM BLD-SCNC: 5.5 MMOL/L (ref 3.5–5.5)
PROT SERPL-MCNC: 7.4 G/DL (ref 5.7–8.2)
PROT UR QL STRIP: NEGATIVE
PROT UR-MCNC: 4 MG/DL (ref 1–14)
PROTHROMBIN TIME: 10.6 SECONDS (ref 9.6–11.5)
RBC # BLD AUTO: 4.14 10*6/MM3 (ref 3.89–5.14)
RBC # UR: ABNORMAL /HPF
REF LAB TEST METHOD: ABNORMAL
SODIUM BLD-SCNC: 133 MMOL/L (ref 132–146)
SODIUM BLD-SCNC: 134 MMOL/L (ref 132–146)
SODIUM UR-SCNC: 51 MMOL/L (ref 30–90)
SP GR UR STRIP: 1.01 (ref 1–1.03)
SQUAMOUS #/AREA URNS HPF: ABNORMAL /HPF
T4 FREE SERPL-MCNC: 1.32 NG/DL (ref 0.89–1.76)
TROPONIN I SERPL-MCNC: 0.01 NG/ML
TSH SERPL DL<=0.05 MIU/L-ACNC: 2.71 MIU/ML (ref 0.35–5.35)
UROBILINOGEN UR QL STRIP: ABNORMAL
WBC NRBC COR # BLD: 7.71 10*3/MM3 (ref 3.5–10.8)
WBC UR QL AUTO: ABNORMAL /HPF
WHOLE BLOOD HOLD SPECIMEN: NORMAL
WHOLE BLOOD HOLD SPECIMEN: NORMAL

## 2018-04-03 PROCEDURE — 84439 ASSAY OF FREE THYROXINE: CPT | Performed by: EMERGENCY MEDICINE

## 2018-04-03 PROCEDURE — 81001 URINALYSIS AUTO W/SCOPE: CPT | Performed by: EMERGENCY MEDICINE

## 2018-04-03 PROCEDURE — 84443 ASSAY THYROID STIM HORMONE: CPT | Performed by: EMERGENCY MEDICINE

## 2018-04-03 PROCEDURE — 80048 BASIC METABOLIC PNL TOTAL CA: CPT | Performed by: EMERGENCY MEDICINE

## 2018-04-03 PROCEDURE — 85730 THROMBOPLASTIN TIME PARTIAL: CPT | Performed by: EMERGENCY MEDICINE

## 2018-04-03 PROCEDURE — 36415 COLL VENOUS BLD VENIPUNCTURE: CPT

## 2018-04-03 PROCEDURE — 71045 X-RAY EXAM CHEST 1 VIEW: CPT

## 2018-04-03 PROCEDURE — 85025 COMPLETE CBC W/AUTO DIFF WBC: CPT | Performed by: EMERGENCY MEDICINE

## 2018-04-03 PROCEDURE — 80053 COMPREHEN METABOLIC PANEL: CPT | Performed by: EMERGENCY MEDICINE

## 2018-04-03 PROCEDURE — 85610 PROTHROMBIN TIME: CPT | Performed by: EMERGENCY MEDICINE

## 2018-04-03 PROCEDURE — 88112 CYTOPATH CELL ENHANCE TECH: CPT | Performed by: INTERNAL MEDICINE

## 2018-04-03 PROCEDURE — 99255 IP/OBS CONSLTJ NEW/EST HI 80: CPT | Performed by: PSYCHIATRY & NEUROLOGY

## 2018-04-03 PROCEDURE — 86140 C-REACTIVE PROTEIN: CPT | Performed by: EMERGENCY MEDICINE

## 2018-04-03 PROCEDURE — 83735 ASSAY OF MAGNESIUM: CPT | Performed by: EMERGENCY MEDICINE

## 2018-04-03 PROCEDURE — 84300 ASSAY OF URINE SODIUM: CPT | Performed by: INTERNAL MEDICINE

## 2018-04-03 PROCEDURE — 82570 ASSAY OF URINE CREATININE: CPT | Performed by: INTERNAL MEDICINE

## 2018-04-03 PROCEDURE — 99285 EMERGENCY DEPT VISIT HI MDM: CPT

## 2018-04-03 PROCEDURE — 85652 RBC SED RATE AUTOMATED: CPT | Performed by: EMERGENCY MEDICINE

## 2018-04-03 PROCEDURE — 93005 ELECTROCARDIOGRAM TRACING: CPT | Performed by: EMERGENCY MEDICINE

## 2018-04-03 PROCEDURE — 84484 ASSAY OF TROPONIN QUANT: CPT | Performed by: EMERGENCY MEDICINE

## 2018-04-03 PROCEDURE — 76775 US EXAM ABDO BACK WALL LIM: CPT

## 2018-04-03 PROCEDURE — 70551 MRI BRAIN STEM W/O DYE: CPT

## 2018-04-03 PROCEDURE — 84156 ASSAY OF PROTEIN URINE: CPT | Performed by: INTERNAL MEDICINE

## 2018-04-03 PROCEDURE — 99223 1ST HOSP IP/OBS HIGH 75: CPT | Performed by: HOSPITALIST

## 2018-04-03 RX ORDER — PREDNISOLONE ACETATE 10 MG/ML
1 SUSPENSION/ DROPS OPHTHALMIC 4 TIMES DAILY
Status: DISCONTINUED | OUTPATIENT
Start: 2018-04-03 | End: 2018-04-05 | Stop reason: HOSPADM

## 2018-04-03 RX ORDER — SODIUM CHLORIDE 0.9 % (FLUSH) 0.9 %
1-10 SYRINGE (ML) INJECTION AS NEEDED
Status: DISCONTINUED | OUTPATIENT
Start: 2018-04-03 | End: 2018-04-05 | Stop reason: HOSPADM

## 2018-04-03 RX ORDER — LEVOTHYROXINE SODIUM 0.1 MG/1
100 TABLET ORAL DAILY
Status: DISCONTINUED | OUTPATIENT
Start: 2018-04-03 | End: 2018-04-05 | Stop reason: HOSPADM

## 2018-04-03 RX ORDER — PREDNISOLONE ACETATE 10 MG/ML
1 SUSPENSION/ DROPS OPHTHALMIC 4 TIMES DAILY
COMMUNITY
End: 2018-06-13

## 2018-04-03 RX ORDER — ASCORBIC ACID 500 MG
500 TABLET ORAL DAILY
COMMUNITY
End: 2019-09-25

## 2018-04-03 RX ORDER — SODIUM CHLORIDE 9 MG/ML
125 INJECTION, SOLUTION INTRAVENOUS CONTINUOUS
Status: DISCONTINUED | OUTPATIENT
Start: 2018-04-03 | End: 2018-04-03

## 2018-04-03 RX ORDER — SODIUM CHLORIDE 9 MG/ML
100 INJECTION, SOLUTION INTRAVENOUS CONTINUOUS
Status: DISCONTINUED | OUTPATIENT
Start: 2018-04-03 | End: 2018-04-03

## 2018-04-03 RX ORDER — FERROUS SULFATE 325(65) MG
325 TABLET ORAL
Status: DISCONTINUED | OUTPATIENT
Start: 2018-04-04 | End: 2018-04-05 | Stop reason: HOSPADM

## 2018-04-03 RX ORDER — SODIUM CHLORIDE 0.9 % (FLUSH) 0.9 %
10 SYRINGE (ML) INJECTION AS NEEDED
Status: DISCONTINUED | OUTPATIENT
Start: 2018-04-03 | End: 2018-04-05 | Stop reason: HOSPADM

## 2018-04-03 RX ORDER — CITALOPRAM 20 MG/1
20 TABLET ORAL DAILY
Status: DISCONTINUED | OUTPATIENT
Start: 2018-04-03 | End: 2018-04-05 | Stop reason: HOSPADM

## 2018-04-03 RX ORDER — PANTOPRAZOLE SODIUM 20 MG/1
20 TABLET, DELAYED RELEASE ORAL DAILY
COMMUNITY
End: 2022-02-25 | Stop reason: HOSPADM

## 2018-04-03 RX ORDER — VALACYCLOVIR HYDROCHLORIDE 500 MG/1
1000 TABLET, FILM COATED ORAL 3 TIMES DAILY
COMMUNITY
Start: 2018-03-28 | End: 2018-04-05 | Stop reason: HOSPADM

## 2018-04-03 RX ORDER — ATORVASTATIN CALCIUM 40 MG/1
40 TABLET, FILM COATED ORAL DAILY
Status: DISCONTINUED | OUTPATIENT
Start: 2018-04-03 | End: 2018-04-05 | Stop reason: HOSPADM

## 2018-04-03 RX ADMIN — SODIUM CHLORIDE 100 ML/HR: 9 INJECTION, SOLUTION INTRAVENOUS at 17:35

## 2018-04-03 RX ADMIN — PREDNISOLONE ACETATE 1 DROP: 10 SUSPENSION/ DROPS OPHTHALMIC at 19:53

## 2018-04-03 RX ADMIN — SODIUM BICARBONATE 100 ML/HR: 84 INJECTION, SOLUTION INTRAVENOUS at 19:52

## 2018-04-03 RX ADMIN — SODIUM CHLORIDE 1000 ML: 9 INJECTION, SOLUTION INTRAVENOUS at 12:30

## 2018-04-03 NOTE — CONSULTS
Neurology    Referring provider: Paul Carr  No referring provider defined for this encounter.    Reason for Consultation: Ataxia and difficulty with  typing    Chief complaint: Mild unsteadiness    History of present illness:  This 62-year-old woman is seems request of Dr. Carr.    She has had a corneal ulcer and has been taking Valtrex since last Thursday.    She was noted yesterday to write a medical note which was incomprehensible.    Her in-laws receives a textile her this morning and was also incomprehensible.    She's been dizzy and somewhat unsteady on her feet.    She's had some lightheadedness associated with this as well.    Patient is known to have atrial fibrillation and is on sotalol.    She is on Eliquis as well.    She denies headache or double vision.  She's had no focal numbness or weakness.    She has been aware of being unsteady on her feet.        Review of Systems: She is followed for atrial fibrillation and high blood pressure.    He has a monoclonal gammopathy which is being followed.        Home meds:     (Not in a hospital admission)    History  Past Medical History:   Diagnosis Date   • Atrial fibrillation    • Corneal abrasion    • Hemorrhoids     bleeding hemorrhoids   • History of anemia    • Hypertension    • Hypothyroidism    • Osteoarthritis    , Past Surgical History:   Procedure Laterality Date   • AUGMENTATION MAMMAPLASTY Bilateral 2008   • BONE MARROW BIOPSY Left 3/8/2017    Procedure: BONE MARROW BIOPSY;  Surgeon: Michel White MD;  Location:  [facility];  Service:    • COLONOSCOPY  01/09/2017   • COLONOSCOPY N/A 1/9/2017    Procedure: COLONOSCOPY;  Surgeon: Michel White MD;  Location:  [facility];  Service:    • ENDOSCOPY N/A 1/7/2017    Procedure: ESOPHAGOGASTRODUODENOSCOPY;  Surgeon: Abhishek Benton MD;  Location:  [facility];  Service:    • HEMORRHOIDECTOMY N/A 3/8/2017    Procedure: HEMORRHOIDECTOMY,;  Surgeon: Michel White MD;  Location:  [facility];   "Service:    • HIP ARTHROSCOPY Right     2005 & 2012   • JOINT REPLACEMENT      2 right hips   • TONSILLECTOMY  1964   , Family History   Problem Relation Age of Onset   • Atrial fibrillation Mother    • Diabetes Mother    • Atrial fibrillation Brother    • Abnormal EKG Brother    • Heart attack Father    • Stroke Father    • Diabetes Sister    • Breast cancer Neg Hx    • Ovarian cancer Neg Hx    , Social History   Substance Use Topics   • Smoking status: Former Smoker     Packs/day: 1.00     Years: 20.00     Types: Cigarettes   • Smokeless tobacco: Never Used      Comment: quit 35+ years ago.    • Alcohol use 1.2 oz/week     2 Shots of liquor per week      Comment: 2 drinks a day    and Allergies:  Sulfa antibiotics,    Vital Signs   Blood pressure 168/85, pulse 63, temperature 98.2 °F (36.8 °C), temperature source Oral, resp. rate 16, height 175.3 cm (69\"), weight 72.6 kg (160 lb), SpO2 97 %.  Body mass index is 23.63 kg/m².    Physical Exam:   General: Pleasant white female in no distress              Head: Atraumatic              Neck: No bruits              Resp: Normal breath sounds              Cor: Regular rhythm              Extr: No edema              Skin: Warm and dry               Neuro: Mentally alert and oriented.    She is a bit slow to answer questions.    Speech is articulate with no word finding problems she has no problem with comprehension.    Cranial nerves show benign fundi equal pupils full eye movements.  She has normal visual fields to confrontation.  Facial sensation is normal including double simultaneous stimulation.    Palate elevates normally tongue protrudes normally.    Coordination shows mild unsteadiness in gait with a tendency to fall to the right.  Romberg is negative.    Finger to nose and fine finger movements are normal bilaterally.    Reflexes are 1+ and equal bilaterally.    Motor testing shows symmetrical strength and tone in all muscle groups.    Sensory testing is " normal.        Results Review: Creatinine of 4.7 is noted.  BUN of 40 is also noted.        Labs:  Lab Results (last 72 hours)     Procedure Component Value Units Date/Time    Protime-INR [[id number]]  (Normal) Collected:  04/03/18 0858    Specimen:  Blood Updated:  04/03/18 1111     Protime 10.6 Seconds      INR 1.01    aPTT [[id number]]  (Abnormal) Collected:  04/03/18 0858    Specimen:  Blood Updated:  04/03/18 1111     PTT 31.1 (H) seconds     Narrative:       PTT = The equivalent PTT values for the therapeutic range of heparin levels at 0.3 to 0.5 U/ml are 55 to 70 seconds.    Basic Metabolic Panel [[id number]]  (Abnormal) Collected:  04/03/18 1033    Specimen:  Blood from Arm, Left Updated:  04/03/18 1104     Glucose 81 mg/dL      BUN 40 (H) mg/dL      Creatinine 4.70 (H) mg/dL      Sodium 134 mmol/L      Potassium 5.5 mmol/L      Chloride 101 mmol/L      CO2 18.0 (L) mmol/L      Calcium 8.7 mg/dL      eGFR Non African Amer 9 (L) mL/min/1.73      BUN/Creatinine Ratio 8.5     Anion Gap 15.0 (H) mmol/L     Narrative:       National Kidney Foundation Guidelines    Stage     Description        GFR  1         Normal or High     90+  2         Mild decrease      60-89  3         Moderate decrease  30-59  4         Severe decrease    15-29  5         Kidney failure     <15    C-reactive Protein [[id number]]  (Abnormal) Collected:  04/03/18 0922    Specimen:  Blood Updated:  04/03/18 1057     C-Reactive Protein 1.34 (H) mg/dL     Sedimentation Rate [[id number]]  (Normal) Collected:  04/03/18 0858    Specimen:  Blood Updated:  04/03/18 1055     Sed Rate 19 mm/hr     [place] Draw [[id number]] Collected:  04/03/18 0858    Specimen:  Blood Updated:  04/03/18 1022    Narrative:       The following orders were created for panel order [place] Draw.  Procedure                               Abnormality         Status                     ---------                               -----------         ------                     Light Blue  Top[[id number]]                                   Final result               Green Top (Gel)[[id number]]                                  Final result               Lavender Top[[id number]]                                     Final result               Gold Top - SST[[id number]]                                   Final result               Green Top (No Gel)[[id number]]                                                            Please view results for these tests on the individual orders.    Green Top (Gel) [[id number]] Collected:  04/03/18 0858    Specimen:  Blood Updated:  04/03/18 1001     Extra Tube Hold for add-ons.     Comment: Auto resulted.       Lavender Top [[id number]] Collected:  04/03/18 0858    Specimen:  Blood Updated:  04/03/18 1001     Extra Tube hold for add-on     Comment: Auto resulted       Light Blue Top [[id number]] Collected:  04/03/18 0858    Specimen:  Blood Updated:  04/03/18 1001     Extra Tube hold for add-on     Comment: Auto resulted       Gold Top - SST [[id number]] Collected:  04/03/18 0858    Specimen:  Blood Updated:  04/03/18 1001     Extra Tube Hold for add-ons.     Comment: Auto resulted.       Comprehensive Metabolic Panel [[id number]]  (Abnormal) Collected:  04/03/18 0858    Specimen:  Blood Updated:  04/03/18 0944     Glucose 85 mg/dL      BUN 37 (H) mg/dL      Creatinine 4.70 (H) mg/dL      Sodium 133 mmol/L      Potassium 5.4 mmol/L      Chloride 102 mmol/L      CO2 17.0 (L) mmol/L      Calcium 8.9 mg/dL      Total Protein 7.4 g/dL      Albumin 4.30 g/dL      ALT (SGPT) 12 U/L      AST (SGOT) 18 U/L      Alkaline Phosphatase 99 U/L      Total Bilirubin 0.5 mg/dL      eGFR Non African Amer 9 (L) mL/min/1.73      Globulin 3.1 gm/dL      A/G Ratio 1.4 (L) g/dL      BUN/Creatinine Ratio 7.9     Anion Gap 14.0 (H) mmol/L     Narrative:       National Kidney Foundation Guidelines    Stage     Description        GFR  1         Normal or High     90+  2         Mild decrease      60-89  3          Moderate decrease  30-59  4         Severe decrease    15-29  5         Kidney failure     <15    Magnesium [[id number]]  (Normal) Collected:  04/03/18 0858    Specimen:  Blood Updated:  04/03/18 0944     Magnesium 2.0 mg/dL     Troponin [[id number]]  (Normal) Collected:  04/03/18 0858    Specimen:  Blood Updated:  04/03/18 0944     Troponin I 0.006 ng/mL      Comment: Results may be falsely decreased if patient taking Biotin.       TSH [[id number]]  (Normal) Collected:  04/03/18 0858    Specimen:  Blood Updated:  04/03/18 0944     TSH 2.705 mIU/mL     T4, Free [[id number]]  (Normal) Collected:  04/03/18 0858    Specimen:  Blood Updated:  04/03/18 0944     Free T4 1.32 ng/dL     CBC & Differential [[id number]] Collected:  04/03/18 0858    Specimen:  Blood Updated:  04/03/18 0905    Narrative:       The following orders were created for panel order CBC & Differential.  Procedure                               Abnormality         Status                     ---------                               -----------         ------                     CBC Auto Differential[[id number]]        Abnormal            Final result                 Please view results for these tests on the individual orders.    CBC Auto Differential [[id number]]  (Abnormal) Collected:  04/03/18 0858    Specimen:  Blood Updated:  04/03/18 0905     WBC 7.71 10*3/mm3      RBC 4.14 10*6/mm3      Hemoglobin 13.4 g/dL      Hematocrit 40.0 %      MCV 96.6 fL      MCH 32.4 (H) pg      MCHC 33.5 g/dL      RDW 12.5 %      RDW-SD 44.3 fl      MPV 10.1 fL      Platelets 225 10*3/mm3      Neutrophil % 72.5 (H) %      Lymphocyte % 12.7 (L) %      Monocyte % 13.4 (H) %      Eosinophil % 0.9 %      Basophil % 0.4 %      Immature Grans % 0.1 %      Neutrophils, Absolute 5.59 10*3/mm3      Lymphocytes, Absolute 0.98 10*3/mm3      Monocytes, Absolute 1.03 (H) 10*3/mm3      Eosinophils, Absolute 0.07 10*3/mm3      Basophils, Absolute 0.03 10*3/mm3      Immature Grans,  Absolute 0.01 10*3/mm3           Rads:  Imaging Results (last 72 hours)     Procedure Component Value Units Date/Time    XR Chest 1 View [[id number]] Collected:  04/03/18 1055     Updated:  04/03/18 1055    Narrative:       EXAMINATION: XR CHEST 1 VW- 04/03/2018     INDICATION: Weak/Dizzy/AMS triage protocol      COMPARISON: 01/08/2017     FINDINGS: Heart is mildly enlarged. The vasculature appears normal.  Lungs appear well-expanded and clear.           Impression:       Borderline cardiomegaly. No evidence of active chest  disease.     D:  04/03/2018  E:  04/03/2018                  Assessment: Confusion and ataxia of questionable cause    Acute renal failure     Plan:    Given the patient's atrial fibrillation think that they are embolic stroke would be the first the tingling needing to be excluded.    The alternative is the head drug interaction between sotalol and the Treximet patient with the renal compromise.        Comment:   Hopefully this is not a stroke.    It is made to involve Dr. Johansen regarding the other alternative therapies to anticoagulation long-term.    Is not clear why her creatinine is increased all of a sudden.    I discussed the patients findings and my recommendations with patient, family and primary care team      Balbir Donato MD  04/03/18  11:21 AM

## 2018-04-03 NOTE — CONSULTS
NAL Consult Note    Halie Perez  1955  [id number]    Date of Admit:  4/3/2018    Date of Consult: 4/3/2018        Requesting Provider: No ref. 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  Cr 0.7 12/17.  She has afib on eliquis and sotalol.  She has hbp on lisinopril.  She IgG lambda MGUS.  Urine neg for bjp.  She started valtrex last week.  Not taking nsaid.  Noted to be confused this am went to er found to have bean.  kyung negative for obstruction.  hgb and platelets normal.  Cr 4.7 k 5.5.  rx iv ns.  ua pos blood. Renal consulted. Case discussed with citlalli pearl, and penny.    Past Medical History:   Diagnosis Date   • Atrial fibrillation    • Corneal abrasion    • Hemorrhoids     bleeding hemorrhoids   • History of anemia    • Hypertension    • Hypothyroidism    • Osteoarthritis        Past Surgical History:   Procedure Laterality Date   • AUGMENTATION MAMMAPLASTY Bilateral 2008   • BONE MARROW BIOPSY Left 3/8/2017    Procedure: BONE MARROW BIOPSY;  Surgeon: Michel White MD;  Location:  [facility];  Service:    • COLONOSCOPY  01/09/2017   • COLONOSCOPY N/A 1/9/2017    Procedure: COLONOSCOPY;  Surgeon: Michel White MD;  Location:  [facility];  Service:    • ENDOSCOPY N/A 1/7/2017    Procedure: ESOPHAGOGASTRODUODENOSCOPY;  Surgeon: Abhishek Benton MD;  Location:  [facility];  Service:    • HEMORRHOIDECTOMY N/A 3/8/2017    Procedure: HEMORRHOIDECTOMY,;  Surgeon: Michel White MD;  Location:  [facility];  Service:    • HIP ARTHROSCOPY Right     2005 & 2012   • JOINT REPLACEMENT      2 right hips   • TONSILLECTOMY  1964       Social History     Social History   • Marital status:      Occupational History   • ARNP      Social History Main Topics   • Smoking status: Former Smoker     Packs/day: 1.00     Years:  20.00     Types: Cigarettes   • Smokeless tobacco: Never Used      Comment: quit 35+ years ago.    • Alcohol use 1.2 oz/week     2 Shots of liquor per week      Comment: 2 drinks a day   • Drug use: No   • Sexual activity: Defer     Other Topics Concern   • Not on file     Social History Narrative    5-10 servings of caffeine per day.        family history includes Abnormal EKG in her brother; Atrial fibrillation in her brother and mother; Diabetes in her mother and sister; Heart attack in her father; Stroke in her father.    Allergies   Allergen Reactions   • Sulfa Antibiotics Anaphylaxis       Medication:    Current Facility-Administered Medications:   •  atorvastatin (LIPITOR) tablet 40 mg, 40 mg, Oral, Daily, Woo Holt MD  •  citalopram (CeleXA) tablet 20 mg, 20 mg, Oral, Daily, Woo Holt MD  •  [START ON 4/4/2018] ferrous sulfate tablet 325 mg, 325 mg, Oral, Daily With Breakfast, Woo Holt MD  •  levothyroxine (SYNTHROID, LEVOTHROID) tablet 100 mcg, 100 mcg, Oral, Daily, Woo Holt MD  •  prednisoLONE acetate (PRED FORTE) 1 % ophthalmic suspension 1 drop, 1 drop, Right Eye, 4x Daily, Woo Holt MD  •  sodium chloride 0.9 % bolus 500 mL, 500 mL, Intravenous, Once, Woo Holt MD  •  sodium chloride 0.9 % flush 1-10 mL, 1-10 mL, Intravenous, PRN, Woo Holt MD  •  sodium chloride 0.9 % flush 10 mL, 10 mL, Intravenous, PRN, Paul Carr MD  •  sodium chloride 0.9 % infusion, 125 mL/hr, Intravenous, Continuous, Paul Carr MD  •  sodium chloride 0.9 % infusion, 100 mL/hr, Intravenous, Continuous, Woo Holt MD, Last Rate: 100 mL/hr at 04/03/18 1735, 100 mL/hr at 04/03/18 1735    Prescriptions Prior to Admission   Medication Sig Dispense Refill Last Dose   • apixaban (ELIQUIS) 5 MG tablet tablet Take 1 tablet by mouth 2 (Two) Times a Day. Restart after 48h and after talking with Dr. Johansen 30 tablet 1 4/3/2018 at Unknown time   • ascorbic acid (VITAMIN C) 500 MG  tablet Take 500 mg by mouth Daily.   4/3/2018 at Unknown time   • atorvastatin (LIPITOR) 40 MG tablet Take 1 tablet by mouth Daily. 30 tablet 1 4/3/2018 at Unknown time   • citalopram (CeleXA) 20 MG tablet Take 20 mg by mouth Daily.   4/3/2018 at Unknown time   • ferrous sulfate 325 (65 FE) MG tablet Take 1 tablet by mouth 2 (Two) Times a Day With Meals. (Patient taking differently: Take 325 mg by mouth Daily With Breakfast.)   4/3/2018 at Unknown time   • levothyroxine (SYNTHROID, LEVOTHROID) 100 MCG tablet Take 100 mcg by mouth Daily.   4/3/2018 at Unknown time   • lisinopril (PRINIVIL,ZESTRIL) 40 MG tablet Take 40 mg by mouth Daily.   4/3/2018 at Unknown time   • pantoprazole (PROTONIX) 20 MG EC tablet Take 20 mg by mouth Daily.   4/3/2018 at Unknown time   • prednisoLONE acetate (PRED FORTE) 1 % ophthalmic suspension Administer 1 drop to the right eye 4 (Four) Times a Day. Pt Started on 4/1/18   4/3/2018 at Unknown time   • sotalol (BETAPACE) 80 MG tablet Take 1 tablet by mouth Daily. 30 tablet 5 4/3/2018 at Unknown time   • valACYclovir (VALTREX) 500 MG tablet Take 1,000 mg by mouth 3 (Three) Times a Day.   4/3/2018 at Unknown time       Review of Systems:    Constitutional-- No Fever, chills or sweats. No significant change in weight  Eye-- no diplopia, no conjunctivitis  ENT-- No new hearing or throat complaints.  No epistaxis or oral sores. No odynophagia or dysphagia. No headache, photophobia or neck stiffness.  CV-- No chest pain, palpitations, soa, or edema  Resp-- No SOB/cough/Hemoptysis  GI- No nausea, vomiting, or diarrhea.  No hematochezia, melena, or hematemesis.  -- No dysuria, hematuria, or flank pain. No lower tract obstructive symptoms  Skin-- No rash, warm and dry  Lymph- no painful or swollen lymph nodes in neck/axilla or groin.   Heme- No active bruising or bleeding; no Hx of DVT or PE.  MS-- no swelling or pain in the joints  Neuro-- No acute focal weakness or numbness in the arms or legs.   "No seizures. Acutely confused  Psych--No anxiety or depression  Endo--No cold or heat intolerance.  No polyuria, polydipsia, or polyphagia    Full review of systems reviewed and negative otherwise for acute complaints    Physical Exam:   Vital Signs   Blood pressure 135/81, pulse 62, temperature 98.5 °F (36.9 °C), temperature source Oral, resp. rate 20, height 175.3 cm (69\"), weight 75.3 kg (166 lb), SpO2 92 %.     GENERAL: Awake and alert, in no acute distress.   HEENT: Normocephalic, atraumatic.  PER.  No conjunctivitis. No icterus. Oropharynx clear without evidence of thrush or exudate. No evidence of peridontal disease.    NECK: Supple without nuchal rigidity. No mass.  LYMPH: No painful cervical, axillary or inguinal lymphadenopathy.  HEART: RRR; No murmur, rubs, gallops. No bruits in neck, abdomen, or groins, no edema  LUNGS: Normal respiratory effort. Nonlabored. No dullness.  No crepitus.  Clear to auscultation bilaterally without wheezing, rales, rhonchi.  ABDOMEN: Soft, nontender, nondistended. Positive bowel sounds. No rebound or guarding. NO mass or HSM.  JOINTS:  Full range of motion, no redness or tenderness.  EXT:  No cyanosis, clubbing or edema.  SKIN: Warm and dry without rash  NEURO: Oriented to PPT. No focal neurological deficits. Strength equal bilateral  PSYCHIATRIC: Normal insight and judgement. Cooperative with PE    Laboratory Data    Results from last 7 days  Lab Units 04/03/18  0858   HEMOGLOBIN g/dL 13.4   HEMATOCRIT % 40.0       Results from last 7 days  Lab Units 04/03/18  1033 04/03/18  0858   SODIUM mmol/L 134 133   POTASSIUM mmol/L 5.5 5.4   CHLORIDE mmol/L 101 102   CO2 mmol/L 18.0* 17.0*   BUN mg/dL 40* 37*   CREATININE mg/dL 4.70* 4.70*   CALCIUM mg/dL 8.7 8.9   MAGNESIUM mg/dL  --  2.0   ALBUMIN g/dL  --  4.30       Results from last 7 days  Lab Units 04/03/18  1033   GLUCOSE mg/dL 81       Results from last 7 days  Lab Units 04/03/18  1613   COLOR UA  Yellow   CLARITY UA  Clear "   PH, URINE  <=5.0   SPECIFIC GRAVITY, URINE  1.007   GLUCOSE UA  Negative   KETONES UA  Negative   BILIRUBIN UA  Negative   PROTEIN UA  Negative   BLOOD UA  Large (3+)*   LEUKOCYTES UA  Negative   NITRITE UA  Negative       Results from last 7 days  Lab Units 04/03/18  0858   ALK PHOS U/L 99   BILIRUBIN mg/dL 0.5   ALT (SGPT) U/L 12   AST (SGOT) U/L 18     Estimated Creatinine Clearance: 14.6 mL/min (by C-G formula based on SCr of 4.7 mg/dL (H)).    Radiology:      Renal Imaging:  FINDINGS: The right kidney measures in length from pole to pole 10.0 cm.  There is no solid cortical mass or renal cortical cysts seen within the  right kidney. The left kidney measures in length from pole to pole 11.3  cm. Small cyst identified on the left kidney measuring in length from  pole to pole 1.6 cm. The bladder is slightly distended with no gross  mass or lesion present.     IMPRESSION:  There is a small left renal cortical cyst. The right kidney  is unremarkable. Bladder is within normal limits.     D:  04/03/2018  E:  04/03/2018  I personally read  the radiographic studies    Impression:   bean most likely 2/2 valtrex crystalluria, will ask pathologist to review the urine.  There is quite a bit of blood in the urine so anticoagulant nephropathy is also possible.  The treatment for both is ivf.  Stop eliquis and valtrex. I will change ivf to nahco3. My review of the literature is that she may require dialysis.  The confusion is likely 2/2 the valtrex also, no evidence for ttp/hus  Hypertension hold lisinopril    PLAN: Thank you for asking us to see Halie Perez, I recommend the following:   alkalinize the urine, check urine for crystals, eval for need of dialysis (npo after mn)    Saji Coronado MD  4/3/2018  5:39 PM

## 2018-04-03 NOTE — CONSULTS
Cardiology Consult/H&P     Halie Heradu  1955  [phone]      04/03/18    DATE OF ADMISSION: 4/3/2018  [facility] EMERGENCY DEPARTMENT    Tori KENYONMarciano Kishor, APRN  [address] [facility] / [facility] [address]    Chief Complaint   Patient presents with   • Dizziness   • Altered Mental Status     Problem List:  1. Atrial Fibrillation   A. CHADSvasc =2 On Eliquis   B. Echocardiogram 11/18/2016: EF 60%, moderate MR   C. Event Monitor 11/18/16-12/17/16 showing episodes of PAF up to 110 bpm   D. Intolerant to Flecainide due to QRS widening   E. Started on Sotalol 1/4/16, now on Sotalol 80 mg daily   F. Lexiscan Cardiolite 1/18/17 : EF 60%, Abnormal myocardial perfusion scan showing medium sized area of moderate to severe hypoperfusion involving the mid anterior wall and the anterior apical segment with mild reversibility. This finding is consistent with breast attenuation artifact versus infarction with ischemia. Clinical correlation is recommended    2. HTN  3. Hypothyroidism  4. HLP  5. Surgical History   A. Tonsillectomy   B. Total hip replacement   C. Mammoplasy  6. Symptomatic Anemia with Hemoglobin 4.8   1/2017- normal colonscopy, EGD, and pill camera  Internal Hemorrhoids s/p hemorrhoidectomy      History of Present Illness:   Patient is a 62 year old  female with the above noted past medical history who presented to the [facility] emergency room with c/o mental status changes, difficulty walking, and slurred speech.  She was recently prescribed Valtrex for a corneal abrasion which she developed about 10 days ago.  She states her symptoms started after she began taking the Valtrex and have progressively worsened up until today.  She denied any confusion but states she sent text messages that did not make sense.  She states her balance has worsened and finds herself drifting while walking.  She was at work today when she was instructed to come to the emergency room  for further evaluation.  Upon arrival to the emergency room, she was noted to have a creatinine of 4.7 and nephrology was consulted.  Renal ultrasound is currently pending.  Neurology was also consulted and MRI of the brain is unremarkable.  Both her sotalol and valtrex has been held.      In terms of her atrial fibrillation, when she was last seen in the office in August, she had been doing well from an afib standpoint and had been maintaining NSR.  She denies any recurrence of atrial fibrillation.  She has been anticoagulated with Eliquis with no bleeding issues.  This has also been held in the setting of MICHAELLE.      Allergies   Allergen Reactions   • Sulfa Antibiotics Anaphylaxis       Prior to Admission Medications     Prescriptions Last Dose Informant Patient Reported? Taking?    apixaban (ELIQUIS) 5 MG tablet tablet 4/3/2018 Pharmacy No Yes    Take 1 tablet by mouth 2 (Two) Times a Day. Restart after 48h and after talking with Dr. Johansen    ascorbic acid (VITAMIN C) 500 MG tablet 4/3/2018 Pharmacy Yes Yes    Take 500 mg by mouth Daily.    atorvastatin (LIPITOR) 40 MG tablet 4/3/2018 Pharmacy No Yes    Take 1 tablet by mouth Daily.    citalopram (CeleXA) 20 MG tablet 4/3/2018 Pharmacy Yes Yes    Take 20 mg by mouth Daily.    ferrous sulfate 325 (65 FE) MG tablet 4/3/2018 Self No Yes    Take 1 tablet by mouth 2 (Two) Times a Day With Meals.    Patient taking differently:  Take 325 mg by mouth Daily With Breakfast.    levothyroxine (SYNTHROID, LEVOTHROID) 100 MCG tablet 4/3/2018 Pharmacy Yes Yes    Take 100 mcg by mouth Daily.    lisinopril (PRINIVIL,ZESTRIL) 40 MG tablet 4/3/2018 Pharmacy Yes Yes    Take 40 mg by mouth Daily.    pantoprazole (PROTONIX) 20 MG EC tablet 4/3/2018 Pharmacy Yes Yes    Take 20 mg by mouth Daily.    prednisoLONE acetate (PRED FORTE) 1 % ophthalmic suspension 4/3/2018 Self Yes Yes    Administer 1 drop to the right eye 4 (Four) Times a Day. Pt Started on 4/1/18    sotalol (BETAPACE) 80  MG tablet 4/3/2018 Pharmacy No Yes    Take 1 tablet by mouth Daily.    valACYclovir (VALTREX) 500 MG tablet 4/3/2018 Self Yes Yes    Take 1,000 mg by mouth 3 (Three) Times a Day.            Current Facility-Administered Medications:   •  sodium chloride 0.9 % flush 10 mL, 10 mL, Intravenous, PRN, Paul Carr MD  •  sodium chloride 0.9 % infusion, 125 mL/hr, Intravenous, Continuous, Paul Carr MD    Current Outpatient Prescriptions:   •  apixaban (ELIQUIS) 5 MG tablet tablet, Take 1 tablet by mouth 2 (Two) Times a Day. Restart after 48h and after talking with Dr. Johansen, Disp: 30 tablet, Rfl: 1  •  ascorbic acid (VITAMIN C) 500 MG tablet, Take 500 mg by mouth Daily., Disp: , Rfl:   •  atorvastatin (LIPITOR) 40 MG tablet, Take 1 tablet by mouth Daily., Disp: 30 tablet, Rfl: 1  •  citalopram (CeleXA) 20 MG tablet, Take 20 mg by mouth Daily., Disp: , Rfl:   •  ferrous sulfate 325 (65 FE) MG tablet, Take 1 tablet by mouth 2 (Two) Times a Day With Meals. (Patient taking differently: Take 325 mg by mouth Daily With Breakfast.), Disp: , Rfl:   •  levothyroxine (SYNTHROID, LEVOTHROID) 100 MCG tablet, Take 100 mcg by mouth Daily., Disp: , Rfl:   •  lisinopril (PRINIVIL,ZESTRIL) 40 MG tablet, Take 40 mg by mouth Daily., Disp: , Rfl:   •  pantoprazole (PROTONIX) 20 MG EC tablet, Take 20 mg by mouth Daily., Disp: , Rfl:   •  prednisoLONE acetate (PRED FORTE) 1 % ophthalmic suspension, Administer 1 drop to the right eye 4 (Four) Times a Day. Pt Started on 4/1/18, Disp: , Rfl:   •  sotalol (BETAPACE) 80 MG tablet, Take 1 tablet by mouth Daily., Disp: 30 tablet, Rfl: 5  •  valACYclovir (VALTREX) 500 MG tablet, Take 1,000 mg by mouth 3 (Three) Times a Day., Disp: , Rfl:     Social History     Social History   • Marital status:      Occupational History   • ARNP      Social History Main Topics   • Smoking status: Former Smoker     Packs/day: 1.00     Years: 20.00     Types: Cigarettes   • Smokeless tobacco: Never  "Used      Comment: quit 35+ years ago.    • Alcohol use 1.2 oz/week     2 Shots of liquor per week      Comment: 2 drinks a day   • Drug use: No   • Sexual activity: Defer     Other Topics Concern   • Not on file     Social History Narrative    5-10 servings of caffeine per day.        Family History   Problem Relation Age of Onset   • Atrial fibrillation Mother    • Diabetes Mother    • Atrial fibrillation Brother    • Abnormal EKG Brother    • Heart attack Father    • Stroke Father    • Diabetes Sister    • Breast cancer Neg Hx    • Ovarian cancer Neg Hx        REVIEW OF SYSTEMS:   CONST:  No weight loss, fever, chills, weakness or fatigue.   HEENT:  + blurred vision on right, no double vision, yellow sclerae.                   No hearing loss, congestion, sore throat.   SKIN:      No rashes, urticaria, ulcers, sores.     RESP:     No shortness of breath, hemoptysis, cough, sputum.   GI:           No anorexia, nausea, vomiting, diarrhea. No abdominal pain, melena.   :         No burning on urination, hematuria or increased frequency.  ENDO:    No diaphoresis, cold or heat intolerance. No polyuria or polydipsia.   NEURO:  + slurred speech (improved), no headache, dizziness, syncope, paralysis, + ataxia, no parasthesias.                  No change in bowel or bladder control. No history of CVA/TIA  MUSC:    No muscle, back pain, joint pain or stiffness.   HEME:    No anemia, bleeding, bruising. No history of DVT/PE.  PSYCH:  No history of depression, anxiety    Vitals:    04/03/18 0840 04/03/18 0847 04/03/18 1047 04/03/18 1200   BP:  147/63 168/85 128/71   Pulse:  62 63    Resp: 16  16    Temp: 98.2 °F (36.8 °C)      TempSrc: Oral      SpO2:  96% 97% 92%   Weight: 72.6 kg (160 lb)      Height: 175.3 cm (69\")            Vital Sign Min/Max for last 24 hours  Temp  Min: 98.2 °F (36.8 °C)  Max: 98.2 °F (36.8 °C)   BP  Min: 128/71  Max: 168/85   Pulse  Min: 62  Max: 63   Resp  Min: 16  Max: 16   SpO2  Min: 92 %  Max: " 97 %   No Data Recorded    No intake or output data in the 24 hours ending 04/03/18 1424          Physical Exam:  GEN: Well nourished, Well- developed  No acute distress  HEENT: Normocephalic, Atraumatic, PERRLA, moist mucous membranes  NECK: supple, NO JVD, no thyromegaly, no lymphadenopathy  CARD: S1S2, RRR, no murmur, gallop, rub  LUNGS: Clear to auscultation, normal respiratory effort  ABDOMEN: Soft, nontender, normal bowel sounds  EXTREMITIES:No gross deformities,  No clubbing, cyanosis, or edema  SKIN: Warm, dry  NEURO: Speech clear, no current difficulty with word finding or comprehension  PSYCHIATRIC: Normal affect and mood      Data:     Results from last 7 days  Lab Units 04/03/18  0858   WBC 10*3/mm3 7.71   HEMOGLOBIN g/dL 13.4   HEMATOCRIT % 40.0   PLATELETS 10*3/mm3 225       Results from last 7 days  Lab Units 04/03/18  1033 04/03/18  0858   SODIUM mmol/L 134 133   POTASSIUM mmol/L 5.5 5.4   CHLORIDE mmol/L 101 102   CO2 mmol/L 18.0* 17.0*   BUN mg/dL 40* 37*   CREATININE mg/dL 4.70* 4.70*   GLUCOSE mg/dL 81 85            Results from last 7 days  Lab Units 04/03/18  0858   TSH mIU/mL 2.705   FREE T4 ng/dL 1.32           Results from last 7 days  Lab Units 04/03/18  0858   PROTIME Seconds 10.6   INR  1.01   APTT seconds 31.1*       Results from last 7 days  Lab Units 04/03/18  0858   TROPONIN I ng/mL 0.006             No intake or output data in the 24 hours ending 04/03/18 1424    Chest X-Ray:  Imaging Results (last 24 hours)     Procedure Component Value Units Date/Time    MRI Brain Without Contrast [[id number]] Collected:  04/03/18 1359     Updated:  04/03/18 1359    Narrative:       EXAMINATION: MRI BRAIN WO CONTRAST- 04/03/2018     INDICATION: Stroke         TECHNIQUE: Sagittal and axial T1, axial T2 FLAIR diffusion weighted  images of the brain without contrast.     COMPARISON: NONE     FINDINGS: Patient history indicates altered mental status with slurred  speech and dizziness, difficulty  writing.     There is no evidence of restricted diffusion to suggest acute  infarction. Remaining imaging sequences show age-appropriate generalized  cerebral atrophy. There is no evidence of previous infarct, no evidence  of intracranial mass or mass effect, hemorrhage, hydrocephalus or  abnormal extra-axial collection. There is expected flow signal in the  major intracranial arteries. Sagittal images show the midline structures  to appear grossly normal. Anatomy of the craniocervical junction appears  normal. No gross abnormalities are seen of the orbits, paranasal sinuses  or mastoids.       Impression:       Brain appears within normal limits for age.     D:  04/03/2018  E:  04/03/2018            Renal Limited [[id number]] Updated:  04/03/18 1306    XR Chest 1 View [[id number]] Collected:  04/03/18 1055     Updated:  04/03/18 1055    Narrative:       EXAMINATION: XR CHEST 1 VW- 04/03/2018     INDICATION: Weak/Dizzy/AMS triage protocol      COMPARISON: 01/08/2017     FINDINGS: Heart is mildly enlarged. The vasculature appears normal.  Lungs appear well-expanded and clear.           Impression:       Borderline cardiomegaly. No evidence of active chest  disease.     D:  04/03/2018  E:  04/03/2018                Telemetry: NSR, HR 62-63 bpm  EKG: NSR, HR 65 bpm, QTc: 437 ms    Assessment and Plan:   1. Paroxysmal atrial fibrillation  - Previously managed on sotalol with maintenance of normal sinus rhythm, now presented with altered mental status and MICHAELLE  - Agree with holding sotalol and Eliquis for now (CHADSVasc = 2)    2. Altered mental status:   - Likely due to sotalol/valtrex adverse reaction with MICHAELLE  - Neurology following, MRI head unremarkable    3. MICHAELLE:  - Initial creatine 4.7 upon admission  - Nephrology consulted, renal ultrasound pending    Scribed for Pradip Johansen MD by Yudi Clifton, APRN. 4/3/2018  2:24 PM

## 2018-04-03 NOTE — ED PROVIDER NOTES
Subjective   Halie Perez is a 62 y.o.female with a history of atrial fibrillation (on sotalol) and  who presents to the ED with c/o an altered mental status. Five days ago the patient had a corneal abrasion, which caused blurry vision. She was evaluated by an ophthalmologist and started on Valtrex three days ago. Since that time her blurred vision has gradually improved.    For the past two days Ms. Perez has had increased difficulty writing, with frequent misspellings and word displacement. This is abnormal for her and she attributes it to her current blurred vision. Yesterday she was nausea and slightly dizzy throughout the day, although she did not vomit. After continued symptoms, and concern by her employer, she presents to the ED for evaluation. At the ED she denies headache, fever, neck pain, neck stiffness, abdominal pain, chest pain, cough, congestion, rhinorrhea or any other acute symptoms.    No history of reactions to valtrex.        History provided by:  Patient  Altered Mental Status   Presenting symptoms comment:  Difficulty with writing.  Most recent episode:  Yesterday  Episode history:  Single  Duration:  2 days  Timing:  Constant  Progression:  Unchanged  Chronicity:  New  Associated symptoms: nausea and visual change (blurred vision (recent corneal abrasion))    Associated symptoms: no abdominal pain, no fever, no headaches and no vomiting    Associated symptoms comment:  Dizziness      Review of Systems   Constitutional: Negative for chills and fever.   HENT: Negative for congestion.    Respiratory: Negative for cough and shortness of breath.    Cardiovascular: Negative for chest pain.   Gastrointestinal: Positive for nausea. Negative for abdominal pain, diarrhea and vomiting.   Neurological: Positive for dizziness. Negative for headaches.        Difficulty with writing   All other systems reviewed and are negative.      Past Medical History:   Diagnosis Date   • Atrial fibrillation     • Corneal abrasion    • Hemorrhoids     bleeding hemorrhoids   • History of anemia    • Hypertension    • Hypothyroidism    • Osteoarthritis        Allergies   Allergen Reactions   • Sulfa Antibiotics Anaphylaxis       Past Surgical History:   Procedure Laterality Date   • AUGMENTATION MAMMAPLASTY Bilateral 2008   • BONE MARROW BIOPSY Left 3/8/2017    Procedure: BONE MARROW BIOPSY;  Surgeon: Michel White MD;  Location:  [facility];  Service:    • COLONOSCOPY  01/09/2017   • COLONOSCOPY N/A 1/9/2017    Procedure: COLONOSCOPY;  Surgeon: Michel White MD;  Location:  [facility];  Service:    • ENDOSCOPY N/A 1/7/2017    Procedure: ESOPHAGOGASTRODUODENOSCOPY;  Surgeon: Abhishek Benton MD;  Location:  [facility];  Service:    • HEMORRHOIDECTOMY N/A 3/8/2017    Procedure: HEMORRHOIDECTOMY,;  Surgeon: Michel White MD;  Location:  [facility];  Service:    • HIP ARTHROSCOPY Right     2005 & 2012   • JOINT REPLACEMENT      2 right hips   • TONSILLECTOMY  1964       Family History   Problem Relation Age of Onset   • Atrial fibrillation Mother    • Diabetes Mother    • Atrial fibrillation Brother    • Abnormal EKG Brother    • Heart attack Father    • Stroke Father    • Diabetes Sister    • Breast cancer Neg Hx    • Ovarian cancer Neg Hx        Social History     Social History   • Marital status:      Occupational History   • ARNP      Social History Main Topics   • Smoking status: Former Smoker     Packs/day: 1.00     Years: 20.00     Types: Cigarettes   • Smokeless tobacco: Never Used      Comment: quit 35+ years ago.    • Alcohol use 1.2 oz/week     2 Shots of liquor per week      Comment: 2 drinks a day   • Drug use: No   • Sexual activity: Defer     Other Topics Concern   • Not on file     Social History Narrative    5-10 servings of caffeine per day.          Objective   Physical Exam   Constitutional: She is oriented to person, place, and time. She appears well-developed and well-nourished. No  distress.   HENT:   Head: Normocephalic and atraumatic.   Mouth/Throat: No oropharyngeal exudate.   Eyes: Conjunctivae are normal. Pupils are equal, round, and reactive to light. No scleral icterus.   A couple beats of left sided fast beat nystagmus.   Neck: Normal range of motion. Neck supple. No JVD present. No thyroid mass and no thyromegaly present.   No rigidity. No masses or lesions.   Cardiovascular: Normal rate, regular rhythm and normal heart sounds.  Exam reveals no gallop and no friction rub.    No murmur heard.  Pulmonary/Chest: Effort normal and breath sounds normal. No respiratory distress. She has no wheezes. She has no rales.   Abdominal: Soft. Bowel sounds are normal. She exhibits no distension. There is no tenderness. There is no rebound and no guarding.   Musculoskeletal: Normal range of motion. She exhibits no edema.   Lymphadenopathy:     She has no cervical adenopathy.   Neurological: She is alert and oriented to person, place, and time. She has normal strength and normal reflexes.   Babinski intact. Mild ataxia with tandem gait. Cranial nerves otherwise normal. No pronator drift. Speech is very minimally thick.   Skin: Skin is warm and dry. She is not diaphoretic.   Psychiatric: She has a normal mood and affect. Her behavior is normal.   Nursing note and vitals reviewed.      Procedures         ED Course  ED Course   Comment By Time   Dr. Carr spoke with Dr. Donato. The patient was discussed in detail. He will evaluate her in the ED. [facility] 100[facility]   Dr. Carr and Dr. Donato at bedside with the patient. [facility] 04/03 1046   Dr. Carr at bedside with the patient. [facility] 04[facility] 1211   Dr. Carr paged the hospitalist. [facility] 04/03 1214   Dr. Carr spoke with Dr. Holt, hospitalist, who will admit. [facility] 04/03 1218   On call nephrologist paged. [facility] 04[facility] 1220   I discussed the case with cardiology who will consult  Paul Carr MD 04/03 1222   I discussed findings with patient and spouse at length.  Patient is hydrated in the emergency department.  Cardiology consult is pending.  I paged nephrology as well for nephrology input.  Patient was waiting transfer to an inpatient monitored unit bed. Paul Carr MD 04/03 1221   Discussed case with Dr. Coronado who will see the patient for nephrology.  He has requested a renal ultrasound which is ordered. Paul Carr MD 04/03 5264     Recent Results (from the past 24 hour(s))   Comprehensive Metabolic Panel    Collection Time: 04/03/18  8:58 AM   Result Value Ref Range    Glucose 85 70 - 100 mg/dL    BUN 37 (H) 9 - 23 mg/dL    Creatinine 4.70 (H) 0.60 - 1.30 mg/dL    Sodium 133 132 - 146 mmol/L    Potassium 5.4 3.5 - 5.5 mmol/L    Chloride 102 99 - 109 mmol/L    CO2 17.0 (L) 20.0 - 31.0 mmol/L    Calcium 8.9 8.7 - 10.4 mg/dL    Total Protein 7.4 5.7 - 8.2 g/dL    Albumin 4.30 3.20 - 4.80 g/dL    ALT (SGPT) 12 7 - 40 U/L    AST (SGOT) 18 0 - 33 U/L    Alkaline Phosphatase 99 25 - 100 U/L    Total Bilirubin 0.5 0.3 - 1.2 mg/dL    eGFR Non African Amer 9 (L) >60 mL/min/1.73    Globulin 3.1 gm/dL    A/G Ratio 1.4 (L) 1.5 - 2.5 g/dL    BUN/Creatinine Ratio 7.9 7.0 - 25.0    Anion Gap 14.0 (H) 3.0 - 11.0 mmol/L   Magnesium    Collection Time: 04/03/18  8:58 AM   Result Value Ref Range    Magnesium 2.0 1.3 - 2.7 mg/dL   Light Blue Top    Collection Time: 04/03/18  8:58 AM   Result Value Ref Range    Extra Tube hold for add-on    Green Top (Gel)    Collection Time: 04/03/18  8:58 AM   Result Value Ref Range    Extra Tube Hold for add-ons.    Lavender Top    Collection Time: 04/03/18  8:58 AM   Result Value Ref Range    Extra Tube hold for add-on    Gold Top - SST    Collection Time: 04/03/18  8:58 AM   Result Value Ref Range    Extra Tube Hold for add-ons.    CBC Auto Differential    Collection Time: 04/03/18  8:58 AM   Result Value Ref Range    WBC 7.71 3.50 - 10.80 10*3/mm3    RBC  4.14 3.89 - 5.14 10*6/mm3    Hemoglobin 13.4 11.5 - 15.5 g/dL    Hematocrit 40.0 34.5 - 44.0 %    MCV 96.6 80.0 - 99.0 fL    MCH 32.4 (H) 27.0 - 31.0 pg    MCHC 33.5 32.0 - 36.0 g/dL    RDW 12.5 11.3 - 14.5 %    RDW-SD 44.3 37.0 - 54.0 fl    MPV 10.1 6.0 - 12.0 fL    Platelets 225 150 - 450 10*3/mm3    Neutrophil % 72.5 (H) 41.0 - 71.0 %    Lymphocyte % 12.7 (L) 24.0 - 44.0 %    Monocyte % 13.4 (H) 0.0 - 12.0 %    Eosinophil % 0.9 0.0 - 3.0 %    Basophil % 0.4 0.0 - 1.0 %    Immature Grans % 0.1 0.0 - 0.6 %    Neutrophils, Absolute 5.59 1.50 - 8.30 10*3/mm3    Lymphocytes, Absolute 0.98 0.60 - 4.80 10*3/mm3    Monocytes, Absolute 1.03 (H) 0.00 - 1.00 10*3/mm3    Eosinophils, Absolute 0.07 0.00 - 0.30 10*3/mm3    Basophils, Absolute 0.03 0.00 - 0.20 10*3/mm3    Immature Grans, Absolute 0.01 0.00 - 0.03 10*3/mm3   Troponin    Collection Time: 04/03/18  8:58 AM   Result Value Ref Range    Troponin I 0.006 <=0.039 ng/mL   Sedimentation Rate    Collection Time: 04/03/18  8:58 AM   Result Value Ref Range    Sed Rate 19 0 - 30 mm/hr   TSH    Collection Time: 04/03/18  8:58 AM   Result Value Ref Range    TSH 2.705 0.350 - 5.350 mIU/mL   T4, Free    Collection Time: 04/03/18  8:58 AM   Result Value Ref Range    Free T4 1.32 0.89 - 1.76 ng/dL   Protime-INR    Collection Time: 04/03/18  8:58 AM   Result Value Ref Range    Protime 10.6 9.6 - 11.5 Seconds    INR 1.01 0.91 - 1.09   aPTT    Collection Time: 04/03/18  8:58 AM   Result Value Ref Range    PTT 31.1 (H) 24.0 - 31.0 seconds   C-reactive Protein    Collection Time: 04/03/18  9:22 AM   Result Value Ref Range    C-Reactive Protein 1.34 (H) 0.00 - 1.00 mg/dL   Basic Metabolic Panel    Collection Time: 04/03/18 10:33 AM   Result Value Ref Range    Glucose 81 70 - 100 mg/dL    BUN 40 (H) 9 - 23 mg/dL    Creatinine 4.70 (H) 0.60 - 1.30 mg/dL    Sodium 134 132 - 146 mmol/L    Potassium 5.5 3.5 - 5.5 mmol/L    Chloride 101 99 - 109 mmol/L    CO2 18.0 (L) 20.0 - 31.0 mmol/L     "Calcium 8.7 8.7 - 10.4 mg/dL    eGFR Non African Amer 9 (L) >60 mL/min/1.73    BUN/Creatinine Ratio 8.5 7.0 - 25.0    Anion Gap 15.0 (H) 3.0 - 11.0 mmol/L     Note: In addition to lab results from this visit, the labs listed above may include labs taken at another facility or during a different encounter within the last 24 hours. Please correlate lab times with ED admission and discharge times for further clarification of the services performed during this visit.    XR Chest 1 View   Preliminary Result   Borderline cardiomegaly. No evidence of active chest   disease.       D:  04/03/2018   E:  04/03/2018              MRI Brain Without Contrast    (Results Pending)   US Renal Limited    (Results Pending)     Vitals:    04/03/18 0840 04/03/18 0847 04/03/18 1047 04/03/18 1200   BP:  147/63 168/85 128/71   Pulse:  62 63    Resp: 16  16    Temp: 98.2 °F (36.8 °C)      TempSrc: Oral      SpO2:  96% 97% 92%   Weight: 72.6 kg (160 lb)      Height: 175.3 cm (69\")        Medications   sodium chloride 0.9 % flush 10 mL (not administered)   sodium chloride 0.9 % bolus 1,000 mL (1,000 mL Intravenous New Bag 4/3/18 1230)   sodium chloride 0.9 % infusion (not administered)     ECG/EMG Results (last 24 hours)     Procedure Component Value Units Date/Time    ECG 12 Lead [[id number]] Collected:  04/03/18 0844     Updated:  04/03/18 0846                    MDM    Final diagnoses:   Acute renal failure, unspecified acute renal failure type   Altered mental status, unspecified altered mental status type   Ataxia   Agraphia   Dysarthria   Adverse effect of drug, initial encounter       Documentation assistance provided by lucas Gonzalez.  Information recorded by the lucas was done at my direction and has been verified and validated by me.     Talon Gonzalez  04/03/18 0931       Talon Gonzalez  04/03/18 1223       Paul Carr MD  04/03/18 1229       Paul Carr MD  04/03/18 1230       Paul Carr, " MD  04/03/18 5142

## 2018-04-03 NOTE — H&P
"    Psychiatric Medicine Services  HISTORY AND PHYSICAL    Patient Name: Halie Perez  : 1955  MRN: [id number]  Primary Care Physician: ARMANDO Edwards    Subjective   Subjective     Chief Complaint:  Ataxia/mental status changes    HPI:  Halie Perez is a 62 y.o. female with history of PAF on Eliquis, on Sotalol, with history of anemia, here with mental status changes. She described around 10 days ago she developed a corneal abrasion. She saw her eye specialist and was prescribed gatifloxacin drops with no improvement. Upon a follow up visit she was prescribed an anti-viral eye drop that was prohibitively expensive and was alternatively prescribed valtrex. Since being on the medication she has had nausea with decreased intake. Over the past 2 days she has had difficulty walking and been ataxic with drifting to the left while walking. Today she had a sensation of \"going under anesthesia\".  Per the ED she has had some slurred speech and somewhat slower cognition.    Review of Systems   Constitutional: Positive for activity change, appetite change and fatigue.   HENT: Negative.    Eyes: Positive for pain, redness and visual disturbance.   Respiratory: Negative.    Cardiovascular: Negative.    Gastrointestinal: Positive for nausea.   Genitourinary: Positive for decreased urine volume. Negative for hematuria.   Musculoskeletal: Positive for gait problem. Negative for arthralgias.   Skin: Negative.    Neurological: Positive for dizziness, speech difficulty and light-headedness.   Hematological: Negative.    Psychiatric/Behavioral: Positive for decreased concentration.          Otherwise 10-system ROS reviewed and is negative except as mentioned in the HPI.    Personal History     Past Medical History:   Diagnosis Date   • Atrial fibrillation    • Corneal abrasion    • Hemorrhoids     bleeding hemorrhoids   • History of anemia    • Hypertension    • Hypothyroidism    • " Osteoarthritis        Past Surgical History:   Procedure Laterality Date   • AUGMENTATION MAMMAPLASTY Bilateral 2008   • BONE MARROW BIOPSY Left 3/8/2017    Procedure: BONE MARROW BIOPSY;  Surgeon: Michel White MD;  Location:  [facility];  Service:    • COLONOSCOPY  01/09/2017   • COLONOSCOPY N/A 1/9/2017    Procedure: COLONOSCOPY;  Surgeon: Michel White MD;  Location:  [facility];  Service:    • ENDOSCOPY N/A 1/7/2017    Procedure: ESOPHAGOGASTRODUODENOSCOPY;  Surgeon: Abhishek Benton MD;  Location:  [facility];  Service:    • HEMORRHOIDECTOMY N/A 3/8/2017    Procedure: HEMORRHOIDECTOMY,;  Surgeon: Michel White MD;  Location:  [facility];  Service:    • HIP ARTHROSCOPY Right     2005 & 2012   • JOINT REPLACEMENT      2 right hips   • TONSILLECTOMY  1964       Family History: family history includes Abnormal EKG in her brother; Atrial fibrillation in her brother and mother; Diabetes in her mother and sister; Heart attack in her father; Stroke in her father.     Social History:  reports that she has quit smoking. Her smoking use included Cigarettes. She has a 20.00 pack-year smoking history. She has never used smokeless tobacco. She reports that she drinks about 1.2 oz of alcohol per week . She reports that she does not use drugs.  Social History     Social History Narrative    5-10 servings of caffeine per day.        Medications:  Reviewed and reconciled    (Not in a hospital admission)    Allergies   Allergen Reactions   • Sulfa Antibiotics Anaphylaxis       Objective   Objective     Vital Signs:   Temp:  [98.2 °F (36.8 °C)] 98.2 °F (36.8 °C)  Heart Rate:  [62-63] 63  Resp:  [16] 16  BP: (128-168)/(63-85) 128/71   Total (NIH Stroke Scale): 0    Physical Exam   NAD, alert and oriented, but speech somewhat slurred and ?word finding difficulty  Face symmetric. Strength intact. No sensory deficits.  Gait not tested  Normal affect  OP clear, PERRL  Neck supple  RRR  CTAB  +BS  CRISTOBAL  No c/c/e  No  mi      Results Reviewed:  I have personally reviewed current lab, radiology, and data and agree.      Results from last 7 days  Lab Units 04/03/18  0858   WBC 10*3/mm3 7.71   HEMOGLOBIN g/dL 13.4   HEMATOCRIT % 40.0   PLATELETS 10*3/mm3 225   INR  1.01       Results from last 7 days  Lab Units 04/03/18  1033 04/03/18  0858   SODIUM mmol/L 134 133   POTASSIUM mmol/L 5.5 5.4   CHLORIDE mmol/L 101 102   CO2 mmol/L 18.0* 17.0*   BUN mg/dL 40* 37*   CREATININE mg/dL 4.70* 4.70*   GLUCOSE mg/dL 81 85   CALCIUM mg/dL 8.7 8.9   ALT (SGPT) U/L  --  12   AST (SGOT) U/L  --  18   TROPONIN I ng/mL  --  0.006     Estimated Creatinine Clearance: 14 mL/min (by C-G formula based on SCr of 4.7 mg/dL (H)).  Brief Urine Lab Results     None        No results found for: BNP  No results found for: PHART  Imaging Results (last 24 hours)     Procedure Component Value Units Date/Time    MRI Brain Without Contrast [[id number]] Updated:  04/03/18 1128    XR Chest 1 View [[id number]] Collected:  04/03/18 1055     Updated:  04/03/18 1055    Narrative:       EXAMINATION: XR CHEST 1 VW- 04/03/2018     INDICATION: Weak/Dizzy/AMS triage protocol      COMPARISON: 01/08/2017     FINDINGS: Heart is mildly enlarged. The vasculature appears normal.  Lungs appear well-expanded and clear.           Impression:       Borderline cardiomegaly. No evidence of active chest  disease.     D:  04/03/2018  E:  04/03/2018              Results for orders placed during the hospital encounter of 11/18/16   Adult Transthoracic Echo Complete    Narrative · Left ventricular function is normal. Estimated EF = 60%.  · All left ventricular wall segments contract normally.  · Moderate mitral valve regurgitation is present  · RVSP(TR) 41 mmHg          Assessment/Plan   Assessment / Plan     Hospital Problem List     Hypertension    History of anemia    Hypothyroidism    Monoclonal gammopathy    Paroxysmal atrial fibrillation    Acute renal failure    Ataxia    Confusion             Assessment & Plan:  MICHAELLE  --possibly secondary to sotalol/valtrex adverse reaction  --awaiting UA  --IVF, consult NAL  Ataxia/altered cognition  --worrisome for CVA per neurology, but again likely secondary to drug-drug interaction (sotalol/valtrex)  PAF  --On sotalol/eliquis  --Cardiology to evaluate for dosage adjustments/regimen appropriateness  Corneal abrasion/ulcer  --hold valtrex  --continue pred-forte  --needs opthalmology follow up  HTN  Hypothyroidism  Hx of GI bleed/anemia  --normal Hgb  --synthroid    DVT prophylaxis:  SCDS only for now    CODE STATUS:  Prior    Admission Status:  I believe this patient meets inpatient criteria.      Electronically signed by Woo Holt MD, 04/03/18, 12:35 PM.

## 2018-04-04 LAB
ALBUMIN SERPL-MCNC: 3.8 G/DL (ref 3.2–4.8)
ANION GAP SERPL CALCULATED.3IONS-SCNC: 10 MMOL/L (ref 3–11)
BASOPHILS # BLD AUTO: 0.02 10*3/MM3 (ref 0–0.2)
BASOPHILS NFR BLD AUTO: 0.4 % (ref 0–1)
BUN BLD-MCNC: 35 MG/DL (ref 9–23)
BUN/CREAT SERPL: 9.7 (ref 7–25)
CALCIUM SPEC-SCNC: 8.3 MG/DL (ref 8.7–10.4)
CHLORIDE SERPL-SCNC: 106 MMOL/L (ref 99–109)
CO2 SERPL-SCNC: 25 MMOL/L (ref 20–31)
CREAT BLD-MCNC: 3.6 MG/DL (ref 0.6–1.3)
DEPRECATED RDW RBC AUTO: 42.3 FL (ref 37–54)
EOSINOPHIL # BLD AUTO: 0.1 10*3/MM3 (ref 0–0.3)
EOSINOPHIL NFR BLD AUTO: 2.2 % (ref 0–3)
ERYTHROCYTE [DISTWIDTH] IN BLOOD BY AUTOMATED COUNT: 12.4 % (ref 11.3–14.5)
GFR SERPL CREATININE-BSD FRML MDRD: 13 ML/MIN/1.73
GLUCOSE BLD-MCNC: 110 MG/DL (ref 70–100)
HCT VFR BLD AUTO: 36.5 % (ref 34.5–44)
HGB BLD-MCNC: 12.3 G/DL (ref 11.5–15.5)
IMM GRANULOCYTES # BLD: 0.01 10*3/MM3 (ref 0–0.03)
IMM GRANULOCYTES NFR BLD: 0.2 % (ref 0–0.6)
INR PPP: 0.98 (ref 0.91–1.09)
LAB AP CASE REPORT: NORMAL
LYMPHOCYTES # BLD AUTO: 0.76 10*3/MM3 (ref 0.6–4.8)
LYMPHOCYTES NFR BLD AUTO: 16.4 % (ref 24–44)
Lab: NORMAL
MCH RBC QN AUTO: 32 PG (ref 27–31)
MCHC RBC AUTO-ENTMCNC: 33.7 G/DL (ref 32–36)
MCV RBC AUTO: 95.1 FL (ref 80–99)
MONOCYTES # BLD AUTO: 0.63 10*3/MM3 (ref 0–1)
MONOCYTES NFR BLD AUTO: 13.6 % (ref 0–12)
NEUTROPHILS # BLD AUTO: 3.12 10*3/MM3 (ref 1.5–8.3)
NEUTROPHILS NFR BLD AUTO: 67.2 % (ref 41–71)
PATH REPORT.FINAL DX SPEC: NORMAL
PHOSPHATE SERPL-MCNC: 4.4 MG/DL (ref 2.4–5.1)
PLATELET # BLD AUTO: 216 10*3/MM3 (ref 150–450)
PMV BLD AUTO: 9.7 FL (ref 6–12)
POTASSIUM BLD-SCNC: 4.1 MMOL/L (ref 3.5–5.5)
PROTHROMBIN TIME: 10.3 SECONDS (ref 9.6–11.5)
RBC # BLD AUTO: 3.84 10*6/MM3 (ref 3.89–5.14)
SODIUM BLD-SCNC: 141 MMOL/L (ref 132–146)
WBC NRBC COR # BLD: 4.64 10*3/MM3 (ref 3.5–10.8)

## 2018-04-04 PROCEDURE — 85025 COMPLETE CBC W/AUTO DIFF WBC: CPT | Performed by: HOSPITALIST

## 2018-04-04 PROCEDURE — 85610 PROTHROMBIN TIME: CPT | Performed by: INTERNAL MEDICINE

## 2018-04-04 PROCEDURE — 80069 RENAL FUNCTION PANEL: CPT | Performed by: INTERNAL MEDICINE

## 2018-04-04 PROCEDURE — 99233 SBSQ HOSP IP/OBS HIGH 50: CPT | Performed by: INTERNAL MEDICINE

## 2018-04-04 PROCEDURE — 99232 SBSQ HOSP IP/OBS MODERATE 35: CPT | Performed by: NURSE PRACTITIONER

## 2018-04-04 PROCEDURE — 99231 SBSQ HOSP IP/OBS SF/LOW 25: CPT | Performed by: PSYCHIATRY & NEUROLOGY

## 2018-04-04 RX ADMIN — PREDNISOLONE ACETATE 1 DROP: 10 SUSPENSION/ DROPS OPHTHALMIC at 08:52

## 2018-04-04 RX ADMIN — Medication 100 ML/HR: at 21:04

## 2018-04-04 RX ADMIN — FERROUS SULFATE TAB 325 MG (65 MG ELEMENTAL FE) 325 MG: 325 (65 FE) TAB at 08:48

## 2018-04-04 RX ADMIN — PREDNISOLONE ACETATE 1 DROP: 10 SUSPENSION/ DROPS OPHTHALMIC at 20:59

## 2018-04-04 RX ADMIN — CITALOPRAM HYDROBROMIDE 20 MG: 20 TABLET ORAL at 08:48

## 2018-04-04 RX ADMIN — SODIUM BICARBONATE 100 ML/HR: 84 INJECTION, SOLUTION INTRAVENOUS at 05:43

## 2018-04-04 RX ADMIN — Medication 100 ML/HR: at 11:22

## 2018-04-04 RX ADMIN — ATORVASTATIN CALCIUM 40 MG: 40 TABLET, FILM COATED ORAL at 08:48

## 2018-04-04 RX ADMIN — PREDNISOLONE ACETATE 1 DROP: 10 SUSPENSION/ DROPS OPHTHALMIC at 18:34

## 2018-04-04 RX ADMIN — PREDNISOLONE ACETATE 1 DROP: 10 SUSPENSION/ DROPS OPHTHALMIC at 11:26

## 2018-04-04 RX ADMIN — LEVOTHYROXINE SODIUM 100 MCG: 100 TABLET ORAL at 08:48

## 2018-04-04 NOTE — PROGRESS NOTES
Neurology       Patient Care Team:  ARMANDO Pugh as PCP - General (Family Medicine)  Michel White MD as Consulting Physician (Colon and Rectal Surgery)    Chief complaint: Altered mental state    History:  Patient is nearing baseline.    The the consultants have concluded that she is suffering with Crystal urea secondary to Valtrex and sotalol.    She apparently has a very low protein intake on a regular basis accounting for her discrepancy between the creatinine and BUN.      Past Medical History:   Diagnosis Date   • Atrial fibrillation    • Corneal abrasion    • Hemorrhoids     bleeding hemorrhoids   • History of anemia    • Hypertension    • Hypothyroidism    • Osteoarthritis        Vital Signs   Vitals:    04/03/18 1652 04/03/18 1700 04/04/18 0536 04/04/18 0838   BP: 135/81  136/71 149/83   BP Location: Left arm  Left arm Left arm   Patient Position: Lying  Lying Lying   Pulse: 69 62 62 61   Resp: 20 18 18   Temp: 98.5 °F (36.9 °C)  98.6 °F (37 °C) 98.3 °F (36.8 °C)   TempSrc: Oral  Oral Oral   SpO2: (!) 85% 92% 92%    Weight: 75.3 kg (166 lb)  75.3 kg (166 lb)    Height:           Physical Exam:   General: Awake and alert              Neuro: Oriented to person place and time with normal memory attention and concentration.    Speech is normal today.    She moves all extremities well.        Results Review:  BUN is 35 today.  Creatinine is 3.6.    MRI was personally reviewed and is normal.    Results from last 7 days  Lab Units 04/04/18  0741   WBC 10*3/mm3 4.64   HEMOGLOBIN g/dL 12.3   HEMATOCRIT % 36.5   PLATELETS 10*3/mm3 216       Results from last 7 days  Lab Units 04/04/18  0741 04/03/18  1033 04/03/18  0858   SODIUM mmol/L 141 134 133   POTASSIUM mmol/L 4.1 5.5 5.4   CHLORIDE mmol/L 106 101 102   CO2 mmol/L 25.0 18.0* 17.0*   BUN mg/dL 35* 40* 37*   CREATININE mg/dL 3.60* 4.70* 4.70*   CALCIUM mg/dL 8.3* 8.7 8.9   BILIRUBIN mg/dL  --   --  0.5   ALK PHOS U/L  --   --  99   ALT (SGPT) U/L  --    --  12   AST (SGOT) U/L  --   --  18   GLUCOSE mg/dL 110* 81 85       Imaging Results (last 24 hours)     Procedure Component Value Units Date/Time    XR Chest 1 View [[id number]] Collected:  04/03/18 1055     Updated:  04/03/18 2321    Narrative:       EXAMINATION: XR CHEST 1 VW- 04/03/2018     INDICATION: Weak/Dizzy/AMS triage protocol      COMPARISON: 01/08/2017     FINDINGS: Heart is mildly enlarged. The vasculature appears normal.  Lungs appear well-expanded and clear.           Impression:       Borderline cardiomegaly. No evidence of active chest  disease.     D:  04/03/2018  E:  04/03/2018     This report was finalized on 4/3/2018 11:19 PM by DR. Woo Lawrence MD.       MRI Brain Without Contrast [[id number]] Collected:  04/03/18 1359     Updated:  04/03/18 2316    Narrative:       EXAMINATION: MRI BRAIN WO CONTRAST- 04/03/2018     INDICATION: Stroke         TECHNIQUE: Sagittal and axial T1, axial T2 FLAIR diffusion weighted  images of the brain without contrast.     COMPARISON: NONE     FINDINGS: Patient history indicates altered mental status with slurred  speech and dizziness, difficulty writing.     There is no evidence of restricted diffusion to suggest acute  infarction. Remaining imaging sequences show age-appropriate generalized  cerebral atrophy. There is no evidence of previous infarct, no evidence  of intracranial mass or mass effect, hemorrhage, hydrocephalus or  abnormal extra-axial collection. There is expected flow signal in the  major intracranial arteries. Sagittal images show the midline structures  to appear grossly normal. Anatomy of the craniocervical junction appears  normal. No gross abnormalities are seen of the orbits, paranasal sinuses  or mastoids.       Impression:       Brain appears within normal limits for age.     D:  04/03/2018  E:  04/03/2018         This report was finalized on 4/3/2018 11:14 PM by DR. Woo Lawrence MD.       US Renal Limited [[id number]] Collected:  04/03/18 6777      Updated:  04/03/18 1711    Narrative:       EXAMINATION: US RENAL LIMITED- 04/03/2018     INDICATION: N17.9-Acute kidney failure, unspecified; R41.82-Altered  mental status, unspecified; R27.0-Ataxia, unspecified; R48.8-Other  symbolic dysfunctions; R47.1-Dysarthria and anarthria;  T88.7XXA-Unspecified adverse effect of drug or medicament, initial  encounter; renal insufficiency     TECHNIQUE: Sonographic imaging was obtained of the kidneys in both the  sagittal and transverse planes.     COMPARISON: NONE     FINDINGS: The right kidney measures in length from pole to pole 10.0 cm.  There is no solid cortical mass or renal cortical cysts seen within the  right kidney. The left kidney measures in length from pole to pole 11.3  cm. Small cyst identified on the left kidney measuring in length from  pole to pole 1.6 cm. The bladder is slightly distended with no gross  mass or lesion present.       Impression:       There is a small left renal cortical cyst. The right kidney  is unremarkable. Bladder is within normal limits.     D:  04/03/2018  E:  04/03/2018         This report was finalized on 4/3/2018 5:08 PM by Dr. Jayashree Aguilar MD.             Assessment:  Confusion secondary to Valtrex.    Acute renal failure    Plan:  Hydrate.    Nutrition consult    Comment:  Okay to discharge when the nephrology is content.    Resume Eliquis 1 creatinine is normalized         I discussed the patients findings and my recommendations with patient, primary care team and consulting provider    Balbir Donato MD  04/04/18  10:06 AM

## 2018-04-04 NOTE — PROGRESS NOTES
Discharge Planning Assessment  [facility]     Patient Name: Halie Perez  MRN: [id number]  Today's Date: 4/4/2018    Admit Date: 4/3/2018          Discharge Needs Assessment     Row Name 04/04/18 1148       Living Environment    Lives With spouse    Current Living Arrangements home/apartment/condo    Primary Care Provided by self    Provides Primary Care For no one    Family Caregiver if Needed spouse    Quality of Family Relationships unable to assess    Able to Return to Prior Arrangements yes       Resource/Environmental Concerns    Resource/Environmental Concerns none    Transportation Concerns car, none       Transition Planning    Patient/Family Anticipates Transition to home    Patient/Family Anticipated Services at Transition none    Transportation Anticipated family or friend will provide       Discharge Needs Assessment    Readmission Within the Last 30 Days no previous admission in last 30 days    Concerns to be Addressed no discharge needs identified;denies needs/concerns at this time    Equipment Currently Used at Home none    Anticipated Changes Related to Illness none    Equipment Needed After Discharge none            Discharge Plan     Row Name 04/04/18 1149       Plan    Plan Home with spouse    Patient/Family in Agreement with Plan yes    Plan Comments Met with patient at bedside. Patient lives in a house in [place] with her . 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 Does not use any DME or home health. Plans to return home at discharge. Denies discharge planning needs at this time. CM will continue to follow. Brandi Benavides RN [phone]    Final Discharge Disposition Code 01 - home or self-care        Destination     No service coordination in this encounter.      Durable Medical Equipment     No service coordination in this encounter.      Dialysis/Infusion     No service coordination in this encounter.      Home Medical Care     No service coordination in this  encounter.      Social Care     No service coordination in this encounter.                Demographic Summary     Row Name 04/04/18 1147       General Information    Arrived From home    Referral Source admission list    Preferred Language English            Functional Status     Row Name 04/04/18 1148       Functional Status    Usual Activity Tolerance good    Current Activity Tolerance moderate       Functional Status, IADL    Medications independent    Meal Preparation independent    Housekeeping independent    Laundry independent    Shopping independent       Employment/    Employment/ Comments [employer] PPO with Rx medication coverage            Psychosocial    No documentation.           Abuse/Neglect    No documentation.           Legal    No documentation.           Substance Abuse    No documentation.           Patient Forms    No documentation.         Brandi Benavides RN

## 2018-04-04 NOTE — PROGRESS NOTES
"[facility]  Progress Note       LOS: 1 day   Patient Care Team:  ARMANDO Pugh as PCP - General (Family Medicine)  Michel White MD as Consulting Physician (Colon and Rectal Surgery)    Chief Complaint:  Altered mental status, atrial fibrillation    Subjective     Interval History: Gait and imbalance improved today.  Feels much better.  Cr improved, still making good urine.  Maintaining NSR.        Review of Systems:   Pertinent positives in HPI, all others reviewed and negative.      Objective       Current Facility-Administered Medications:   •  atorvastatin (LIPITOR) tablet 40 mg, 40 mg, Oral, Daily, Woo Holt MD, 40 mg at 04/04/18 0848  •  citalopram (CeleXA) tablet 20 mg, 20 mg, Oral, Daily, Woo Holt MD, 20 mg at 04/04/18 0848  •  ferrous sulfate tablet 325 mg, 325 mg, Oral, Daily With Breakfast, Woo Holt MD, 325 mg at 04/04/18 0848  •  levothyroxine (SYNTHROID, LEVOTHROID) tablet 100 mcg, 100 mcg, Oral, Daily, Woo Holt MD, 100 mcg at 04/04/18 0848  •  prednisoLONE acetate (PRED FORTE) 1 % ophthalmic suspension 1 drop, 1 drop, Right Eye, 4x Daily, Woo Holt MD, 1 drop at 04/04/18 1126  •  sodium bicarbonate 8.4 % 100 mEq in dextrose (D5W) 5 % 1,000 mL infusion (greater than 75 mEq), 100 mL/hr, Intravenous, Continuous, Deyvi Watters MD, Last Rate: 100 mL/hr at 04/04/18 1122, 100 mL/hr at 04/04/18 1122  •  sodium chloride 0.9 % flush 1-10 mL, 1-10 mL, Intravenous, PRN, Woo Holt MD  •  sodium chloride 0.9 % flush 10 mL, 10 mL, Intravenous, PRN, Paul Carr MD    Vital Sign Min/Max for last 24 hours  Temp  Min: 98.3 °F (36.8 °C)  Max: 98.6 °F (37 °C)   BP  Min: 116/69  Max: 149/83   Pulse  Min: 57  Max: 69   Resp  Min: 18  Max: 20   SpO2  Min: 85 %  Max: 97 %   No Data Recorded   Weight  Min: 75.3 kg (166 lb)  Max: 75.3 kg (166 lb)     Flowsheet Rows    Flowsheet Row First Filed Value   Admission Height 175.3 cm (69\") " Documented at 04/03/2018 0840   Admission Weight 72.6 kg (160 lb) Documented at 04/03/2018 0840            Intake/Output Summary (Last 24 hours) at 04/04/18 1307  Last data filed at 04/04/18 1122   Gross per 24 hour   Intake             2000 ml   Output              900 ml   Net             1100 ml       Physical Exam:     General Appearance:    Alert, cooperative, in no acute distress   Lungs:     Clear to auscultation, respirations regular, even and                  unlabored    Heart:    Regular rhythm and normal rate, normal S1 and S2, no            murmur, no gallop, no rub, no click   Chest Wall:    No abnormalities observed   Abdomen:     Normal bowel sounds, soft non-tender, non-distended   Extremities:   Moves all extremities well, no edema, no cyanosis, no             redness   Pulses:   Pulses palpable and equal bilaterally   Skin:   No bleeding, bruising or rash        Results Review:     Results from last 7 days  Lab Units 04/04/18  0741 04/03/18  0858   WBC 10*3/mm3 4.64 7.71   HEMOGLOBIN g/dL 12.3 13.4   HEMATOCRIT % 36.5 40.0   PLATELETS 10*3/mm3 216 225       Results from last 7 days  Lab Units 04/04/18  0741 04/03/18  1033 04/03/18  0858   SODIUM mmol/L 141 134 133   POTASSIUM mmol/L 4.1 5.5 5.4   CHLORIDE mmol/L 106 101 102   CO2 mmol/L 25.0 18.0* 17.0*   BUN mg/dL 35* 40* 37*   CREATININE mg/dL 3.60* 4.70* 4.70*   GLUCOSE mg/dL 110* 81 85                Results from last 7 days  Lab Units 04/03/18  0858   TSH mIU/mL 2.705   FREE T4 ng/dL 1.32           Results from last 7 days  Lab Units 04/04/18  0741 04/03/18  0858   PROTIME Seconds 10.3 10.6   INR  0.98 1.01   APTT seconds  --  31.1*       Results from last 7 days  Lab Units 04/03/18  0858   TROPONIN I ng/mL 0.006       Intake/Output Summary (Last 24 hours) at 04/04/18 1307  Last data filed at 04/04/18 1122   Gross per 24 hour   Intake             2000 ml   Output              900 ml   Net             1100 ml       I personally viewed and  interpreted the patient's EKG/Telemetry data    Telemetry: NSR, HR 57-69 bpm    Ejection Fraction  Lab Results   Component Value Date    EF 60 01/18/2017       Echo EF Estimated  Lab Results   Component Value Date    ECHOEFEST 60 11/18/2016         Present on Admission:  • Acute renal failure  • Hypertension  • Hypothyroidism  • Paroxysmal atrial fibrillation  • Monoclonal gammopathy    Assessment/Plan   1. Paroxysmal atrial fibrillation  - Previously managed on sotalol with maintenance of normal sinus rhythm, presented yesterday with altered mental status and MICHAELLE  - Continue holding sotalol and Eliquis for now (CHADSVasc = 2) as CrCl only 19     2. Altered mental status:   - Likely due to sotalol/valtrex adverse reaction with MICHAELLE  - Neurology following, MRI head unremarkable  - Improving and feels about back to baseline     3. MICHAELLE:  - Initial creatine 4.7 upon admission, improved to 3.6 today  - Nephrology following, on bicarb gtt      Yuid Clifton, ARMANDO  04/04/18  1:07 PM

## 2018-04-04 NOTE — PLAN OF CARE
Problem: Patient Care Overview  Goal: Plan of Care Review  Outcome: Ongoing (interventions implemented as appropriate)   04/04/18 0418   Coping/Psychosocial   Plan of Care Reviewed With patient   Plan of Care Review   Progress improving   OTHER   Outcome Summary mental status baseline. VSS. Bicarp infusion started. Pt states she pees 2 to 3 times daily (baseline). Slept all night. no complaints.        Problem: Renal Failure/Kidney Injury, Acute (Adult)  Goal: Signs and Symptoms of Listed Potential Problems Will be Absent, Minimized or Managed (Renal Failure/Kidney Injury, Acute)  Outcome: Ongoing (interventions implemented as appropriate)

## 2018-04-04 NOTE — PROGRESS NOTES
[facility] Medicine Services  PROGRESS NOTE    Patient Name: Halie Perez  : 1955  MRN: [id number]    Date of Admission: 4/3/2018  Length of Stay: 1  Primary Care Physician: ARMANDO Edwards    Subjective   Subjective     CC:  AMS, MICHAELLE    HPI:  Doing much better this am, states that she has been up and walking and imbalance/gait disturbance has improved.  Good UOP.  Slept well.     Review of Systems  Gen- No fevers, chills  CV- No chest pain, palpitations  Resp- No cough, dyspnea  GI- No N/V/D, abd pain    Otherwise ROS is negative except as mentioned in the HPI.    Objective   Objective     Vital Signs:   Temp:  [98.3 °F (36.8 °C)-98.6 °F (37 °C)] 98.3 °F (36.8 °C)  Heart Rate:  [57-69] 61  Resp:  [16-20] 18  BP: (116-168)/(66-85) 149/83  Total (NIH Stroke Scale): 0     Physical Exam:  Constitutional: No acute distress, awake, alert  HENT: NCAT, mucous membranes moist  Respiratory: Clear to auscultation bilaterally, respiratory effort normal   Cardiovascular: RRR, no murmurs, rubs, or gallops, palpable pedal pulses bilaterally  Gastrointestinal: Positive bowel sounds, soft, nontender, nondistended  Musculoskeletal: No bilateral ankle edema  Psychiatric: Appropriate affect, cooperative  Neurologic: Oriented x 3, strength symmetric in all extremities, Cranial Nerves grossly intact to confrontation, speech clear  Skin: No rashes    Results Reviewed:  I have personally reviewed current lab, radiology, and data and agree.      Results from last 7 days  Lab Units 18  0741 18  0858   WBC 10*3/mm3 4.64 7.71   HEMOGLOBIN g/dL 12.3 13.4   HEMATOCRIT % 36.5 40.0   PLATELETS 10*3/mm3 216 225   INR  0.98 1.01       Results from last 7 days  Lab Units 18  0741 18  1033 18  0858   SODIUM mmol/L 141 134 133   POTASSIUM mmol/L 4.1 5.5 5.4   CHLORIDE mmol/L 106 101 102   CO2 mmol/L 25.0 18.0* 17.0*   BUN mg/dL 35* 40* 37*   CREATININE mg/dL 3.60* 4.70*  4.70*   GLUCOSE mg/dL 110* 81 85   CALCIUM mg/dL 8.3* 8.7 8.9   ALT (SGPT) U/L  --   --  12   AST (SGOT) U/L  --   --  18   TROPONIN I ng/mL  --   --  0.006     Estimated Creatinine Clearance: 19 mL/min (by C-G formula based on SCr of 3.6 mg/dL (H)).  No results found for: BNP  No results found for: PHART    Microbiology Results Abnormal     None          Imaging Results (last 24 hours)     Procedure Component Value Units Date/Time    XR Chest 1 View [[id number]] Collected:  04/03/18 1055     Updated:  04/03/18 2321    Narrative:       EXAMINATION: XR CHEST 1 VW- 04/03/2018     INDICATION: Weak/Dizzy/AMS triage protocol      COMPARISON: 01/08/2017     FINDINGS: Heart is mildly enlarged. The vasculature appears normal.  Lungs appear well-expanded and clear.           Impression:       Borderline cardiomegaly. No evidence of active chest  disease.     D:  04/03/2018  E:  04/03/2018     This report was finalized on 4/3/2018 11:19 PM by DR. Woo Lawrence MD.       MRI Brain Without Contrast [[id number]] Collected:  04/03/18 1359     Updated:  04/03/18 2316    Narrative:       EXAMINATION: MRI BRAIN WO CONTRAST- 04/03/2018     INDICATION: Stroke         TECHNIQUE: Sagittal and axial T1, axial T2 FLAIR diffusion weighted  images of the brain without contrast.     COMPARISON: NONE     FINDINGS: Patient history indicates altered mental status with slurred  speech and dizziness, difficulty writing.     There is no evidence of restricted diffusion to suggest acute  infarction. Remaining imaging sequences show age-appropriate generalized  cerebral atrophy. There is no evidence of previous infarct, no evidence  of intracranial mass or mass effect, hemorrhage, hydrocephalus or  abnormal extra-axial collection. There is expected flow signal in the  major intracranial arteries. Sagittal images show the midline structures  to appear grossly normal. Anatomy of the craniocervical junction appears  normal. No gross abnormalities are seen  of the orbits, paranasal sinuses  or mastoids.       Impression:       Brain appears within normal limits for age.     D:  04/03/2018  E:  04/03/2018         This report was finalized on 4/3/2018 11:14 PM by DR. Woo Lawrence MD.        Renal Limited [[id number]] Collected:  04/03/18 1553     Updated:  04/03/18 1711    Narrative:       EXAMINATION:  RENAL LIMITED- 04/03/2018     INDICATION: N17.9-Acute kidney failure, unspecified; R41.82-Altered  mental status, unspecified; R27.0-Ataxia, unspecified; R48.8-Other  symbolic dysfunctions; R47.1-Dysarthria and anarthria;  T88.7XXA-Unspecified adverse effect of drug or medicament, initial  encounter; renal insufficiency     TECHNIQUE: Sonographic imaging was obtained of the kidneys in both the  sagittal and transverse planes.     COMPARISON: NONE     FINDINGS: The right kidney measures in length from pole to pole 10.0 cm.  There is no solid cortical mass or renal cortical cysts seen within the  right kidney. The left kidney measures in length from pole to pole 11.3  cm. Small cyst identified on the left kidney measuring in length from  pole to pole 1.6 cm. The bladder is slightly distended with no gross  mass or lesion present.       Impression:       There is a small left renal cortical cyst. The right kidney  is unremarkable. Bladder is within normal limits.     D:  04/03/2018  E:  04/03/2018         This report was finalized on 4/3/2018 5:08 PM by Dr. Jayashree Aguilar MD.           Results for orders placed during the hospital encounter of 11/18/16   Adult Transthoracic Echo Complete    Narrative · Left ventricular function is normal. Estimated EF = 60%.  · All left ventricular wall segments contract normally.  · Moderate mitral valve regurgitation is present  · RVSP(TR) 41 mmHg          I have reviewed the medications.    Assessment/Plan   Assessment / Plan     Active Problems:    Acute renal failure    Ataxia    Confusion    History of anemia    Paroxysmal atrial  fibrillation    Hypertension    Hypothyroidism    Monoclonal gammopathy           Brief Hospital Course to date:  Halie Perez is a 62 y.o. female with PMH of Afib on Sotalol, HTN, Hypothyroidism, MGUS, and recent diagnosis of corneal abrasion/ulcer on Valtrex for possible viral etiology who presented to the ED with complaints of gait disturbance/ataxia and found to have MICHAELLE with Cr of 4.7.  Suspected drug-drug interaction of Valtrex and Sotalol.  She was admitted to our service for further treatment.       Plan:  --Cr improving with IVFs, stopped Valtrex.  NAL following. Renal U/S relatively unremarkable (only shows renal cyst).    --ataxia improved, suspect due to drug-drug interaction.  Neurology following  --Continue sotalol and Eliquis (currently on hold) for Afib, Cards to evaluate to see if Sotalol needs to be renally dosed/adjusted  --continue antihypertensives  --continue Synthroid    DVT Prophylaxis:  Eliquis (on hold until okay to restart with NAL/once Cr improves)    CODE STATUS: Full Code    Disposition: I expect the patient to be discharged TBD      Electronically signed by Brittney Benavides MD, 04/04/18, 10:04 AM.

## 2018-04-04 NOTE — PROGRESS NOTES
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  Cr 0.7 12/17.  She has afib on eliquis and sotalol.  She has hbp on lisinopril.  She IgG lambda MGUS.  Urine neg for bjp.  She started valtrex last week.  Not taking nsaid.  Noted to be confused this am went to er found to have bean.  kyung negative for obstruction.  hgb and platelets normal.  Cr 4.7 k 5.5.  Subjective   Patient overall condition improving, renal functions better, making good urine.  Comfortable in bed.       Review of Systems:   Patient denies shortness of breath, chest pain, dysuria, hematuria, nausea, vomiting.      Objective     Vital Sign Min/Max for last 24 hours  Temp  Min: 98.5 °F (36.9 °C)  Max: 98.6 °F (37 °C)   BP  Min: 116/69  Max: 168/85   Pulse  Min: 57  Max: 69   Resp  Min: 16  Max: 20   SpO2  Min: 85 %  Max: 97 %   No Data Recorded   Weight  Min: 72.6 kg (160 lb)  Max: 75.3 kg (166 lb)     Flowsheet Rows    Flowsheet Row First Filed Value   Admission Height 175.3 cm (69\") Documented at 04/03/2018 0840   Admission Weight 72.6 kg (160 lb) Documented at 04/03/2018 0840          No intake/output data recorded.  I/O last 3 completed shifts:  In: 1000 [IV Piggyback:1000]  Out: 900 [Urine:900]    Physical Exam:  General Appearance: Alert, oriented, no obvious distress.  Eyes: PER, EOMI.  Neck: Supple no JVD.  Lungs: Clear auscultation, no rales rhonchi's, equal chest movement, nonlabored.  Heart: No gallop, murmur, rub, RRR.  Abdomen: Soft, nontender, positive bowel sounds, no organomegaly.  Extremities: No edema, no cyanosis.  Neuro: No focal deficit, moving all extremities, alert oriented×3      WBC WBC   Date Value Ref Range Status   04/04/2018 4.64 3.50 - 10.80 10*3/mm3 Final   04/03/2018 7.71 3.50 - 10.80 10*3/mm3 Final "      HGB Hemoglobin   Date Value Ref Range Status   04/04/2018 12.3 11.5 - 15.5 g/dL Final   04/03/2018 13.4 11.5 - 15.5 g/dL Final      HCT Hematocrit   Date Value Ref Range Status   04/04/2018 36.5 34.5 - 44.0 % Final   04/03/2018 40.0 34.5 - 44.0 % Final      Platlets No results found for: LABPLAT   MCV MCV   Date Value Ref Range Status   04/04/2018 95.1 80.0 - 99.0 fL Final   04/03/2018 96.6 80.0 - 99.0 fL Final          Sodium Sodium   Date Value Ref Range Status   04/04/2018 141 132 - 146 mmol/L Final   04/03/2018 134 132 - 146 mmol/L Final   04/03/2018 133 132 - 146 mmol/L Final      Potassium Potassium   Date Value Ref Range Status   04/04/2018 4.1 3.5 - 5.5 mmol/L Final     Comment:     Verified by repeat analysis.    04/03/2018 5.5 3.5 - 5.5 mmol/L Final   04/03/2018 5.4 3.5 - 5.5 mmol/L Final      Chloride Chloride   Date Value Ref Range Status   04/04/2018 106 99 - 109 mmol/L Final   04/03/2018 101 99 - 109 mmol/L Final   04/03/2018 102 99 - 109 mmol/L Final      CO2 CO2   Date Value Ref Range Status   04/04/2018 25.0 20.0 - 31.0 mmol/L Final     Comment:     Verified by repeat analysis.    04/03/2018 18.0 (L) 20.0 - 31.0 mmol/L Final   04/03/2018 17.0 (L) 20.0 - 31.0 mmol/L Final      BUN BUN   Date Value Ref Range Status   04/04/2018 35 (H) 9 - 23 mg/dL Final   04/03/2018 40 (H) 9 - 23 mg/dL Final   04/03/2018 37 (H) 9 - 23 mg/dL Final      Creatinine Creatinine   Date Value Ref Range Status   04/04/2018 3.60 (H) 0.60 - 1.30 mg/dL Final   04/03/2018 4.70 (H) 0.60 - 1.30 mg/dL Final   04/03/2018 4.70 (H) 0.60 - 1.30 mg/dL Final      Calcium Calcium   Date Value Ref Range Status   04/04/2018 8.3 (L) 8.7 - 10.4 mg/dL Final   04/03/2018 8.7 8.7 - 10.4 mg/dL Final   04/03/2018 8.9 8.7 - 10.4 mg/dL Final      PO4 No results found for: CAPO4   Albumin Albumin   Date Value Ref Range Status   04/04/2018 3.80 3.20 - 4.80 g/dL Final   04/03/2018 4.30 3.20 - 4.80 g/dL Final      Magnesium Magnesium   Date Value  Ref Range Status   04/03/2018 2.0 1.3 - 2.7 mg/dL Final      Uric Acid No results found for: URICACID        Results Review:     I reviewed the patient's new clinical results.      atorvastatin 40 mg Oral Daily   citalopram 20 mg Oral Daily   ferrous sulfate 325 mg Oral Daily With Breakfast   levothyroxine 100 mcg Oral Daily   prednisoLONE acetate 1 drop Right Eye 4x Daily       sodium bicarbonate drip (greater than 75 mEq/bag) 100 mL/hr Last Rate: 100 mL/hr (04/04/18 0543)       Medication Review: Reviewed    Assessment/Plan     Active Problems:    Hypertension    History of anemia    Hypothyroidism    Monoclonal gammopathy    Paroxysmal atrial fibrillation    Acute renal failure    Ataxia    Confusion      Impression:   1 acute kidney injury: Most likely secondary to valtrex crystalluria, will ask pathologist to review the urine, blood in the urine so anticoagulant nephropathy is also possible.    Plan:   treatment for both is ivf.    Stop eliquis and valtrex.     May not require dialysis  Decrease IV bicarbonate drip rate  Improved confusion is likely 2/2 the valtrex also, no evidence for ttp/hus  Hypertension hold lisinopril   Case discussed with Dr. Donato and with patient    Deyvi Watters MD  04/04/18  9:42 AM

## 2018-04-05 VITALS
RESPIRATION RATE: 22 BRPM | SYSTOLIC BLOOD PRESSURE: 158 MMHG | BODY MASS INDEX: 24.59 KG/M2 | WEIGHT: 166 LBS | OXYGEN SATURATION: 92 % | HEIGHT: 69 IN | DIASTOLIC BLOOD PRESSURE: 65 MMHG | TEMPERATURE: 98.3 F | HEART RATE: 60 BPM

## 2018-04-05 LAB
ANION GAP SERPL CALCULATED.3IONS-SCNC: 10 MMOL/L (ref 3–11)
BUN BLD-MCNC: 25 MG/DL (ref 9–23)
BUN/CREAT SERPL: 10.9 (ref 7–25)
CALCIUM SPEC-SCNC: 8.5 MG/DL (ref 8.7–10.4)
CHLORIDE SERPL-SCNC: 98 MMOL/L (ref 99–109)
CO2 SERPL-SCNC: 32 MMOL/L (ref 20–31)
CREAT BLD-MCNC: 2.3 MG/DL (ref 0.6–1.3)
DEPRECATED RDW RBC AUTO: 43.7 FL (ref 37–54)
ERYTHROCYTE [DISTWIDTH] IN BLOOD BY AUTOMATED COUNT: 12.4 % (ref 11.3–14.5)
GFR SERPL CREATININE-BSD FRML MDRD: 21 ML/MIN/1.73
GLUCOSE BLD-MCNC: 105 MG/DL (ref 70–100)
HCT VFR BLD AUTO: 36.7 % (ref 34.5–44)
HGB BLD-MCNC: 12 G/DL (ref 11.5–15.5)
MCH RBC QN AUTO: 31.5 PG (ref 27–31)
MCHC RBC AUTO-ENTMCNC: 32.7 G/DL (ref 32–36)
MCV RBC AUTO: 96.3 FL (ref 80–99)
PLATELET # BLD AUTO: 207 10*3/MM3 (ref 150–450)
PMV BLD AUTO: 10.3 FL (ref 6–12)
POTASSIUM BLD-SCNC: 3.5 MMOL/L (ref 3.5–5.5)
RBC # BLD AUTO: 3.81 10*6/MM3 (ref 3.89–5.14)
REF LAB TEST RESULTS: NORMAL
SODIUM BLD-SCNC: 140 MMOL/L (ref 132–146)
WBC NRBC COR # BLD: 4.22 10*3/MM3 (ref 3.5–10.8)

## 2018-04-05 PROCEDURE — 99232 SBSQ HOSP IP/OBS MODERATE 35: CPT | Performed by: INTERNAL MEDICINE

## 2018-04-05 PROCEDURE — 99239 HOSP IP/OBS DSCHRG MGMT >30: CPT | Performed by: INTERNAL MEDICINE

## 2018-04-05 PROCEDURE — 99231 SBSQ HOSP IP/OBS SF/LOW 25: CPT | Performed by: PSYCHIATRY & NEUROLOGY

## 2018-04-05 PROCEDURE — 80048 BASIC METABOLIC PNL TOTAL CA: CPT | Performed by: INTERNAL MEDICINE

## 2018-04-05 PROCEDURE — 85027 COMPLETE CBC AUTOMATED: CPT | Performed by: INTERNAL MEDICINE

## 2018-04-05 RX ORDER — AMLODIPINE BESYLATE 5 MG/1
5 TABLET ORAL
Status: DISCONTINUED | OUTPATIENT
Start: 2018-04-05 | End: 2018-04-05 | Stop reason: HOSPADM

## 2018-04-05 RX ORDER — AMLODIPINE BESYLATE 5 MG/1
5 TABLET ORAL
Qty: 30 TABLET | Refills: 0 | Status: SHIPPED | OUTPATIENT
Start: 2018-04-05 | End: 2018-06-13 | Stop reason: SDDI

## 2018-04-05 RX ADMIN — CITALOPRAM HYDROBROMIDE 20 MG: 20 TABLET ORAL at 08:48

## 2018-04-05 RX ADMIN — FERROUS SULFATE TAB 325 MG (65 MG ELEMENTAL FE) 325 MG: 325 (65 FE) TAB at 08:48

## 2018-04-05 RX ADMIN — Medication 100 ML/HR: at 08:52

## 2018-04-05 RX ADMIN — PREDNISOLONE ACETATE 1 DROP: 10 SUSPENSION/ DROPS OPHTHALMIC at 08:48

## 2018-04-05 RX ADMIN — ATORVASTATIN CALCIUM 40 MG: 40 TABLET, FILM COATED ORAL at 08:48

## 2018-04-05 RX ADMIN — LEVOTHYROXINE SODIUM 100 MCG: 100 TABLET ORAL at 08:48

## 2018-04-05 NOTE — PLAN OF CARE
Problem: Patient Care Overview  Goal: Plan of Care Review  Outcome: Ongoing (interventions implemented as appropriate)   04/05/18 0601   Coping/Psychosocial   Plan of Care Reviewed With patient   Plan of Care Review   Progress improving   OTHER   Outcome Summary Mental status baseline. Up ad/robin. VSS. Slept great. Pt ready to leave today.        Problem: Renal Failure/Kidney Injury, Acute (Adult)  Goal: Signs and Symptoms of Listed Potential Problems Will be Absent, Minimized or Managed (Renal Failure/Kidney Injury, Acute)  Outcome: Ongoing (interventions implemented as appropriate)

## 2018-04-05 NOTE — PROGRESS NOTES
"[facility]  Progress Note       LOS: 2 days   Patient Care Team:  ARMANDO Pugh as PCP - General (Family Medicine)  Michel White MD as Consulting Physician (Colon and Rectal Surgery)    Chief Complaint:  Altered mental status, atrial fibrillation    Subjective     Interval History: Patient feels back to baseline, up walking around the room.  Denies any current complaints.  Maintaining NSR. Feels much improved. Mentation improved        Review of Systems:   Pertinent positives in HPI, all others reviewed and negative.      Objective       Current Facility-Administered Medications:   •  atorvastatin (LIPITOR) tablet 40 mg, 40 mg, Oral, Daily, Woo Holt MD, 40 mg at 04/05/18 0848  •  citalopram (CeleXA) tablet 20 mg, 20 mg, Oral, Daily, Woo Holt MD, 20 mg at 04/05/18 0848  •  ferrous sulfate tablet 325 mg, 325 mg, Oral, Daily With Breakfast, Woo Holt MD, 325 mg at 04/05/18 0848  •  levothyroxine (SYNTHROID, LEVOTHROID) tablet 100 mcg, 100 mcg, Oral, Daily, Woo Holt MD, 100 mcg at 04/05/18 0848  •  prednisoLONE acetate (PRED FORTE) 1 % ophthalmic suspension 1 drop, 1 drop, Right Eye, 4x Daily, Woo Holt MD, 1 drop at 04/05/18 0848  •  sodium bicarbonate 8.4 % 100 mEq in dextrose (D5W) 5 % 1,000 mL infusion (greater than 75 mEq), 100 mL/hr, Intravenous, Continuous, Deyvi Watters MD, Last Rate: 100 mL/hr at 04/05/18 0852, 100 mL/hr at 04/05/18 0852  •  sodium chloride 0.9 % flush 1-10 mL, 1-10 mL, Intravenous, PRN, Woo Holt MD  •  sodium chloride 0.9 % flush 10 mL, 10 mL, Intravenous, PRN, Paul Carr MD    Vital Sign Min/Max for last 24 hours  Temp  Min: 97.6 °F (36.4 °C)  Max: 98.3 °F (36.8 °C)   BP  Min: 148/77  Max: 158/65   Pulse  Min: 58  Max: 60   Resp  Min: 18  Max: 22   No Data Recorded   No Data Recorded   No Data Recorded     Flowsheet Rows    Flowsheet Row First Filed Value   Admission Height 175.3 cm (69\") " Documented at 04/03/2018 0840   Admission Weight 72.6 kg (160 lb) Documented at 04/03/2018 0840            Intake/Output Summary (Last 24 hours) at 04/05/18 0912  Last data filed at 04/05/18 0852   Gross per 24 hour   Intake             2240 ml   Output             2650 ml   Net             -410 ml       Physical Exam:     General Appearance:    Alert, cooperative, in no acute distress   Lungs:     CTA bilaterally    Heart:    RRR Nl S1 S2,     Chest Wall:    No abnormalities observed   Abdomen:     Normal bowel sounds, soft non-tender, non-distended   Extremities:   Moves all extremities well, no edema, no cyanosis, no             Redness, NL exam   Pulses:   Pulses palpable and equal bilaterally   Skin:   No bleeding, bruising or rash        Results Review:     Results from last 7 days  Lab Units 04/05/18  0638 04/04/18  0741 04/03/18  0858   WBC 10*3/mm3 4.22 4.64 7.71   HEMOGLOBIN g/dL 12.0 12.3 13.4   HEMATOCRIT % 36.7 36.5 40.0   PLATELETS 10*3/mm3 207 216 225       Results from last 7 days  Lab Units 04/05/18  0638 04/04/18  0741 04/03/18  1033   SODIUM mmol/L 140 141 134   POTASSIUM mmol/L 3.5 4.1 5.5   CHLORIDE mmol/L 98* 106 101   CO2 mmol/L 32.0* 25.0 18.0*   BUN mg/dL 25* 35* 40*   CREATININE mg/dL 2.30* 3.60* 4.70*   GLUCOSE mg/dL 105* 110* 81                Results from last 7 days  Lab Units 04/03/18  0858   TSH mIU/mL 2.705   FREE T4 ng/dL 1.32           Results from last 7 days  Lab Units 04/04/18  0741 04/03/18  0858   PROTIME Seconds 10.3 10.6   INR  0.98 1.01   APTT seconds  --  31.1*       Results from last 7 days  Lab Units 04/03/18  0858   TROPONIN I ng/mL 0.006       Intake/Output Summary (Last 24 hours) at 04/05/18 0912  Last data filed at 04/05/18 0852   Gross per 24 hour   Intake             2240 ml   Output             2650 ml   Net             -410 ml       I personally viewed and interpreted the patient's EKG/Telemetry data    Telemetry: NSR, HR 58-60 bpm, No AF    Ejection  Fraction  Lab Results   Component Value Date    EF 60 01/18/2017       Echo EF Estimated  Lab Results   Component Value Date    ECHOEFEST 60 11/18/2016         Present on Admission:  • Acute renal failure  • Hypertension  • Hypothyroidism  • Paroxysmal atrial fibrillation  • Monoclonal gammopathy    Assessment/Plan   1. Paroxysmal atrial fibrillation: clinically improved  - Previously managed on sotalol with maintenance of normal sinus rhythm, presented with altered mental status and MICHAELLE  - No afib during admission  - CrCl improved today to 29, continue holding sotalol and Eliquis for now (CHADSVasc = 2), once MICHAELLE resolves completely, can restart Eliquis at 5 mg BID.     2. Altered mental status:   - Likely due to sotalol/valtrex adverse reaction with MICHAELLE  - Neurology following, MRI head unremarkable  - Neurologically back to baseline     3. MICHAELLE:  - Initial creatine 4.7 upon admission, improved to 2.3 today  - Nephrology following, on bicarb gtt  - Repeat BMP after discharge    Plan for disposition: Patient is stable and is ok for discharge from an EP standpoint.  She will follow up with Dr. Johansen in 2 months. Agree with holding eliquis until Crea NL. No further sotalol for now      Yudi Gabby Clifton, APRN  04/05/18  9:12 AM      I, Pradip Johansen MD, personally performed the services face to face as described and documented by the above named individual. I have made any necessary edits and it is both accurate and complete 4/5/2018  10:15 AM

## 2018-04-05 NOTE — PROGRESS NOTES
Neurology       Patient Care Team:  ARMANDO Pugh as PCP - General (Family Medicine)  Michel White MD as Consulting Physician (Colon and Rectal Surgery)    Chief complaint: Confusion    History:  Confusion is clearing.    Patient remains off sotalol and Eliquis at the moment.      Past Medical History:   Diagnosis Date   • Atrial fibrillation    • Corneal abrasion    • Hemorrhoids     bleeding hemorrhoids   • History of anemia    • Hypertension    • Hypothyroidism    • Osteoarthritis        Vital Signs   Vitals:    04/04/18 1300 04/04/18 1650 04/04/18 1953 04/05/18 0757   BP: 148/77 151/95 150/70 158/65   BP Location:  Left arm  Left arm   Patient Position:  Sitting  Lying   Pulse: 58 60     Resp: 18 18 18 22   Temp: 97.9 °F (36.6 °C) 97.6 °F (36.4 °C) 98 °F (36.7 °C) 98.3 °F (36.8 °C)   TempSrc: Oral Oral Oral Oral   SpO2:       Weight:       Height:           Physical Exam:   General: Awake and alert              Neuro: Oriented to person place and time.    Speech is normal.        Results Review:  BUN is 25 creatinine is 2.3.  Calcium is 8.5    Results from last 7 days  Lab Units 04/05/18  0638   WBC 10*3/mm3 4.22   HEMOGLOBIN g/dL 12.0   HEMATOCRIT % 36.7   PLATELETS 10*3/mm3 207       Results from last 7 days  Lab Units 04/05/18  0638 04/04/18  0741 04/03/18  1033 04/03/18  0858   SODIUM mmol/L 140 141 134 133   POTASSIUM mmol/L 3.5 4.1 5.5 5.4   CHLORIDE mmol/L 98* 106 101 102   CO2 mmol/L 32.0* 25.0 18.0* 17.0*   BUN mg/dL 25* 35* 40* 37*   CREATININE mg/dL 2.30* 3.60* 4.70* 4.70*   CALCIUM mg/dL 8.5* 8.3* 8.7 8.9   BILIRUBIN mg/dL  --   --   --  0.5   ALK PHOS U/L  --   --   --  99   ALT (SGPT) U/L  --   --   --  12   AST (SGOT) U/L  --   --   --  18   GLUCOSE mg/dL 105* 110* 81 85       Imaging Results (last 24 hours)     ** No results found for the last 24 hours. **          Assessment:  Confusion secondary to Valtrex    Plan:  Okay to discharge anytime.    Dr. Johansen seen the patient  apparently is in charge of resuming Eliquis.    Comment:  Okay to discharge anytime.         I discussed the patients findings and my recommendations with patient    Balbir Donato MD  04/05/18  10:25 AM

## 2018-04-05 NOTE — PROGRESS NOTES
"Adult Nutrition  Assessment/PES    Patient Name:  Halie Perez  YOB: 1955  MRN: [id number]  Admit Date:  4/3/2018    Assessment Date:  4/5/2018    Comments:            Adult Nutrition Assessment     Row Name 04/05/18 1038       Nutrition Prescription PO    Current PO Diet Regular    Row Name 04/05/18 1037       Labs/Procedures/Meds    Lab Results Reviewed reviewed       Physical Findings    Gastrointestinal other (see comments)   WDL PER NURSING DOCUMENTATION    Skin other (see comments)   WDL PER NURSING DOCUMENTATION    Row Name 04/05/18 1036       Anthropometrics    Height 175.3 cm (69\")    Current Weight Method measured   STANDING    Weight 75.3 kg (166 lb)       Ideal Body Weight (IBW)    Ideal Body Weight (IBW) (kg) 66.43    % Ideal Body Weight 113.34       Body Mass Index (BMI)    BMI (kg/m2) 24.57       IBW Adjustment, Para/Tetraplegia    5% Adjustment, Para (IBW) 63.11    10% Adjustment, Para (IBW) 59.79    10% Adjustment, Tetra (IBW) 59.79    15% Adjustment, Tetra (IBW) 56.47    Row Name 04/05/18 1031       Nutrition/Diet History    Typical Food/Fluid Intake PT REPORTS SHE USUALLY EATS ONLY 1 MEAL/DAY WHICH IS SUPPER. SHE REPORTS SHE IS A [employer] VEGETARIAN DUE TO NOT LIKING THE TEXTURE OF MEAT. SHE REPORTS NOT A FLUID DRINKER OTHER THAN COFFEE. SHE SAYS EATING BREAKFAST JUST MAKES HER WANT TO EAT ALL DAY LONG.     Row Name 04/05/18 1028       Reason for Assessment    Reason For Assessment physician consult   PER DISCUSSION WITH DR. MINOR, NUTRITION ASSESSMENT OF HEALTHY DIET TO FOLLOW AFTER DISCHARGE.    Diagnosis --   ACUTE RENAL FAILURE (IMPROVING), S/P CONFUSION & LOSS OF CO-ORDINATION. HX OF HTN, ANEMIA, HYPOTHYROIDISM, MONOCLONAL GAMMOPATHY, A-FIB, OA & FORMER SMOKER.            Problem/Interventions:        Problem 1     Row Name 04/05/18 1038       Nutrition Diagnoses Problem 1    Problem 1 Knowledge Deficit    Etiology (related to) MNT for Treatment/Condition    " Signs/Symptoms (evidenced by) Potential Information Deficit                          Education/Evaluation     Row Name 04/05/18 1038       Education    Education Provided education regarding;Other (comment)   PT VERBALIZED UNDERSTANDING. WRITTEN MATERIALS PROVIDED.    Provided education regarding Nutrition for age;Preventative health;Diet rationale;Key food habit change   KEY AREAS OF EMPHASIS PLACED ON NOT SKIPPING MEALS HAVING A HIGH QUALITY PRO SOURCE EACH MEAL, ADEQUATE NON-CAFFEINE CONTAINING FLUID FOR AGE & SODIUM INTAKE TO STAY WITHIN 1067-7835 MG/DAY IN VIEW OF HTN. MINIMUM FOOD GROUP REQUIREMENTS ALSO PROVIDED.          Electronically signed by:  Ila Browne RD  04/05/18 10:59 AM

## 2018-04-05 NOTE — PROGRESS NOTES
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  Cr 0.7 12/17.  She has afib on eliquis and sotalol.  She has hbp on lisinopril.  She IgG lambda MGUS.  Urine neg for bjp.  She started valtrex last week.  Not taking nsaid.  Noted to be confused this am went to er found to have bean.  kyung negative for obstruction.  hgb and platelets normal.  Cr 4.7 k 5.5.  Subjective   Creatinine improved, no new events.  Good urine output.       Review of Systems:   Denies any nausea vomiting chest pain shortness of breath no headache, blurring of vision.      Objective     Vital Sign Min/Max for last 24 hours  Temp  Min: 97.6 °F (36.4 °C)  Max: 98.3 °F (36.8 °C)   BP  Min: 148/77  Max: 158/65   Pulse  Min: 58  Max: 60   Resp  Min: 18  Max: 22   No Data Recorded   No Data Recorded   No Data Recorded     Flowsheet Rows    Flowsheet Row First Filed Value   Admission Height 175.3 cm (69\") Documented at 04/03/2018 0840   Admission Weight 72.6 kg (160 lb) Documented at 04/03/2018 0840          I/O this shift:  In: 1000 [I.V.:1000]  Out: -   I/O last 3 completed shifts:  In: 1240 [P.O.:240; I.V.:1000]  Out: 3550 [Urine:3550]    Physical Exam:  General Appearance:Alert 28, no obvious distress, comfortable in chair.  Eyes: PER, EOMI.  Neck: Supple no JVD.  Lungs: Clear auscultation, no rales rhonchi's, equal chest movement, nonlabored.  Heart: No gallop, murmur, rub, RRR.  Abdomen: Soft, nontender, positive bowel sounds, no organomegaly.  Extremities: No edema, no cyanosis.  Neuro: Moving all extremities no focal deficit.  Psych: Mood and affect are normal and appropriate.      WBC WBC   Date Value Ref Range Status   04/05/2018 4.22 3.50 - 10.80 10*3/mm3 Final   04/04/2018 4.64 3.50 - 10.80 10*3/mm3 Final   04/03/2018 7.71 " 3.50 - 10.80 10*3/mm3 Final      HGB Hemoglobin   Date Value Ref Range Status   04/05/2018 12.0 11.5 - 15.5 g/dL Final   04/04/2018 12.3 11.5 - 15.5 g/dL Final   04/03/2018 13.4 11.5 - 15.5 g/dL Final      HCT Hematocrit   Date Value Ref Range Status   04/05/2018 36.7 34.5 - 44.0 % Final   04/04/2018 36.5 34.5 - 44.0 % Final   04/03/2018 40.0 34.5 - 44.0 % Final      Platlets No results found for: LABPLAT   MCV MCV   Date Value Ref Range Status   04/05/2018 96.3 80.0 - 99.0 fL Final   04/04/2018 95.1 80.0 - 99.0 fL Final   04/03/2018 96.6 80.0 - 99.0 fL Final          Sodium Sodium   Date Value Ref Range Status   04/05/2018 140 132 - 146 mmol/L Final   04/04/2018 141 132 - 146 mmol/L Final   04/03/2018 134 132 - 146 mmol/L Final   04/03/2018 133 132 - 146 mmol/L Final      Potassium Potassium   Date Value Ref Range Status   04/05/2018 3.5 3.5 - 5.5 mmol/L Final   04/04/2018 4.1 3.5 - 5.5 mmol/L Final     Comment:     Verified by repeat analysis.    04/03/2018 5.5 3.5 - 5.5 mmol/L Final   04/03/2018 5.4 3.5 - 5.5 mmol/L Final      Chloride Chloride   Date Value Ref Range Status   04/05/2018 98 (L) 99 - 109 mmol/L Final   04/04/2018 106 99 - 109 mmol/L Final   04/03/2018 101 99 - 109 mmol/L Final   04/03/2018 102 99 - 109 mmol/L Final      CO2 CO2   Date Value Ref Range Status   04/05/2018 32.0 (H) 20.0 - 31.0 mmol/L Final     Comment:     Verified by repeat analysis.    04/04/2018 25.0 20.0 - 31.0 mmol/L Final     Comment:     Verified by repeat analysis.    04/03/2018 18.0 (L) 20.0 - 31.0 mmol/L Final   04/03/2018 17.0 (L) 20.0 - 31.0 mmol/L Final      BUN BUN   Date Value Ref Range Status   04/05/2018 25 (H) 9 - 23 mg/dL Final   04/04/2018 35 (H) 9 - 23 mg/dL Final   04/03/2018 40 (H) 9 - 23 mg/dL Final   04/03/2018 37 (H) 9 - 23 mg/dL Final      Creatinine Creatinine   Date Value Ref Range Status   04/05/2018 2.30 (H) 0.60 - 1.30 mg/dL Final   04/04/2018 3.60 (H) 0.60 - 1.30 mg/dL Final   04/03/2018 4.70 (H) 0.60  - 1.30 mg/dL Final   04/03/2018 4.70 (H) 0.60 - 1.30 mg/dL Final      Calcium Calcium   Date Value Ref Range Status   04/05/2018 8.5 (L) 8.7 - 10.4 mg/dL Final   04/04/2018 8.3 (L) 8.7 - 10.4 mg/dL Final   04/03/2018 8.7 8.7 - 10.4 mg/dL Final   04/03/2018 8.9 8.7 - 10.4 mg/dL Final      PO4 No results found for: CAPO4   Albumin Albumin   Date Value Ref Range Status   04/04/2018 3.80 3.20 - 4.80 g/dL Final   04/03/2018 4.30 3.20 - 4.80 g/dL Final      Magnesium Magnesium   Date Value Ref Range Status   04/03/2018 2.0 1.3 - 2.7 mg/dL Final      Uric Acid No results found for: URICACID        Results Review:     I reviewed the patient's new clinical results.      atorvastatin 40 mg Oral Daily   citalopram 20 mg Oral Daily   ferrous sulfate 325 mg Oral Daily With Breakfast   levothyroxine 100 mcg Oral Daily   prednisoLONE acetate 1 drop Right Eye 4x Daily       sodium bicarbonate drip (greater than 75 mEq/bag) 100 mL/hr Last Rate: 100 mL/hr (04/05/18 0852)       Medication Review: Reviewed    Assessment/Plan     Active Problems:    Hypertension    History of anemia    Hypothyroidism    Monoclonal gammopathy    Paroxysmal atrial fibrillation    Acute renal failure    Ataxia    Confusion      Impression:   1 acute kidney injury: Most likely secondary to valtrex crystalluria, will ask pathologist to review the urine, blood in the urine so anticoagulant nephropathy is also possible.    Plan:  Stop eliquis and valtrex.   Improved confusion is likely 2/2 the valtrex also, no evidence for ttp/hus  Hypertension hold lisinopril Can be started later.  Okay to DC home today on 5 mg of amlodipine     Case discussed with Dr. Donato and with patient    Deyvi Watters MD  04/05/18  9:22 AM

## 2018-04-05 NOTE — DISCHARGE SUMMARY
[facility] Medicine Services  DISCHARGE SUMMARY    Patient Name: Halie Perez  : 1955  MRN: [id number]    Date of Admission: 4/3/2018  Date of Discharge:  2018  Primary Care Physician: ARMANDO Edwards    Consults     Date and Time Order Name Status Description    4/3/2018 1235 Inpatient Cardiology Consult      4/3/2018 1235 Inpatient Nephrology Consult      4/3/2018 1221 Cardiology (on-call MD unless specified)          Hospital Course     Presenting Problem:   Acute renal failure, unspecified acute renal failure type [N17.9]    Active Hospital Problems (** Indicates Principal Problem)    Diagnosis Date Noted   • Acute renal failure [N17.9] 2018     Priority: High   • Ataxia [R27.0] 2018     Priority: High   • Confusion [R41.0] 2018     Priority: High   • Paroxysmal atrial fibrillation [I48.0] 02/15/2017     Priority: Medium   • History of anemia [Z86.2]      Priority: Medium   • Monoclonal gammopathy [D47.2] 01/10/2017     Priority: Low   • Hypertension [I10]      Priority: Low   • Hypothyroidism [E03.9]      Priority: Low      Resolved Hospital Problems    Diagnosis Date Noted Date Resolved   No resolved problems to display.          Hospital Course:  Halie Perez is a 62 y.o. female  with PMH of Afib on Sotalol and Eliquis, HTN, Hypothyroidism, MGUS, and recent diagnosis of corneal abrasion/ulcer on Valtrex for possible viral etiology who presented to the ED with complaints of gait disturbance/ataxia and found to have MICHAELLE with Cr of 4.7. Symptoms suspected to be due drug-drug interaction of Valtrex and Sotalol.  She was admitted to our service for further treatment. Pt was started on IVFs,Valtrex, Sotalol and eliquis were d/c'd on admission.  Pt's creatinine improved and is now at 2.3.  We consulted EP (dr. Johansen), NAL (dr. Watters), and Neurology (dr. Donato) on admission.  Tele showed no afib while she was admitted here.    Haily suggested holding Sotalol on d/c and to resume Eliquis once her MICHAELLE resolves.  He will follow up with her in two months.  Neurology was consulted given ataxia on admission- MRI brain was negative for CVA.  Dr. Donato felt that her symptoms were likely due to medication interaction as well.  Her ataxia has resolved at this time. NAL ordered a renal ultrasound which showed no obstruction, only small left renal cyst. Pt's MICHAELLE was attributed to valtrex crystalluria.      Given her MICHAELLE, we held her ACEI as well on admission.  This will be d/c'd for time being, she can likely restart this at a later time when her MICHAELLE has resolved.  Renal added Amlodipine 5mg to her medication regimen.            Day of Discharge     HPI:   Doing well today, anxious to be discharged.     Review of Systems  Gen- No fevers, chills  CV- No chest pain, palpitations  Resp- No cough, dyspnea  GI- No N/V/D, abd pain    Otherwise ROS is negative except as mentioned in the HPI.    Vital Signs:   Temp:  [97.6 °F (36.4 °C)-98.3 °F (36.8 °C)] 98.3 °F (36.8 °C)  Heart Rate:  [58-60] 60  Resp:  [18-22] 22  BP: (148-158)/(65-95) 158/65     Physical Exam:  Constitutional: No acute distress, awake, alert  HENT: NCAT, mucous membranes moist  Respiratory: Clear to auscultation bilaterally, respiratory effort normal   Cardiovascular: RRR, no murmurs, rubs, or gallops, palpable pedal pulses bilaterally  Gastrointestinal: Positive bowel sounds, soft, nontender, nondistended  Musculoskeletal: No bilateral ankle edema  Psychiatric: Appropriate affect, cooperative  Neurologic: Oriented x 3, strength symmetric in all extremities, Cranial Nerves grossly intact to confrontation, speech clear  Skin: No rashes    Pertinent  and/or Most Recent Results       Results from last 7 days  Lab Units 04/05/18  0638 04/04/18  0741 04/03/18  1033 04/03/18  0858   WBC 10*3/mm3 4.22 4.64  --  7.71   HEMOGLOBIN g/dL 12.0 12.3  --  13.4   HEMATOCRIT % 36.7 36.5  --  40.0    PLATELETS 10*3/mm3 207 216  --  225   SODIUM mmol/L 140 141 134 133   POTASSIUM mmol/L 3.5 4.1 5.5 5.4   CHLORIDE mmol/L 98* 106 101 102   CO2 mmol/L 32.0* 25.0 18.0* 17.0*   BUN mg/dL 25* 35* 40* 37*   CREATININE mg/dL 2.30* 3.60* 4.70* 4.70*   GLUCOSE mg/dL 105* 110* 81 85   CALCIUM mg/dL 8.5* 8.3* 8.7 8.9       Results from last 7 days  Lab Units 04/04/18  0741 04/03/18  0858   BILIRUBIN mg/dL  --  0.5   ALK PHOS U/L  --  99   ALT (SGPT) U/L  --  12   AST (SGOT) U/L  --  18   PROTIME Seconds 10.3 10.6   INR  0.98 1.01   APTT seconds  --  31.1*           Invalid input(s): TG, LDLCALC, LDLREALC    Results from last 7 days  Lab Units 04/03/18  0858   TSH mIU/mL 2.705   TROPONIN I ng/mL 0.006     Brief Urine Lab Results  (Last result in the past 365 days)      Color   Clarity   Blood   Leuk Est   Nitrite   Protein   CREAT   Urine HCG        04/03/18 1613             34.5       04/03/18 1613 Yellow Clear Large (3+)(A) Negative Negative Negative               Microbiology Results Abnormal     None          Imaging Results (all)     Procedure Component Value Units Date/Time    XR Chest 1 View [[id number]] Collected:  04/03/18 1055     Updated:  04/03/18 2321    Narrative:       EXAMINATION: XR CHEST 1 VW- 04/03/2018     INDICATION: Weak/Dizzy/AMS triage protocol      COMPARISON: 01/08/2017     FINDINGS: Heart is mildly enlarged. The vasculature appears normal.  Lungs appear well-expanded and clear.           Impression:       Borderline cardiomegaly. No evidence of active chest  disease.     D:  04/03/2018  E:  04/03/2018     This report was finalized on 4/3/2018 11:19 PM by DR. Woo Lawrence MD.       MRI Brain Without Contrast [[id number]] Collected:  04/03/18 1359     Updated:  04/03/18 2316    Narrative:       EXAMINATION: MRI BRAIN WO CONTRAST- 04/03/2018     INDICATION: Stroke         TECHNIQUE: Sagittal and axial T1, axial T2 FLAIR diffusion weighted  images of the brain without contrast.     COMPARISON: NONE      FINDINGS: Patient history indicates altered mental status with slurred  speech and dizziness, difficulty writing.     There is no evidence of restricted diffusion to suggest acute  infarction. Remaining imaging sequences show age-appropriate generalized  cerebral atrophy. There is no evidence of previous infarct, no evidence  of intracranial mass or mass effect, hemorrhage, hydrocephalus or  abnormal extra-axial collection. There is expected flow signal in the  major intracranial arteries. Sagittal images show the midline structures  to appear grossly normal. Anatomy of the craniocervical junction appears  normal. No gross abnormalities are seen of the orbits, paranasal sinuses  or mastoids.       Impression:       Brain appears within normal limits for age.     D:  04/03/2018  E:  04/03/2018         This report was finalized on 4/3/2018 11:14 PM by DR. Woo Lawrence MD.       [employer] Renal Limited [[id number]] Collected:  04/03/18 1553     Updated:  04/03/18 1711    Narrative:       EXAMINATION:  RENAL LIMITED- 04/03/2018     INDICATION: N17.9-Acute kidney failure, unspecified; R41.82-Altered  mental status, unspecified; R27.0-Ataxia, unspecified; R48.8-Other  symbolic dysfunctions; R47.1-Dysarthria and anarthria;  T88.7XXA-Unspecified adverse effect of drug or medicament, initial  encounter; renal insufficiency     TECHNIQUE: Sonographic imaging was obtained of the kidneys in both the  sagittal and transverse planes.     COMPARISON: NONE     FINDINGS: The right kidney measures in length from pole to pole 10.0 cm.  There is no solid cortical mass or renal cortical cysts seen within the  right kidney. The left kidney measures in length from pole to pole 11.3  cm. Small cyst identified on the left kidney measuring in length from  pole to pole 1.6 cm. The bladder is slightly distended with no gross  mass or lesion present.       Impression:       There is a small left renal cortical cyst. The right kidney  is unremarkable.  Bladder is within normal limits.     D:  04/03/2018  E:  04/03/2018         This report was finalized on 4/3/2018 5:08 PM by Dr. Jayashree Aguilar MD.                       Results for orders placed during the hospital encounter of 11/18/16   Adult Transthoracic Echo Complete    Narrative · Left ventricular function is normal. Estimated EF = 60%.  · All left ventricular wall segments contract normally.  · Moderate mitral valve regurgitation is present  · RVSP(TR) 41 mmHg           Order Current Status    ask the pathologist to look at the urine for valtrex crystals please - Miscellaneous Test In process        Discharge Details      Halie Perez   Home Medication Instructions AASHISH:[id number]    Printed on:04/05/18 1057   Medication Information                      amLODIPine (NORVASC) 5 MG tablet  Take 1 tablet by mouth Daily.             ascorbic acid (VITAMIN C) 500 MG tablet  Take 500 mg by mouth Daily.             atorvastatin (LIPITOR) 40 MG tablet  Take 1 tablet by mouth Daily.             citalopram (CeleXA) 20 MG tablet  Take 20 mg by mouth Daily.             ferrous sulfate 325 (65 FE) MG tablet  Take 1 tablet by mouth 2 (Two) Times a Day With Meals.             levothyroxine (SYNTHROID, LEVOTHROID) 100 MCG tablet  Take 100 mcg by mouth Daily.             pantoprazole (PROTONIX) 20 MG EC tablet  Take 20 mg by mouth Daily.             prednisoLONE acetate (PRED FORTE) 1 % ophthalmic suspension  Administer 1 drop to the right eye 4 (Four) Times a Day. Pt Started on 4/1/18                   Discharge Disposition:  Home or Self Care    Discharge Diet:  Diet Instructions     Diet: Regular       Discharge Diet:  Regular          Discharge Activity:   Activity Instructions     Activity as Tolerated             Special Instructions:    Future Appointments  Date Time Provider Department Center   6/11/2018 2:30 PM ARMANDO Acuña [facility]   6/13/2018 11:15 AM Pradip Johansen MD [facility] None        Additional Instructions for the Follow-ups that You Need to Schedule     Discharge Follow-up with PCP    As directed      Follow Up Details:  PCP in one week         Discharge Follow-up with Specialty: Dr. Johansen; 2 Months    As directed      Specialty:  Dr. Johansen    Follow Up:  2 Months         Additional information on Labs and Follow-ups:      Office will call with f/u appt date and time.                    Time Spent on Discharge:  35 minutes    Electronically signed by Brittney Benavides MD, 04/05/18, 10:58 AM.

## 2018-04-10 ENCOUNTER — LAB (OUTPATIENT)
Dept: LAB | Facility: HOSPITAL | Age: 63
End: 2018-04-10

## 2018-04-10 ENCOUNTER — TRANSCRIBE ORDERS (OUTPATIENT)
Dept: LAB | Facility: HOSPITAL | Age: 63
End: 2018-04-10

## 2018-04-10 DIAGNOSIS — Z00.00 ROUTINE GENERAL MEDICAL EXAMINATION AT A HEALTH CARE FACILITY: ICD-10-CM

## 2018-04-10 DIAGNOSIS — Z00.00 ROUTINE GENERAL MEDICAL EXAMINATION AT A HEALTH CARE FACILITY: Primary | ICD-10-CM

## 2018-04-10 LAB
ANION GAP SERPL CALCULATED.3IONS-SCNC: 9 MMOL/L (ref 3–11)
BUN BLD-MCNC: 19 MG/DL (ref 9–23)
BUN/CREAT SERPL: 19 (ref 7–25)
CALCIUM SPEC-SCNC: 9.6 MG/DL (ref 8.7–10.4)
CHLORIDE SERPL-SCNC: 104 MMOL/L (ref 99–109)
CO2 SERPL-SCNC: 24 MMOL/L (ref 20–31)
CREAT BLD-MCNC: 1 MG/DL (ref 0.6–1.3)
GFR SERPL CREATININE-BSD FRML MDRD: 56 ML/MIN/1.73
GLUCOSE BLD-MCNC: 81 MG/DL (ref 70–100)
POTASSIUM BLD-SCNC: 4.1 MMOL/L (ref 3.5–5.5)
SODIUM BLD-SCNC: 137 MMOL/L (ref 132–146)

## 2018-04-10 PROCEDURE — 80048 BASIC METABOLIC PNL TOTAL CA: CPT

## 2018-04-10 PROCEDURE — 36415 COLL VENOUS BLD VENIPUNCTURE: CPT

## 2018-04-25 ENCOUNTER — TRANSCRIBE ORDERS (OUTPATIENT)
Dept: LAB | Facility: HOSPITAL | Age: 63
End: 2018-04-25

## 2018-04-25 ENCOUNTER — APPOINTMENT (OUTPATIENT)
Dept: LAB | Facility: HOSPITAL | Age: 63
End: 2018-04-25

## 2018-04-25 DIAGNOSIS — B60.13 ACANTHAMOEBA KERATITIS: Primary | ICD-10-CM

## 2018-04-25 LAB
GRAM STN SPEC: NORMAL
GRAM STN SPEC: NORMAL

## 2018-04-25 PROCEDURE — 87220 TISSUE EXAM FOR FUNGI: CPT | Performed by: OPHTHALMOLOGY

## 2018-04-25 PROCEDURE — 36415 COLL VENOUS BLD VENIPUNCTURE: CPT | Performed by: OPHTHALMOLOGY

## 2018-04-25 PROCEDURE — 87205 SMEAR GRAM STAIN: CPT | Performed by: OPHTHALMOLOGY

## 2018-04-26 LAB — REF LAB TEST RESULTS: NORMAL

## 2018-06-05 ENCOUNTER — LAB (OUTPATIENT)
Dept: LAB | Facility: HOSPITAL | Age: 63
End: 2018-06-05

## 2018-06-05 DIAGNOSIS — D47.2 MONOCLONAL GAMMOPATHY: ICD-10-CM

## 2018-06-05 LAB
ALBUMIN SERPL-MCNC: 4.5 G/DL (ref 3.2–4.8)
ALBUMIN/GLOB SERPL: 1.7 G/DL (ref 1.5–2.5)
ALP SERPL-CCNC: 90 U/L (ref 25–100)
ALT SERPL W P-5'-P-CCNC: 21 U/L (ref 7–40)
ANION GAP SERPL CALCULATED.3IONS-SCNC: 5 MMOL/L (ref 3–11)
AST SERPL-CCNC: 29 U/L (ref 0–33)
BASOPHILS # BLD AUTO: 0.03 10*3/MM3 (ref 0–0.2)
BASOPHILS NFR BLD AUTO: 0.7 % (ref 0–1)
BILIRUB SERPL-MCNC: 0.7 MG/DL (ref 0.3–1.2)
BUN BLD-MCNC: 25 MG/DL (ref 9–23)
BUN/CREAT SERPL: 31.3 (ref 7–25)
CA-I SERPL ISE-MCNC: 1.29 MMOL/L (ref 1.12–1.32)
CALCIUM SPEC-SCNC: 9.4 MG/DL (ref 8.7–10.4)
CHLORIDE SERPL-SCNC: 108 MMOL/L (ref 99–109)
CO2 SERPL-SCNC: 25 MMOL/L (ref 20–31)
CREAT BLD-MCNC: 0.8 MG/DL (ref 0.6–1.3)
CRP SERPL-MCNC: 0.02 MG/DL (ref 0–1)
DEPRECATED RDW RBC AUTO: 46.2 FL (ref 37–54)
EOSINOPHIL # BLD AUTO: 0.11 10*3/MM3 (ref 0–0.3)
EOSINOPHIL NFR BLD AUTO: 2.5 % (ref 0–3)
ERYTHROCYTE [DISTWIDTH] IN BLOOD BY AUTOMATED COUNT: 13.2 % (ref 11.3–14.5)
ERYTHROCYTE [SEDIMENTATION RATE] IN BLOOD: 9 MM/HR (ref 0–30)
GFR SERPL CREATININE-BSD FRML MDRD: 72 ML/MIN/1.73
GLOBULIN UR ELPH-MCNC: 2.7 GM/DL
GLUCOSE BLD-MCNC: 111 MG/DL (ref 70–100)
HCT VFR BLD AUTO: 38.3 % (ref 34.5–44)
HGB BLD-MCNC: 12.6 G/DL (ref 11.5–15.5)
IMM GRANULOCYTES # BLD: 0 10*3/MM3 (ref 0–0.03)
IMM GRANULOCYTES NFR BLD: 0 % (ref 0–0.6)
LYMPHOCYTES # BLD AUTO: 1.08 10*3/MM3 (ref 0.6–4.8)
LYMPHOCYTES NFR BLD AUTO: 24.8 % (ref 24–44)
MCH RBC QN AUTO: 31.7 PG (ref 27–31)
MCHC RBC AUTO-ENTMCNC: 32.9 G/DL (ref 32–36)
MCV RBC AUTO: 96.2 FL (ref 80–99)
MONOCYTES # BLD AUTO: 0.44 10*3/MM3 (ref 0–1)
MONOCYTES NFR BLD AUTO: 10.1 % (ref 0–12)
NEUTROPHILS # BLD AUTO: 2.69 10*3/MM3 (ref 1.5–8.3)
NEUTROPHILS NFR BLD AUTO: 61.9 % (ref 41–71)
PLATELET # BLD AUTO: 252 10*3/MM3 (ref 150–450)
PMV BLD AUTO: 10.1 FL (ref 6–12)
POTASSIUM BLD-SCNC: 4.3 MMOL/L (ref 3.5–5.5)
PROT SERPL-MCNC: 7.2 G/DL (ref 5.7–8.2)
RBC # BLD AUTO: 3.98 10*6/MM3 (ref 3.89–5.14)
SODIUM BLD-SCNC: 138 MMOL/L (ref 132–146)
WBC NRBC COR # BLD: 4.35 10*3/MM3 (ref 3.5–10.8)

## 2018-06-05 PROCEDURE — 82232 ASSAY OF BETA-2 PROTEIN: CPT

## 2018-06-05 PROCEDURE — 84165 PROTEIN E-PHORESIS SERUM: CPT

## 2018-06-05 PROCEDURE — 82784 ASSAY IGA/IGD/IGG/IGM EACH: CPT

## 2018-06-05 PROCEDURE — 85652 RBC SED RATE AUTOMATED: CPT

## 2018-06-05 PROCEDURE — 83883 ASSAY NEPHELOMETRY NOT SPEC: CPT

## 2018-06-05 PROCEDURE — 86140 C-REACTIVE PROTEIN: CPT

## 2018-06-05 PROCEDURE — 82330 ASSAY OF CALCIUM: CPT

## 2018-06-05 PROCEDURE — 80053 COMPREHEN METABOLIC PANEL: CPT

## 2018-06-05 PROCEDURE — 36415 COLL VENOUS BLD VENIPUNCTURE: CPT

## 2018-06-05 PROCEDURE — 85025 COMPLETE CBC W/AUTO DIFF WBC: CPT

## 2018-06-05 PROCEDURE — 86334 IMMUNOFIX E-PHORESIS SERUM: CPT

## 2018-06-06 LAB
ALBUMIN SERPL-MCNC: 4 G/DL (ref 2.9–4.4)
ALBUMIN/GLOB SERPL: 1.4 {RATIO} (ref 0.7–1.7)
ALPHA1 GLOB FLD ELPH-MCNC: 0.2 G/DL (ref 0–0.4)
ALPHA2 GLOB SERPL ELPH-MCNC: 0.7 G/DL (ref 0.4–1)
B-GLOBULIN SERPL ELPH-MCNC: 0.8 G/DL (ref 0.7–1.3)
GAMMA GLOB SERPL ELPH-MCNC: 1.3 G/DL (ref 0.4–1.8)
GLOBULIN SER CALC-MCNC: 3 G/DL (ref 2.2–3.9)
IGA SERPL-MCNC: 105 MG/DL (ref 87–352)
IGG SERPL-MCNC: 1097 MG/DL (ref 700–1600)
IGM SERPL-MCNC: 58 MG/DL (ref 26–217)
INTERPRETATION SERPL IEP-IMP: ABNORMAL
KAPPA LC SERPL-MCNC: 16.6 MG/L (ref 3.3–19.4)
KAPPA LC/LAMBDA SER: 0.12 {RATIO} (ref 0.26–1.65)
LAMBDA LC FREE SERPL-MCNC: 133.6 MG/L (ref 5.7–26.3)
Lab: ABNORMAL
M-SPIKE: 0.7 G/DL
PROT SERPL-MCNC: 7 G/DL (ref 6–8.5)

## 2018-06-07 LAB — B2 MICROGLOB SERPL-MCNC: 2.3 MG/L (ref 0.6–2.4)

## 2018-06-12 ENCOUNTER — OFFICE VISIT (OUTPATIENT)
Dept: ONCOLOGY | Facility: CLINIC | Age: 63
End: 2018-06-12

## 2018-06-12 VITALS
HEART RATE: 70 BPM | SYSTOLIC BLOOD PRESSURE: 137 MMHG | TEMPERATURE: 97.5 F | BODY MASS INDEX: 23.7 KG/M2 | HEIGHT: 69 IN | RESPIRATION RATE: 16 BRPM | WEIGHT: 160 LBS | DIASTOLIC BLOOD PRESSURE: 74 MMHG

## 2018-06-12 DIAGNOSIS — D47.2 MONOCLONAL GAMMOPATHY: Primary | ICD-10-CM

## 2018-06-12 PROBLEM — D64.9 ANEMIA: Status: RESOLVED | Noted: 2017-03-05 | Resolved: 2018-06-12

## 2018-06-12 PROCEDURE — 99213 OFFICE O/P EST LOW 20 MIN: CPT | Performed by: NURSE PRACTITIONER

## 2018-06-12 RX ORDER — LISINOPRIL 40 MG/1
40 TABLET ORAL DAILY
Status: ON HOLD | COMMUNITY
End: 2022-02-22

## 2018-06-13 ENCOUNTER — OFFICE VISIT (OUTPATIENT)
Dept: CARDIOLOGY | Facility: CLINIC | Age: 63
End: 2018-06-13

## 2018-06-13 VITALS
HEART RATE: 82 BPM | WEIGHT: 156 LBS | HEIGHT: 69 IN | SYSTOLIC BLOOD PRESSURE: 130 MMHG | BODY MASS INDEX: 23.11 KG/M2 | DIASTOLIC BLOOD PRESSURE: 64 MMHG

## 2018-06-13 DIAGNOSIS — D64.9 SYMPTOMATIC ANEMIA: ICD-10-CM

## 2018-06-13 DIAGNOSIS — N17.9 ACUTE RENAL FAILURE, UNSPECIFIED ACUTE RENAL FAILURE TYPE (HCC): ICD-10-CM

## 2018-06-13 DIAGNOSIS — I10 ESSENTIAL HYPERTENSION: ICD-10-CM

## 2018-06-13 DIAGNOSIS — I48.0 PAROXYSMAL ATRIAL FIBRILLATION (HCC): Primary | ICD-10-CM

## 2018-06-13 PROCEDURE — 99213 OFFICE O/P EST LOW 20 MIN: CPT | Performed by: INTERNAL MEDICINE

## 2018-06-13 NOTE — PROGRESS NOTES
Halie Perez  1955  [phone]      06/13/2018    [facility] CARDIOLOGY     Tori Iverson, APRN  [address] [address]    Chief Complaint   Patient presents with   • Atrial Fibrillation         Problem List:   1. Atrial Fibrillation  A. CHADSvasc =2 On Eliquis  B. Echocardiogram 11/18/2016: EF 60%, moderate MR  C. Event Monitor 11/18/16-12/17/16 showing episodes of PAF up to 110 bpm  D. Intolerant to Flecainide due to QRS widening  E. Started on Sotalol 1/4/16, now on Sotalol 80 mg daily  F. Lexiscan Cardiolite 1/18/17 : EF 60%, Abnormal myocardial perfusion scan showing medium sized area of moderate to severe hypoperfusion involving the mid anterior wall and the anterior apical segment with mild reversibility. This finding is consistent with breast attenuation artifact versus infarction with ischemia. Clinical correlation is recommended    2. HTN  3. Hypothyroidism  4. HLP  5. Surgical History  A. Tonsillectomy  B. Total hip replacement  C. Mammoplasy  6. Symptomatic Anemia with Hemoglobin 4.8   1/2017- normal colonscopy, EGD, and pill camera  Internal Hemorrhoids s/p hemorrhoidectomy  7. MICHAELLE with Cr of 4.7. Symptoms suspected to be due drug-drug interaction of Valtrex and Sotalol. 4/18      Allergies  Allergies   Allergen Reactions   • Sulfa Antibiotics Anaphylaxis       Current Medications    Current Outpatient Prescriptions:   •  ascorbic acid (VITAMIN C) 500 MG tablet, Take 500 mg by mouth Daily., Disp: , Rfl:   •  atorvastatin (LIPITOR) 40 MG tablet, Take 1 tablet by mouth Daily., Disp: 30 tablet, Rfl: 1  •  citalopram (CeleXA) 20 MG tablet, Take 20 mg by mouth Daily., Disp: , Rfl:   •  ferrous sulfate 325 (65 FE) MG tablet, Take 1 tablet by mouth 2 (Two) Times a Day With Meals. (Patient taking differently: Take 325 mg by mouth Daily With Breakfast.), Disp: , Rfl:   •  levothyroxine (SYNTHROID, LEVOTHROID) 100 MCG tablet, Take 100 mcg by  "mouth Daily., Disp: , Rfl:   •  lisinopril (PRINIVIL,ZESTRIL) 40 MG tablet, Take 40 mg by mouth Daily., Disp: , Rfl:   •  pantoprazole (PROTONIX) 20 MG EC tablet, Take 20 mg by mouth Daily., Disp: , Rfl:     History of Present Illness   HPI    Pt presents for follow up of AF/HTN. Since we last saw the pt, pt was admitted for MICHAELLE due to drug drug interaction with sotalol and valtrex in 4/18. Her meds were stopped. Since then, she denies any AF episodes, SOB, CP, LH, and dizziness. Denies any bleeding, or TIA/CVA symptoms. Overall feels well. Bp at home stable. 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  Feels good    ROS:  General:  No fatigue, No weight gain or loss  Cardiovascular:  Denies CP, PND, syncope, near syncope, edema or palpitations.  Pulmonary:  Denies HOFFMAN, cough, or wheezing      Vitals:    06/13/18 1109   BP: 130/64   BP Location: Left arm   Patient Position: Sitting   Pulse: 82   Weight: 70.8 kg (156 lb)   Height: 175.3 cm (69\")     Body mass index is 23.04 kg/m².  PE:  General: NAD  Neck: no JVD, no carotid bruits, no TM  Heart RRR, NL S1, S2, no rubs, murmurs  Lungs: CTA, no wheezes, rhonchi, or rales  Abd: soft, non-tender, NL BS  Ext: No musculoskeletal deformities, no edema, cyanosis, or clubbing  Psych: normal mood and affect    Diagnostic Data:      Procedures    1. Paroxysmal atrial fibrillation    2. Essential hypertension    3. Acute renal failure, unspecified acute renal failure type    4. Symptomatic anemia          Plan:  1) PAF: no recurrence  Continue present medications.   2) Anticoagulation  Continue NOAC  3) HTN: well controlled   Wt loss, exercise, salt reduction  4) MICHAELLE: resolved  5) Anemia: resolved    F/up in 12 months      "

## 2018-12-12 ENCOUNTER — LAB (OUTPATIENT)
Dept: LAB | Facility: HOSPITAL | Age: 63
End: 2018-12-12

## 2018-12-12 DIAGNOSIS — D47.2 MONOCLONAL GAMMOPATHY: ICD-10-CM

## 2018-12-12 LAB
ALBUMIN SERPL-MCNC: 4.62 G/DL (ref 3.2–4.8)
ALBUMIN/GLOB SERPL: 1.9 G/DL (ref 1.5–2.5)
ALP SERPL-CCNC: 83 U/L (ref 25–100)
ALT SERPL W P-5'-P-CCNC: 26 U/L (ref 7–40)
ANION GAP SERPL CALCULATED.3IONS-SCNC: 6 MMOL/L (ref 3–11)
AST SERPL-CCNC: 43 U/L (ref 0–33)
BILIRUB SERPL-MCNC: 0.7 MG/DL (ref 0.3–1.2)
BUN BLD-MCNC: 12 MG/DL (ref 9–23)
BUN/CREAT SERPL: 14 (ref 7–25)
CA-I SERPL ISE-MCNC: 1.33 MMOL/L (ref 1.12–1.32)
CALCIUM SPEC-SCNC: 9.5 MG/DL (ref 8.7–10.4)
CHLORIDE SERPL-SCNC: 107 MMOL/L (ref 99–109)
CO2 SERPL-SCNC: 25 MMOL/L (ref 20–31)
CREAT BLD-MCNC: 0.86 MG/DL (ref 0.6–1.3)
CRP SERPL-MCNC: <0.01 MG/DL (ref 0–1)
ERYTHROCYTE [DISTWIDTH] IN BLOOD BY AUTOMATED COUNT: 14 % (ref 11.3–14.5)
ERYTHROCYTE [SEDIMENTATION RATE] IN BLOOD: 7 MM/HR (ref 0–30)
GFR SERPL CREATININE-BSD FRML MDRD: 66 ML/MIN/1.73
GLOBULIN UR ELPH-MCNC: 2.4 GM/DL
GLUCOSE BLD-MCNC: 98 MG/DL (ref 70–100)
HCT VFR BLD AUTO: 39.5 % (ref 34.5–44)
HGB BLD-MCNC: 13.1 G/DL (ref 11.5–15.5)
LYMPHOCYTES # BLD AUTO: 1.5 10*3/MM3 (ref 0.6–4.8)
LYMPHOCYTES NFR BLD AUTO: 35.6 % (ref 24–44)
MCH RBC QN AUTO: 32.6 PG (ref 27–31)
MCHC RBC AUTO-ENTMCNC: 33.2 G/DL (ref 32–36)
MCV RBC AUTO: 98.1 FL (ref 80–99)
MONOCYTES # BLD AUTO: 0.3 10*3/MM3 (ref 0–1)
MONOCYTES NFR BLD AUTO: 6.2 % (ref 0–12)
NEUTROPHILS # BLD AUTO: 2.4 10*3/MM3 (ref 1.5–8.3)
NEUTROPHILS NFR BLD AUTO: 58.2 % (ref 41–71)
PLATELET # BLD AUTO: 235 10*3/MM3 (ref 150–450)
PMV BLD AUTO: 7.6 FL (ref 6–12)
POTASSIUM BLD-SCNC: 4.9 MMOL/L (ref 3.5–5.5)
PROT SERPL-MCNC: 7 G/DL (ref 5.7–8.2)
RBC # BLD AUTO: 4.03 10*6/MM3 (ref 3.89–5.14)
SODIUM BLD-SCNC: 138 MMOL/L (ref 132–146)
WBC NRBC COR # BLD: 4.2 10*3/MM3 (ref 3.5–10.8)

## 2018-12-12 PROCEDURE — 83883 ASSAY NEPHELOMETRY NOT SPEC: CPT

## 2018-12-12 PROCEDURE — 85025 COMPLETE CBC W/AUTO DIFF WBC: CPT

## 2018-12-12 PROCEDURE — 82232 ASSAY OF BETA-2 PROTEIN: CPT

## 2018-12-12 PROCEDURE — 86334 IMMUNOFIX E-PHORESIS SERUM: CPT

## 2018-12-12 PROCEDURE — 80053 COMPREHEN METABOLIC PANEL: CPT

## 2018-12-12 PROCEDURE — 86140 C-REACTIVE PROTEIN: CPT

## 2018-12-12 PROCEDURE — 85652 RBC SED RATE AUTOMATED: CPT

## 2018-12-12 PROCEDURE — 82330 ASSAY OF CALCIUM: CPT

## 2018-12-12 PROCEDURE — 36415 COLL VENOUS BLD VENIPUNCTURE: CPT

## 2018-12-12 PROCEDURE — 82784 ASSAY IGA/IGD/IGG/IGM EACH: CPT

## 2018-12-12 PROCEDURE — 84165 PROTEIN E-PHORESIS SERUM: CPT

## 2018-12-13 LAB
ALBUMIN SERPL-MCNC: 4.4 G/DL (ref 2.9–4.4)
ALBUMIN/GLOB SERPL: 1.5 {RATIO} (ref 0.7–1.7)
ALPHA1 GLOB FLD ELPH-MCNC: 0.2 G/DL (ref 0–0.4)
ALPHA2 GLOB SERPL ELPH-MCNC: 0.7 G/DL (ref 0.4–1)
B-GLOBULIN SERPL ELPH-MCNC: 0.9 G/DL (ref 0.7–1.3)
GAMMA GLOB SERPL ELPH-MCNC: 1.3 G/DL (ref 0.4–1.8)
GLOBULIN SER CALC-MCNC: 3.1 G/DL (ref 2.2–3.9)
IGA SERPL-MCNC: 125 MG/DL (ref 87–352)
IGG SERPL-MCNC: 1304 MG/DL (ref 700–1600)
IGM SERPL-MCNC: 64 MG/DL (ref 26–217)
INTERPRETATION SERPL IEP-IMP: ABNORMAL
KAPPA LC SERPL-MCNC: 18 MG/L (ref 3.3–19.4)
KAPPA LC/LAMBDA SER: 0.18 {RATIO} (ref 0.26–1.65)
LAMBDA LC FREE SERPL-MCNC: 98.5 MG/L (ref 5.7–26.3)
Lab: ABNORMAL
M-SPIKE: 0.8 G/DL
PROT SERPL-MCNC: 7.5 G/DL (ref 6–8.5)

## 2018-12-14 LAB — B2 MICROGLOB SERPL-MCNC: 2.5 MG/L (ref 0.6–2.4)

## 2018-12-17 ENCOUNTER — LAB (OUTPATIENT)
Dept: LAB | Facility: HOSPITAL | Age: 63
End: 2018-12-17

## 2018-12-17 DIAGNOSIS — D47.2 MONOCLONAL GAMMOPATHY: ICD-10-CM

## 2018-12-17 PROCEDURE — 81050 URINALYSIS VOLUME MEASURE: CPT

## 2018-12-17 PROCEDURE — 86335 IMMUNFIX E-PHORSIS/URINE/CSF: CPT

## 2018-12-17 PROCEDURE — 84156 ASSAY OF PROTEIN URINE: CPT

## 2018-12-17 PROCEDURE — 84166 PROTEIN E-PHORESIS/URINE/CSF: CPT

## 2018-12-20 ENCOUNTER — OFFICE VISIT (OUTPATIENT)
Dept: ONCOLOGY | Facility: CLINIC | Age: 63
End: 2018-12-20

## 2018-12-20 ENCOUNTER — HOSPITAL ENCOUNTER (OUTPATIENT)
Dept: GENERAL RADIOLOGY | Facility: HOSPITAL | Age: 63
Discharge: HOME | End: 2018-12-20
Admitting: NURSE PRACTITIONER

## 2018-12-20 VITALS
HEART RATE: 75 BPM | DIASTOLIC BLOOD PRESSURE: 77 MMHG | TEMPERATURE: 97.9 F | RESPIRATION RATE: 16 BRPM | WEIGHT: 162 LBS | OXYGEN SATURATION: 95 % | SYSTOLIC BLOOD PRESSURE: 153 MMHG | BODY MASS INDEX: 23.99 KG/M2 | HEIGHT: 69 IN

## 2018-12-20 DIAGNOSIS — D47.2 MONOCLONAL GAMMOPATHY: ICD-10-CM

## 2018-12-20 DIAGNOSIS — D47.2 MONOCLONAL GAMMOPATHY: Primary | Chronic | ICD-10-CM

## 2018-12-20 LAB
ALBUMIN 24H MFR UR ELPH: 24.8 %
ALPHA1 GLOB 24H MFR UR ELPH: 12.5 %
ALPHA2 GLOB 24H MFR UR ELPH: 19 %
B-GLOBULIN MFR UR ELPH: 26.8 %
GAMMA GLOB 24H MFR UR ELPH: 17 %
HIV 1 & 2 AB SER-IMP: NORMAL
INTERPRETATION UR IFE-IMP: NORMAL
M PROTEIN 24H MFR UR ELPH: NORMAL %
PROT 24H UR-MRATE: <74 MG/24 HR (ref 30–150)
PROT UR-MCNC: <4 MG/DL

## 2018-12-20 PROCEDURE — 77075 RADEX OSSEOUS SURVEY COMPL: CPT

## 2018-12-20 PROCEDURE — 99213 OFFICE O/P EST LOW 20 MIN: CPT | Performed by: INTERNAL MEDICINE

## 2018-12-20 NOTE — PROGRESS NOTES
CHIEF COMPLAINT: Monoclonal gammopathy    Problem List:  Oncology/Hematology History    1.)  Monoclonal gammopathy    a) March 2017 24-hour urine immunoelectrophoresis showed no monoclonal spike, lambda light chains 106 with kappa light chains 24 and a kappa to lambda ratio of 0.23 with normal IgA, G, and M levels.  January 2017 serum immunoelectrophoresis showed 600 mg/dL of immunoglobulin G lambda light chains in the serum.  3/8/17 bone marrow biopsy showed normal marrow with 3% plasma cells and a small lambda clonal population identified with no stainable iron.  There was erythroid expansion with mild maturation dyssynchrony and occasional atypical nuclear features that may represent the compensatory response to her anemia though myelodysplasia could not entirely be ruled out.  Had reactive aggregates of lymphoid tissue.     b) 6/13/2017 sedimentation rate 8, immunoglobulin free light chains free kappa light chains 25.7, free lambda light chains 173.4, kappa/lambda ratio 0.15.  Serum immunoelectrophoresis M-spike 0.9g/dL, C-reactive protein less than 0.01, CMP normal, ionized calcium 1.31, beta-2 microglobulin 3.1, CBC WBC 5500, hemoglobin 14.2, hematocrit 44.6%, MCV 94.8, platelets 259,000.  24-hour urine immunoelectrophoresis pending.  2.)  History of anemia, resolved after hemorrhoidectomy repair and on oral iron.  CBC on 6/13/2017 with a hemoglobin of 14.2, hematocrit 44.6%, MCV of 94.8.            Monoclonal gammopathy     Initial Diagnosis     Monoclonal gammopathy         11/29/2017 Imaging     Bone survey IMPRESSION:  Negative skeletal survey.         6/5/2018 -  Other Event     6/5/2018 labs:  CBC WBC 4350, hemoglobin 12.6, hematocrit 38.3%, platelet count 252,000.  CMP creatinine 0.8, serum calcium 9.4.  Ionized calcium 1.29.  Beta-2 microglobulin 2.3.  Sedimentation rate 9, CRP 0.02.  Kappa/lambda light chains-normal kappa light chains 16.6, lambda light chains elevated at 133.6, kappa/lambda ratio  0.12.  Serum immunoelectrophoresis M-spike stable 0.7 g/dL.           12/20/2018 -  Other Event     24-hour urine monoclonal protein showed no monoclonal protein.  Beta-2 microglobulin 2.5.  AST 43.  Creatinine 0.86.  Calcium 9.5.  Ionized calcium 1.3.  C-reactive protein less than 0.01.  Sedimentation rate 7.  Serum monoclonal protein 800 mg/dL of immunoglobulin G lambda.  Lambda monoclonal protein down to 98.5 with a kappa to lambda ratio 0.18.  CBC is normal.  Total body bone survey done on the day of her visit 12/20/18 was not read by the time of her visit.            HISTORY OF PRESENT ILLNESS:  The patient is a 63 y.o. female, here for follow up on management of monoclonal gammopathy.  Her monoclonal spike is small and stable in the serum, nonexistent in the urine, and the light chains are actually decreasing over time.  Bone survey was done today and results are not back yet.  CBC is normal and her creatinine is as well.  Calcium also normal.  Sedimentation rate and C-reactive protein do not suggest any active process either.      Past Medical History:   Diagnosis Date   • Atrial fibrillation (CMS/HCC)    • Corneal abrasion    • Hemorrhoids     bleeding hemorrhoids   • History of anemia    • Hypertension    • Hypothyroidism    • Osteoarthritis      Past Surgical History:   Procedure Laterality Date   • AUGMENTATION MAMMAPLASTY Bilateral 2008   • BONE MARROW BIOPSY Left 3/8/2017    Procedure: BONE MARROW BIOPSY;  Surgeon: Michel White MD;  Location:  [facility];  Service:    • COLONOSCOPY  01/09/2017   • COLONOSCOPY N/A 1/9/2017    Procedure: COLONOSCOPY;  Surgeon: Michel White MD;  Location:  [facility];  Service:    • ENDOSCOPY N/A 1/7/2017    Procedure: ESOPHAGOGASTRODUODENOSCOPY;  Surgeon: Abhishek Benton MD;  Location:  [facility];  Service:    • HEMORRHOIDECTOMY N/A 3/8/2017    Procedure: HEMORRHOIDECTOMY,;  Surgeon: Michel White MD;  Location:  [facility];  Service:    •  "HEMORROIDECTOMY  01/2018   • HIP ARTHROSCOPY Right     2005 & 2012   • JOINT REPLACEMENT      2 right hips   • TONSILLECTOMY  1964       Allergies   Allergen Reactions   • Sulfa Antibiotics Anaphylaxis       Family History and Social History reviewed and changed as necessary      REVIEW OF SYSTEM:   Review of Systems   Constitutional: Negative for appetite change, chills, diaphoresis, fatigue, fever and unexpected weight change.   HENT:   Negative for mouth sores, sore throat and trouble swallowing.    Eyes: Negative for icterus.   Respiratory: Negative for cough, hemoptysis and shortness of breath.    Cardiovascular: Negative for chest pain, leg swelling and palpitations.   Gastrointestinal: Negative for abdominal distention, abdominal pain, blood in stool, constipation, diarrhea, nausea and vomiting.   Endocrine: Negative for hot flashes.   Genitourinary: Negative for bladder incontinence, difficulty urinating, dysuria, frequency and hematuria.    Musculoskeletal: Negative for gait problem, neck pain and neck stiffness.   Skin: Negative for rash.   Neurological: Negative for dizziness, gait problem, headaches, light-headedness and numbness.   Hematological: Negative for adenopathy. Does not bruise/bleed easily.   Psychiatric/Behavioral: Negative for depression. The patient is not nervous/anxious.    All other systems reviewed and are negative.       PHYSICAL EXAM    Vitals:    12/20/18 1540   BP: 153/77   Pulse: 75   Resp: 16   Temp: 97.9 °F (36.6 °C)   SpO2: 95%   Weight: 73.5 kg (162 lb)   Height: 175.3 cm (69\")     Constitutional: Appears well-developed and well-nourished. No distress.   ECOG: (0) Fully active, able to carry on all predisease performance without restriction  HENT:   Head: Normocephalic.   Mouth/Throat: Oropharynx is clear and moist.   Eyes: Conjunctivae are normal. Pupils are equal, round, and reactive to light. No scleral icterus.   Neck: Neck supple. No JVD present. No thyromegaly present. "   Cardiovascular: Normal rate, regular rhythm and normal heart sounds.    Pulmonary/Chest: Breath sounds normal. No respiratory distress.   Abdominal: Soft. Exhibits no distension and no mass. There is no hepatosplenomegaly. There is no tenderness. There is no rebound and no guarding.   Musculoskeletal:Exhibits no edema, tenderness or deformity.   Neurological: Alert and oriented to person, place, and time. Exhibits normal muscle tone.   Skin: No ecchymosis, no petechiae and no rash noted. Not diaphoretic. No cyanosis. Nails show no clubbing.   Psychiatric: Normal mood and affect.   Vitals reviewed.      No radiology results for the last 7 days          ASSESSMENT & PLAN:    1.  Monoclonal gammopathy  2. History of iron deficiency anemia subsequently resolved with cessation of massive hemorrhoidal bleeding resolving    Discussion: her monoclonal gammopathy is stable.  We'll call her the bone survey results.  We'll check her myeloma panel twice a year but there is no hint of lymphoma or other autoimmune or other processes clinically that would cause this.  No WaldenStrom's that we can detect.  We'll check a bone survey once year and her myeloma panel twice a year.      René Valencia MD    12/20/2018

## 2019-06-17 ENCOUNTER — TRANSCRIBE ORDERS (OUTPATIENT)
Dept: ADMINISTRATIVE | Facility: HOSPITAL | Age: 64
End: 2019-06-17

## 2019-06-17 ENCOUNTER — LAB (OUTPATIENT)
Dept: LAB | Facility: HOSPITAL | Age: 64
End: 2019-06-17

## 2019-06-17 DIAGNOSIS — Z12.31 VISIT FOR SCREENING MAMMOGRAM: Primary | ICD-10-CM

## 2019-06-17 DIAGNOSIS — D47.2 MONOCLONAL GAMMOPATHY: ICD-10-CM

## 2019-06-17 LAB
ALBUMIN SERPL-MCNC: 5 G/DL (ref 3.5–5.2)
ALBUMIN/GLOB SERPL: 1.6 G/DL
ALP SERPL-CCNC: 97 U/L (ref 39–117)
ALT SERPL W P-5'-P-CCNC: 23 U/L (ref 1–33)
ANION GAP SERPL CALCULATED.3IONS-SCNC: 14 MMOL/L
AST SERPL-CCNC: 38 U/L (ref 1–32)
B2 MICROGLOB SERPL-MCNC: 2.8 MG/L (ref 0.8–2.2)
BILIRUB SERPL-MCNC: 0.4 MG/DL (ref 0.2–1.2)
BUN BLD-MCNC: 20 MG/DL (ref 8–23)
BUN/CREAT SERPL: 23.3 (ref 7–25)
CA-I SERPL ISE-MCNC: 1.34 MMOL/L (ref 1.12–1.32)
CALCIUM SPEC-SCNC: 10 MG/DL (ref 8.6–10.5)
CHLORIDE SERPL-SCNC: 96 MMOL/L (ref 98–107)
CO2 SERPL-SCNC: 26 MMOL/L (ref 22–29)
CREAT BLD-MCNC: 0.86 MG/DL (ref 0.57–1)
CRP SERPL-MCNC: 0.04 MG/DL (ref 0–0.5)
ERYTHROCYTE [DISTWIDTH] IN BLOOD BY AUTOMATED COUNT: 14.9 % (ref 12.3–15.4)
ERYTHROCYTE [SEDIMENTATION RATE] IN BLOOD: 8 MM/HR (ref 0–30)
GFR SERPL CREATININE-BSD FRML MDRD: 66 ML/MIN/1.73
GLOBULIN UR ELPH-MCNC: 3.1 GM/DL
GLUCOSE BLD-MCNC: 101 MG/DL (ref 65–99)
HCT VFR BLD AUTO: 40.3 % (ref 34–46.6)
HGB BLD-MCNC: 13.6 G/DL (ref 12–15.9)
LYMPHOCYTES # BLD AUTO: 1.5 10*3/MM3 (ref 0.7–3.1)
LYMPHOCYTES NFR BLD AUTO: 30.9 % (ref 19.6–45.3)
MCH RBC QN AUTO: 33.4 PG (ref 26.6–33)
MCHC RBC AUTO-ENTMCNC: 33.8 G/DL (ref 31.5–35.7)
MCV RBC AUTO: 98.7 FL (ref 79–97)
MONOCYTES # BLD AUTO: 0.4 10*3/MM3 (ref 0.1–0.9)
MONOCYTES NFR BLD AUTO: 7.7 % (ref 5–12)
NEUTROPHILS # BLD AUTO: 3 10*3/MM3 (ref 1.7–7)
NEUTROPHILS NFR BLD AUTO: 61.4 % (ref 42.7–76)
PLATELET # BLD AUTO: 298 10*3/MM3 (ref 140–450)
PMV BLD AUTO: 7.4 FL (ref 6–12)
POTASSIUM BLD-SCNC: 4.4 MMOL/L (ref 3.5–5.2)
PROT SERPL-MCNC: 8.1 G/DL (ref 6–8.5)
RBC # BLD AUTO: 4.08 10*6/MM3 (ref 3.77–5.28)
SODIUM BLD-SCNC: 136 MMOL/L (ref 136–145)
WBC NRBC COR # BLD: 4.9 10*3/MM3 (ref 3.4–10.8)

## 2019-06-17 PROCEDURE — 80053 COMPREHEN METABOLIC PANEL: CPT

## 2019-06-17 PROCEDURE — 85025 COMPLETE CBC W/AUTO DIFF WBC: CPT

## 2019-06-17 PROCEDURE — 36415 COLL VENOUS BLD VENIPUNCTURE: CPT

## 2019-06-17 PROCEDURE — 82232 ASSAY OF BETA-2 PROTEIN: CPT

## 2019-06-17 PROCEDURE — 86334 IMMUNOFIX E-PHORESIS SERUM: CPT

## 2019-06-17 PROCEDURE — 83883 ASSAY NEPHELOMETRY NOT SPEC: CPT

## 2019-06-17 PROCEDURE — 82330 ASSAY OF CALCIUM: CPT

## 2019-06-17 PROCEDURE — 82784 ASSAY IGA/IGD/IGG/IGM EACH: CPT

## 2019-06-17 PROCEDURE — 84165 PROTEIN E-PHORESIS SERUM: CPT

## 2019-06-17 PROCEDURE — 86140 C-REACTIVE PROTEIN: CPT

## 2019-06-17 PROCEDURE — 85652 RBC SED RATE AUTOMATED: CPT

## 2019-06-18 LAB
ALBUMIN SERPL-MCNC: 4.5 G/DL (ref 2.9–4.4)
ALBUMIN/GLOB SERPL: 1.4 {RATIO} (ref 0.7–1.7)
ALPHA1 GLOB FLD ELPH-MCNC: 0.2 G/DL (ref 0–0.4)
ALPHA2 GLOB SERPL ELPH-MCNC: 0.8 G/DL (ref 0.4–1)
B-GLOBULIN SERPL ELPH-MCNC: 0.9 G/DL (ref 0.7–1.3)
GAMMA GLOB SERPL ELPH-MCNC: 1.4 G/DL (ref 0.4–1.8)
GLOBULIN SER CALC-MCNC: 3.3 G/DL (ref 2.2–3.9)
IGA SERPL-MCNC: 125 MG/DL (ref 87–352)
IGG SERPL-MCNC: 1219 MG/DL (ref 700–1600)
IGM SERPL-MCNC: 56 MG/DL (ref 26–217)
INTERPRETATION SERPL IEP-IMP: ABNORMAL
KAPPA LC SERPL-MCNC: 21.7 MG/L (ref 3.3–19.4)
KAPPA LC/LAMBDA SER: 0.17 {RATIO} (ref 0.26–1.65)
LAMBDA LC FREE SERPL-MCNC: 128.7 MG/L (ref 5.7–26.3)
Lab: ABNORMAL
M-SPIKE: 0.6 G/DL
PROT SERPL-MCNC: 7.8 G/DL (ref 6–8.5)

## 2019-06-19 ENCOUNTER — OFFICE VISIT (OUTPATIENT)
Dept: CARDIOLOGY | Facility: CLINIC | Age: 64
End: 2019-06-19

## 2019-06-19 VITALS
OXYGEN SATURATION: 95 % | DIASTOLIC BLOOD PRESSURE: 70 MMHG | WEIGHT: 161 LBS | BODY MASS INDEX: 23.05 KG/M2 | HEIGHT: 70 IN | SYSTOLIC BLOOD PRESSURE: 142 MMHG | HEART RATE: 66 BPM

## 2019-06-19 DIAGNOSIS — I48.0 PAROXYSMAL ATRIAL FIBRILLATION (HCC): Primary | ICD-10-CM

## 2019-06-19 DIAGNOSIS — I10 ESSENTIAL HYPERTENSION: ICD-10-CM

## 2019-06-19 PROCEDURE — 93000 ELECTROCARDIOGRAM COMPLETE: CPT | Performed by: PHYSICIAN ASSISTANT

## 2019-06-19 PROCEDURE — 99213 OFFICE O/P EST LOW 20 MIN: CPT | Performed by: PHYSICIAN ASSISTANT

## 2019-06-19 NOTE — PROGRESS NOTES
[facility] at [facility]   OFFICE NOTE      Halie Lennon  1955  PCP: Tori Iverson APRN    SUBJECTIVE:   Halie Lennon is a 63 y.o. female seen for a follow up visit regarding the following:     CC: Afib  Problem List:   1. Atrial Fibrillation   A. CHADSvasc =2 On Eliquis   B. Echocardiogram 11/18/2016: EF 60%, moderate MR   C. Event Monitor 11/18/16-12/17/16 showing episodes of PAF up to 110 bpm   D. Intolerant to Flecainide due to QRS widening   E. Started on Sotalol 1/4/16, now on Sotalol 80 mg daily   F. Lexiscan Cardiolite 1/18/17 : EF 60%, Abnormal myocardial perfusion scan showing medium sized area of moderate to severe hypoperfusion involving the mid anterior wall and the anterior  apical segment with mild reversibility. This finding is consistent with breast attenuation artifact versus infarction with ischemia. Clinical correlation is recommended    2. HTN  3. Hypothyroidism  4. HLP  5. Surgical History  A. Tonsillectomy  B. Total hip replacement  C. Mammoplasy  6. Symptomatic Anemia with Hemoglobin 4.8   1/2017- normal colonscopy, EGD, and pill camera  Internal Hemorrhoids s/p hemorrhoidectomy  7. MICHAELLE with Cr of 4.7. Symptoms suspected to be due drug-drug interaction of Valtrex and Sotalol. 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  She had previously tried Tambocor therapy with QRS widening.  She was on sotalol therapy in 2016 but this had to be discontinued secondary to an interaction with Valtrex which led to acute renal failure and confusion.  She was not having any atrial fibrillation up until this past year she has had multiple breakthrough episodes.  She states about once a week she has an episode lasting 15 to 20 minutes.  When she does have A. fib she states her heart rate is quite fast she becomes dizzy lightheaded with  this.  She denies any chest pain or chest tightness with exertion suggesting his pectoris.  She denies any heart failure symptoms.  She reports her blood pressure is well controlled.        ROS:  Review of Symptoms:  General: no recent weight loss/gain, weakness or fatigue  Skin: no rashes, lumps, or other skin changes  HEENT: no dizziness, lightheadedness, or vision changes  Respiratory: no cough or hemoptysis  Cardiovascular: + palpitations, and tachycardia  Gastrointestinal: no black/tarry stools or diarrhea  Urinary: no change in frequency or urgency  Peripheral Vascular: no claudication or leg cramps  Musculoskeletal: no muscle or joint pain/stiffness  Psychiatric: no depression or excessive stress  Neurological: no sensory or motor loss, no syncope  Hematologic: no anemia, easy bruising or bleeding  Endocrine: no thyroid problems, nor heat or cold intolerance    Cardiac PMH: (Old records have been reviewed and summarized below)      Past Medical History, Past Surgical History, Family history, Social History, and Medications were all reviewed with the patient today and updated as necessary.       Current Outpatient Medications:   •  ascorbic acid (VITAMIN C) 500 MG tablet, Take 500 mg by mouth Daily., Disp: , Rfl:   •  atorvastatin (LIPITOR) 40 MG tablet, Take 1 tablet by mouth Daily., Disp: 30 tablet, Rfl: 1  •  citalopram (CeleXA) 20 MG tablet, Take 20 mg by mouth Daily., Disp: , Rfl:   •  ferrous sulfate 325 (65 FE) MG tablet, Take 1 tablet by mouth 2 (Two) Times a Day With Meals. (Patient taking differently: Take 325 mg by mouth Daily With Breakfast.), Disp: , Rfl:   •  levothyroxine (SYNTHROID, LEVOTHROID) 100 MCG tablet, Take 100 mcg by mouth Daily., Disp: , Rfl:   •  lisinopril (PRINIVIL,ZESTRIL) 40 MG tablet, Take 40 mg by mouth Daily., Disp: , Rfl:   •  pantoprazole (PROTONIX) 20 MG EC tablet, Take 20 mg by mouth Daily., Disp: , Rfl:       Allergies   Allergen Reactions   • Sulfa Antibiotics  Anaphylaxis     Patient Active Problem List   Diagnosis   • Hypertension   • History of anemia   • Hypothyroidism   • Osteoarthritis   • Symptomatic anemia   • Monoclonal gammopathy   • Paroxysmal atrial fibrillation (CMS/HCC)   • Hemorrhoids with complication   • Acute renal failure (CMS/HCC)   • Ataxia   • Confusion     Past Medical History:   Diagnosis Date   • Atrial fibrillation (CMS/HCC)    • Corneal abrasion    • Hemorrhoids     bleeding hemorrhoids   • History of anemia    • Hypertension    • Hypothyroidism    • Osteoarthritis      Past Surgical History:   Procedure Laterality Date   • AUGMENTATION MAMMAPLASTY Bilateral 2008   • BONE MARROW BIOPSY Left 3/8/2017    Procedure: BONE MARROW BIOPSY;  Surgeon: Michel White MD;  Location:  [facility];  Service:    • COLONOSCOPY  01/09/2017   • COLONOSCOPY N/A 1/9/2017    Procedure: COLONOSCOPY;  Surgeon: Michel White MD;  Location:  [facility];  Service:    • ENDOSCOPY N/A 1/7/2017    Procedure: ESOPHAGOGASTRODUODENOSCOPY;  Surgeon: Abhishek Benton MD;  Location:  [facility];  Service:    • HEMORRHOIDECTOMY N/A 3/8/2017    Procedure: HEMORRHOIDECTOMY,;  Surgeon: Michel White MD;  Location:  [facility];  Service:    • HEMORROIDECTOMY  01/2018   • HIP ARTHROSCOPY Right     2005 & 2012   • JOINT REPLACEMENT      2 right hips   • TONSILLECTOMY  1964     Family History   Problem Relation Age of Onset   • Atrial fibrillation Mother    • Diabetes Mother    • Atrial fibrillation Brother    • Abnormal EKG Brother    • Heart attack Father    • Stroke Father    • Diabetes Sister    • Breast cancer Neg Hx    • Ovarian cancer Neg Hx      Social History     Tobacco Use   • Smoking status: Former Smoker     Packs/day: 1.00     Years: 20.00     Pack years: 20.00     Types: Cigarettes   • Smokeless tobacco: Never Used   • Tobacco comment: quit 35+ years ago.    Substance Use Topics   • Alcohol use: Yes     Alcohol/week: 1.2 oz     Types: 2 Shots of liquor per  week     Comment: 2 drinks a day         PHYSICAL EXAM:    LMP  (LMP Unknown)        Wt Readings from Last 5 Encounters:   12/20/18 73.5 kg (162 lb)   06/13/18 70.8 kg (156 lb)   06/12/18 72.6 kg (160 lb)   04/05/18 75.3 kg (166 lb)   04/03/18 72.6 kg (160 lb)       BP Readings from Last 5 Encounters:   12/20/18 153/77   06/13/18 130/64   06/12/18 137/74   04/05/18 158/65   12/19/17 142/72       General appearance - Alert, well appearing, and in no distress   Mental status - Affect appropriate to mood.  Eyes - Sclerae anicteric,  ENMT - Hearing grossly normal bilaterally, Dental hygiene good.  Neck - Carotids upstroke normal bilaterally, no bruits, no JVD.  Resp - Clear to auscultation, no wheezes, rales or rhonchi, symmetric air entry.  Heart - Normal rate, regular rhythm, normal S1, S2, no murmurs, rubs, clicks or gallops.  GI - Soft, nontender, nondistended, no masses or organomegaly.  Neurological - Grossly intact - normal speech, no focal findings  Musculoskeletal - No joint tenderness, deformity or swelling, no muscular tenderness noted.  Extremities - Peripheral pulses normal, no pedal edema, no clubbing or cyanosis.  Skin - Normal coloration and turgor.  Psych -  oriented to person, place, and time.    Medical problems and test results were reviewed with the patient today.     Recent Results (from the past 672 hour(s))   Beta 2 Microglobulin, Serum    Collection Time: 06/17/19  9:33 AM   Result Value Ref Range    Beta-2 Microglobulin 2.8 (H) 0.8 - 2.2 mg/L   Calcium, Ionized    Collection Time: 06/17/19  9:33 AM   Result Value Ref Range    Ionized Calcium 1.34 (H) 1.12 - 1.32 mmol/L   Comprehensive Metabolic Panel    Collection Time: 06/17/19  9:33 AM   Result Value Ref Range    Glucose 101 (H) 65 - 99 mg/dL    BUN 20 8 - 23 mg/dL    Creatinine 0.86 0.57 - 1.00 mg/dL    Sodium 136 136 - 145 mmol/L    Potassium 4.4 3.5 - 5.2 mmol/L    Chloride 96 (L) 98 - 107 mmol/L    CO2 26.0 22.0 - 29.0 mmol/L    Calcium  10.0 8.6 - 10.5 mg/dL    Total Protein 8.1 6.0 - 8.5 g/dL    Albumin 5.00 3.50 - 5.20 g/dL    ALT (SGPT) 23 1 - 33 U/L    AST (SGOT) 38 (H) 1 - 32 U/L    Alkaline Phosphatase 97 39 - 117 U/L    Total Bilirubin 0.4 0.2 - 1.2 mg/dL    eGFR Non African Amer 66 >60 mL/min/1.73    Globulin 3.1 gm/dL    A/G Ratio 1.6 g/dL    BUN/Creatinine Ratio 23.3 7.0 - 25.0    Anion Gap 14.0 mmol/L   C-reactive Protein    Collection Time: 06/17/19  9:33 AM   Result Value Ref Range    C-Reactive Protein 0.04 0.00 - 0.50 mg/dL   VERONIQUE + PE    Collection Time: 06/17/19  9:33 AM   Result Value Ref Range    IgG 1219 700 - 1600 mg/dL    IgA 125 87 - 352 mg/dL    IgM 56 26 - 217 mg/dL    Total Protein 7.8 6.0 - 8.5 g/dL    Albumin 4.5 (H) 2.9 - 4.4 g/dL    Alpha-1-Globulin 0.2 0.0 - 0.4 g/dL    Alpha-2-Globulin 0.8 0.4 - 1.0 g/dL    Beta Globulin 0.9 0.7 - 1.3 g/dL    Gamma Globulin 1.4 0.4 - 1.8 g/dL    M-Merlin 0.6 (H) Not Observed g/dL    Globulin 3.3 2.2 - 3.9 g/dL    A/G Ratio 1.4 0.7 - 1.7    Immunofixation Reflex, Serum Comment     Please note Comment    Immunoglobulin Free LT Chains Blood    Collection Time: 06/17/19  9:33 AM   Result Value Ref Range    Free Light Chain, Kappa 21.7 (H) 3.3 - 19.4 mg/L    Free Lambda Light Chains 128.7 (H) 5.7 - 26.3 mg/L    Kappa/Lambda Ratio 0.17 (L) 0.26 - 1.65   Sedimentation Rate    Collection Time: 06/17/19  9:33 AM   Result Value Ref Range    Sed Rate 8 0 - 30 mm/hr   CBC Auto Differential    Collection Time: 06/17/19  9:33 AM   Result Value Ref Range    WBC 4.90 3.40 - 10.80 10*3/mm3    RBC 4.08 3.77 - 5.28 10*6/mm3    Hemoglobin 13.6 12.0 - 15.9 g/dL    Hematocrit 40.3 34.0 - 46.6 %    RDW 14.9 12.3 - 15.4 %    MCV 98.7 (H) 79.0 - 97.0 fL    MCH 33.4 (H) 26.6 - 33.0 pg    MCHC 33.8 31.5 - 35.7 g/dL    MPV 7.4 6.0 - 12.0 fL    Platelets 298 140 - 450 10*3/mm3    Neutrophil % 61.4 42.7 - 76.0 %    Lymphocyte % 30.9 19.6 - 45.3 %    Monocyte % 7.7 5.0 - 12.0 %    Neutrophils, Absolute 3.00 1.70 -  7.00 10*3/mm3    Lymphocytes, Absolute 1.50 0.70 - 3.10 10*3/mm3    Monocytes, Absolute 0.40 0.10 - 0.90 10*3/mm3         EKG: (EKG has been independently visualized by me and summarized below)    ECG 12 Lead  Date/Time: 6/19/2019 1:59 PM  Performed by: Bartolo Flannery PA  Authorized by: Bartolo Flannery PA   Comparison: compared with previous ECG from 4/3/2018  Similar to previous ECG  Rhythm: sinus rhythm  Rate: normal  Conduction: right bundle branch block  ST Segments: ST segments normal  T Waves: T waves normal  QRS axis: normal  Other: no other findings            ASSESSMENT   1. PAF: Recent recurrent episodes with palpitations,  1-2 per week.   2. HTN: Controlled on Zestril  3. Anticoagulation:  Noac. No recurrent anemia     PLAN  · For currently at this time we feel that the patient would benefit from reinitiating antiarrhythmic drug if she is having multiple episodes of atrial fibrillation quite symptomatic with rapid palpitations.  Discussed with Dr. Johansen reviewed EKG with him we have recommended that she initiate sotalol 80 mg once a day.  She will follow-up EKG 48 hours.  She will continue other home medications including her Eliquis which she states she is doing well on.  · Return to follow-up in 2 months.       6/19/2019  12:26 PM    Will Prudencio DICK

## 2019-06-24 ENCOUNTER — CLINICAL SUPPORT (OUTPATIENT)
Dept: CARDIOLOGY | Facility: CLINIC | Age: 64
End: 2019-06-24

## 2019-06-24 DIAGNOSIS — I48.0 PAROXYSMAL ATRIAL FIBRILLATION (HCC): Primary | ICD-10-CM

## 2019-06-24 PROCEDURE — 93000 ELECTROCARDIOGRAM COMPLETE: CPT | Performed by: PHYSICIAN ASSISTANT

## 2019-07-01 ENCOUNTER — OFFICE VISIT (OUTPATIENT)
Dept: ONCOLOGY | Facility: CLINIC | Age: 64
End: 2019-07-01

## 2019-07-01 VITALS
RESPIRATION RATE: 16 BRPM | TEMPERATURE: 97.7 F | HEART RATE: 61 BPM | BODY MASS INDEX: 23.19 KG/M2 | HEIGHT: 70 IN | SYSTOLIC BLOOD PRESSURE: 135 MMHG | OXYGEN SATURATION: 98 % | WEIGHT: 162 LBS | DIASTOLIC BLOOD PRESSURE: 71 MMHG

## 2019-07-01 DIAGNOSIS — D47.2 MONOCLONAL GAMMOPATHY: Primary | Chronic | ICD-10-CM

## 2019-07-01 PROCEDURE — 99213 OFFICE O/P EST LOW 20 MIN: CPT | Performed by: INTERNAL MEDICINE

## 2019-07-01 NOTE — PROGRESS NOTES
CHIEF COMPLAINT: Follow-up MGUS    Problem List:  Oncology/Hematology History    1.)  Monoclonal gammopathy    a) March 2017 24-hour urine immunoelectrophoresis showed no monoclonal spike, lambda light chains 106 with kappa light chains 24 and a kappa to lambda ratio of 0.23 with normal IgA, G, and M levels.  January 2017 serum immunoelectrophoresis showed 600 mg/dL of immunoglobulin G lambda light chains in the serum.  3/8/17 bone marrow biopsy showed normal marrow with 3% plasma cells and a small lambda clonal population identified with no stainable iron.  There was erythroid expansion with mild maturation dyssynchrony and occasional atypical nuclear features that may represent the compensatory response to her anemia though myelodysplasia could not entirely be ruled out.  Had reactive aggregates of lymphoid tissue.     b) 6/13/2017 sedimentation rate 8, immunoglobulin free light chains free kappa light chains 25.7, free lambda light chains 173.4, kappa/lambda ratio 0.15.  Serum immunoelectrophoresis M-spike 0.9g/dL, C-reactive protein less than 0.01, CMP normal, ionized calcium 1.31, beta-2 microglobulin 3.1, CBC WBC 5500, hemoglobin 14.2, hematocrit 44.6%, MCV 94.8, platelets 259,000.  24-hour urine immunoelectrophoresis pending.  C.)  -6/17/2019 data: Lambda 129 with kappa 22 and ratio 0.17.  This is in the range that it has fluctuated since January 2017 upon first testing.  M spike stable 600 mg/dL immunoglobulin G lambda.  C-reactive protein 0.04 with sedimentation rate of 8.  Creatinine 0.86 with total calcium of 10 and ionized calcium 1.34 upper limit of normal 1.32.  Beta-2 microglobulin 2.8.  CBC unremarkable.  2.)  History of anemia, resolved after hemorrhoidectomy repair and on oral iron.  CBC on 6/13/2017 with a hemoglobin of 14.2, hematocrit 44.6%, MCV of 94.8.            Monoclonal gammopathy     Initial Diagnosis     Monoclonal gammopathy         11/29/2017 Imaging     Bone survey  IMPRESSION:  Negative skeletal survey.         6/5/2018 -  Other Event     6/5/2018 labs:  CBC WBC 4350, hemoglobin 12.6, hematocrit 38.3%, platelet count 252,000.  CMP creatinine 0.8, serum calcium 9.4.  Ionized calcium 1.29.  Beta-2 microglobulin 2.3.  Sedimentation rate 9, CRP 0.02.  Kappa/lambda light chains-normal kappa light chains 16.6, lambda light chains elevated at 133.6, kappa/lambda ratio 0.12.  Serum immunoelectrophoresis M-spike stable 0.7 g/dL.           12/20/2018 -  Other Event     24-hour urine monoclonal protein showed no monoclonal protein.  Beta-2 microglobulin 2.5.  AST 43.  Creatinine 0.86.  Calcium 9.5.  Ionized calcium 1.3.  C-reactive protein less than 0.01.  Sedimentation rate 7.  Serum monoclonal protein 800 mg/dL of immunoglobulin G lambda.  Lambda monoclonal protein down to 98.5 with a kappa to lambda ratio 0.18.  CBC is normal.  Total body bone survey done on the day of her visit 12/20/18 was not read by the time of her visit.         6/17/2019 -  Other Event      Lambda 129 with kappa 22 and ratio 0.17.  This is in the range that it has fluctuated since January 2017 upon first testing.  M spike stable 600 mg/dL immunoglobulin G lambda.  C-reactive protein 0.04 with sedimentation rate of 8.  Creatinine 0.86 with total calcium of 10 and ionized calcium 1.34 upper limit of normal 1.32.  Beta-2 microglobulin 2.8.  CBC unremarkable.            HISTORY OF PRESENT ILLNESS:  The patient is a 63 y.o. female, here for follow up on management of MGUS.  See data from 6/17/2019 above.  Essentially stable for 2 years.      Past Medical History:   Diagnosis Date   • Atrial fibrillation (CMS/HCC)    • Corneal abrasion    • Hemorrhoids     bleeding hemorrhoids   • History of anemia    • Hypertension    • Hypothyroidism    • Osteoarthritis      Past Surgical History:   Procedure Laterality Date   • AUGMENTATION MAMMAPLASTY Bilateral 2008   • BONE MARROW BIOPSY Left 3/8/2017    Procedure: BONE MARROW  BIOPSY;  Surgeon: Michel White MD;  Location:  [facility];  Service:    • COLONOSCOPY  01/09/2017   • COLONOSCOPY N/A 1/9/2017    Procedure: COLONOSCOPY;  Surgeon: Michel White MD;  Location:  [facility];  Service:    • ENDOSCOPY N/A 1/7/2017    Procedure: ESOPHAGOGASTRODUODENOSCOPY;  Surgeon: Abhishek Benton MD;  Location:  [facility];  Service:    • HEMORRHOIDECTOMY N/A 3/8/2017    Procedure: HEMORRHOIDECTOMY,;  Surgeon: Michel White MD;  Location:  [facility];  Service:    • HEMORROIDECTOMY  01/2018   • HIP ARTHROSCOPY Right     2005 & 2012   • JOINT REPLACEMENT      2 right hips   • TONSILLECTOMY  1964       Allergies   Allergen Reactions   • Sulfa Antibiotics Anaphylaxis       Family History and Social History reviewed and changed as necessary      REVIEW OF SYSTEM:   Review of Systems   Constitutional: Negative for appetite change, chills, diaphoresis, fatigue, fever and unexpected weight change.   HENT:   Negative for mouth sores, sore throat and trouble swallowing.    Eyes: Negative for icterus.   Respiratory: Negative for cough, hemoptysis and shortness of breath.    Cardiovascular: Negative for chest pain, leg swelling and palpitations.   Gastrointestinal: Negative for abdominal distention, abdominal pain, blood in stool, constipation, diarrhea, nausea and vomiting.   Endocrine: Negative for hot flashes.   Genitourinary: Negative for bladder incontinence, difficulty urinating, dysuria, frequency and hematuria.    Musculoskeletal: Negative for gait problem, neck pain and neck stiffness.   Skin: Negative for rash.   Neurological: Negative for dizziness, gait problem, headaches, light-headedness and numbness.   Hematological: Negative for adenopathy. Does not bruise/bleed easily.   Psychiatric/Behavioral: Negative for depression. The patient is not nervous/anxious.    All other systems reviewed and are negative.       PHYSICAL EXAM    Vitals:    07/01/19 1320   BP: 135/71   Pulse: 61  "  Resp: 16   Temp: 97.7 °F (36.5 °C)   SpO2: 98%   Weight: 73.5 kg (162 lb)   Height: 177.8 cm (70\")     Constitutional: Appears well-developed and well-nourished. No distress.   ECOG: (0) Fully active, able to carry on all predisease performance without restriction  HENT:   Head: Normocephalic.   Mouth/Throat: Oropharynx is clear and moist.   Eyes: Conjunctivae are normal. Pupils are equal, round, and reactive to light. No scleral icterus.   Neck: Neck supple. No JVD present. No thyromegaly present.   Cardiovascular: Normal rate, regular rhythm and normal heart sounds.    Pulmonary/Chest: Breath sounds normal. No respiratory distress.   Abdominal: Soft. Exhibits no distension and no mass. There is no hepatosplenomegaly. There is no tenderness. There is no rebound and no guarding.   Musculoskeletal:Exhibits no edema, tenderness or deformity.   Neurological: Alert and oriented to person, place, and time. Exhibits normal muscle tone.   Skin: No ecchymosis, no petechiae and no rash noted. Not diaphoretic. No cyanosis. Nails show no clubbing.   Psychiatric: Normal mood and affect.   Vitals reviewed.      Lab Results   Component Value Date    HGB 13.6 06/17/2019    HCT 40.3 06/17/2019    MCV 98.7 (H) 06/17/2019     06/17/2019    WBC 4.90 06/17/2019    NEUTROABS 3.00 06/17/2019    LYMPHSABS 1.50 06/17/2019    MONOSABS 0.40 06/17/2019    EOSABS 0.11 06/05/2018    BASOSABS 0.03 06/05/2018       Lab Results   Component Value Date    GLUCOSE 101 (H) 06/17/2019    BUN 20 06/17/2019    CREATININE 0.86 06/17/2019     06/17/2019    K 4.4 06/17/2019    CL 96 (L) 06/17/2019    CO2 26.0 06/17/2019    CALCIUM 10.0 06/17/2019    PROTEINTOT 8.1 06/17/2019    ALBUMIN 5.00 06/17/2019    ALBUMIN 4.5 (H) 06/17/2019    BILITOT 0.4 06/17/2019    ALKPHOS 97 06/17/2019    AST 38 (H) 06/17/2019    ALT 23 06/17/2019                   ASSESSMENT & PLAN:    1. History of anemia hemoglobin of the fours with negative GI work-up in " 2017  2. History of drug-induced kidney injury subsequently resolved  3. Stable monoclonal gammopathy as above  4. A. Fib  5. Hypertension  6. Hyperlipidemia  7. Hypothyroidism    Discussion: I we will continue to monitor her MGUS but there has been no significant change in her monoclonal gammopathy over the last 2-1/2 years.  We will go to a 9-month interval this time and if that is stable we will go to annual after that.  No hint of progression to myeloma based on her serologies.  No associated autoimmune disease apparent.  No lymphadenopathy or splenomegaly to suggest associated lymphoplasmacytic or lymphoproliferative process.  No hypercalcemia, renal disease, anemia, and alkaline phosphatase is normal.  Bone survey was negative in December and we will get this with her next set of tests..      René Valencia MD    07/01/2019

## 2019-08-14 ENCOUNTER — HOSPITAL ENCOUNTER (OUTPATIENT)
Dept: MAMMOGRAPHY | Facility: HOSPITAL | Age: 64
Discharge: HOME | End: 2019-08-14
Admitting: NURSE PRACTITIONER

## 2019-08-14 DIAGNOSIS — Z12.31 VISIT FOR SCREENING MAMMOGRAM: ICD-10-CM

## 2019-08-14 PROCEDURE — 77063 BREAST TOMOSYNTHESIS BI: CPT | Performed by: RADIOLOGY

## 2019-08-14 PROCEDURE — 77067 SCR MAMMO BI INCL CAD: CPT

## 2019-08-14 PROCEDURE — 77067 SCR MAMMO BI INCL CAD: CPT | Performed by: RADIOLOGY

## 2019-08-14 PROCEDURE — 77063 BREAST TOMOSYNTHESIS BI: CPT

## 2019-09-25 ENCOUNTER — OFFICE VISIT (OUTPATIENT)
Dept: CARDIOLOGY | Facility: CLINIC | Age: 64
End: 2019-09-25

## 2019-09-25 VITALS
WEIGHT: 162 LBS | HEART RATE: 59 BPM | SYSTOLIC BLOOD PRESSURE: 120 MMHG | DIASTOLIC BLOOD PRESSURE: 74 MMHG | BODY MASS INDEX: 23.99 KG/M2 | OXYGEN SATURATION: 96 % | HEIGHT: 69 IN

## 2019-09-25 DIAGNOSIS — I10 ESSENTIAL HYPERTENSION: ICD-10-CM

## 2019-09-25 DIAGNOSIS — I48.0 PAROXYSMAL ATRIAL FIBRILLATION (HCC): Primary | ICD-10-CM

## 2019-09-25 PROCEDURE — 99213 OFFICE O/P EST LOW 20 MIN: CPT | Performed by: NURSE PRACTITIONER

## 2019-09-25 PROCEDURE — 93000 ELECTROCARDIOGRAM COMPLETE: CPT | Performed by: NURSE PRACTITIONER

## 2019-09-25 NOTE — PROGRESS NOTES
Halie Lennon  1955    There is no work phone number on file.      09/25/2019    [facility]     Tori Iverson, APRN  [address] [phone]    Chief Complaint   Patient presents with   • Atrial Fibrillation       Problem List:   1. Atrial Fibrillation              A. CHADSvasc =3 On Eliquis              B. Echocardiogram 11/18/2016: EF 60%, moderate MR              C. Event Monitor 11/18/16-12/17/16 showing episodes of PAF up to 110 bpm              D. Intolerant to Flecainide due to QRS widening              E. Started on Sotalol 1/4/16, now on Sotalol 80 mg daily              F. Lexiscan Cardiolite 1/18/17 : EF 60%, Abnormal myocardial perfusion scan showing medium sized area of moderate to severe hypoperfusion involving the mid anterior wall and the anterior         apical segment with mild reversibility. This finding is consistent with breast attenuation artifact versus infarction with ischemia. Clinical correlation is recommended    2. HTN  3. Hypothyroidism  4. HLP  5. Surgical History  A. Tonsillectomy  B. Total hip replacement  C. Mammoplasy  6. Symptomatic Anemia with Hemoglobin 4.8   1/2017- normal colonscopy, EGD, and pill camera  Internal Hemorrhoids s/p hemorrhoidectomy  7. MICHAELLE with Cr of 4.7. Symptoms suspected to be due drug-drug interaction of Valtrex and Sotalol. 4/18    Allergies  Allergies   Allergen Reactions   • Sulfa Antibiotics Anaphylaxis       Current Medications    Current Outpatient Medications:   •  apixaban (ELIQUIS) 5 MG tablet tablet, Take 5 mg by mouth 2 (Two) Times a Day., Disp: , Rfl:   •  atorvastatin (LIPITOR) 40 MG tablet, Take 1 tablet by mouth Daily., Disp: 30 tablet, Rfl: 1  •  citalopram (CeleXA) 20 MG tablet, Take 20 mg by mouth Daily., Disp: , Rfl:   •  levothyroxine (SYNTHROID, LEVOTHROID) 100 MCG tablet, Take 100 mcg by mouth Daily., Disp: , Rfl:   •  lisinopril (PRINIVIL,ZESTRIL) 40 MG tablet,  "Take 40 mg by mouth Daily., Disp: , Rfl:   •  pantoprazole (PROTONIX) 20 MG EC tablet, Take 20 mg by mouth Daily., Disp: , Rfl:   •  Sotalol HCl AF 80 MG tablet, Take 1 tablet by mouth Daily., Disp: 60 tablet, Rfl: 11    History of Present Illness   HPI    Pt presents for follow up of atrial fibrillation, HTN. Since we last saw the pt, she was restarted on Sotalol once daily due to occasional episodes of AF.  Since then, pt denies any AF episodes, SOB, CP, LH, or dizziness. Denies any hospitalizations, ER visits, bleeding, or TIA/CVA symptoms. Overall feels well.  BP's running well.    ROS:  General:  Denies fatigue, weight gain or loss  Cardiovascular:  Denies CP, PND, syncope, near syncope, edema or palpitations.  Pulmonary:  Denies HOFFMAN, cough, or wheezing      Vitals:    09/25/19 0911   BP: 120/74   BP Location: Left arm   Patient Position: Sitting   Pulse: 59   SpO2: 96%   Weight: 73.5 kg (162 lb)   Height: 175.3 cm (69\")     PE:  General: NAD  Neck: no JVD, no carotid bruits, no TM  Heart RRR, NL S1, S2, no rubs, murmurs  Lungs: CTA, no wheezes, rhonchi, or rales  Abd: soft, non-tender, NL BS  Ext: No musculoskeletal deformities, no edema, cyanosis, or clubbing  Psych: normal mood and affect    Diagnostic Data:        ECG 12 Lead  Date/Time: 9/25/2019 9:20 AM  Performed by: Yudi Clifton APRN  Authorized by: Yudi Clifton APRN   Comparison: compared with previous ECG from 6/24/2019  Similar to previous ECG  Rhythm: sinus bradycardia  BPM: 52              1. Paroxysmal atrial fibrillation (CMS/HCC)    2. Essential hypertension          Plan:  1) Paroxysmal atrial fibrillation:  - Restarted on Sotalol at last visit.  Maintaining sinus rhythm on once daily dosing.  QTc acceptable today.  - Continue present medications.   2) Anticoagulation:  - CHADSVASc = 3, on Eliquis  3) HTN:  - Well controlled on Lisinopril  - Wt loss, exercise, salt reduction    F/up in 9 months    Yudi Clifton" [facility]  9/25/2019  9:24 AM

## 2019-09-30 ENCOUNTER — APPOINTMENT (OUTPATIENT)
Dept: MAMMOGRAPHY | Facility: HOSPITAL | Age: 64
End: 2019-09-30

## 2020-04-06 ENCOUNTER — LAB (OUTPATIENT)
Dept: LAB | Facility: HOSPITAL | Age: 65
End: 2020-04-06

## 2020-04-06 DIAGNOSIS — D47.2 MONOCLONAL GAMMOPATHY: ICD-10-CM

## 2020-04-06 LAB
ALBUMIN SERPL-MCNC: 5.2 G/DL (ref 3.5–5.2)
ALBUMIN/GLOB SERPL: 1.6 G/DL
ALP SERPL-CCNC: 79 U/L (ref 39–117)
ALT SERPL W P-5'-P-CCNC: 23 U/L (ref 1–33)
ANION GAP SERPL CALCULATED.3IONS-SCNC: 17 MMOL/L (ref 5–15)
AST SERPL-CCNC: 42 U/L (ref 1–32)
B2 MICROGLOB SERPL-MCNC: 2.6 MG/L (ref 0.8–2.2)
BILIRUB SERPL-MCNC: 0.9 MG/DL (ref 0.2–1.2)
BUN BLD-MCNC: 11 MG/DL (ref 8–23)
BUN/CREAT SERPL: 14.3 (ref 7–25)
CA-I SERPL ISE-MCNC: 1.26 MMOL/L (ref 1.12–1.32)
CALCIUM SPEC-SCNC: 10.1 MG/DL (ref 8.6–10.5)
CHLORIDE SERPL-SCNC: 94 MMOL/L (ref 98–107)
CO2 SERPL-SCNC: 23 MMOL/L (ref 22–29)
CREAT BLD-MCNC: 0.77 MG/DL (ref 0.57–1)
CRP SERPL-MCNC: 0.08 MG/DL (ref 0–0.5)
ERYTHROCYTE [DISTWIDTH] IN BLOOD BY AUTOMATED COUNT: 14.1 % (ref 12.3–15.4)
ERYTHROCYTE [SEDIMENTATION RATE] IN BLOOD: 13 MM/HR (ref 0–30)
GFR SERPL CREATININE-BSD FRML MDRD: 75 ML/MIN/1.73
GLOBULIN UR ELPH-MCNC: 3.2 GM/DL
GLUCOSE BLD-MCNC: 87 MG/DL (ref 65–99)
HCT VFR BLD AUTO: 47 % (ref 34–46.6)
HGB BLD-MCNC: 15.2 G/DL (ref 12–15.9)
LYMPHOCYTES # BLD AUTO: 1.4 10*3/MM3 (ref 0.7–3.1)
LYMPHOCYTES NFR BLD AUTO: 26.3 % (ref 19.6–45.3)
MCH RBC QN AUTO: 32.3 PG (ref 26.6–33)
MCHC RBC AUTO-ENTMCNC: 32.4 G/DL (ref 31.5–35.7)
MCV RBC AUTO: 99.6 FL (ref 79–97)
MONOCYTES # BLD AUTO: 0.2 10*3/MM3 (ref 0.1–0.9)
MONOCYTES NFR BLD AUTO: 4.6 % (ref 5–12)
NEUTROPHILS # BLD AUTO: 3.7 10*3/MM3 (ref 1.7–7)
NEUTROPHILS NFR BLD AUTO: 69.1 % (ref 42.7–76)
PLATELET # BLD AUTO: 225 10*3/MM3 (ref 140–450)
PMV BLD AUTO: 6.8 FL (ref 6–12)
POTASSIUM BLD-SCNC: 4.2 MMOL/L (ref 3.5–5.2)
PROT SERPL-MCNC: 8.4 G/DL (ref 6–8.5)
RBC # BLD AUTO: 4.72 10*6/MM3 (ref 3.77–5.28)
SODIUM BLD-SCNC: 134 MMOL/L (ref 136–145)
WBC NRBC COR # BLD: 5.4 10*3/MM3 (ref 3.4–10.8)

## 2020-04-06 PROCEDURE — 82232 ASSAY OF BETA-2 PROTEIN: CPT

## 2020-04-06 PROCEDURE — 36415 COLL VENOUS BLD VENIPUNCTURE: CPT

## 2020-04-06 PROCEDURE — 82784 ASSAY IGA/IGD/IGG/IGM EACH: CPT

## 2020-04-06 PROCEDURE — 85652 RBC SED RATE AUTOMATED: CPT

## 2020-04-06 PROCEDURE — 83883 ASSAY NEPHELOMETRY NOT SPEC: CPT

## 2020-04-06 PROCEDURE — 80053 COMPREHEN METABOLIC PANEL: CPT

## 2020-04-06 PROCEDURE — 84165 PROTEIN E-PHORESIS SERUM: CPT

## 2020-04-06 PROCEDURE — 86140 C-REACTIVE PROTEIN: CPT

## 2020-04-06 PROCEDURE — 86334 IMMUNOFIX E-PHORESIS SERUM: CPT

## 2020-04-06 PROCEDURE — 85025 COMPLETE CBC W/AUTO DIFF WBC: CPT

## 2020-04-06 PROCEDURE — 82330 ASSAY OF CALCIUM: CPT

## 2020-04-07 LAB
ALBUMIN SERPL-MCNC: 4.8 G/DL (ref 2.9–4.4)
ALBUMIN/GLOB SERPL: 1.6 {RATIO} (ref 0.7–1.7)
ALPHA1 GLOB FLD ELPH-MCNC: 0.2 G/DL (ref 0–0.4)
ALPHA2 GLOB SERPL ELPH-MCNC: 0.8 G/DL (ref 0.4–1)
B-GLOBULIN SERPL ELPH-MCNC: 0.8 G/DL (ref 0.7–1.3)
GAMMA GLOB SERPL ELPH-MCNC: 1.4 G/DL (ref 0.4–1.8)
GLOBULIN SER CALC-MCNC: 3.2 G/DL (ref 2.2–3.9)
IGA SERPL-MCNC: 131 MG/DL (ref 87–352)
IGG SERPL-MCNC: 1351 MG/DL (ref 586–1602)
IGM SERPL-MCNC: 57 MG/DL (ref 26–217)
INTERPRETATION SERPL IEP-IMP: ABNORMAL
KAPPA LC SERPL-MCNC: 16.3 MG/L (ref 3.3–19.4)
KAPPA LC/LAMBDA SER: 0.15 {RATIO} (ref 0.26–1.65)
LAMBDA LC FREE SERPL-MCNC: 107.7 MG/L (ref 5.7–26.3)
Lab: ABNORMAL
M-SPIKE: 0.6 G/DL
PROT SERPL-MCNC: 8 G/DL (ref 6–8.5)

## 2020-04-10 ENCOUNTER — LAB (OUTPATIENT)
Dept: LAB | Facility: HOSPITAL | Age: 65
End: 2020-04-10

## 2020-04-10 DIAGNOSIS — D47.2 MONOCLONAL GAMMOPATHY: Chronic | ICD-10-CM

## 2020-04-10 PROCEDURE — 81050 URINALYSIS VOLUME MEASURE: CPT

## 2020-04-10 PROCEDURE — 83883 ASSAY NEPHELOMETRY NOT SPEC: CPT

## 2020-04-13 LAB
FREE KAPPA LT CHAINS, 24HR: 11.9 MG/24 HR
FREE LAMBDA LT CHAINS, 24 HR: 2.26 MG/24 HR
KAPPA LC UR-MCNC: 5.95 MG/L (ref 0.63–113.79)
KAPPA LC/LAMBDA UR: 5.27 {RATIO} (ref 1.03–31.76)
LAMBDA LC UR-MCNC: 1.13 MG/L (ref 0.47–11.77)

## 2020-04-20 ENCOUNTER — TELEMEDICINE (OUTPATIENT)
Dept: ONCOLOGY | Facility: CLINIC | Age: 65
End: 2020-04-20

## 2020-04-20 DIAGNOSIS — D47.2 MONOCLONAL GAMMOPATHY: Primary | Chronic | ICD-10-CM

## 2020-04-20 PROCEDURE — 99212 OFFICE O/P EST SF 10 MIN: CPT | Performed by: NURSE PRACTITIONER

## 2020-04-20 NOTE — PROGRESS NOTES
CHIEF COMPLAINT: Follow-up MGUS    This was an audio and video enabled telemedicine encounter.    Problem List:  Oncology/Hematology History    1.)  Monoclonal gammopathy    a) March 2017 24-hour urine immunoelectrophoresis showed no monoclonal spike, lambda light chains 106 with kappa light chains 24 and a kappa to lambda ratio of 0.23 with normal IgA, G, and M levels.  January 2017 serum immunoelectrophoresis showed 600 mg/dL of immunoglobulin G lambda light chains in the serum.  3/8/17 bone marrow biopsy showed normal marrow with 3% plasma cells and a small lambda clonal population identified with no stainable iron.  There was erythroid expansion with mild maturation dyssynchrony and occasional atypical nuclear features that may represent the compensatory response to her anemia though myelodysplasia could not entirely be ruled out.  Had reactive aggregates of lymphoid tissue.     b) 6/13/2017 sedimentation rate 8, immunoglobulin free light chains free kappa light chains 25.7, free lambda light chains 173.4, kappa/lambda ratio 0.15.  Serum immunoelectrophoresis M-spike 0.9g/dL, C-reactive protein less than 0.01, CMP normal, ionized calcium 1.31, beta-2 microglobulin 3.1, CBC WBC 5500, hemoglobin 14.2, hematocrit 44.6%, MCV 94.8, platelets 259,000.  24-hour urine immunoelectrophoresis pending.  C.)  -6/17/2019 data: Lambda 129 with kappa 22 and ratio 0.17.  This is in the range that it has fluctuated since January 2017 upon first testing.  M spike stable 600 mg/dL immunoglobulin G lambda.  C-reactive protein 0.04 with sedimentation rate of 8.  Creatinine 0.86 with total calcium of 10 and ionized calcium 1.34 upper limit of normal 1.32.  Beta-2 microglobulin 2.8.  CBC unremarkable.  2.)  History of anemia, resolved after hemorrhoidectomy repair and on oral iron.  CBC on 6/13/2017 with a hemoglobin of 14.2, hematocrit 44.6%, MCV of 94.8.            Monoclonal gammopathy     Initial Diagnosis     Monoclonal  gammopathy      11/29/2017 Imaging     Bone survey IMPRESSION:  Negative skeletal survey.      6/5/2018 -  Other Event     6/5/2018 labs:  CBC WBC 4350, hemoglobin 12.6, hematocrit 38.3%, platelet count 252,000.  CMP creatinine 0.8, serum calcium 9.4.  Ionized calcium 1.29.  Beta-2 microglobulin 2.3.  Sedimentation rate 9, CRP 0.02.  Kappa/lambda light chains-normal kappa light chains 16.6, lambda light chains elevated at 133.6, kappa/lambda ratio 0.12.  Serum immunoelectrophoresis M-spike stable 0.7 g/dL.        12/20/2018 -  Other Event     24-hour urine monoclonal protein showed no monoclonal protein.  Beta-2 microglobulin 2.5.  AST 43.  Creatinine 0.86.  Calcium 9.5.  Ionized calcium 1.3.  C-reactive protein less than 0.01.  Sedimentation rate 7.  Serum monoclonal protein 800 mg/dL of immunoglobulin G lambda.  Lambda monoclonal protein down to 98.5 with a kappa to lambda ratio 0.18.  CBC is normal.  Total body bone survey done on the day of her visit 12/20/18 was not read by the time of her visit.      6/17/2019 -  Other Event      Lambda 129 with kappa 22 and ratio 0.17.  This is in the range that it has fluctuated since January 2017 upon first testing.  M spike stable 600 mg/dL immunoglobulin G lambda.  C-reactive protein 0.04 with sedimentation rate of 8.  Creatinine 0.86 with total calcium of 10 and ionized calcium 1.34 upper limit of normal 1.32.  Beta-2 microglobulin 2.8.  CBC unremarkable.      4/6/2020 -  Other Event     Myeloma panel:  CBC WBC 5400, hemoglobin 15.2, platelet count 225,000.  Beta-2 microglobulin 2.6.  Ionized calcium 1.26.  CMP creatinine 0.77, serum calcium 10.1 C-reactive protein 0.08, sedimentation rate 13.  Serum immunoelectrophoresis M spike stable 0.6 g/dL.  Serum free light chains, normal free kappa light chains 16.3, free lambda light chains 107.7, kappa/lambda ratio 0.15.  Kappa/lambda light chains Urine normal.         HISTORY OF PRESENT ILLNESS:  The patient is a 64 y.o.  female, here for follow up on management of MGUS.  See data from 4/6/2020 above.  Halie is feeling well, no new complaints.  Currently staying home, sheltering in place in light of current COVID 19 pandemic.  No change in her health since we saw her last.  Has not had bone survey yet as she did not want to come in to the hospital which is reasonable.        Past Medical History:   Diagnosis Date   • Atrial fibrillation (CMS/HCC)    • Corneal abrasion    • Hemorrhoids     bleeding hemorrhoids   • History of anemia    • Hypertension    • Hypothyroidism    • Osteoarthritis      Past Surgical History:   Procedure Laterality Date   • AUGMENTATION MAMMAPLASTY Bilateral 2008   • BONE MARROW BIOPSY Left 3/8/2017    Procedure: BONE MARROW BIOPSY;  Surgeon: Michel White MD;  Location:  [facility];  Service:    • COLONOSCOPY  01/09/2017   • COLONOSCOPY N/A 1/9/2017    Procedure: COLONOSCOPY;  Surgeon: Michel White MD;  Location:  [facility];  Service:    • ENDOSCOPY N/A 1/7/2017    Procedure: ESOPHAGOGASTRODUODENOSCOPY;  Surgeon: Abhishek Benton MD;  Location:  [facility];  Service:    • HEMORRHOIDECTOMY N/A 3/8/2017    Procedure: HEMORRHOIDECTOMY,;  Surgeon: Michel White MD;  Location:  [facility];  Service:    • HEMORROIDECTOMY  01/2018   • HIP ARTHROSCOPY Right     2005 & 2012   • JOINT REPLACEMENT      2 right hips   • TONSILLECTOMY  1964       Allergies   Allergen Reactions   • Sulfa Antibiotics Anaphylaxis       Family History and Social History reviewed and changed as necessary      REVIEW OF SYSTEM:   Review of Systems   Constitutional: Negative for appetite change, chills, diaphoresis, fatigue, fever and unexpected weight change.   HENT:   Negative for mouth sores, sore throat and trouble swallowing.    Eyes: Negative for icterus.   Respiratory: Negative for cough, hemoptysis and shortness of breath.    Cardiovascular: Negative for chest pain, leg swelling and palpitations.   Gastrointestinal:  Negative for abdominal distention, abdominal pain, blood in stool, constipation, diarrhea, nausea and vomiting.   Endocrine: Negative for hot flashes.   Genitourinary: Negative for bladder incontinence, difficulty urinating, dysuria, frequency and hematuria.    Musculoskeletal: Negative for gait problem, neck pain and neck stiffness.   Skin: Negative for rash.   Neurological: Negative for dizziness, gait problem, headaches, light-headedness and numbness.   Hematological: Negative for adenopathy. Does not bruise/bleed easily.   Psychiatric/Behavioral: Negative for depression. The patient is not nervous/anxious.    All other systems reviewed and are negative.     Vitals:    04/20/20 1507   PainSc: 0-No pain          PHYSICAL EXAM    Constitutional: Appears well-developed and well-nourished. No distress.   ECOG: (0) Fully active, able to carry on all predisease performance without restriction  HENT:   Head: Normocephalic.   Mouth/Throat: Oropharynx is clear and moist.   Eyes: Conjunctivae are normal. Pupils are equal, round. No scleral icterus.   Neck: Neck supple. No JVD present.   Pulmonary/Chest: No respiratory distress.   Neurological: Alert and oriented to person, place, and time. Exhibits normal muscle tone.   Psychiatric: Normal mood and affect.     Recent Results (from the past 504 hour(s))   Beta 2 Microglobulin, Serum    Collection Time: 04/06/20 12:07 PM   Result Value Ref Range    Beta-2 Microglobulin 2.6 (H) 0.8 - 2.2 mg/L   Calcium, Ionized    Collection Time: 04/06/20 12:07 PM   Result Value Ref Range    Ionized Calcium 1.26 1.12 - 1.32 mmol/L   Comprehensive Metabolic Panel    Collection Time: 04/06/20 12:07 PM   Result Value Ref Range    Glucose 87 65 - 99 mg/dL    BUN 11 8 - 23 mg/dL    Creatinine 0.77 0.57 - 1.00 mg/dL    Sodium 134 (L) 136 - 145 mmol/L    Potassium 4.2 3.5 - 5.2 mmol/L    Chloride 94 (L) 98 - 107 mmol/L    CO2 23.0 22.0 - 29.0 mmol/L    Calcium 10.1 8.6 - 10.5 mg/dL    Total Protein  8.4 6.0 - 8.5 g/dL    Albumin 5.20 3.50 - 5.20 g/dL    ALT (SGPT) 23 1 - 33 U/L    AST (SGOT) 42 (H) 1 - 32 U/L    Alkaline Phosphatase 79 39 - 117 U/L    Total Bilirubin 0.9 0.2 - 1.2 mg/dL    eGFR Non African Amer 75 >60 mL/min/1.73    Globulin 3.2 gm/dL    A/G Ratio 1.6 g/dL    BUN/Creatinine Ratio 14.3 7.0 - 25.0    Anion Gap 17.0 (H) 5.0 - 15.0 mmol/L   C-reactive Protein    Collection Time: 04/06/20 12:07 PM   Result Value Ref Range    C-Reactive Protein 0.08 0.00 - 0.50 mg/dL   VERONIQUE + PE    Collection Time: 04/06/20 12:07 PM   Result Value Ref Range    IgG 1351 586 - 1602 mg/dL    IgA 131 87 - 352 mg/dL    IgM 57 26 - 217 mg/dL    Total Protein 8.0 6.0 - 8.5 g/dL    Albumin 4.8 (H) 2.9 - 4.4 g/dL    Alpha-1-Globulin 0.2 0.0 - 0.4 g/dL    Alpha-2-Globulin 0.8 0.4 - 1.0 g/dL    Beta Globulin 0.8 0.7 - 1.3 g/dL    Gamma Globulin 1.4 0.4 - 1.8 g/dL    M-Merlin 0.6 (H) Not Observed g/dL    Globulin 3.2 2.2 - 3.9 g/dL    A/G Ratio 1.6 0.7 - 1.7    Immunofixation Reflex, Serum Comment     Please note Comment    Immunoglobulin Free LT Chains Blood    Collection Time: 04/06/20 12:07 PM   Result Value Ref Range    Free Light Chain, Kappa 16.3 3.3 - 19.4 mg/L    Free Lambda Light Chains 107.7 (H) 5.7 - 26.3 mg/L    Kappa/Lambda Ratio 0.15 (L) 0.26 - 1.65   Sedimentation Rate    Collection Time: 04/06/20 12:07 PM   Result Value Ref Range    Sed Rate 13 0 - 30 mm/hr   CBC Auto Differential    Collection Time: 04/06/20 12:07 PM   Result Value Ref Range    WBC 5.40 3.40 - 10.80 10*3/mm3    RBC 4.72 3.77 - 5.28 10*6/mm3    Hemoglobin 15.2 12.0 - 15.9 g/dL    Hematocrit 47.0 (H) 34.0 - 46.6 %    RDW 14.1 12.3 - 15.4 %    MCV 99.6 (H) 79.0 - 97.0 fL    MCH 32.3 26.6 - 33.0 pg    MCHC 32.4 31.5 - 35.7 g/dL    MPV 6.8 6.0 - 12.0 fL    Platelets 225 140 - 450 10*3/mm3    Neutrophil % 69.1 42.7 - 76.0 %    Lymphocyte % 26.3 19.6 - 45.3 %    Monocyte % 4.6 (L) 5.0 - 12.0 %    Neutrophils, Absolute 3.70 1.70 - 7.00 10*3/mm3     Lymphocytes, Absolute 1.40 0.70 - 3.10 10*3/mm3    Monocytes, Absolute 0.20 0.10 - 0.90 10*3/mm3   Stiles / Lambda Light Chains, Urine, 24 Hour - Urine, Clean Catch    Collection Time: 04/10/20 11:43 AM   Result Value Ref Range    Free Stiles Lt Chains,Ur 5.95 0.63 - 113.79 mg/L    Free Stiles Lt Chains, 24hr 11.90 Undefined mg/24 hr    Free Lambda Lt Chains,Ur 1.13 0.47 - 11.77 mg/L    Free Lambda Lt Chains, 24 Hr 2.26 Undefined mg/24 hr    Kappa/Lambda Ratio,U 5.27 1.03 - 31.76               ASSESSMENT & PLAN:    1. Stable monoclonal gammopathy as above  2. History of drug-induced kidney injury subsequently resolved  3. History of anemia hemoglobin of the fours with negative GI work-up in 2017  4. A. Fib  5. Hypertension  6. Hyperlipidemia  7. Hypothyroidism    Discussion: Labs are stable as shown above.  She hasn't gotten her bone survey yet in light of current COVID 19 pandemic she has been sheltering in place at home.  She will get in the next month or so.  We will continue to monitor her MGUS but there has been no significant change in her monoclonal gammopathy over the last 2-1/2 years.  We will go to anual interval this time.  No hint of progression to myeloma based on her serologies.  No associated autoimmune disease apparent.  No hypercalcemia, renal disease, anemia, and alkaline phosphatase is normal.      Radha Martinez, APRN    04/20/2020

## 2020-07-28 RX ORDER — SOTALOL HYDROCHLORIDE 80 MG/1
TABLET ORAL
Qty: 60 TABLET | Refills: 11 | Status: SHIPPED | OUTPATIENT
Start: 2020-07-28 | End: 2021-09-20

## 2020-12-18 ENCOUNTER — IMMUNIZATION (OUTPATIENT)
Dept: VACCINE CLINIC | Facility: HOSPITAL | Age: 65
End: 2020-12-18

## 2020-12-18 PROCEDURE — 91300 HC SARSCOV02 VAC 30MCG/0.3ML IM: CPT | Performed by: PHYSICIAN ASSISTANT

## 2020-12-18 PROCEDURE — 0001A: CPT | Performed by: PHYSICIAN ASSISTANT

## 2020-12-29 ENCOUNTER — HOSPITAL ENCOUNTER (OUTPATIENT)
Dept: GENERAL RADIOLOGY | Facility: HOSPITAL | Age: 65
Discharge: HOME | End: 2020-12-29
Admitting: INTERNAL MEDICINE

## 2020-12-29 DIAGNOSIS — D47.2 MONOCLONAL GAMMOPATHY: Chronic | ICD-10-CM

## 2020-12-29 PROCEDURE — 77075 RADEX OSSEOUS SURVEY COMPL: CPT

## 2021-01-06 ENCOUNTER — IMMUNIZATION (OUTPATIENT)
Dept: VACCINE CLINIC | Facility: HOSPITAL | Age: 66
End: 2021-01-06

## 2021-01-06 PROCEDURE — 0002A: CPT | Performed by: INTERNAL MEDICINE

## 2021-01-06 PROCEDURE — 0001A: CPT | Performed by: INTERNAL MEDICINE

## 2021-01-06 PROCEDURE — 91300 HC SARSCOV02 VAC 30MCG/0.3ML IM: CPT | Performed by: INTERNAL MEDICINE

## 2021-01-08 ENCOUNTER — APPOINTMENT (OUTPATIENT)
Dept: VACCINE CLINIC | Facility: HOSPITAL | Age: 66
End: 2021-01-08

## 2021-04-13 ENCOUNTER — LAB (OUTPATIENT)
Dept: LAB | Facility: HOSPITAL | Age: 66
End: 2021-04-13

## 2021-04-13 DIAGNOSIS — D47.2 MONOCLONAL GAMMOPATHY: ICD-10-CM

## 2021-04-13 LAB
ALBUMIN SERPL-MCNC: 4.6 G/DL (ref 3.5–5.2)
ALBUMIN/GLOB SERPL: 1.6 G/DL
ALP SERPL-CCNC: 102 U/L (ref 39–117)
ALT SERPL W P-5'-P-CCNC: 18 U/L (ref 1–33)
ANION GAP SERPL CALCULATED.3IONS-SCNC: 11 MMOL/L (ref 5–15)
AST SERPL-CCNC: 28 U/L (ref 1–32)
B2 MICROGLOB SERPL-MCNC: 2.5 MG/L (ref 0.8–2.2)
BILIRUB SERPL-MCNC: 1.1 MG/DL (ref 0–1.2)
BUN SERPL-MCNC: 11 MG/DL (ref 8–23)
BUN/CREAT SERPL: 11.7 (ref 7–25)
CA-I SERPL ISE-MCNC: 1.3 MMOL/L (ref 1.12–1.32)
CALCIUM SPEC-SCNC: 9 MG/DL (ref 8.6–10.5)
CHLORIDE SERPL-SCNC: 101 MMOL/L (ref 98–107)
CO2 SERPL-SCNC: 28 MMOL/L (ref 22–29)
CREAT SERPL-MCNC: 0.94 MG/DL (ref 0.57–1)
CRP SERPL-MCNC: <0.3 MG/DL (ref 0–0.5)
ERYTHROCYTE [DISTWIDTH] IN BLOOD BY AUTOMATED COUNT: 13.9 % (ref 12.3–15.4)
ERYTHROCYTE [SEDIMENTATION RATE] IN BLOOD: 7 MM/HR (ref 0–30)
GFR SERPL CREATININE-BSD FRML MDRD: 60 ML/MIN/1.73
GLOBULIN UR ELPH-MCNC: 2.9 GM/DL
GLUCOSE SERPL-MCNC: 91 MG/DL (ref 65–99)
HCT VFR BLD AUTO: 48.1 % (ref 34–46.6)
HGB BLD-MCNC: 15.4 G/DL (ref 12–15.9)
LYMPHOCYTES # BLD AUTO: 2 10*3/MM3 (ref 0.7–3.1)
LYMPHOCYTES NFR BLD AUTO: 40.1 % (ref 19.6–45.3)
MCH RBC QN AUTO: 30.8 PG (ref 26.6–33)
MCHC RBC AUTO-ENTMCNC: 32.1 G/DL (ref 31.5–35.7)
MCV RBC AUTO: 96 FL (ref 79–97)
MONOCYTES # BLD AUTO: 0.3 10*3/MM3 (ref 0.1–0.9)
MONOCYTES NFR BLD AUTO: 6 % (ref 5–12)
NEUTROPHILS NFR BLD AUTO: 2.6 10*3/MM3 (ref 1.7–7)
NEUTROPHILS NFR BLD AUTO: 53.9 % (ref 42.7–76)
PLATELET # BLD AUTO: 217 10*3/MM3 (ref 140–450)
PMV BLD AUTO: 7.8 FL (ref 6–12)
POTASSIUM SERPL-SCNC: 4.2 MMOL/L (ref 3.5–5.2)
PROT SERPL-MCNC: 7.5 G/DL (ref 6–8.5)
RBC # BLD AUTO: 5.01 10*6/MM3 (ref 3.77–5.28)
SODIUM SERPL-SCNC: 140 MMOL/L (ref 136–145)
WBC # BLD AUTO: 4.9 10*3/MM3 (ref 3.4–10.8)

## 2021-04-13 PROCEDURE — 86140 C-REACTIVE PROTEIN: CPT

## 2021-04-13 PROCEDURE — 82784 ASSAY IGA/IGD/IGG/IGM EACH: CPT

## 2021-04-13 PROCEDURE — 85652 RBC SED RATE AUTOMATED: CPT

## 2021-04-13 PROCEDURE — 85025 COMPLETE CBC W/AUTO DIFF WBC: CPT

## 2021-04-13 PROCEDURE — 83883 ASSAY NEPHELOMETRY NOT SPEC: CPT

## 2021-04-13 PROCEDURE — 36415 COLL VENOUS BLD VENIPUNCTURE: CPT

## 2021-04-13 PROCEDURE — 86334 IMMUNOFIX E-PHORESIS SERUM: CPT

## 2021-04-13 PROCEDURE — 84165 PROTEIN E-PHORESIS SERUM: CPT

## 2021-04-13 PROCEDURE — 82232 ASSAY OF BETA-2 PROTEIN: CPT

## 2021-04-13 PROCEDURE — 80053 COMPREHEN METABOLIC PANEL: CPT

## 2021-04-13 PROCEDURE — 82330 ASSAY OF CALCIUM: CPT

## 2021-04-14 LAB
ALBUMIN SERPL ELPH-MCNC: 4.1 G/DL (ref 2.9–4.4)
ALBUMIN/GLOB SERPL: 1.3 {RATIO} (ref 0.7–1.7)
ALPHA1 GLOB SERPL ELPH-MCNC: 0.2 G/DL (ref 0–0.4)
ALPHA2 GLOB SERPL ELPH-MCNC: 0.8 G/DL (ref 0.4–1)
B-GLOBULIN SERPL ELPH-MCNC: 1.1 G/DL (ref 0.7–1.3)
GAMMA GLOB SERPL ELPH-MCNC: 1.4 G/DL (ref 0.4–1.8)
GLOBULIN SER-MCNC: 3.4 G/DL (ref 2.2–3.9)
IGA SERPL-MCNC: 140 MG/DL (ref 87–352)
IGG SERPL-MCNC: 1368 MG/DL (ref 586–1602)
IGM SERPL-MCNC: 56 MG/DL (ref 26–217)
INTERPRETATION SERPL IEP-IMP: ABNORMAL
KAPPA LC FREE SER-MCNC: 18.7 MG/L (ref 3.3–19.4)
KAPPA LC FREE/LAMBDA FREE SER: 0.14 {RATIO} (ref 0.26–1.65)
LABORATORY COMMENT REPORT: ABNORMAL
LAMBDA LC FREE SERPL-MCNC: 131.5 MG/L (ref 5.7–26.3)
M PROTEIN SERPL ELPH-MCNC: 0.8 G/DL
PROT SERPL-MCNC: 7.5 G/DL (ref 6–8.5)

## 2021-04-15 ENCOUNTER — OFFICE VISIT (OUTPATIENT)
Dept: ONCOLOGY | Facility: CLINIC | Age: 66
End: 2021-04-15

## 2021-04-15 VITALS
OXYGEN SATURATION: 98 % | RESPIRATION RATE: 14 BRPM | TEMPERATURE: 97.4 F | HEIGHT: 70 IN | SYSTOLIC BLOOD PRESSURE: 110 MMHG | HEART RATE: 75 BPM | BODY MASS INDEX: 23.05 KG/M2 | DIASTOLIC BLOOD PRESSURE: 89 MMHG | WEIGHT: 161 LBS

## 2021-04-15 DIAGNOSIS — D47.2 MONOCLONAL GAMMOPATHY: Primary | Chronic | ICD-10-CM

## 2021-04-15 PROCEDURE — 99213 OFFICE O/P EST LOW 20 MIN: CPT | Performed by: INTERNAL MEDICINE

## 2021-04-15 NOTE — PROGRESS NOTES
CHIEF COMPLAINT: Monoclonal gammopathy    Problem List:  Oncology/Hematology History Overview Note   1.)  Monoclonal gammopathy    a) March 2017 24-hour urine immunoelectrophoresis showed no monoclonal spike, lambda light chains 106 with kappa light chains 24 and a kappa to lambda ratio of 0.23 with normal IgA, G, and M levels.  January 2017 serum immunoelectrophoresis showed 600 mg/dL of immunoglobulin G lambda light chains in the serum.  3/8/17 bone marrow biopsy showed normal marrow with 3% plasma cells and a small lambda clonal population identified with no stainable iron.  There was erythroid expansion with mild maturation dyssynchrony and occasional atypical nuclear features that may represent the compensatory response to her anemia though myelodysplasia could not entirely be ruled out.  Had reactive aggregates of lymphoid tissue.     b) 6/13/2017 sedimentation rate 8, immunoglobulin free light chains free kappa light chains 25.7, free lambda light chains 173.4, kappa/lambda ratio 0.15.  Serum immunoelectrophoresis M-spike 0.9g/dL, C-reactive protein less than 0.01, CMP normal, ionized calcium 1.31, beta-2 microglobulin 3.1, CBC WBC 5500, hemoglobin 14.2, hematocrit 44.6%, MCV 94.8, platelets 259,000.  24-hour urine immunoelectrophoresis pending.  C.)  -6/17/2019 data: Lambda 129 with kappa 22 and ratio 0.17.  This is in the range that it has fluctuated since January 2017 upon first testing.  M spike stable 600 mg/dL immunoglobulin G lambda.  C-reactive protein 0.04 with sedimentation rate of 8.  Creatinine 0.86 with total calcium of 10 and ionized calcium 1.34 upper limit of normal 1.32.  Beta-2 microglobulin 2.8.  CBC unremarkable.  -4/13/2021 data: Beta-2 microglobulin 2.5 stable.  Hemoglobin 15.4 with normal CBC.  CMP unremarkable including creatinine 0.94, calcium 9.0, total protein 7.5, normal alkaline phosphatase 102.  Serum M spike 800 mg/dL IgG lambda stable with normal quantitative immunoglobulins.   Immunoglobulin free lambda light chains 131 mg/L with kappa 18.7 and ratio 0.14 stable x4 years.  Sedimentation rate 7.  C-reactive protein less than 0.3.  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  2.)  History of anemia, resolved after hemorrhoidectomy repair and on oral iron.  CBC on 6/13/2017 with a hemoglobin of 14.2, hematocrit 44.6%, MCV of 94.8.         Monoclonal gammopathy    Initial Diagnosis    Monoclonal gammopathy     11/29/2017 Imaging    Bone survey IMPRESSION:  Negative skeletal survey.     6/5/2018 -  Other Event    6/5/2018 labs:  CBC WBC 4350, hemoglobin 12.6, hematocrit 38.3%, platelet count 252,000.  CMP creatinine 0.8, serum calcium 9.4.  Ionized calcium 1.29.  Beta-2 microglobulin 2.3.  Sedimentation rate 9, CRP 0.02.  Kappa/lambda light chains-normal kappa light chains 16.6, lambda light chains elevated at 133.6, kappa/lambda ratio 0.12.  Serum immunoelectrophoresis M-spike stable 0.7 g/dL.       12/20/2018 -  Other Event    24-hour urine monoclonal protein showed no monoclonal protein.  Beta-2 microglobulin 2.5.  AST 43.  Creatinine 0.86.  Calcium 9.5.  Ionized calcium 1.3.  C-reactive protein less than 0.01.  Sedimentation rate 7.  Serum monoclonal protein 800 mg/dL of immunoglobulin G lambda.  Lambda monoclonal protein down to 98.5 with a kappa to lambda ratio 0.18.  CBC is normal.  Total body bone survey done on the day of her visit 12/20/18 was not read by the time of her visit.     6/17/2019 -  Other Event     Lambda 129 with kappa 22 and ratio 0.17.  This is in the range that it has fluctuated since January 2017 upon first testing.  M spike stable 600 mg/dL immunoglobulin G lambda.  C-reactive protein 0.04 with sedimentation rate of 8.  Creatinine 0.86 with total calcium of 10 and ionized calcium 1.34 upper limit of normal 1.32.  Beta-2 microglobulin 2.8.  CBC unremarkable.     4/6/2020 -  Other Event    Myeloma panel:  CBC WBC 5400, hemoglobin 15.2, platelet  count 225,000.  Beta-2 microglobulin 2.6.  Ionized calcium 1.26.  CMP creatinine 0.77, serum calcium 10.1 C-reactive protein 0.08, sedimentation rate 13.  Serum immunoelectrophoresis M spike stable 0.6 g/dL.  Serum free light chains, normal free kappa light chains 16.3, free lambda light chains 107.7, kappa/lambda ratio 0.15.  Kappa/lambda light chains Urine normal.         HISTORY OF PRESENT ILLNESS:  The patient is a 65 y.o. female, here for follow up on management of stable monoclonal gammopathy.    Past Medical History:   Diagnosis Date   • Atrial fibrillation (CMS/HCC)    • Corneal abrasion    • Hemorrhoids     bleeding hemorrhoids   • History of anemia    • Hypertension    • Hypothyroidism    • Osteoarthritis      Past Surgical History:   Procedure Laterality Date   • AUGMENTATION MAMMAPLASTY Bilateral 2008   • BONE MARROW BIOPSY Left 3/8/2017    Procedure: BONE MARROW BIOPSY;  Surgeon: Michel White MD;  Location:  [facility];  Service:    • COLONOSCOPY  01/09/2017   • COLONOSCOPY N/A 1/9/2017    Procedure: COLONOSCOPY;  Surgeon: Michel White MD;  Location:  [facility];  Service:    • ENDOSCOPY N/A 1/7/2017    Procedure: ESOPHAGOGASTRODUODENOSCOPY;  Surgeon: Abhishek Benton MD;  Location:  [facility];  Service:    • HEMORRHOIDECTOMY N/A 3/8/2017    Procedure: HEMORRHOIDECTOMY,;  Surgeon: Michel White MD;  Location:  [facility];  Service:    • HEMORROIDECTOMY  01/2018   • HIP ARTHROSCOPY Right     2005 & 2012   • JOINT REPLACEMENT      2 right hips   • TONSILLECTOMY  1964       Allergies   Allergen Reactions   • Sulfa Antibiotics Anaphylaxis       Family History and Social History reviewed and changed as necessary    REVIEW OF SYSTEM:   No somatic complaints to suggest progression of monoclonal gammopathy    PHYSICAL EXAM:  No visible adenopathy.  No respiratory distress.  No tender bones.    Vitals:    04/15/21 1058   BP: 110/89   Pulse: 75   Resp: 14   Temp: 97.4 °F (36.3 °C)   SpO2: 98%  "  Weight: 73 kg (161 lb)   Height: 177.8 cm (70\")     Vitals:    04/15/21 1058   PainSc: 0-No pain          ECOG score: 0           Vitals reviewed.    ECOG: (0) Fully Active - Able to Carry On All Pre-disease Performance Without Restriction    Lab Results   Component Value Date    HGB 15.4 04/13/2021    HCT 48.1 (H) 04/13/2021    MCV 96.0 04/13/2021     04/13/2021    WBC 4.90 04/13/2021    NEUTROABS 2.60 04/13/2021    LYMPHSABS 2.00 04/13/2021    MONOSABS 0.30 04/13/2021    EOSABS 0.11 06/05/2018    BASOSABS 0.03 06/05/2018       Lab Results   Component Value Date    GLUCOSE 91 04/13/2021    BUN 11 04/13/2021    CREATININE 0.94 04/13/2021     04/13/2021    K 4.2 04/13/2021     04/13/2021    CO2 28.0 04/13/2021    CALCIUM 9.0 04/13/2021    PROTEINTOT 7.5 04/13/2021    ALBUMIN 4.60 04/13/2021    ALBUMIN 4.1 04/13/2021    BILITOT 1.1 04/13/2021    ALKPHOS 102 04/13/2021    AST 28 04/13/2021    ALT 18 04/13/2021             ASSESSMENT & PLAN:  1. Stable monoclonal gammopathy as above  2. History of drug-induced kidney injury subsequently resolved  3. History of anemia hemoglobin of the fours with negative GI work-up in 2017  4. A. Fib  5. Hypertension  6. Hyperlipidemia  7. Hypothyroidism    Discussion: I reviewed her myeloma panel as outlined above.  No progression of her light chains or intact monoclonal protein and bone survey back in December was negative.  We will continue to follow this annually.  Total time of care today was 15 minutes.  No hint of progression to myeloma.  René Valencia MD    04/15/2021      "

## 2021-09-20 RX ORDER — SOTALOL HYDROCHLORIDE 80 MG/1
TABLET ORAL
Qty: 60 TABLET | Refills: 2 | Status: SHIPPED | OUTPATIENT
Start: 2021-09-20 | End: 2022-02-15 | Stop reason: HOSPADM

## 2021-09-28 NOTE — TELEPHONE ENCOUNTER
Pt in  Today for ekg. She made the decision to resume her Flecainide back on Saturday 12/31/16. Per EC pt to come today for EKG. GFt saw EKG and pt told to stop Flecainide today and resume Diltiazem and get Stress test. Pt agrees.  
receives peritoneal dialysis at home

## 2022-02-11 ENCOUNTER — APPOINTMENT (OUTPATIENT)
Dept: GENERAL RADIOLOGY | Facility: HOSPITAL | Age: 67
End: 2022-02-11

## 2022-02-11 ENCOUNTER — HOSPITAL ENCOUNTER (INPATIENT)
Facility: HOSPITAL | Age: 67
LOS: 4 days | Discharge: HOME | End: 2022-02-15
Attending: EMERGENCY MEDICINE | Admitting: INTERNAL MEDICINE

## 2022-02-11 DIAGNOSIS — R06.02 SHORTNESS OF BREATH: ICD-10-CM

## 2022-02-11 DIAGNOSIS — N93.9 VAGINAL BLEEDING: ICD-10-CM

## 2022-02-11 DIAGNOSIS — I48.91 ATRIAL FIBRILLATION WITH RVR: Primary | ICD-10-CM

## 2022-02-11 LAB
ABO GROUP BLD: NORMAL
ALBUMIN SERPL-MCNC: 4.2 G/DL (ref 3.5–5.2)
ALBUMIN/GLOB SERPL: 1.6 G/DL
ALP SERPL-CCNC: 149 U/L (ref 39–117)
ALT SERPL W P-5'-P-CCNC: 35 U/L (ref 1–33)
ANION GAP SERPL CALCULATED.3IONS-SCNC: 14 MMOL/L (ref 5–15)
AST SERPL-CCNC: 67 U/L (ref 1–32)
BASOPHILS # BLD AUTO: 0.04 10*3/MM3 (ref 0–0.2)
BASOPHILS NFR BLD AUTO: 0.6 % (ref 0–1.5)
BILIRUB SERPL-MCNC: 1.1 MG/DL (ref 0–1.2)
BLD GP AB SCN SERPL QL: NEGATIVE
BUN SERPL-MCNC: 12 MG/DL (ref 8–23)
BUN/CREAT SERPL: 15.8 (ref 7–25)
CALCIUM SPEC-SCNC: 9.3 MG/DL (ref 8.6–10.5)
CHLORIDE SERPL-SCNC: 101 MMOL/L (ref 98–107)
CO2 SERPL-SCNC: 23 MMOL/L (ref 22–29)
CREAT SERPL-MCNC: 0.76 MG/DL (ref 0.57–1)
DEPRECATED RDW RBC AUTO: 49.2 FL (ref 37–54)
EOSINOPHIL # BLD AUTO: 0.06 10*3/MM3 (ref 0–0.4)
EOSINOPHIL NFR BLD AUTO: 1 % (ref 0.3–6.2)
ERYTHROCYTE [DISTWIDTH] IN BLOOD BY AUTOMATED COUNT: 13.5 % (ref 12.3–15.4)
GFR SERPL CREATININE-BSD FRML MDRD: 76 ML/MIN/1.73
GLOBULIN UR ELPH-MCNC: 2.6 GM/DL
GLUCOSE SERPL-MCNC: 99 MG/DL (ref 65–99)
HCT VFR BLD AUTO: 44.6 % (ref 34–46.6)
HGB BLD-MCNC: 15.1 G/DL (ref 12–15.9)
HOLD SPECIMEN: NORMAL
IMM GRANULOCYTES # BLD AUTO: 0.02 10*3/MM3 (ref 0–0.05)
IMM GRANULOCYTES NFR BLD AUTO: 0.3 % (ref 0–0.5)
INR PPP: 1.49 (ref 0.85–1.16)
LYMPHOCYTES # BLD AUTO: 1.81 10*3/MM3 (ref 0.7–3.1)
LYMPHOCYTES NFR BLD AUTO: 29 % (ref 19.6–45.3)
MAGNESIUM SERPL-MCNC: 1.4 MG/DL (ref 1.6–2.4)
MCH RBC QN AUTO: 33.8 PG (ref 26.6–33)
MCHC RBC AUTO-ENTMCNC: 33.9 G/DL (ref 31.5–35.7)
MCV RBC AUTO: 99.8 FL (ref 79–97)
MONOCYTES # BLD AUTO: 0.55 10*3/MM3 (ref 0.1–0.9)
MONOCYTES NFR BLD AUTO: 8.8 % (ref 5–12)
NEUTROPHILS NFR BLD AUTO: 3.77 10*3/MM3 (ref 1.7–7)
NEUTROPHILS NFR BLD AUTO: 60.3 % (ref 42.7–76)
NRBC BLD AUTO-RTO: 0 /100 WBC (ref 0–0.2)
NT-PROBNP SERPL-MCNC: 3874 PG/ML (ref 0–900)
PLATELET # BLD AUTO: 200 10*3/MM3 (ref 140–450)
PMV BLD AUTO: 9.9 FL (ref 6–12)
POTASSIUM SERPL-SCNC: 4.6 MMOL/L (ref 3.5–5.2)
PROT SERPL-MCNC: 6.8 G/DL (ref 6–8.5)
PROTHROMBIN TIME: 17.5 SECONDS (ref 11.4–14.4)
RBC # BLD AUTO: 4.47 10*6/MM3 (ref 3.77–5.28)
RH BLD: POSITIVE
SARS-COV-2 RNA PNL SPEC NAA+PROBE: NOT DETECTED
SODIUM SERPL-SCNC: 138 MMOL/L (ref 136–145)
T&S EXPIRATION DATE: NORMAL
TROPONIN T SERPL-MCNC: <0.01 NG/ML (ref 0–0.03)
TSH SERPL DL<=0.05 MIU/L-ACNC: 1.05 UIU/ML (ref 0.27–4.2)
WBC NRBC COR # BLD: 6.25 10*3/MM3 (ref 3.4–10.8)
WHOLE BLOOD HOLD SPECIMEN: NORMAL
WHOLE BLOOD HOLD SPECIMEN: NORMAL

## 2022-02-11 PROCEDURE — 84443 ASSAY THYROID STIM HORMONE: CPT | Performed by: EMERGENCY MEDICINE

## 2022-02-11 PROCEDURE — 86850 RBC ANTIBODY SCREEN: CPT | Performed by: PHYSICIAN ASSISTANT

## 2022-02-11 PROCEDURE — 80053 COMPREHEN METABOLIC PANEL: CPT | Performed by: EMERGENCY MEDICINE

## 2022-02-11 PROCEDURE — 99223 1ST HOSP IP/OBS HIGH 75: CPT | Performed by: INTERNAL MEDICINE

## 2022-02-11 PROCEDURE — 86900 BLOOD TYPING SEROLOGIC ABO: CPT | Performed by: PHYSICIAN ASSISTANT

## 2022-02-11 PROCEDURE — U0004 COV-19 TEST NON-CDC HGH THRU: HCPCS | Performed by: INTERNAL MEDICINE

## 2022-02-11 PROCEDURE — 85025 COMPLETE CBC W/AUTO DIFF WBC: CPT | Performed by: EMERGENCY MEDICINE

## 2022-02-11 PROCEDURE — 85610 PROTHROMBIN TIME: CPT | Performed by: PHYSICIAN ASSISTANT

## 2022-02-11 PROCEDURE — 84484 ASSAY OF TROPONIN QUANT: CPT | Performed by: EMERGENCY MEDICINE

## 2022-02-11 PROCEDURE — 25010000002 MAGNESIUM SULFATE 2 GM/50ML SOLUTION: Performed by: INTERNAL MEDICINE

## 2022-02-11 PROCEDURE — 86901 BLOOD TYPING SEROLOGIC RH(D): CPT | Performed by: PHYSICIAN ASSISTANT

## 2022-02-11 PROCEDURE — 93005 ELECTROCARDIOGRAM TRACING: CPT | Performed by: EMERGENCY MEDICINE

## 2022-02-11 PROCEDURE — 83735 ASSAY OF MAGNESIUM: CPT | Performed by: EMERGENCY MEDICINE

## 2022-02-11 PROCEDURE — 83880 ASSAY OF NATRIURETIC PEPTIDE: CPT | Performed by: EMERGENCY MEDICINE

## 2022-02-11 PROCEDURE — 71045 X-RAY EXAM CHEST 1 VIEW: CPT

## 2022-02-11 PROCEDURE — 99284 EMERGENCY DEPT VISIT MOD MDM: CPT

## 2022-02-11 RX ORDER — MAGNESIUM SULFATE HEPTAHYDRATE 40 MG/ML
4 INJECTION, SOLUTION INTRAVENOUS AS NEEDED
Status: DISCONTINUED | OUTPATIENT
Start: 2022-02-11 | End: 2022-02-15 | Stop reason: HOSPADM

## 2022-02-11 RX ORDER — CHOLECALCIFEROL (VITAMIN D3) 125 MCG
5 CAPSULE ORAL NIGHTLY PRN
Status: DISCONTINUED | OUTPATIENT
Start: 2022-02-11 | End: 2022-02-15 | Stop reason: HOSPADM

## 2022-02-11 RX ORDER — ACETAMINOPHEN 325 MG/1
650 TABLET ORAL EVERY 4 HOURS PRN
Status: DISCONTINUED | OUTPATIENT
Start: 2022-02-11 | End: 2022-02-15 | Stop reason: HOSPADM

## 2022-02-11 RX ORDER — MAGNESIUM SULFATE HEPTAHYDRATE 40 MG/ML
2 INJECTION, SOLUTION INTRAVENOUS AS NEEDED
Status: DISCONTINUED | OUTPATIENT
Start: 2022-02-11 | End: 2022-02-15 | Stop reason: HOSPADM

## 2022-02-11 RX ORDER — SODIUM CHLORIDE 0.9 % (FLUSH) 0.9 %
10 SYRINGE (ML) INJECTION AS NEEDED
Status: DISCONTINUED | OUTPATIENT
Start: 2022-02-11 | End: 2022-02-15 | Stop reason: HOSPADM

## 2022-02-11 RX ORDER — ONDANSETRON 4 MG/1
4 TABLET, FILM COATED ORAL EVERY 6 HOURS PRN
Status: DISCONTINUED | OUTPATIENT
Start: 2022-02-11 | End: 2022-02-15 | Stop reason: HOSPADM

## 2022-02-11 RX ORDER — ONDANSETRON 2 MG/ML
4 INJECTION INTRAMUSCULAR; INTRAVENOUS EVERY 6 HOURS PRN
Status: DISCONTINUED | OUTPATIENT
Start: 2022-02-11 | End: 2022-02-15 | Stop reason: HOSPADM

## 2022-02-11 RX ORDER — DILTIAZEM HCL IN NACL,ISO-OSM 125 MG/125
5-15 PLASTIC BAG, INJECTION (ML) INTRAVENOUS CONTINUOUS
Status: DISCONTINUED | OUTPATIENT
Start: 2022-02-11 | End: 2022-02-14

## 2022-02-11 RX ORDER — POTASSIUM CHLORIDE 750 MG/1
40 CAPSULE, EXTENDED RELEASE ORAL AS NEEDED
Status: DISCONTINUED | OUTPATIENT
Start: 2022-02-11 | End: 2022-02-15 | Stop reason: HOSPADM

## 2022-02-11 RX ORDER — BISACODYL 5 MG/1
5 TABLET, DELAYED RELEASE ORAL DAILY PRN
Status: DISCONTINUED | OUTPATIENT
Start: 2022-02-11 | End: 2022-02-15 | Stop reason: HOSPADM

## 2022-02-11 RX ORDER — SODIUM CHLORIDE 0.9 % (FLUSH) 0.9 %
10 SYRINGE (ML) INJECTION EVERY 12 HOURS SCHEDULED
Status: DISCONTINUED | OUTPATIENT
Start: 2022-02-11 | End: 2022-02-15 | Stop reason: HOSPADM

## 2022-02-11 RX ORDER — SOTALOL HYDROCHLORIDE 80 MG/1
80 TABLET ORAL EVERY 12 HOURS SCHEDULED
Status: DISCONTINUED | OUTPATIENT
Start: 2022-02-11 | End: 2022-02-15 | Stop reason: HOSPADM

## 2022-02-11 RX ORDER — HYDROXYZINE HYDROCHLORIDE 10 MG/1
10 TABLET, FILM COATED ORAL 3 TIMES DAILY PRN
COMMUNITY
End: 2022-02-25 | Stop reason: HOSPADM

## 2022-02-11 RX ORDER — AMOXICILLIN 250 MG
2 CAPSULE ORAL 2 TIMES DAILY
Status: DISCONTINUED | OUTPATIENT
Start: 2022-02-11 | End: 2022-02-15 | Stop reason: HOSPADM

## 2022-02-11 RX ORDER — POTASSIUM CHLORIDE 1.5 G/1.77G
40 POWDER, FOR SOLUTION ORAL AS NEEDED
Status: DISCONTINUED | OUTPATIENT
Start: 2022-02-11 | End: 2022-02-15 | Stop reason: HOSPADM

## 2022-02-11 RX ORDER — BISACODYL 10 MG
10 SUPPOSITORY, RECTAL RECTAL DAILY PRN
Status: DISCONTINUED | OUTPATIENT
Start: 2022-02-11 | End: 2022-02-15 | Stop reason: HOSPADM

## 2022-02-11 RX ORDER — POLYETHYLENE GLYCOL 3350 17 G/17G
17 POWDER, FOR SOLUTION ORAL DAILY PRN
Status: DISCONTINUED | OUTPATIENT
Start: 2022-02-11 | End: 2022-02-15 | Stop reason: HOSPADM

## 2022-02-11 RX ADMIN — SODIUM CHLORIDE, PRESERVATIVE FREE 10 ML: 5 INJECTION INTRAVENOUS at 21:03

## 2022-02-11 RX ADMIN — MAGNESIUM SULFATE HEPTAHYDRATE 2 G: 40 INJECTION, SOLUTION INTRAVENOUS at 23:25

## 2022-02-11 RX ADMIN — MAGNESIUM SULFATE HEPTAHYDRATE 2 G: 40 INJECTION, SOLUTION INTRAVENOUS at 18:03

## 2022-02-11 RX ADMIN — SOTALOL HYDROCHLORIDE 80 MG: 80 TABLET ORAL at 20:53

## 2022-02-11 RX ADMIN — Medication 5 MG/HR: at 11:28

## 2022-02-11 RX ADMIN — MAGNESIUM SULFATE HEPTAHYDRATE 2 G: 40 INJECTION, SOLUTION INTRAVENOUS at 20:55

## 2022-02-11 RX ADMIN — APIXABAN 5 MG: 5 TABLET, FILM COATED ORAL at 20:53

## 2022-02-11 NOTE — CONSULTS
[facility] Consult Note       Referring Provider: No ref. provider found  Reason for Consultation:      Patient Care Team:  Tori Iverson APRN as PCP - General (Family Medicine)  Michel White MD as Consulting Physician (Colon and Rectal Surgery)    Chief complaint:   Dypsnea    Subjective .     History of present illness:  67 y/o WF with PAF has 4-5 day history of HOFFMAN and SOB with less than typical exertion.  No angina.  Symptoms noted onset concurrent with new onset vaginal bleeding during the same time course.  Pt. Has just returned from vacation in [place] which is where she noted the onset of her symptoms. She has mild palpitations but her dypsnea is consistent with her symptoms with previous episodes of AF.    Review of Systems  A 14 point review of systems was negative except as was stated in the HPI    History  Past Medical History:   Diagnosis Date   • Atrial fibrillation (HCC)    • Corneal abrasion    • Hemorrhoids     bleeding hemorrhoids   • History of anemia    • Hypertension    • Hypothyroidism    • Osteoarthritis      Past Surgical History:   Procedure Laterality Date   • AUGMENTATION MAMMAPLASTY Bilateral 2008   • BONE MARROW BIOPSY Left 3/8/2017    Procedure: BONE MARROW BIOPSY;  Surgeon: Michel White MD;  Location:  [facility];  Service:    • COLONOSCOPY  01/09/2017   • COLONOSCOPY N/A 1/9/2017    Procedure: COLONOSCOPY;  Surgeon: Michel White MD;  Location:  [facility];  Service:    • ENDOSCOPY N/A 1/7/2017    Procedure: ESOPHAGOGASTRODUODENOSCOPY;  Surgeon: Abhishek Benton MD;  Location:  [facility];  Service:    • HEMORRHOIDECTOMY N/A 3/8/2017    Procedure: HEMORRHOIDECTOMY,;  Surgeon: Michel White MD;  Location:  [facility];  Service:    • HEMORROIDECTOMY  01/2018   • HIP ARTHROSCOPY Right     2005 & 2012   • JOINT REPLACEMENT      2 right hips   • TONSILLECTOMY  1964     Family History   Problem Relation Age of Onset   • Atrial fibrillation Mother   "  • Diabetes Mother    • Atrial fibrillation Brother    • Abnormal EKG Brother    • Heart attack Father    • Stroke Father    • Diabetes Sister    • Breast cancer Neg Hx    • Ovarian cancer Neg Hx      Social History     Tobacco Use   • Smoking status: Former Smoker     Packs/day: 1.00     Years: 20.00     Pack years: 20.00     Types: Cigarettes   • Smokeless tobacco: Never Used   • Tobacco comment: quit 35+ years ago.    Substance Use Topics   • Alcohol use: Yes     Alcohol/week: 2.0 standard drinks     Types: 2 Shots of liquor per week     Comment: 2 drinks a day   • Drug use: No     (Not in a hospital admission)    Scheduled Meds:     Continuous Infusions:  dilTIAZem, 5-15 mg/hr, Last Rate: 5 mg/hr (02/11/22 1336)      PRN Meds:  sodium chloride   Allergies:  Sulfa antibiotics    Objective     Vital Sign Min/Max for last 24 hours  Temp  Min: 97.6 °F (36.4 °C)  Max: 97.6 °F (36.4 °C)   BP  Min: 107/91  Max: 136/91   Pulse  Min: 84  Max: 143   Resp  Min: 20  Max: 20   SpO2  Min: 92 %  Max: 99 %   No data recorded   Weight  Min: 72.6 kg (160 lb)  Max: 72.6 kg (160 lb)     Flowsheet Rows      First Filed Value   Admission Height 177.8 cm (70\") Documented at 02/11/2022 0844   Admission Weight 72.6 kg (160 lb) Documented at 02/11/2022 0844               Physical Exam: Thin WF in NAD  General Appearance: Alert, appears younger than stated age and cooperative  Lungs: Clear to auscultation  Heart::Irregular and rapid. No Murmurs, Rubs or Gallops  Abdomen: Soft and nontender with adequate bowel sounds.  No organomegaly  Extremities: No cyanosis, clubbing or edema  Pulses: Pulses palpable and equal bilaterally  Skin: Warm and dry with no rash  Psych: Normal    Results Review:  Cxray clear; EKG-AF RVR NSSTT ABN.           Impression      -Diastolic dysfunction secondary to AF with RVR  -Persistent AF with RVR  -Htn  -Vaginal Bleeding--new onset  -Hypothyroidism  -HLP  -Remote GI bleed secondary to hemorrhoids  -Monoclonal " Gammopathy--indolent      Plan     Agree with admission to Telemetry  Begin IV cardizem and continue Sotalol 80 bid  Gyn consult re vaginal bleeding--this is more urgent owing to her ongoing need for anticoagulation for stroke prevention in AF  Will hold Eliquis temporarily  Repeat H/H in am    Orlin Novak MD   02/11/22  13:42 EST    Time:

## 2022-02-11 NOTE — ED PROVIDER NOTES
Subjective   Ms. Lennon is a 66-year-old female the presents emergency department today with rapid heart rate vaginal bleeding.  She reports that she is on Eliquis for her atrial fibrillation.  She is pregnant had a period in 15 to 20 years.  She reports that she been having some bleeding about 4 to 5 days ago.  Dr. Novak had her hold her Eliquis which she has been off of it for more than 2 days.  She still bleeding about 3 pads a day.  She reports she was never bleeding any heavier than that.  She reports she is been a bit short of breath.  She had no chest pain.  She reports that she has had no nausea or vomiting.  She had an endometrial biopsy years ago but not recently.  She has a patient of Dr. Jair Miguel for GYN.      History provided by:  Patient and significant other   used: No    Shortness of Breath  Severity:  Moderate  Onset quality:  Gradual  Timing:  Constant  Progression:  Waxing and waning  Chronicity:  New  Context: activity    Context: not URI    Context comment:  Current having vaginal bleeding on Eliquis also who appears to have A. fib with RVR.  Relieved by:  Rest  Worsened by:  Activity  Ineffective treatments:  None tried  Associated symptoms: no abdominal pain, no chest pain, no claudication, no cough, no diaphoresis, no ear pain, no fever, no headaches, no neck pain, no rash, no sputum production, no vomiting and no wheezing    Risk factors: no hx of PE/DVT, no obesity, no recent surgery and no tobacco use        Review of Systems   Constitutional: Negative for diaphoresis and fever.   HENT: Negative for ear pain.    Respiratory: Positive for shortness of breath. Negative for cough, sputum production and wheezing.    Cardiovascular: Negative for chest pain, palpitations and claudication.   Gastrointestinal: Negative for abdominal pain and vomiting.   Genitourinary: Positive for vaginal bleeding. Negative for dysuria, frequency and urgency.   Musculoskeletal: Negative  for back pain and neck pain.   Skin: Negative for pallor and rash.   Neurological: Negative for headaches.   Psychiatric/Behavioral: Negative.    All other systems reviewed and are negative.      Past Medical History:   Diagnosis Date   • Atrial fibrillation (HCC)    • Corneal abrasion    • Hemorrhoids     bleeding hemorrhoids   • History of anemia    • Hypertension    • Hypothyroidism    • Osteoarthritis        Allergies   Allergen Reactions   • Sulfa Antibiotics Anaphylaxis       Past Surgical History:   Procedure Laterality Date   • AUGMENTATION MAMMAPLASTY Bilateral 2008   • BONE MARROW BIOPSY Left 3/8/2017    Procedure: BONE MARROW BIOPSY;  Surgeon: Michel White MD;  Location:  [facility];  Service:    • COLONOSCOPY  01/09/2017   • COLONOSCOPY N/A 1/9/2017    Procedure: COLONOSCOPY;  Surgeon: Michel White MD;  Location:  [facility];  Service:    • ENDOSCOPY N/A 1/7/2017    Procedure: ESOPHAGOGASTRODUODENOSCOPY;  Surgeon: Abhishek Benton MD;  Location:  [facility];  Service:    • HEMORRHOIDECTOMY N/A 3/8/2017    Procedure: HEMORRHOIDECTOMY,;  Surgeon: Michel White MD;  Location:  [facility];  Service:    • HEMORROIDECTOMY  01/2018   • HIP ARTHROSCOPY Right     2005 & 2012   • JOINT REPLACEMENT      2 right hips   • TONSILLECTOMY  1964       Family History   Problem Relation Age of Onset   • Atrial fibrillation Mother    • Diabetes Mother    • Atrial fibrillation Brother    • Abnormal EKG Brother    • Heart attack Father    • Stroke Father    • Diabetes Sister    • Breast cancer Neg Hx    • Ovarian cancer Neg Hx        Social History     Socioeconomic History   • Marital status:    Tobacco Use   • Smoking status: Former Smoker     Packs/day: 1.00     Years: 20.00     Pack years: 20.00     Types: Cigarettes   • Smokeless tobacco: Never Used   • Tobacco comment: quit 35+ years ago.    Substance and Sexual Activity   • Alcohol use: Yes     Alcohol/week: 2.0 standard drinks     Types: 2  Shots of liquor per week     Comment: 2 drinks a day   • Drug use: No   • Sexual activity: Defer     Partners: Male           Objective   Physical Exam  Vitals and nursing note reviewed.   Constitutional:       Appearance: She is well-developed.   HENT:      Head: Normocephalic and atraumatic.      Right Ear: External ear normal.      Left Ear: External ear normal.      Nose: Nose normal.   Eyes:      General: No scleral icterus.     Conjunctiva/sclera: Conjunctivae normal.      Pupils: Pupils are equal, round, and reactive to light.   Neck:      Thyroid: No thyromegaly.   Cardiovascular:      Rate and Rhythm: Tachycardia present.      Heart sounds: Normal heart sounds.      Comments: Irregularly irregular  Pulmonary:      Effort: Pulmonary effort is normal. No respiratory distress.      Breath sounds: Normal breath sounds. No decreased breath sounds, wheezing or rales.   Chest:      Chest wall: No tenderness.   Abdominal:      General: Bowel sounds are normal. There is no distension.      Palpations: Abdomen is soft.      Tenderness: There is no abdominal tenderness.   Musculoskeletal:         General: Normal range of motion.      Cervical back: Normal range of motion.   Lymphadenopathy:      Cervical: No cervical adenopathy.   Skin:     General: Skin is warm and dry.   Neurological:      Mental Status: She is alert and oriented to person, place, and time.      Cranial Nerves: No cranial nerve deficit.      Coordination: Coordination normal.      Deep Tendon Reflexes: Reflexes are normal and symmetric. Reflexes normal.   Psychiatric:         Behavior: Behavior normal.         Thought Content: Thought content normal.         Judgment: Judgment normal.         Procedures           ED Course  ED Course as of 02/12/22 1826 Fri Feb 11, 2022   1115 Hemoglobin: 15.1 [RS]   1115 Heart Rate(!): 143 [RS]   1128 Dr. Novak seen the patient here in the emergency department.  He would like to have her admitted she has been  off her Eliquis for 2 days.  He is going to try a Cardizem drip.  She is continued to have vaginal bleeding although not as heavy even off of her Eliquis. [LOUISA]   1130 Spoke to Dr. Fernandes who is on-call for Dr. Thakkar.  Bleeding is not severe at this time.  She states she Maurin happy to consult if needed. [LOUISA]   1138 Paged the hospitalist [LOUISA]   1140 Spoke to Dr. Piedra she is agreed to admit the patient. [LOUISA]      ED Course User Index  [LOUISA] Derian Samson PA  [RS] Mckinley Sheffield MD                                         Recent Results (from the past 24 hour(s))   Basic Metabolic Panel    Collection Time: 02/12/22  4:12 AM    Specimen: Blood   Result Value Ref Range    Glucose 80 65 - 99 mg/dL    BUN 13 8 - 23 mg/dL    Creatinine 0.76 0.57 - 1.00 mg/dL    Sodium 136 136 - 145 mmol/L    Potassium 4.2 3.5 - 5.2 mmol/L    Chloride 103 98 - 107 mmol/L    CO2 21.0 (L) 22.0 - 29.0 mmol/L    Calcium 9.1 8.6 - 10.5 mg/dL    eGFR Non African Amer 76 >60 mL/min/1.73    BUN/Creatinine Ratio 17.1 7.0 - 25.0    Anion Gap 12.0 5.0 - 15.0 mmol/L   CBC Auto Differential    Collection Time: 02/12/22  4:12 AM    Specimen: Blood   Result Value Ref Range    WBC 5.55 3.40 - 10.80 10*3/mm3    RBC 4.14 3.77 - 5.28 10*6/mm3    Hemoglobin 13.7 12.0 - 15.9 g/dL    Hematocrit 41.7 34.0 - 46.6 %    .7 (H) 79.0 - 97.0 fL    MCH 33.1 (H) 26.6 - 33.0 pg    MCHC 32.9 31.5 - 35.7 g/dL    RDW 13.5 12.3 - 15.4 %    RDW-SD 50.4 37.0 - 54.0 fl    MPV 10.3 6.0 - 12.0 fL    Platelets 174 140 - 450 10*3/mm3    Neutrophil % 54.4 42.7 - 76.0 %    Lymphocyte % 31.9 19.6 - 45.3 %    Monocyte % 11.5 5.0 - 12.0 %    Eosinophil % 1.3 0.3 - 6.2 %    Basophil % 0.5 0.0 - 1.5 %    Immature Grans % 0.4 0.0 - 0.5 %    Neutrophils, Absolute 3.02 1.70 - 7.00 10*3/mm3    Lymphocytes, Absolute 1.77 0.70 - 3.10 10*3/mm3    Monocytes, Absolute 0.64 0.10 - 0.90 10*3/mm3    Eosinophils, Absolute 0.07 0.00 - 0.40 10*3/mm3    Basophils, Absolute 0.03 0.00  - 0.20 10*3/mm3    Immature Grans, Absolute 0.02 0.00 - 0.05 10*3/mm3    nRBC 0.0 0.0 - 0.2 /100 WBC   ECG 12 Lead    Collection Time: 02/12/22  6:15 AM   Result Value Ref Range    QT Interval 436 ms    QTC Interval 509 ms   Adult Transthoracic Echo Complete W/ Cont if Necessary Per Protocol    Collection Time: 02/12/22 11:13 AM   Result Value Ref Range    BSA 2.0 m^2    IVSd 0.88 cm    LVIDd 5.3 cm    LVIDs 4.1 cm    LVPWd 0.86 cm    IVS/LVPW 1.0     FS 21.6 %    EDV(Teich) 135.0 ml    ESV(Teich) 76.3 ml    EF(Teich) 43.5 %    EDV(cubed) 148.4 ml    ESV(cubed) 71.4 ml    EF(cubed) 51.9 %    LV mass(C)d 167.2 grams    LV mass(C)dI 85.2 grams/m^2    SV(Teich) 58.7 ml    SI(Teich) 29.9 ml/m^2    SV(cubed) 77.0 ml    SI(cubed) 39.2 ml/m^2    Ao root diam 2.6 cm    Ao root area 5.5 cm^2    LA dimension 4.4 cm    asc Aorta Diam 3.0 cm    LA/Ao 1.7     LVOT diam 1.9 cm    LVOT area 2.9 cm^2    LVOT area(traced) 2.8 cm^2    LAd major 5.1 cm    LVLd ap4 7.3 cm    EDV(MOD-sp4) 83.0 ml    LVLs ap4 7.3 cm    ESV(MOD-sp4) 39.0 ml    EF(MOD-sp4) 53.0 %    LVLd ap2 7.1 cm    EDV(MOD-sp2) 73.0 ml    LVLs ap2 6.5 cm    ESV(MOD-sp2) 21.0 ml    EF(MOD-sp2) 71.2 %    LA volume 87.0 ml    EF(MOD-bp) 60.0 %    SV(MOD-sp4) 44.0 ml    SI(MOD-sp4) 22.4 ml/m^2    SV(MOD-sp2) 52.0 ml    SI(MOD-sp2) 26.5 ml/m^2    Ao root area (BSA corrected) 1.3     LV Pierre Vol (BSA corrected) 42.3 ml/m^2    LV Sys Vol (BSA corrected) 19.9 ml/m^2    LA Volume Index 44.3 ml/m^2    MV E max brian 121.0 cm/sec    LV IVRT 0.08 sec    MV V2 max 118.4 cm/sec    MV max PG 5.6 mmHg    MV V2 mean 69.3 cm/sec    MV mean PG 2.3 mmHg    MV V2 VTI 26.6 cm    MV dec time 0.11 sec    Ao pk brian 86.9 cm/sec    Ao max PG 3.0 mmHg    Ao V2 mean 57.5 cm/sec    Ao mean PG 1.6 mmHg    Ao V2 VTI 13.6 cm    MR max brian 522.6 cm/sec    MR max .0 mmHg    MR mean brian 414.4 cm/sec    MR mean PG 74.8 mmHg    MR .1 cm    MR PISA 3.7 cm^2    MR flow rate 117.5 cm^3/sec    MR ERO  0.22 cm^2    MR volume 36.4 ml    MR PISA radius 0.77 cm    MR alias diallo 31.9 cm/sec    SV(Ao) 74.3 ml    SI(Ao) 37.9 ml/m^2    PA acc slope 466.5 cm/sec^2    PA acc time 0.1 sec    TR max diallo 296 cm/sec    TR max PG 35 mmHg    PA pr(Accel) 36.2 mmHg    Lat E/e'  7.6     Med E/e' 11.3     Lat Peak E' Diallo 15.9 cm/sec    Med Peak E' Diallo 10.6 cm/sec    BH CV ECHO LAURIE - BZI_BMI 24.8 kilograms/m^2     CV ECHO LAURIE - BSA([place]) 2.0 m^2     CV ECHO LAURIE - BZI_METRIC_WEIGHT 78.5 kg     CV ECHO LAURIE - BZI_METRIC_HEIGHT 177.8 cm    Avg E/e' ratio 9.13     Target HR (85%) 130 bpm    Max. Pred. HR (100%) 153 bpm     CV VAS BP RIGHT /101 mmHg    RV Base 3.50 cm    RV Length 7.90 cm    RV Mid 2.60 cm    Ascending aorta 3.0 cm    MV vena contracta 0.50 cm    IVRT 77.0 msec    PISA ALIASING DIALLO 3.20 m/s    Radius 0.8 cm    PISA MR EROA 0.23 cm2    RAP systole 15 mmHg    RVSP(TR) 50 mmHg   Magnesium    Collection Time: 02/12/22 12:30 PM    Specimen: Blood   Result Value Ref Range    Magnesium 2.5 (H) 1.6 - 2.4 mg/dL     Note: In addition to lab results from this visit, the labs listed above may include labs taken at another facility or during a different encounter within the last 24 hours. Please correlate lab times with ED admission and discharge times for further clarification of the services performed during this visit.    XR Chest 1 View   Final Result   1.  Cardiomegaly.       This report was finalized on 2/11/2022 9:37 AM by Edi Sage MD.            Vitals:    02/12/22 1230 02/12/22 1415 02/12/22 1505 02/12/22 1600   BP:   121/95    BP Location:   Right arm    Patient Position:   Lying    Pulse: 94 77 87 87   Resp:   16    Temp:   98 °F (36.7 °C)    TempSrc:   Oral    SpO2:       Weight:       Height:         Medications   sodium chloride 0.9 % flush 10 mL (has no administration in time range)   dilTIAZem (CARDIZEM) 125 mg in 125 mL 0.7% sodium chloride  infusion (5 mg/hr Intravenous Currently Infusing  2/12/22 1333)   potassium chloride (MICRO-K) CR capsule 40 mEq (has no administration in time range)   potassium chloride (KLOR-CON) packet 40 mEq (has no administration in time range)   Magnesium Sulfate 2 gram Bolus, followed by 8 gram infusion (total Mg dose 10 grams)- Mg less than or equal to 1mg/dL ( Intravenous Not Given:  See Alt 2/11/22 2325)     Or   Magnesium Sulfate 2 gram / 50mL Infusion (GIVE X 3 BAGS TO EQUAL 6GM TOTAL DOSE) - Mg 1.1 - 1.5 mg/dl (2 g Intravenous New Bag 2/11/22 2325)     Or   Magnesium Sulfate 4 gram infusion- Mg 1.6-1.9 mg/dL ( Intravenous Not Given:  See Alt 2/11/22 2325)   apixaban (ELIQUIS) tablet 5 mg (5 mg Oral Given 2/12/22 0831)   sotalol (BETAPACE) tablet 80 mg (80 mg Oral Given 2/12/22 0831)   sodium chloride 0.9 % flush 10 mL (10 mL Intravenous Not Given 2/12/22 0832)   sodium chloride 0.9 % flush 10 mL (has no administration in time range)   acetaminophen (TYLENOL) tablet 650 mg (has no administration in time range)   ondansetron (ZOFRAN) tablet 4 mg (has no administration in time range)     Or   ondansetron (ZOFRAN) injection 4 mg (has no administration in time range)   sennosides-docusate (PERICOLACE) 8.6-50 MG per tablet 2 tablet (2 tablets Oral Not Given 2/12/22 0831)     And   polyethylene glycol (MIRALAX) packet 17 g (has no administration in time range)     And   bisacodyl (DULCOLAX) EC tablet 5 mg (has no administration in time range)     And   bisacodyl (DULCOLAX) suppository 10 mg (has no administration in time range)   melatonin tablet 5 mg (has no administration in time range)   atorvastatin (LIPITOR) tablet 40 mg (40 mg Oral Given 2/12/22 0831)   levothyroxine (SYNTHROID, LEVOTHROID) tablet 100 mcg (100 mcg Oral Given 2/12/22 0643)   pantoprazole (PROTONIX) EC tablet 40 mg (40 mg Oral Given 2/12/22 0831)   dilTIAZem (CARDIZEM) bolus from bag 1 mg/mL 10 mg (10 mg Intravenous Bolus from Bag 2/11/22 6723)     ECG/EMG Results (last 24 hours)     Procedure Component  Value Units Date/Time    ECG 12 Lead [[id number]] Collected: 02/11/22 0851     Updated: 02/11/22 0947        ECG 12 Lead   Final Result   Test Reason : af   Blood Pressure :   */*   mmHG   Vent. Rate :  82 BPM     Atrial Rate :  75 BPM      P-R Int :   * ms          QRS Dur : 122 ms       QT Int : 436 ms       P-R-T Axes :   * 249  47 degrees      QTc Int : 509 ms      Atrial fibrillation with premature ventricular or aberrantly conducted    complexes   Right bundle branch block   Septal infarct (cited on or before 06-JAN-2017)   Abnormal ECG   When compared with ECG of 11-FEB-2022 08:51, (Unconfirmed)   Vent. rate has decreased BY  47 BPM   Questionable change in QRS axis   Confirmed by PEPE MANZANARES MD ([id number]) on 2/12/2022 4:14:50 PM      Referred By:            Confirmed By: PEPE MANZANARES MD      ECG 12 Lead                     MDM  Number of Diagnoses or Management Options  Atrial fibrillation with RVR (HCC): new and requires workup  Shortness of breath: new and requires workup  Vaginal bleeding: new and requires workup     Amount and/or Complexity of Data Reviewed  Clinical lab tests: reviewed and ordered  Tests in the radiology section of CPT®: reviewed and ordered  Tests in the medicine section of CPT®: reviewed and ordered  Decide to obtain previous medical records or to obtain history from someone other than the patient: yes  Discuss the patient with other providers: yes    Patient Progress  Patient progress: stable      Final diagnoses:   Atrial fibrillation with RVR (HCC)   Shortness of breath   Vaginal bleeding       ED Disposition  ED Disposition     ED Disposition Condition Comment    Decision to Admit  Level of Care: Telemetry [5]   Diagnosis: Atrial fibrillation with RVR (HCC) [[id number]]   Admitting Physician: NEREIDA AGUIRRE [[id number]]   Attending Physician: NEREIDA AGUIRRE [[id number]]   Certification: I Certify That Inpatient Hospital Services Are Medically Necessary For Greater Than  2 Midnights            No follow-up provider specified.       Medication List      No changes were made to your prescriptions during this visit.          Derian Samson PA  02/12/22 8123

## 2022-02-11 NOTE — H&P
[facility] Medicine Services  HISTORY AND PHYSICAL    Patient Name: Halie Lennon  : 1955  MRN: [id number]  Primary Care Physician: Tori Iverson APRN  Date of admission: 2022      Subjective   Subjective     Chief Complaint:  A. fib RVR    HPI:  Halie Lennon is a 66 y.o. female with history of A. fib, on Eliquis for anticoagulation who presented to the ER after 2 to 3 days of abnormal uterine bleeding.  She called her cardiologist who recommended to stop anticoagulation, but has not noticed any change in terms of bleeding.  She states that she usually goes through 2-3 tampons per day.  Over the last few days she has had episodes where she feels her heart is racing and then earlier today had similar event where she could not catch her breath and presented to the hospital.  She thought maybe it was related to nurse and took a Xanax which helped some but was still having some palpitations.  HR elevated in the ER, she was started on Cardizem drip    I discussed the case with OB/GYN on-call who recommended to restart Eliquis and monitor for signs of increased bleeding.  Patient already has a follow-up appointment with her GYN on .  Discussed with her that if she has increased bleeding with restarting of her Eliquis, will again hold and see OBGYN sooner    She has history of fibroid that had been monitored via US but had been unchanged over the last several follow up visits      COVID Details:    Symptoms:    [x] NONE [] Fever []  Cough [] Shortness of breath [] Change in taste/smell      Review of Systems   Constitutional: Negative for chills, fatigue and fever.   Respiratory: Negative for shortness of breath.    Cardiovascular: Positive for palpitations.   Gastrointestinal: Negative for diarrhea, nausea and vomiting.   Genitourinary: Positive for vaginal bleeding.        All other systems reviewed and are negative.     Personal History     Past Medical  History:   Diagnosis Date   • Atrial fibrillation (HCC)    • Corneal abrasion    • Hemorrhoids     bleeding hemorrhoids   • History of anemia    • Hypertension    • Hypothyroidism    • Osteoarthritis        Past Surgical History:   Procedure Laterality Date   • AUGMENTATION MAMMAPLASTY Bilateral 2008   • BONE MARROW BIOPSY Left 3/8/2017    Procedure: BONE MARROW BIOPSY;  Surgeon: Michel White MD;  Location:  [facility];  Service:    • COLONOSCOPY  01/09/2017   • COLONOSCOPY N/A 1/9/2017    Procedure: COLONOSCOPY;  Surgeon: Michel White MD;  Location:  [facility];  Service:    • ENDOSCOPY N/A 1/7/2017    Procedure: ESOPHAGOGASTRODUODENOSCOPY;  Surgeon: Abhishek Benton MD;  Location:  [facility];  Service:    • HEMORRHOIDECTOMY N/A 3/8/2017    Procedure: HEMORRHOIDECTOMY,;  Surgeon: Michel White MD;  Location:  [facility];  Service:    • HEMORROIDECTOMY  01/2018   • HIP ARTHROSCOPY Right     2005 & 2012   • JOINT REPLACEMENT      2 right hips   • TONSILLECTOMY  1964       Family History:  family history includes Abnormal EKG in her brother; Atrial fibrillation in her brother and mother; Diabetes in her mother and sister; Heart attack in her father; Stroke in her father. Otherwise pertinent FHx was reviewed and unremarkable.     Social History:  reports that she has quit smoking. Her smoking use included cigarettes. She has a 20.00 pack-year smoking history. She has never used smokeless tobacco. She reports current alcohol use of about 2.0 standard drinks of alcohol per week. She reports that she does not use drugs.  Social History     Social History Narrative    5-10 servings of caffeine per day.        Medications:  Available home medication information reviewed.  (Not in a hospital admission)      Allergies   Allergen Reactions   • Sulfa Antibiotics Anaphylaxis       Objective   Objective     Vital Signs:   Temp:  [97.6 °F (36.4 °C)] 97.6 °F (36.4 °C)  Heart Rate:  [] 89  Resp:  [20]  20  BP: (107-136)/() 114/89       Physical Exam  Constitutional:       General: She is not in acute distress.     Appearance: Normal appearance. She is normal weight.   HENT:      Head: Normocephalic and atraumatic.      Mouth/Throat:      Mouth: Mucous membranes are moist.   Cardiovascular:      Rate and Rhythm: Normal rate. Rhythm irregular.   Pulmonary:      Effort: Pulmonary effort is normal. No respiratory distress.   Abdominal:      General: There is no distension.      Palpations: Abdomen is soft.   Musculoskeletal:      Cervical back: Neck supple.      Right lower leg: No edema.      Left lower leg: No edema.   Skin:     General: Skin is warm and dry.   Neurological:      General: No focal deficit present.      Mental Status: She is alert and oriented to person, place, and time.   Psychiatric:         Mood and Affect: Mood normal.         Behavior: Behavior normal.            Result Review:  I have personally reviewed the results from the time of this admission to 2/11/2022 15:18 EST and agree with these findings:  [x]  Laboratory  []  Microbiology  [x]  Radiology  [x]  EKG/Telemetry   []  Cardiology/Vascular   []  Pathology  []  Old records  []  Other:  Most notable findings include:     LAB RESULTS:      Lab 02/11/22  1010   WBC 6.25   HEMOGLOBIN 15.1   HEMATOCRIT 44.6   PLATELETS 200   NEUTROS ABS 3.77   IMMATURE GRANS (ABS) 0.02   LYMPHS ABS 1.81   MONOS ABS 0.55   EOS ABS 0.06   MCV 99.8*   PROTIME 17.5*   INR 1.49*         Lab 02/11/22  1010   SODIUM 138   POTASSIUM 4.6   CHLORIDE 101   CO2 23.0   ANION GAP 14.0   BUN 12   CREATININE 0.76   GLUCOSE 99   CALCIUM 9.3   MAGNESIUM 1.4*   TSH 1.050         Lab 02/11/22  1010   TOTAL PROTEIN 6.8   ALBUMIN 4.20   GLOBULIN 2.6   ALT (SGPT) 35*   AST (SGOT) 67*   BILIRUBIN 1.1   ALK PHOS 149*         Lab 02/11/22  1010   PROBNP 3,874.0*   TROPONIN T <0.010             Lab 02/11/22  1010   ABO TYPING O   RH TYPING Positive   ANTIBODY SCREEN Negative              Microbiology Results (last 10 days)     ** No results found for the last 240 hours. **          XR Chest 1 View    Result Date: 2/11/2022  DATE OF EXAM: 2/11/2022 8:55 AM  PROCEDURE: XR CHEST 1 VW-  INDICATIONS: Dysrhythmia triage protocol  COMPARISON: April 3, 2018  TECHNIQUE: Single radiographic AP view of the chest was obtained.  FINDINGS: The heart looks enlarged. There are no definite infiltrates or obvious pleural effusions. The visualized osseous structures do not appear unusual.      Impression: 1.  Cardiomegaly.  This report was finalized on 2/11/2022 9:37 AM by Edi Sage MD.        Results for orders placed during the hospital encounter of 11/18/16    Adult Transthoracic Echo Complete    Interpretation Summary  · Left ventricular function is normal. Estimated EF = 60%.  · All left ventricular wall segments contract normally.  · Moderate mitral valve regurgitation is present  · RVSP(TR) 41 mmHg      Assessment/Plan   Assessment & Plan     Active Hospital Problems    Diagnosis  POA   • Atrial fibrillation with RVR (HCC) [I48.91]  Yes       A. fib RVR  -Cardiology evaluated, on Cardizem drip, watch BP  -Sotalol restarted    Vaginal bleeding  Previous history of uterine fibroid that has been monitored outpatient by nurse practitioner with Dr. Miguel  -Has follow-up appointment March 2 discussed  -She held her Eliquis for the last 2 to 3 days with new onset of bleeding, has not noticed any change in terms of decreased bleeding with holding medication  -Discussed with OB on-call, okay to restart Eliquis.  If having increased bleeding, more than 1 pad per hour, recommend to hold Eliquis and can pursue further work-up before already scheduled appointment    Hypomag  -Replace per protocol          DVT prophylaxis: Eliquis restarted      CODE STATUS: Full  Code Status and Medical Interventions:   Ordered at: 02/11/22 1448     Level Of Support Discussed With:    Patient     Code Status (Patient has no pulse  and is not breathing):    CPR (Attempt to Resuscitate)     Medical Interventions (Patient has pulse or is breathing):    Full Support         Bette Pantoja, DO  02/11/22

## 2022-02-12 ENCOUNTER — APPOINTMENT (OUTPATIENT)
Dept: CARDIOLOGY | Facility: HOSPITAL | Age: 67
End: 2022-02-12

## 2022-02-12 PROBLEM — N93.9 VAGINAL BLEEDING: Status: ACTIVE | Noted: 2022-02-12

## 2022-02-12 LAB
ANION GAP SERPL CALCULATED.3IONS-SCNC: 12 MMOL/L (ref 5–15)
ASCENDING AORTA: 3 CM
BASOPHILS # BLD AUTO: 0.03 10*3/MM3 (ref 0–0.2)
BASOPHILS NFR BLD AUTO: 0.5 % (ref 0–1.5)
BH CV ECHO MEAS - AO MAX PG: 3 MMHG
BH CV ECHO MEAS - AO MEAN PG: 1.6 MMHG
BH CV ECHO MEAS - AO ROOT AREA (BSA CORRECTED): 1.3
BH CV ECHO MEAS - AO ROOT AREA: 5.5 CM^2
BH CV ECHO MEAS - AO ROOT DIAM: 2.6 CM
BH CV ECHO MEAS - AO V2 MAX: 86.9 CM/SEC
BH CV ECHO MEAS - AO V2 MEAN: 57.5 CM/SEC
BH CV ECHO MEAS - AO V2 VTI: 13.6 CM
BH CV ECHO MEAS - ASC AORTA: 3 CM
BH CV ECHO MEAS - BSA(HAYCOCK): 2 M^2
BH CV ECHO MEAS - BSA: 2 M^2
BH CV ECHO MEAS - BZI_BMI: 24.8 KILOGRAMS/M^2
BH CV ECHO MEAS - BZI_METRIC_HEIGHT: 177.8 CM
BH CV ECHO MEAS - BZI_METRIC_WEIGHT: 78.5 KG
BH CV ECHO MEAS - EDV(CUBED): 148.4 ML
BH CV ECHO MEAS - EDV(MOD-SP2): 73 ML
BH CV ECHO MEAS - EDV(MOD-SP4): 83 ML
BH CV ECHO MEAS - EDV(TEICH): 135 ML
BH CV ECHO MEAS - EF(CUBED): 51.9 %
BH CV ECHO MEAS - EF(MOD-BP): 60 %
BH CV ECHO MEAS - EF(MOD-SP2): 71.2 %
BH CV ECHO MEAS - EF(MOD-SP4): 53 %
BH CV ECHO MEAS - EF(TEICH): 43.5 %
BH CV ECHO MEAS - ESV(CUBED): 71.4 ML
BH CV ECHO MEAS - ESV(MOD-SP2): 21 ML
BH CV ECHO MEAS - ESV(MOD-SP4): 39 ML
BH CV ECHO MEAS - ESV(TEICH): 76.3 ML
BH CV ECHO MEAS - FS: 21.6 %
BH CV ECHO MEAS - IVS/LVPW: 1
BH CV ECHO MEAS - IVSD: 0.88 CM
BH CV ECHO MEAS - LA DIMENSION: 4.4 CM
BH CV ECHO MEAS - LA/AO: 1.7
BH CV ECHO MEAS - LAD MAJOR: 5.1 CM
BH CV ECHO MEAS - LAT PEAK E' VEL: 15.9 CM/SEC
BH CV ECHO MEAS - LATERAL E/E' RATIO: 7.6
BH CV ECHO MEAS - LV DIASTOLIC VOL/BSA (35-75): 42.3 ML/M^2
BH CV ECHO MEAS - LV IVRT: 0.08 SEC
BH CV ECHO MEAS - LV MASS(C)D: 167.2 GRAMS
BH CV ECHO MEAS - LV MASS(C)DI: 85.2 GRAMS/M^2
BH CV ECHO MEAS - LV SYSTOLIC VOL/BSA (12-30): 19.9 ML/M^2
BH CV ECHO MEAS - LVIDD: 5.3 CM
BH CV ECHO MEAS - LVIDS: 4.1 CM
BH CV ECHO MEAS - LVLD AP2: 7.1 CM
BH CV ECHO MEAS - LVLD AP4: 7.3 CM
BH CV ECHO MEAS - LVLS AP2: 6.5 CM
BH CV ECHO MEAS - LVLS AP4: 7.3 CM
BH CV ECHO MEAS - LVOT AREA (M): 2.8 CM^2
BH CV ECHO MEAS - LVOT AREA: 2.9 CM^2
BH CV ECHO MEAS - LVOT DIAM: 1.9 CM
BH CV ECHO MEAS - LVPWD: 0.86 CM
BH CV ECHO MEAS - MED PEAK E' VEL: 10.6 CM/SEC
BH CV ECHO MEAS - MEDIAL E/E' RATIO: 11.3
BH CV ECHO MEAS - MR ALIAS VEL: 31.9 CM/SEC
BH CV ECHO MEAS - MR ERO: 0.22 CM^2
BH CV ECHO MEAS - MR FLOW RATE: 117.5 CM^3/SEC
BH CV ECHO MEAS - MR MAX PG: 111 MMHG
BH CV ECHO MEAS - MR MAX VEL: 522.6 CM/SEC
BH CV ECHO MEAS - MR MEAN PG: 74.8 MMHG
BH CV ECHO MEAS - MR MEAN VEL: 414.4 CM/SEC
BH CV ECHO MEAS - MR PISA RADIUS: 0.77 CM
BH CV ECHO MEAS - MR PISA: 3.7 CM^2
BH CV ECHO MEAS - MR VOLUME: 36.4 ML
BH CV ECHO MEAS - MR VTI: 162.1 CM
BH CV ECHO MEAS - MV DEC TIME: 0.11 SEC
BH CV ECHO MEAS - MV E MAX VEL: 121 CM/SEC
BH CV ECHO MEAS - MV MAX PG: 5.6 MMHG
BH CV ECHO MEAS - MV MEAN PG: 2.3 MMHG
BH CV ECHO MEAS - MV V2 MAX: 118.4 CM/SEC
BH CV ECHO MEAS - MV V2 MEAN: 69.3 CM/SEC
BH CV ECHO MEAS - MV V2 VTI: 26.6 CM
BH CV ECHO MEAS - PA ACC SLOPE: 466.5 CM/SEC^2
BH CV ECHO MEAS - PA ACC TIME: 0.1 SEC
BH CV ECHO MEAS - PA PR(ACCEL): 36.2 MMHG
BH CV ECHO MEAS - RAP SYSTOLE: 15 MMHG
BH CV ECHO MEAS - RVSP: 50 MMHG
BH CV ECHO MEAS - SI(AO): 37.9 ML/M^2
BH CV ECHO MEAS - SI(CUBED): 39.2 ML/M^2
BH CV ECHO MEAS - SI(MOD-SP2): 26.5 ML/M^2
BH CV ECHO MEAS - SI(MOD-SP4): 22.4 ML/M^2
BH CV ECHO MEAS - SI(TEICH): 29.9 ML/M^2
BH CV ECHO MEAS - SV(AO): 74.3 ML
BH CV ECHO MEAS - SV(CUBED): 77 ML
BH CV ECHO MEAS - SV(MOD-SP2): 52 ML
BH CV ECHO MEAS - SV(MOD-SP4): 44 ML
BH CV ECHO MEAS - SV(TEICH): 58.7 ML
BH CV ECHO MEAS - TR MAX PG: 35 MMHG
BH CV ECHO MEAS - TR MAX VEL: 296 CM/SEC
BH CV ECHO MEASUREMENTS AVERAGE E/E' RATIO: 9.13
BH CV VAS BP RIGHT ARM: NORMAL MMHG
BH CV XLRA - RV BASE: 3.5 CM
BH CV XLRA - RV LENGTH: 7.9 CM
BH CV XLRA - RV MID: 2.6 CM
BUN SERPL-MCNC: 13 MG/DL (ref 8–23)
BUN/CREAT SERPL: 17.1 (ref 7–25)
CALCIUM SPEC-SCNC: 9.1 MG/DL (ref 8.6–10.5)
CHLORIDE SERPL-SCNC: 103 MMOL/L (ref 98–107)
CO2 SERPL-SCNC: 21 MMOL/L (ref 22–29)
CREAT SERPL-MCNC: 0.76 MG/DL (ref 0.57–1)
DEPRECATED RDW RBC AUTO: 50.4 FL (ref 37–54)
EOSINOPHIL # BLD AUTO: 0.07 10*3/MM3 (ref 0–0.4)
EOSINOPHIL NFR BLD AUTO: 1.3 % (ref 0.3–6.2)
ERYTHROCYTE [DISTWIDTH] IN BLOOD BY AUTOMATED COUNT: 13.5 % (ref 12.3–15.4)
GFR SERPL CREATININE-BSD FRML MDRD: 76 ML/MIN/1.73
GLUCOSE SERPL-MCNC: 80 MG/DL (ref 65–99)
HCT VFR BLD AUTO: 41.7 % (ref 34–46.6)
HGB BLD-MCNC: 13.7 G/DL (ref 12–15.9)
IMM GRANULOCYTES # BLD AUTO: 0.02 10*3/MM3 (ref 0–0.05)
IMM GRANULOCYTES NFR BLD AUTO: 0.4 % (ref 0–0.5)
IVRT: 77 MSEC
LEFT ATRIUM VOLUME INDEX: 44.3 ML/M^2
LEFT ATRIUM VOLUME: 87 ML
LYMPHOCYTES # BLD AUTO: 1.77 10*3/MM3 (ref 0.7–3.1)
LYMPHOCYTES NFR BLD AUTO: 31.9 % (ref 19.6–45.3)
MAGNESIUM SERPL-MCNC: 2.5 MG/DL (ref 1.6–2.4)
MAXIMAL PREDICTED HEART RATE: 153 BPM
MCH RBC QN AUTO: 33.1 PG (ref 26.6–33)
MCHC RBC AUTO-ENTMCNC: 32.9 G/DL (ref 31.5–35.7)
MCV RBC AUTO: 100.7 FL (ref 79–97)
MONOCYTES # BLD AUTO: 0.64 10*3/MM3 (ref 0.1–0.9)
MONOCYTES NFR BLD AUTO: 11.5 % (ref 5–12)
MR PISA EROA: 0.23 CM2
MV VENA CONTRACTA: 0.5 CM
NEUTROPHILS NFR BLD AUTO: 3.02 10*3/MM3 (ref 1.7–7)
NEUTROPHILS NFR BLD AUTO: 54.4 % (ref 42.7–76)
NRBC BLD AUTO-RTO: 0 /100 WBC (ref 0–0.2)
PISA ALIASING VEL: 3.2 M/S
PISA RADIUS: 0.8 CM
PLATELET # BLD AUTO: 174 10*3/MM3 (ref 140–450)
PMV BLD AUTO: 10.3 FL (ref 6–12)
POTASSIUM SERPL-SCNC: 4.2 MMOL/L (ref 3.5–5.2)
QT INTERVAL: 436 MS
QTC INTERVAL: 509 MS
RBC # BLD AUTO: 4.14 10*6/MM3 (ref 3.77–5.28)
SODIUM SERPL-SCNC: 136 MMOL/L (ref 136–145)
STRESS TARGET HR: 130 BPM
WBC NRBC COR # BLD: 5.55 10*3/MM3 (ref 3.4–10.8)

## 2022-02-12 PROCEDURE — 83735 ASSAY OF MAGNESIUM: CPT | Performed by: INTERNAL MEDICINE

## 2022-02-12 PROCEDURE — 80048 BASIC METABOLIC PNL TOTAL CA: CPT | Performed by: INTERNAL MEDICINE

## 2022-02-12 PROCEDURE — 85025 COMPLETE CBC W/AUTO DIFF WBC: CPT | Performed by: INTERNAL MEDICINE

## 2022-02-12 PROCEDURE — 99233 SBSQ HOSP IP/OBS HIGH 50: CPT | Performed by: INTERNAL MEDICINE

## 2022-02-12 PROCEDURE — 93005 ELECTROCARDIOGRAM TRACING: CPT | Performed by: INTERNAL MEDICINE

## 2022-02-12 PROCEDURE — 93306 TTE W/DOPPLER COMPLETE: CPT

## 2022-02-12 RX ORDER — PANTOPRAZOLE SODIUM 40 MG/1
40 TABLET, DELAYED RELEASE ORAL DAILY
Status: DISCONTINUED | OUTPATIENT
Start: 2022-02-12 | End: 2022-02-15 | Stop reason: HOSPADM

## 2022-02-12 RX ORDER — ATORVASTATIN CALCIUM 40 MG/1
40 TABLET, FILM COATED ORAL DAILY
Status: DISCONTINUED | OUTPATIENT
Start: 2022-02-12 | End: 2022-02-15 | Stop reason: HOSPADM

## 2022-02-12 RX ORDER — LEVOTHYROXINE SODIUM 0.1 MG/1
100 TABLET ORAL
Status: DISCONTINUED | OUTPATIENT
Start: 2022-02-12 | End: 2022-02-15 | Stop reason: HOSPADM

## 2022-02-12 RX ADMIN — APIXABAN 5 MG: 5 TABLET, FILM COATED ORAL at 20:14

## 2022-02-12 RX ADMIN — SENNOSIDES AND DOCUSATE SODIUM 2 TABLET: 50; 8.6 TABLET ORAL at 20:15

## 2022-02-12 RX ADMIN — SODIUM CHLORIDE, PRESERVATIVE FREE 10 ML: 5 INJECTION INTRAVENOUS at 20:15

## 2022-02-12 RX ADMIN — ATORVASTATIN CALCIUM 40 MG: 40 TABLET, FILM COATED ORAL at 08:31

## 2022-02-12 RX ADMIN — LEVOTHYROXINE SODIUM 100 MCG: 100 TABLET ORAL at 06:43

## 2022-02-12 RX ADMIN — SOTALOL HYDROCHLORIDE 80 MG: 80 TABLET ORAL at 20:14

## 2022-02-12 RX ADMIN — APIXABAN 5 MG: 5 TABLET, FILM COATED ORAL at 08:31

## 2022-02-12 RX ADMIN — PANTOPRAZOLE SODIUM 40 MG: 40 TABLET, DELAYED RELEASE ORAL at 08:31

## 2022-02-12 RX ADMIN — SOTALOL HYDROCHLORIDE 80 MG: 80 TABLET ORAL at 08:31

## 2022-02-12 NOTE — PLAN OF CARE
Goal Outcome Evaluation:  Plan of Care Reviewed With: patient           Outcome Summary: A & O x 4, AFib with controlled rate on Diltiazem at 5mg/hr, RA, no c/o pain, plan is cardioversion Monday if she doesn't naturally convert, Brad 22, Falls score 6, will continue to monitor

## 2022-02-12 NOTE — PROGRESS NOTES
"                                 [facility] Progress Note      LOS: 1 day   Patient Care Team:  Tori Iverson APRN as PCP - General (Family Medicine)  ChristopherMichel cage MD as Consulting Physician (Colon and Rectal Surgery)    Chief Complaint:    Chief Complaint   Patient presents with   • Shortness of Breath   • Irregular Heart Beat       Subjective     Interval History: Uneventful evening    Patient Complaints: No chest pain or dyspnea      Review of Systems:   A 14 point review of systems was negative except as was stated in the HPI      Objective     Vital Sign Min/Max for last 24 hours  Temp  Min: 97.6 °F (36.4 °C)  Max: 97.8 °F (36.6 °C)   BP  Min: 107/91  Max: 144/104   Pulse  Min: 71  Max: 143   Resp  Min: 16  Max: 20   SpO2  Min: 89 %  Max: 99 %   No data recorded   Weight  Min: 72.6 kg (160 lb)  Max: 78.5 kg (173 lb)     Flowsheet Rows      First Filed Value   Admission Height 177.8 cm (70\") Documented at 02/11/2022 0844   Admission Weight 72.6 kg (160 lb) Documented at 02/11/2022 0844          Physical Exam:  General Appearance: Alert, appears stated age and cooperative  Lungs: Clear to auscultation  Heart:: Irregular no Murmurs, Rubs or Gallops  Abdomen: Soft and nontender with adequate bowel sounds.  No organomegaly  Extremities: No cyanosis, clubbing or edema  Pulses: Pulses palpable and equal bilaterally  Skin: Warm and dry with no rash  Psych: Normal     Results Review:     I reviewed the patient's new clinical results.  Results from last 7 days   Lab Units 02/12/22 0412 02/11/22  1010 02/11/22  1010   SODIUM mmol/L 136  --  138   POTASSIUM mmol/L 4.2  --  4.6   CHLORIDE mmol/L 103  --  101   CO2 mmol/L 21.0*  --  23.0   BUN mg/dL 13  --  12   CREATININE mg/dL 0.76  --  0.76   GLUCOSE mg/dL 80   < > 99   CALCIUM mg/dL 9.1  --  9.3    < > = values in this interval not displayed.     Results from last 7 days   Lab Units 02/12/22  0412 02/11/22  1010   WBC 10*3/mm3 5.55 6.25 "   HEMOGLOBIN g/dL 13.7 15.1   HEMATOCRIT % 41.7 44.6   PLATELETS 10*3/mm3 174 200     Lab Results   Lab Value Date/Time    TROPONINT <0.010 02/11/2022 1010         Results from last 7 days   Lab Units 02/11/22  1010   INR  1.49*               Medication Review: yes  Current Facility-Administered Medications   Medication Dose Route Frequency Provider Last Rate Last Admin   • acetaminophen (TYLENOL) tablet 650 mg  650 mg Oral Q4H PRN Bette Pantoja DO       • apixaban (ELIQUIS) tablet 5 mg  5 mg Oral Q12H Bette Pantoja DO   5 mg at 02/11/22 2053   • atorvastatin (LIPITOR) tablet 40 mg  40 mg Oral Daily Brittney Benavides MD       • sennosides-docusate (PERICOLACE) 8.6-50 MG per tablet 2 tablet  2 tablet Oral BID Bette Pantoja DO        And   • polyethylene glycol (MIRALAX) packet 17 g  17 g Oral Daily PRN Bette Pantoja DO        And   • bisacodyl (DULCOLAX) EC tablet 5 mg  5 mg Oral Daily PRN eBtte Pantoja DO        And   • bisacodyl (DULCOLAX) suppository 10 mg  10 mg Rectal Daily PRN Bette Pantoja DO       • dilTIAZem (CARDIZEM) 125 mg in 125 mL 0.7% sodium chloride  infusion  5-15 mg/hr Intravenous Continuous Orlin Novak MD 5 mL/hr at 02/12/22 0433 5 mg/hr at 02/12/22 0433   • levothyroxine (SYNTHROID, LEVOTHROID) tablet 100 mcg  100 mcg Oral Q AM Brittney Benavides MD   100 mcg at 02/12/22 0643   • Magnesium Sulfate 2 gram Bolus, followed by 8 gram infusion (total Mg dose 10 grams)- Mg less than or equal to 1mg/dL  2 g Intravenous PRN Bette Pantoja DO        Or   • Magnesium Sulfate 2 gram / 50mL Infusion (GIVE X 3 BAGS TO EQUAL 6GM TOTAL DOSE) - Mg 1.1 - 1.5 mg/dl  2 g Intravenous PRN Bette Pantoja DO 25 mL/hr at 02/11/22 2325 2 g at 02/11/22 2325    Or   • Magnesium Sulfate 4 gram infusion- Mg 1.6-1.9 mg/dL  4 g Intravenous PRN Bette Pantoja,        • melatonin tablet 5 mg  5 mg Oral Nightly PRN  Bette Pantoja, DO       • ondansetron (ZOFRAN) tablet 4 mg  4 mg Oral Q6H PRN Bette Pantoja DO        Or   • ondansetron (ZOFRAN) injection 4 mg  4 mg Intravenous Q6H PRN Bette Pantoja DO       • pantoprazole (PROTONIX) EC tablet 40 mg  40 mg Oral Daily Brittney Benavides MD       • potassium chloride (KLOR-CON) packet 40 mEq  40 mEq Oral PRN Bette Pantoja DO       • potassium chloride (MICRO-K) CR capsule 40 mEq  40 mEq Oral PRN Bette Pantoja, DO       • sodium chloride 0.9 % flush 10 mL  10 mL Intravenous PRN Mckinley Sheffield MD       • sodium chloride 0.9 % flush 10 mL  10 mL Intravenous Q12H Bette Pantoja DO   10 mL at 02/11/22 2103   • sodium chloride 0.9 % flush 10 mL  10 mL Intravenous PRN Bette Pantoja DO       • sotalol (BETAPACE) tablet 80 mg  80 mg Oral Q12H Bette Pantoja DO   80 mg at 02/11/22 2053         Hypothyroidism    Atrial fibrillation with RVR (HCC)    Vaginal bleeding        Impression      Persistent atrial fibrillation  Vaginal bleeding  Others as per the consult dated 2/11/2022      Plan     Continue IV diltiazem for rate control.  Continue oral sotalol and Eliquis  Echocardiography today  If no conversion this weekend she will need MIMA cardioversion on Monday      Orlin Novak MD   02/12/22  07:59 EST

## 2022-02-12 NOTE — PROGRESS NOTES
[facility]  PROGRESS NOTE    Patient Name: Halie Lennon  : 1955  MRN: [id number]    Date of Admission: 2022  Primary Care Physician: Tori Iverson APRN    Subjective   Subjective     CC:  Afib w/ RVR    HPI:  Doing better this am, getting TTE at bedside.  arrives at bedside while I was evaluating. Pt feeling better.     ROS:  Gen- No fevers, chills  CV- No chest pain, palpitations  Resp- No cough, dyspnea  GI- No N/V/D, abd pain        Objective   Objective     Vital Signs:   Temp:  [97.6 °F (36.4 °C)-97.8 °F (36.6 °C)] 97.7 °F (36.5 °C)  Heart Rate:  [] 81  Resp:  [16-20] 16  BP: (107-144)/() 114/93     Physical Exam:  Constitutional: No acute distress, awake, alert  HENT: NCAT, mucous membranes moist  Respiratory: Clear to auscultation bilaterally, respiratory effort normal   Cardiovascular: RRR, appears sinus on tele with rates in the high 80s, no murmurs, rubs, or gallops  Gastrointestinal: Positive bowel sounds, soft, nontender, nondistended  Musculoskeletal: No bilateral ankle edema  Psychiatric: Appropriate affect, cooperative  Neurologic: Oriented x 3, strength symmetric in all extremities, Cranial Nerves grossly intact to confrontation, speech clear  Skin: No rashes    Results Reviewed:  LAB RESULTS:      Lab 22  1010   WBC 6.25   HEMOGLOBIN 15.1   HEMATOCRIT 44.6   PLATELETS 200   NEUTROS ABS 3.77   IMMATURE GRANS (ABS) 0.02   LYMPHS ABS 1.81   MONOS ABS 0.55   EOS ABS 0.06   MCV 99.8*   PROTIME 17.5*         Lab 22  1010   SODIUM 138   POTASSIUM 4.6   CHLORIDE 101   CO2 23.0   ANION GAP 14.0   BUN 12   CREATININE 0.76   GLUCOSE 99   CALCIUM 9.3   MAGNESIUM 1.4*   TSH 1.050         Lab 22  1010   TOTAL PROTEIN 6.8   ALBUMIN 4.20   GLOBULIN 2.6   ALT (SGPT) 35*   AST (SGOT) 67*   BILIRUBIN 1.1   ALK PHOS 149*         Lab 22  1010   PROBNP 3,874.0*   TROPONIN T <0.010   PROTIME 17.5*   INR 1.49*              Lab 02/11/22  1010   ABO TYPING O   RH TYPING Positive   ANTIBODY SCREEN Negative         Brief Urine Lab Results     None          Microbiology Results Abnormal     Procedure Component Value - Date/Time    COVID PRE-OP / PRE-PROCEDURE SCREENING ORDER (NO ISOLATION) - Swab, Nasopharynx [[id number]]  (Normal) Collected: 02/11/22 1531    Lab Status: Final result Specimen: Swab from Nasopharynx Updated: 02/11/22 2023    Narrative:      The following orders were created for panel order COVID PRE-OP / PRE-PROCEDURE SCREENING ORDER (NO ISOLATION) - Swab, Nasopharynx.  Procedure                               Abnormality         Status                     ---------                               -----------         ------                     COVID-19, APTIMA PANTHER...[[id number]]  Normal              Final result                 Please view results for these tests on the individual orders.    COVID-19, APTIMA PANTHER JENN[facility]ER IN-HOUSE NP/OP SWAB IN UTM/VTM/SALINE TRANSPORT MEDIA 24HR TAT - Swab, Nasopharynx [[id number]]  (Normal) Collected: 02/11/22 1531    Lab Status: Final result Specimen: Swab from Nasopharynx Updated: 02/11/22 2023     COVID19 Not Detected    Narrative:      Fact sheet for providers: https://www.fda.gov/media/972644/download     Fact sheet for patients: https://www.fda.gov/media/513556/download    Test performed by RT PCR.          XR Chest 1 View    Result Date: 2/11/2022  DATE OF EXAM: 2/11/2022 8:55 AM  PROCEDURE: XR CHEST 1 VW-  INDICATIONS: Dysrhythmia triage protocol  COMPARISON: April 3, 2018  TECHNIQUE: Single radiographic AP view of the chest was obtained.  FINDINGS: The heart looks enlarged. There are no definite infiltrates or obvious pleural effusions. The visualized osseous structures do not appear unusual.      Impression: 1.  Cardiomegaly.  This report was finalized on 2/11/2022 9:37 AM by Edi Sage MD.        Results for orders placed during the hospital encounter of  11/18/16    Adult Transthoracic Echo Complete    Interpretation Summary  · Left ventricular function is normal. Estimated EF = 60%.  · All left ventricular wall segments contract normally.  · Moderate mitral valve regurgitation is present  · RVSP(TR) 41 mmHg      I have reviewed the medications:  Scheduled Meds:apixaban, 5 mg, Oral, Q12H  atorvastatin, 40 mg, Oral, Daily  levothyroxine, 100 mcg, Oral, Daily  pantoprazole, 20 mg, Oral, Daily  senna-docusate sodium, 2 tablet, Oral, BID  sodium chloride, 10 mL, Intravenous, Q12H  sotalol, 80 mg, Oral, Q12H      Continuous Infusions:dilTIAZem, 5-15 mg/hr, Last Rate: 5 mg/hr (02/12/22 0433)      PRN Meds:.•  acetaminophen  •  senna-docusate sodium **AND** polyethylene glycol **AND** bisacodyl **AND** bisacodyl  •  magnesium sulfate **OR** magnesium sulfate **OR** magnesium sulfate  •  melatonin  •  ondansetron **OR** ondansetron  •  potassium chloride  •  potassium chloride  •  sodium chloride  •  sodium chloride    Assessment/Plan   Assessment & Plan     Active Hospital Problems    Diagnosis  POA   • Atrial fibrillation with RVR (HCC) [I48.91]  Yes     Priority: High   • Vaginal bleeding [N93.9]  Unknown     Priority: Medium   • Hypothyroidism [E03.9]  Yes     Priority: Low      Resolved Hospital Problems   No resolved problems to display.        Brief Hospital Course to date:  Halie Lennon is a 66 y.o. female with PMH of Hypothyroidism, HLD, and Afib recently taken off of Eliquis due to vaginal bleeding who presented to the hospital on 2/11/22 with complaints of palpitations and was found to be in Afib with RVR.    Plan:    A. fib RVR  -- Cardiology evaluated, on Cardizem drip  -- Sotalol restarted  -- [id number] of 2, as noted, Eliquis recently d/c'd due to vaginal bleeding, but was restarted on admission     Vaginal bleeding  -- Previous history of uterine fibroid that has been monitored outpatient by nurse practitioner with Dr. Thakkar.  Of note, did have  endometrial biopsy several years ago for this.   -- Has follow-up appointment March 2 discussed  -- She held her Eliquis for the last 2 to 3 days with new onset of bleeding, has not noticed any change in terms of decreased bleeding with holding medication  -- My partner discussed with OB on-call, okay to restart Eliquis.  If having increased bleeding (more than 1 pad per hour) recommended to hold Eliquis and can pursue further work-up before already scheduled appointment     Hypomag  -Replace per protocol    Hypothyroidism  --TSH wnl, continue Synthroid      DVT prophylaxis:  Medical DVT prophylaxis orders are present.            Disposition: I expect the patient to be discharged home once HR better controlled, possibly tomorrow    CODE STATUS:   Code Status and Medical Interventions:   Ordered at: 02/11/22 1444     Level Of Support Discussed With:    Patient     Code Status (Patient has no pulse and is not breathing):    CPR (Attempt to Resuscitate)     Medical Interventions (Patient has pulse or is breathing):    Full Support       Brittney Benavides MD  02/12/22

## 2022-02-13 LAB
ANION GAP SERPL CALCULATED.3IONS-SCNC: 11 MMOL/L (ref 5–15)
BASOPHILS # BLD AUTO: 0.03 10*3/MM3 (ref 0–0.2)
BASOPHILS NFR BLD AUTO: 0.5 % (ref 0–1.5)
BUN SERPL-MCNC: 14 MG/DL (ref 8–23)
BUN/CREAT SERPL: 16.5 (ref 7–25)
CALCIUM SPEC-SCNC: 9.4 MG/DL (ref 8.6–10.5)
CHLORIDE SERPL-SCNC: 106 MMOL/L (ref 98–107)
CO2 SERPL-SCNC: 23 MMOL/L (ref 22–29)
CREAT SERPL-MCNC: 0.85 MG/DL (ref 0.57–1)
DEPRECATED RDW RBC AUTO: 51.7 FL (ref 37–54)
EOSINOPHIL # BLD AUTO: 0.07 10*3/MM3 (ref 0–0.4)
EOSINOPHIL NFR BLD AUTO: 1.2 % (ref 0.3–6.2)
ERYTHROCYTE [DISTWIDTH] IN BLOOD BY AUTOMATED COUNT: 13.8 % (ref 12.3–15.4)
GFR SERPL CREATININE-BSD FRML MDRD: 67 ML/MIN/1.73
GLUCOSE SERPL-MCNC: 103 MG/DL (ref 65–99)
HCT VFR BLD AUTO: 42.2 % (ref 34–46.6)
HGB BLD-MCNC: 13.8 G/DL (ref 12–15.9)
IMM GRANULOCYTES # BLD AUTO: 0.03 10*3/MM3 (ref 0–0.05)
IMM GRANULOCYTES NFR BLD AUTO: 0.5 % (ref 0–0.5)
LYMPHOCYTES # BLD AUTO: 1.64 10*3/MM3 (ref 0.7–3.1)
LYMPHOCYTES NFR BLD AUTO: 29 % (ref 19.6–45.3)
MCH RBC QN AUTO: 33.3 PG (ref 26.6–33)
MCHC RBC AUTO-ENTMCNC: 32.7 G/DL (ref 31.5–35.7)
MCV RBC AUTO: 101.9 FL (ref 79–97)
MONOCYTES # BLD AUTO: 0.77 10*3/MM3 (ref 0.1–0.9)
MONOCYTES NFR BLD AUTO: 13.6 % (ref 5–12)
NEUTROPHILS NFR BLD AUTO: 3.11 10*3/MM3 (ref 1.7–7)
NEUTROPHILS NFR BLD AUTO: 55.2 % (ref 42.7–76)
NRBC BLD AUTO-RTO: 0 /100 WBC (ref 0–0.2)
PLATELET # BLD AUTO: 181 10*3/MM3 (ref 140–450)
PMV BLD AUTO: 10.4 FL (ref 6–12)
POTASSIUM SERPL-SCNC: 4 MMOL/L (ref 3.5–5.2)
QT INTERVAL: 302 MS
QTC INTERVAL: 442 MS
RBC # BLD AUTO: 4.14 10*6/MM3 (ref 3.77–5.28)
SODIUM SERPL-SCNC: 140 MMOL/L (ref 136–145)
T4 FREE SERPL-MCNC: 1.87 NG/DL (ref 0.93–1.7)
WBC NRBC COR # BLD: 5.65 10*3/MM3 (ref 3.4–10.8)

## 2022-02-13 PROCEDURE — 93005 ELECTROCARDIOGRAM TRACING: CPT | Performed by: INTERNAL MEDICINE

## 2022-02-13 PROCEDURE — 84439 ASSAY OF FREE THYROXINE: CPT | Performed by: INTERNAL MEDICINE

## 2022-02-13 PROCEDURE — 99233 SBSQ HOSP IP/OBS HIGH 50: CPT | Performed by: INTERNAL MEDICINE

## 2022-02-13 PROCEDURE — 85025 COMPLETE CBC W/AUTO DIFF WBC: CPT | Performed by: INTERNAL MEDICINE

## 2022-02-13 PROCEDURE — 80048 BASIC METABOLIC PNL TOTAL CA: CPT | Performed by: INTERNAL MEDICINE

## 2022-02-13 RX ORDER — LORAZEPAM 0.5 MG/1
0.5 TABLET ORAL EVERY 6 HOURS PRN
Status: DISCONTINUED | OUTPATIENT
Start: 2022-02-13 | End: 2022-02-15 | Stop reason: HOSPADM

## 2022-02-13 RX ADMIN — APIXABAN 5 MG: 5 TABLET, FILM COATED ORAL at 08:18

## 2022-02-13 RX ADMIN — ATORVASTATIN CALCIUM 40 MG: 40 TABLET, FILM COATED ORAL at 08:17

## 2022-02-13 RX ADMIN — SOTALOL HYDROCHLORIDE 80 MG: 80 TABLET ORAL at 08:18

## 2022-02-13 RX ADMIN — Medication 5 MG/HR: at 06:47

## 2022-02-13 RX ADMIN — APIXABAN 5 MG: 5 TABLET, FILM COATED ORAL at 21:20

## 2022-02-13 RX ADMIN — PANTOPRAZOLE SODIUM 40 MG: 40 TABLET, DELAYED RELEASE ORAL at 08:18

## 2022-02-13 RX ADMIN — SOTALOL HYDROCHLORIDE 80 MG: 80 TABLET ORAL at 21:20

## 2022-02-13 RX ADMIN — Medication 5 MG: at 21:20

## 2022-02-13 RX ADMIN — SODIUM CHLORIDE, PRESERVATIVE FREE 10 ML: 5 INJECTION INTRAVENOUS at 21:21

## 2022-02-13 RX ADMIN — LEVOTHYROXINE SODIUM 100 MCG: 100 TABLET ORAL at 06:47

## 2022-02-13 RX ADMIN — SENNOSIDES AND DOCUSATE SODIUM 2 TABLET: 50; 8.6 TABLET ORAL at 08:27

## 2022-02-13 RX ADMIN — LORAZEPAM 0.5 MG: 0.5 TABLET ORAL at 21:20

## 2022-02-13 NOTE — PROGRESS NOTES
[facility] Medicine Services  PROGRESS NOTE    Patient Name: Halie Lennon  : 1955  MRN: [id number]    Date of Admission: 2022  Primary Care Physician: Tori Iverson APRN    Subjective   Subjective     CC:  Afib w/ RVR    HPI:  Pt reports that she had an anxiety attack last pm. She reports that she has been having them since just prior to admission and just prior to her recent vacation with friends. She states that the initial episode woke her from sleep- she had palpitations at that time. Overnight, she does not think her episode was related to heart rate.     ROS:  Gen- No fevers, chills  CV- No chest pain  Resp- No cough, dyspnea  GI- No N/V/D, abd pain        Objective   Objective     Vital Signs:   Temp:  [97.5 °F (36.4 °C)-98 °F (36.7 °C)] 97.5 °F (36.4 °C)  Heart Rate:  [77-98] 87  Resp:  [16] 16  BP: (116-129)/(89-99) 126/99     Physical Exam:  Constitutional: No acute distress, awake, alert  HENT: NCAT, mucous membranes moist  Respiratory: Clear to auscultation bilaterally, respiratory effort normal   Cardiovascular: RRR, appears sinus on tele with rates in the high 80s, no murmurs, rubs, or gallops  Gastrointestinal: Positive bowel sounds, soft, nontender, nondistended  Musculoskeletal: No bilateral ankle edema  Psychiatric: Appropriate affect, cooperative  Neurologic: Oriented x 3, strength symmetric in all extremities, Cranial Nerves grossly intact to confrontation, speech clear  Skin: No rashes    Results Reviewed:  LAB RESULTS:      Lab 22  0412 22  1010   WBC 5.55 6.25   HEMOGLOBIN 13.7 15.1   HEMATOCRIT 41.7 44.6   PLATELETS 174 200   NEUTROS ABS 3.02 3.77   IMMATURE GRANS (ABS) 0.02 0.02   LYMPHS ABS 1.77 1.81   MONOS ABS 0.64 0.55   EOS ABS 0.07 0.06   .7* 99.8*   PROTIME  --  17.5*         Lab 22  1230 22  0412 22  1010   SODIUM  --  136 138   POTASSIUM  --  4.2 4.6   CHLORIDE  --  103 101   CO2  --  21.0* 23.0    ANION GAP  --  12.0 14.0   BUN  --  13 12   CREATININE  --  0.76 0.76   GLUCOSE  --  80 99   CALCIUM  --  9.1 9.3   MAGNESIUM 2.5*  --  1.4*   TSH  --   --  1.050         Lab 02/11/22  1010   TOTAL PROTEIN 6.8   ALBUMIN 4.20   GLOBULIN 2.6   ALT (SGPT) 35*   AST (SGOT) 67*   BILIRUBIN 1.1   ALK PHOS 149*         Lab 02/11/22  1010   PROBNP 3,874.0*   TROPONIN T <0.010   PROTIME 17.5*   INR 1.49*             Lab 02/11/22  1010   ABO TYPING O   RH TYPING Positive   ANTIBODY SCREEN Negative         Brief Urine Lab Results     None          Microbiology Results Abnormal     Procedure Component Value - Date/Time    COVID PRE-OP / PRE-PROCEDURE SCREENING ORDER (NO ISOLATION) - Swab, Nasopharynx [[id number]]  (Normal) Collected: 02/11/22 1531    Lab Status: Final result Specimen: Swab from Nasopharynx Updated: 02/11/22 2023    Narrative:      The following orders were created for panel order COVID PRE-OP / PRE-PROCEDURE SCREENING ORDER (NO ISOLATION) - Swab, Nasopharynx.  Procedure                               Abnormality         Status                     ---------                               -----------         ------                     COVID-19, APTIMA PANTHER...[[id number]]  Normal              Final result                 Please view results for these tests on the individual orders.    COVID-19, APTIMA PANTHER [facility] IN-HOUSE NP/OP SWAB IN UTM/VTM/SALINE TRANSPORT MEDIA 24HR TAT - Swab, Nasopharynx [[id number]]  (Normal) Collected: 02/11/22 1531    Lab Status: Final result Specimen: Swab from Nasopharynx Updated: 02/11/22 2023     COVID19 Not Detected    Narrative:      Fact sheet for providers: https://www.fda.gov/media/650826/download     Fact sheet for patients: https://www.fda.gov/media/363631/download    Test performed by RT PCR.          Adult Transthoracic Echo Complete W/ Cont if Necessary Per Protocol    Result Date: 2/12/2022  · Moderate tricuspid valve regurgitation is present. · Estimated right ventricular  systolic pressure from tricuspid regurgitation is moderately elevated (45-55 mmHg). Calculated right ventricular systolic pressure from tricuspid regurgitation is 50 mmHg. · Left atrial volume is moderately increased. · Moderate mitral valve regurgitation is present. · Left ventricular systolic function is normal · The study was done with the patient in atrial fibrillation      XR Chest 1 View    Result Date: 2/11/2022  DATE OF EXAM: 2/11/2022 8:55 AM  PROCEDURE: XR CHEST 1 VW-  INDICATIONS: Dysrhythmia triage protocol  COMPARISON: April 3, 2018  TECHNIQUE: Single radiographic AP view of the chest was obtained.  FINDINGS: The heart looks enlarged. There are no definite infiltrates or obvious pleural effusions. The visualized osseous structures do not appear unusual.      Impression: 1.  Cardiomegaly.  This report was finalized on 2/11/2022 9:37 AM by Edi Sage MD.        Results for orders placed during the hospital encounter of 02/11/22    Adult Transthoracic Echo Complete W/ Cont if Necessary Per Protocol    Interpretation Summary  · Moderate tricuspid valve regurgitation is present.  · Estimated right ventricular systolic pressure from tricuspid regurgitation is moderately elevated (45-55 mmHg). Calculated right ventricular systolic pressure from tricuspid regurgitation is 50 mmHg.  · Left atrial volume is moderately increased.  · Moderate mitral valve regurgitation is present.  · Left ventricular systolic function is normal  · The study was done with the patient in atrial fibrillation      I have reviewed the medications:  Scheduled Meds:apixaban, 5 mg, Oral, Q12H  atorvastatin, 40 mg, Oral, Daily  levothyroxine, 100 mcg, Oral, Q AM  pantoprazole, 40 mg, Oral, Daily  senna-docusate sodium, 2 tablet, Oral, BID  sodium chloride, 10 mL, Intravenous, Q12H  sotalol, 80 mg, Oral, Q12H      Continuous Infusions:dilTIAZem, 5-15 mg/hr, Last Rate: 5 mg/hr (02/13/22 0647)      PRN Meds:.•  acetaminophen  •   senna-docusate sodium **AND** polyethylene glycol **AND** bisacodyl **AND** bisacodyl  •  magnesium sulfate **OR** magnesium sulfate **OR** magnesium sulfate  •  melatonin  •  ondansetron **OR** ondansetron  •  potassium chloride  •  potassium chloride  •  sodium chloride  •  sodium chloride    Assessment/Plan   Assessment & Plan     Active Hospital Problems    Diagnosis  POA   • Atrial fibrillation with RVR (HCC) [I48.91]  Yes     Priority: High   • Vaginal bleeding [N93.9]  Unknown     Priority: Medium   • Hypothyroidism [E03.9]  Yes     Priority: Low      Resolved Hospital Problems   No resolved problems to display.        Brief Hospital Course to date:  Halie Lennon is a 66 y.o. female with PMH of Hypothyroidism, HLD, and Afib recently taken off of Eliquis due to vaginal bleeding who presented to the hospital on 2/11/22 with complaints of palpitations and was found to be in Afib with RVR.    Plan:    A. fib RVR  -- Cardiology evaluated, on Cardizem drip  -- Sotalol restarted  -- NPO after MN for MIMA with cardioversion in the am per Dr. Novak  -- [id number] of 2, as noted, Eliquis recently d/c'd due to vaginal bleeding, but was restarted on admission     Vaginal bleeding  -- Previous history of uterine fibroid that has been monitored outpatient by nurse practitioner with Dr. Thakkar.  Of note, did have endometrial biopsy several years ago for this.   -- Has follow-up appointment March 2 discussed  -- She held her Eliquis for the last 2 to 3 days with new onset of bleeding, has not noticed any change in terms of decreased bleeding with holding medication  -- My partner discussed with OB on-call, okay to restart Eliquis.  If having increased bleeding (more than 1 pad per hour) recommended to hold Eliquis and can pursue further work-up before already scheduled appointment     Hypomag  -Replace per protocol    Hypothyroidism  --TSH wnl, continue Synthroid      Anxiety  -- pt declines addition of Buspar,  agreeable to Ativan PRN while inpatient. She does seem to think her symptoms are likely related to her Afib.     Insomnia  --  Melatonin     DVT prophylaxis:  Medical DVT prophylaxis orders are present.       AM-PAC 6 Clicks Score (PT): 24 (02/12/22 7181)    Disposition: I expect the patient to be discharged home once okay with Cardiology    CODE STATUS:   Code Status and Medical Interventions:   Ordered at: 02/11/22 1444     Level Of Support Discussed With:    Patient     Code Status (Patient has no pulse and is not breathing):    CPR (Attempt to Resuscitate)     Medical Interventions (Patient has pulse or is breathing):    Full Support       Brittney Benavides MD  02/13/22

## 2022-02-13 NOTE — PROGRESS NOTES
"                                 [facility] Progress Note      LOS: 2 days   Patient Care Team:  Tori Iverson APRN as PCP - General (Family Medicine)  Michel White MD as Consulting Physician (Colon and Rectal Surgery)    Chief Complaint:    Chief Complaint   Patient presents with   • Shortness of Breath   • Irregular Heart Beat       Subjective     Interval History: Uneventful evening    Patient Complaints: No chest pain or dyspnea.  Vaginal bleeding has diminished considerably      Review of Systems:   A 14 point review of systems was negative except as was stated in the HPI      Objective     Vital Sign Min/Max for last 24 hours  Temp  Min: 97.8 °F (36.6 °C)  Max: 98 °F (36.7 °C)   BP  Min: 116/89  Max: 131/101   Pulse  Min: 77  Max: 98   Resp  Min: 16  Max: 16   No data recorded   No data recorded   Weight  Min: 78.5 kg (173 lb)  Max: 78.5 kg (173 lb)     Flowsheet Rows      First Filed Value   Admission Height 177.8 cm (70\") Documented at 02/11/2022 0844   Admission Weight 72.6 kg (160 lb) Documented at 02/11/2022 0844          Physical Exam:  General Appearance: Alert, appears stated age and cooperative  Lungs: Clear to auscultation  Heart:: Irregular no Murmurs, Rubs or Gallops  Abdomen: Soft and nontender with adequate bowel sounds.  No organomegaly  Extremities: No cyanosis, clubbing or edema  Pulses: Pulses palpable and equal bilaterally  Skin: Warm and dry with no rash  Psych: Normal     Results Review:     I reviewed the patient's new clinical results.  Results from last 7 days   Lab Units 02/12/22 0412 02/11/22  1010 02/11/22  1010   SODIUM mmol/L 136  --  138   POTASSIUM mmol/L 4.2  --  4.6   CHLORIDE mmol/L 103  --  101   CO2 mmol/L 21.0*  --  23.0   BUN mg/dL 13  --  12   CREATININE mg/dL 0.76  --  0.76   GLUCOSE mg/dL 80   < > 99   CALCIUM mg/dL 9.1  --  9.3    < > = values in this interval not displayed.     Results from last 7 days   Lab Units 02/12/22 0412 02/11/22  1010 "   WBC 10*3/mm3 5.55 6.25   HEMOGLOBIN g/dL 13.7 15.1   HEMATOCRIT % 41.7 44.6   PLATELETS 10*3/mm3 174 200     Lab Results   Lab Value Date/Time    TROPONINT <0.010 02/11/2022 1010         Results from last 7 days   Lab Units 02/11/22  1010   INR  1.49*               Medication Review: yes  Current Facility-Administered Medications   Medication Dose Route Frequency Provider Last Rate Last Admin   • acetaminophen (TYLENOL) tablet 650 mg  650 mg Oral Q4H PRN Bette Pantoja DO       • apixaban (ELIQUIS) tablet 5 mg  5 mg Oral Q12H Bette Pantoja DO   5 mg at 02/12/22 2014   • atorvastatin (LIPITOR) tablet 40 mg  40 mg Oral Daily Brittney Benavides MD   40 mg at 02/12/22 0831   • sennosides-docusate (PERICOLACE) 8.6-50 MG per tablet 2 tablet  2 tablet Oral BID Bette Pantoja DO   2 tablet at 02/12/22 2015    And   • polyethylene glycol (MIRALAX) packet 17 g  17 g Oral Daily PRN Bette Pantoja DO        And   • bisacodyl (DULCOLAX) EC tablet 5 mg  5 mg Oral Daily PRN Bette Pantoja DO        And   • bisacodyl (DULCOLAX) suppository 10 mg  10 mg Rectal Daily PRN Bette Pantoja DO       • dilTIAZem (CARDIZEM) 125 mg in 125 mL 0.7% sodium chloride  infusion  5-15 mg/hr Intravenous Continuous Orlin Novak MD 5 mL/hr at 02/13/22 0647 5 mg/hr at 02/13/22 0647   • levothyroxine (SYNTHROID, LEVOTHROID) tablet 100 mcg  100 mcg Oral Q AM Brittney Benavides MD   100 mcg at 02/13/22 0647   • Magnesium Sulfate 2 gram Bolus, followed by 8 gram infusion (total Mg dose 10 grams)- Mg less than or equal to 1mg/dL  2 g Intravenous PRN Bette Pantoja DO        Or   • Magnesium Sulfate 2 gram / 50mL Infusion (GIVE X 3 BAGS TO EQUAL 6GM TOTAL DOSE) - Mg 1.1 - 1.5 mg/dl  2 g Intravenous PRN Bette Pantoja, DO 25 mL/hr at 02/11/22 2325 2 g at 02/11/22 2325    Or   • Magnesium Sulfate 4 gram infusion- Mg 1.6-1.9 mg/dL  4 g Intravenous PRN Kit,  Bette Tierney DO       • melatonin tablet 5 mg  5 mg Oral Nightly PRN Bette Pantoja DO       • ondansetron (ZOFRAN) tablet 4 mg  4 mg Oral Q6H PRN Bette Pantoja DO        Or   • ondansetron (ZOFRAN) injection 4 mg  4 mg Intravenous Q6H PRN Bette Pantoja DO       • pantoprazole (PROTONIX) EC tablet 40 mg  40 mg Oral Daily Brittney Benavides MD   40 mg at 02/12/22 0831   • potassium chloride (KLOR-CON) packet 40 mEq  40 mEq Oral PRN Bette Pantoja DO       • potassium chloride (MICRO-K) CR capsule 40 mEq  40 mEq Oral PRN Bette Pantoja DO       • sodium chloride 0.9 % flush 10 mL  10 mL Intravenous PRN Mckinley Sheffield MD       • sodium chloride 0.9 % flush 10 mL  10 mL Intravenous Q12H Bette Pantoja DO   10 mL at 02/12/22 2015   • sodium chloride 0.9 % flush 10 mL  10 mL Intravenous PRN Bette Pantoja DO       • sotalol (BETAPACE) tablet 80 mg  80 mg Oral Q12H Bette Pantoja DO   80 mg at 02/12/22 2014         Hypothyroidism    Atrial fibrillation with RVR (HCC)    Vaginal bleeding        Impression      Persistent atrial fibrillation  Moderate MR with mild to moderate LAE and normal LV systolic function  Vaginal bleeding  Hypothyroidism  Others as per the consult dated 2/11/2022      Plan     Continue IV diltiazem for rate control.  Continue oral sotalol and Eliquis  MIMA cardioversion Monday morning  Recheck thyroid functions today      Orlin Novak MD   02/13/22  08:03 EST

## 2022-02-13 NOTE — PLAN OF CARE
Goal Outcome Evaluation:  Plan of Care Reviewed With: patient        Progress: no change  Outcome Summary: A & O x 4, AFib with controlled rate on Diltiazem gtt at 5mg/hr, RA, no c/o pain, PRN Ativan added for anxiety, to be NPO at midnight for MIMA with cardioversion tomorrow if she doesn't naturally convert/consent signed and in chart, Brad 22, Falls score 7, will continue to monitor

## 2022-02-14 ENCOUNTER — APPOINTMENT (OUTPATIENT)
Dept: CARDIOLOGY | Facility: HOSPITAL | Age: 67
End: 2022-02-14

## 2022-02-14 ENCOUNTER — ANESTHESIA (OUTPATIENT)
Dept: CARDIOLOGY | Facility: HOSPITAL | Age: 67
End: 2022-02-14

## 2022-02-14 ENCOUNTER — ANESTHESIA EVENT (OUTPATIENT)
Dept: CARDIOLOGY | Facility: HOSPITAL | Age: 67
End: 2022-02-14

## 2022-02-14 LAB
ALBUMIN SERPL-MCNC: 3.9 G/DL (ref 3.5–5.2)
ALBUMIN/GLOB SERPL: 1.6 G/DL
ALP SERPL-CCNC: 106 U/L (ref 39–117)
ALT SERPL W P-5'-P-CCNC: 18 U/L (ref 1–33)
ANION GAP SERPL CALCULATED.3IONS-SCNC: 13 MMOL/L (ref 5–15)
ASCENDING AORTA: 2.7 CM
AST SERPL-CCNC: 25 U/L (ref 1–32)
BASOPHILS # BLD AUTO: 0.02 10*3/MM3 (ref 0–0.2)
BASOPHILS NFR BLD AUTO: 0.4 % (ref 0–1.5)
BH CV ECHO MEAS - BSA(HAYCOCK): 2 M^2
BH CV ECHO MEAS - BSA: 2 M^2
BH CV ECHO MEAS - BZI_BMI: 24.8 KILOGRAMS/M^2
BH CV ECHO MEAS - BZI_METRIC_HEIGHT: 177.8 CM
BH CV ECHO MEAS - BZI_METRIC_WEIGHT: 78.5 KG
BH CV ECHO MEAS - EF_3D-VOL: 53 %
BH CV ECHO MEAS - IVSD: 1.1 CM
BH CV ECHO MEAS - LVIDD: 5 CM
BH CV ECHO MEAS - LVIDS: 3.8 CM
BH CV ECHO MEAS - LVPWD: 1.1 CM
BH CV VAS BP LEFT ARM: NORMAL MMHG
BILIRUB SERPL-MCNC: 1.1 MG/DL (ref 0–1.2)
BUN SERPL-MCNC: 14 MG/DL (ref 8–23)
BUN/CREAT SERPL: 18.9 (ref 7–25)
CALCIUM SPEC-SCNC: 9.3 MG/DL (ref 8.6–10.5)
CHLORIDE SERPL-SCNC: 106 MMOL/L (ref 98–107)
CO2 SERPL-SCNC: 19 MMOL/L (ref 22–29)
CREAT SERPL-MCNC: 0.74 MG/DL (ref 0.57–1)
DEPRECATED RDW RBC AUTO: 53.8 FL (ref 37–54)
EOSINOPHIL # BLD AUTO: 0.1 10*3/MM3 (ref 0–0.4)
EOSINOPHIL NFR BLD AUTO: 1.9 % (ref 0.3–6.2)
ERYTHROCYTE [DISTWIDTH] IN BLOOD BY AUTOMATED COUNT: 13.9 % (ref 12.3–15.4)
GFR SERPL CREATININE-BSD FRML MDRD: 78 ML/MIN/1.73
GLOBULIN UR ELPH-MCNC: 2.4 GM/DL
GLUCOSE SERPL-MCNC: 106 MG/DL (ref 65–99)
HCT VFR BLD AUTO: 42.6 % (ref 34–46.6)
HGB BLD-MCNC: 13.4 G/DL (ref 12–15.9)
IMM GRANULOCYTES # BLD AUTO: 0.01 10*3/MM3 (ref 0–0.05)
IMM GRANULOCYTES NFR BLD AUTO: 0.2 % (ref 0–0.5)
LV EF 2D ECHO EST: 55 %
LYMPHOCYTES # BLD AUTO: 1.51 10*3/MM3 (ref 0.7–3.1)
LYMPHOCYTES NFR BLD AUTO: 28.5 % (ref 19.6–45.3)
MAXIMAL PREDICTED HEART RATE: 153 BPM
MAXIMAL PREDICTED HEART RATE: 153 BPM
MCH RBC QN AUTO: 33.2 PG (ref 26.6–33)
MCHC RBC AUTO-ENTMCNC: 31.5 G/DL (ref 31.5–35.7)
MCV RBC AUTO: 105.4 FL (ref 79–97)
MONOCYTES # BLD AUTO: 0.63 10*3/MM3 (ref 0.1–0.9)
MONOCYTES NFR BLD AUTO: 11.9 % (ref 5–12)
NEUTROPHILS NFR BLD AUTO: 3.03 10*3/MM3 (ref 1.7–7)
NEUTROPHILS NFR BLD AUTO: 57.1 % (ref 42.7–76)
NRBC BLD AUTO-RTO: 0 /100 WBC (ref 0–0.2)
PLATELET # BLD AUTO: 172 10*3/MM3 (ref 140–450)
PMV BLD AUTO: 10.2 FL (ref 6–12)
POTASSIUM SERPL-SCNC: 4 MMOL/L (ref 3.5–5.2)
PROT SERPL-MCNC: 6.3 G/DL (ref 6–8.5)
QT INTERVAL: 480 MS
QTC INTERVAL: 491 MS
RBC # BLD AUTO: 4.04 10*6/MM3 (ref 3.77–5.28)
SINUS: 2.8 CM
SODIUM SERPL-SCNC: 138 MMOL/L (ref 136–145)
STJ: 1.8 CM
STRESS TARGET HR: 130 BPM
STRESS TARGET HR: 130 BPM
TSH SERPL DL<=0.05 MIU/L-ACNC: 2.21 UIU/ML (ref 0.27–4.2)
WBC NRBC COR # BLD: 5.3 10*3/MM3 (ref 3.4–10.8)

## 2022-02-14 PROCEDURE — B24BZZ4 ULTRASONOGRAPHY OF HEART WITH AORTA, TRANSESOPHAGEAL: ICD-10-PCS | Performed by: INTERNAL MEDICINE

## 2022-02-14 PROCEDURE — 93321 DOPPLER ECHO F-UP/LMTD STD: CPT | Performed by: INTERNAL MEDICINE

## 2022-02-14 PROCEDURE — 93005 ELECTROCARDIOGRAM TRACING: CPT | Performed by: INTERNAL MEDICINE

## 2022-02-14 PROCEDURE — 84443 ASSAY THYROID STIM HORMONE: CPT | Performed by: INTERNAL MEDICINE

## 2022-02-14 PROCEDURE — 93312 ECHO TRANSESOPHAGEAL: CPT | Performed by: INTERNAL MEDICINE

## 2022-02-14 PROCEDURE — 93325 DOPPLER ECHO COLOR FLOW MAPG: CPT | Performed by: INTERNAL MEDICINE

## 2022-02-14 PROCEDURE — 80053 COMPREHEN METABOLIC PANEL: CPT | Performed by: INTERNAL MEDICINE

## 2022-02-14 PROCEDURE — 85025 COMPLETE CBC W/AUTO DIFF WBC: CPT | Performed by: INTERNAL MEDICINE

## 2022-02-14 PROCEDURE — 92960 CARDIOVERSION ELECTRIC EXT: CPT | Performed by: INTERNAL MEDICINE

## 2022-02-14 PROCEDURE — 99232 SBSQ HOSP IP/OBS MODERATE 35: CPT | Performed by: INTERNAL MEDICINE

## 2022-02-14 PROCEDURE — 5A2204Z RESTORATION OF CARDIAC RHYTHM, SINGLE: ICD-10-PCS | Performed by: INTERNAL MEDICINE

## 2022-02-14 PROCEDURE — 92960 CARDIOVERSION ELECTRIC EXT: CPT

## 2022-02-14 RX ADMIN — APIXABAN 5 MG: 5 TABLET, FILM COATED ORAL at 21:20

## 2022-02-14 RX ADMIN — Medication 5 MG: at 21:20

## 2022-02-14 RX ADMIN — LEVOTHYROXINE SODIUM 100 MCG: 100 TABLET ORAL at 08:46

## 2022-02-14 RX ADMIN — APIXABAN 5 MG: 5 TABLET, FILM COATED ORAL at 08:47

## 2022-02-14 RX ADMIN — ATORVASTATIN CALCIUM 40 MG: 40 TABLET, FILM COATED ORAL at 08:47

## 2022-02-14 RX ADMIN — SODIUM CHLORIDE, PRESERVATIVE FREE 10 ML: 5 INJECTION INTRAVENOUS at 21:19

## 2022-02-14 RX ADMIN — PANTOPRAZOLE SODIUM 40 MG: 40 TABLET, DELAYED RELEASE ORAL at 08:47

## 2022-02-14 RX ADMIN — LORAZEPAM 0.5 MG: 0.5 TABLET ORAL at 21:20

## 2022-02-14 RX ADMIN — SENNOSIDES AND DOCUSATE SODIUM 2 TABLET: 50; 8.6 TABLET ORAL at 08:47

## 2022-02-14 RX ADMIN — SODIUM CHLORIDE, PRESERVATIVE FREE 10 ML: 5 INJECTION INTRAVENOUS at 08:47

## 2022-02-14 RX ADMIN — SOTALOL HYDROCHLORIDE 80 MG: 80 TABLET ORAL at 08:47

## 2022-02-14 RX ADMIN — Medication 5 MG/HR: at 09:52

## 2022-02-14 RX ADMIN — SOTALOL HYDROCHLORIDE 80 MG: 80 TABLET ORAL at 21:20

## 2022-02-14 RX ADMIN — SENNOSIDES AND DOCUSATE SODIUM 2 TABLET: 50; 8.6 TABLET ORAL at 21:20

## 2022-02-14 NOTE — ANESTHESIA PREPROCEDURE EVALUATION
Anesthesia Evaluation                  Airway   Mallampati: I  TM distance: >3 FB  Neck ROM: full  No difficulty expected  Dental      Pulmonary    Cardiovascular     ECG reviewed    (+) hypertension, valvular problems/murmurs MR and TI, dysrhythmias Atrial Fib,       Neuro/Psych  (+) psychiatric history,    GI/Hepatic/Renal/Endo    (+)   renal disease, thyroid problem hypothyroidism    Musculoskeletal     Abdominal    Substance History      OB/GYN          Other                        Anesthesia Plan    ASA 3     general     intravenous induction     Anesthetic plan, all risks, benefits, and alternatives have been provided, discussed and informed consent has been obtained with: patient.    Plan discussed with CRNA.        CODE STATUS:    Level Of Support Discussed With: Patient  Code Status (Patient has no pulse and is not breathing): CPR (Attempt to Resuscitate)  Medical Interventions (Patient has pulse or is breathing): Full Support

## 2022-02-14 NOTE — PLAN OF CARE
Goal Outcome Evaluation:           Progress: no change  Outcome Summary: A & O x 4, AFib with controlled rate on Diltiazem gtt at 5mg/hr this morning but is SB-NSR post cardioversion, Dilt was discontinued post MIMA.  RA, no c/o pain.  Brad 22, Falls score 7, will continue to monitor.

## 2022-02-14 NOTE — CASE MANAGEMENT/SOCIAL WORK
Discharge Planning Assessment  [facility]     Patient Name: Halie Lennon  MRN: [id number]  Today's Date: 2/14/2022    Admit Date: 2/11/2022     Discharge Needs Assessment     Row Name 02/14/22 0917       Living Environment    Lives With spouse    Name(s) of Who Lives With Patient Keon    Current Living Arrangements home/apartment/condo    Primary Care Provided by self    Able to Return to Prior Arrangements yes       Transition Planning    Patient/Family Anticipates Transition to home    Transportation Anticipated car, drives self       Discharge Needs Assessment    Readmission Within the Last 30 Days no previous admission in last 30 days    Equipment Currently Used at Home none               Discharge Plan     Row Name 02/14/22 0917       Plan    Plan home    Patient/Family in Agreement with Plan yes    Plan Comments I met with Mrs. Kyra Lennon at the bedside. She lives in [facility] with her . She is independent with mobility and activities of daily living. She will return home after this admission and she doesn't anticipate having any discharge needs. She is undergoing a MIMA/ECV today. Case management will follow her plan of care and assist as needed.    Final Discharge Disposition Code 01 - home or self-care              Continued Care and Services - Admitted Since 2/11/2022    Coordination has not been started for this encounter.          Demographic Summary     Row Name 02/14/22 0916       General Information    General Information Comments I confirmed that Tori Iverson is Mrs. Lennon' PCP. [place] [employer] is her insurer.               Functional Status     Row Name 02/14/22 0917       Functional Status, IADL    Medications independent    Meal Preparation independent    Housekeeping independent    Laundry independent    Shopping independent       Employment/    Employment Status employed full-time               Psychosocial    No documentation.                Abuse/Neglect    No  documentation.                Legal    No documentation.                Substance Abuse    No documentation.                Patient Forms    No documentation.                   Rafi Haq RN

## 2022-02-14 NOTE — PROGRESS NOTES
[facility] Medicine Services  PROGRESS NOTE    Patient Name: Halie Lennon  : 1955  MRN: [id number]    Date of Admission: 2022  Primary Care Physician: Tori Iverson APRN    Subjective   Subjective     CC:  Afib w/ RVR    HPI:  Pt doing okay this am, states she's just bored. Going for ECV at 1pm.     ROS:  Gen- No fevers, chills  CV- No chest pain  Resp- No cough, dyspnea  GI- No N/V/D, abd pain        Objective   Objective     Vital Signs:   Temp:  [97.4 °F (36.3 °C)-98.2 °F (36.8 °C)] 97.4 °F (36.3 °C)  Heart Rate:  [70-99] 70  Resp:  [16-18] 16  BP: (101-129)/(77-99) 110/77     Physical Exam:  Constitutional: No acute distress, awake, alert  HENT: NCAT, mucous membranes moist  Respiratory: Clear to auscultation bilaterally, respiratory effort normal   Cardiovascular: RRR, appears sinus on tele with rates in the high 80s, no murmurs, rubs, or gallops  Gastrointestinal: Positive bowel sounds, soft, nontender, nondistended  Musculoskeletal: No bilateral ankle edema  Psychiatric: Appropriate affect, cooperative  Neurologic: Oriented x 3, strength symmetric in all extremities, Cranial Nerves grossly intact to confrontation, speech clear  Skin: No rashes    Results Reviewed:  LAB RESULTS:      Lab 22  0908 22  04122  1010   WBC 5.65 5.55 6.25   HEMOGLOBIN 13.8 13.7 15.1   HEMATOCRIT 42.2 41.7 44.6   PLATELETS 181 174 200   NEUTROS ABS 3.11 3.02 3.77   IMMATURE GRANS (ABS) 0.03 0.02 0.02   LYMPHS ABS 1.64 1.77 1.81   MONOS ABS 0.77 0.64 0.55   EOS ABS 0.07 0.07 0.06   .9* 100.7* 99.8*   PROTIME  --   --  17.5*         Lab 22  0908 22  1230 22  0412 22  1010   SODIUM 140  --  136 138   POTASSIUM 4.0  --  4.2 4.6   CHLORIDE 106  --  103 101   CO2 23.0  --  21.0* 23.0   ANION GAP 11.0  --  12.0 14.0   BUN 14  --  13 12   CREATININE 0.85  --  0.76 0.76   GLUCOSE 103*  --  80 99   CALCIUM 9.4  --  9.1 9.3   MAGNESIUM  --   2.5*  --  1.4*   TSH  --   --   --  1.050         Lab 02/11/22  1010   TOTAL PROTEIN 6.8   ALBUMIN 4.20   GLOBULIN 2.6   ALT (SGPT) 35*   AST (SGOT) 67*   BILIRUBIN 1.1   ALK PHOS 149*         Lab 02/11/22  1010   PROBNP 3,874.0*   TROPONIN T <0.010   PROTIME 17.5*   INR 1.49*             Lab 02/11/22  1010   ABO TYPING O   RH TYPING Positive   ANTIBODY SCREEN Negative         Brief Urine Lab Results     None          Microbiology Results Abnormal     Procedure Component Value - Date/Time    COVID PRE-OP / PRE-PROCEDURE SCREENING ORDER (NO ISOLATION) - Swab, Nasopharynx [[id number]]  (Normal) Collected: 02/11/22 1531    Lab Status: Final result Specimen: Swab from Nasopharynx Updated: 02/11/22 2023    Narrative:      The following orders were created for panel order COVID PRE-OP / PRE-PROCEDURE SCREENING ORDER (NO ISOLATION) - Swab, Nasopharynx.  Procedure                               Abnormality         Status                     ---------                               -----------         ------                     COVID-19, APTIMA PANTHER...[[id number]]  Normal              Final result                 Please view results for these tests on the individual orders.    COVID-19, APTIMA PANTHER JENNIFER IN-HOUSE NP/OP SWAB IN UTM/VTM/SALINE TRANSPORT MEDIA 24HR TAT - Swab, Nasopharynx [[id number]]  (Normal) Collected: 02/11/22 1531    Lab Status: Final result Specimen: Swab from Nasopharynx Updated: 02/11/22 2023     COVID19 Not Detected    Narrative:      Fact sheet for providers: https://www.fda.gov/media/841813/download     Fact sheet for patients: https://www.fda.gov/media/349121/download    Test performed by RT PCR.          Adult Transthoracic Echo Complete W/ Cont if Necessary Per Protocol    Result Date: 2/12/2022  · Moderate tricuspid valve regurgitation is present. · Estimated right ventricular systolic pressure from tricuspid regurgitation is moderately elevated (45-55 mmHg). Calculated right ventricular systolic  pressure from tricuspid regurgitation is 50 mmHg. · Left atrial volume is moderately increased. · Moderate mitral valve regurgitation is present. · Left ventricular systolic function is normal · The study was done with the patient in atrial fibrillation        Results for orders placed during the hospital encounter of 02/11/22    Adult Transthoracic Echo Complete W/ Cont if Necessary Per Protocol    Interpretation Summary  · Moderate tricuspid valve regurgitation is present.  · Estimated right ventricular systolic pressure from tricuspid regurgitation is moderately elevated (45-55 mmHg). Calculated right ventricular systolic pressure from tricuspid regurgitation is 50 mmHg.  · Left atrial volume is moderately increased.  · Moderate mitral valve regurgitation is present.  · Left ventricular systolic function is normal  · The study was done with the patient in atrial fibrillation      I have reviewed the medications:  Scheduled Meds:apixaban, 5 mg, Oral, Q12H  atorvastatin, 40 mg, Oral, Daily  levothyroxine, 100 mcg, Oral, Q AM  pantoprazole, 40 mg, Oral, Daily  senna-docusate sodium, 2 tablet, Oral, BID  sodium chloride, 10 mL, Intravenous, Q12H  sotalol, 80 mg, Oral, Q12H      Continuous Infusions:dilTIAZem, 5-15 mg/hr, Last Rate: 5 mg/hr (02/13/22 0647)      PRN Meds:.•  acetaminophen  •  senna-docusate sodium **AND** polyethylene glycol **AND** bisacodyl **AND** bisacodyl  •  LORazepam  •  magnesium sulfate **OR** magnesium sulfate **OR** magnesium sulfate  •  melatonin  •  ondansetron **OR** ondansetron  •  potassium chloride  •  potassium chloride  •  sodium chloride  •  sodium chloride    Assessment/Plan   Assessment & Plan     Active Hospital Problems    Diagnosis  POA   • Atrial fibrillation with RVR (HCC) [I48.91]  Yes     Priority: High   • Vaginal bleeding [N93.9]  Unknown     Priority: Medium   • Hypothyroidism [E03.9]  Yes     Priority: Low      Resolved Hospital Problems   No resolved problems to  display.        Brief Hospital Course to date:  Halie Lennon is a 66 y.o. female with PMH of Hypothyroidism, HLD, and Afib recently taken off of Eliquis due to vaginal bleeding who presented to the hospital on 2/11/22 with complaints of palpitations and was found to be in Afib with RVR.    Plan:    A. fib RVR  -- Cardiology evaluated, on Cardizem drip  -- Sotalol restarted  -- NPO for MIMA with cardioversion today per Dr. Novak  -- [id number] of 2, as noted, Eliquis recently d/c'd due to vaginal bleeding, but was restarted on admission     Vaginal bleeding  -- Previous history of uterine fibroid that has been monitored outpatient by nurse practitioner with Dr. Thakkar.  Of note, did have endometrial biopsy several years ago for this.   -- Has follow-up appointment March 2 discussed  -- She held her Eliquis for the last 2 to 3 days with new onset of bleeding, has not noticed any change in terms of decreased bleeding with holding medication  -- My partner discussed with OB on-call, okay to restart Eliquis.  If having increased bleeding (more than 1 pad per hour) recommended to hold Eliquis and can pursue further work-up before already scheduled appointment  --improving and H&H stable the last few days     Hypomag  -Replace per protocol    Hypothyroidism  --TSH wnl, continue Synthroid      Anxiety  -- pt declines addition of Buspar, agreeable to Ativan PRN while inpatient. She does think her symptoms seem related to/coincide with her Afib.     Insomnia  --  Melatonin     DVT prophylaxis:  Medical DVT prophylaxis orders are present.       AM-PAC 6 Clicks Score (PT): 24 (02/13/22 2000)    Disposition: I expect the patient to be discharged home once okay with Cardiology    CODE STATUS:   Code Status and Medical Interventions:   Ordered at: 02/11/22 0629     Level Of Support Discussed With:    Patient     Code Status (Patient has no pulse and is not breathing):    CPR (Attempt to Resuscitate)     Medical  Interventions (Patient has pulse or is breathing):    Full Support       Brittney Benavides MD  02/14/22

## 2022-02-15 VITALS
TEMPERATURE: 97.8 F | RESPIRATION RATE: 16 BRPM | OXYGEN SATURATION: 98 % | DIASTOLIC BLOOD PRESSURE: 97 MMHG | SYSTOLIC BLOOD PRESSURE: 143 MMHG | WEIGHT: 173 LBS | HEIGHT: 70 IN | BODY MASS INDEX: 24.77 KG/M2 | HEART RATE: 78 BPM

## 2022-02-15 LAB
QT INTERVAL: 430 MS
QT INTERVAL: 444 MS
QTC INTERVAL: 506 MS
QTC INTERVAL: 537 MS

## 2022-02-15 PROCEDURE — 99239 HOSP IP/OBS DSCHRG MGMT >30: CPT | Performed by: INTERNAL MEDICINE

## 2022-02-15 RX ORDER — SOTALOL HYDROCHLORIDE 80 MG/1
80 TABLET ORAL EVERY 12 HOURS SCHEDULED
Qty: 60 TABLET | Refills: 0 | Status: ON HOLD | OUTPATIENT
Start: 2022-02-15 | End: 2022-02-22

## 2022-02-15 RX ADMIN — LEVOTHYROXINE SODIUM 100 MCG: 100 TABLET ORAL at 05:42

## 2022-02-15 RX ADMIN — SENNOSIDES AND DOCUSATE SODIUM 2 TABLET: 50; 8.6 TABLET ORAL at 08:48

## 2022-02-15 RX ADMIN — SOTALOL HYDROCHLORIDE 80 MG: 80 TABLET ORAL at 08:48

## 2022-02-15 RX ADMIN — APIXABAN 5 MG: 5 TABLET, FILM COATED ORAL at 08:48

## 2022-02-15 RX ADMIN — ATORVASTATIN CALCIUM 40 MG: 40 TABLET, FILM COATED ORAL at 08:48

## 2022-02-15 RX ADMIN — SODIUM CHLORIDE, PRESERVATIVE FREE 10 ML: 5 INJECTION INTRAVENOUS at 08:48

## 2022-02-15 RX ADMIN — PANTOPRAZOLE SODIUM 40 MG: 40 TABLET, DELAYED RELEASE ORAL at 08:48

## 2022-02-15 NOTE — DISCHARGE SUMMARY
[facility] Medicine Services  DISCHARGE SUMMARY    Patient Name: Halie Lennon  : 1955  MRN: [id number]    Date of Admission: 2022  9:34 AM  Date of Discharge:  2/15/2022  Primary Care Physician: Tori Iverson APRN    Consults     No orders found from 2022 to 2022.          Hospital Course     Presenting Problem:   Atrial fibrillation with RVR (HCC) [I48.91]    Active Hospital Problems    Diagnosis  POA   • Atrial fibrillation with RVR (HCC) [I48.91]  Yes     Priority: High   • Vaginal bleeding [N93.9]  Unknown     Priority: Medium   • Hypothyroidism [E03.9]  Yes     Priority: Low      Resolved Hospital Problems   No resolved problems to display.          Hospital Course:  Halie Lennon is a 66 y.o. female with PMH of Hypothyroidism, HLD, and Afib recently taken off of Eliquis due to vaginal bleeding who presented to the hospital on 22 with complaints of palpitations and was found to be in Afib with RVR.     A. fib RVR  -- Cardiology evaluated, was initially on Cardizem gtt  -- Sotalol restarted now on BID dose  -- pt underwent MIMA with ECV on  with Dr. Noavk now in NSR  -- [id number] of 2, as noted, Eliquis recently d/c'd due to vaginal bleeding, but was restarted on admission. Continue on d/c     Vaginal bleeding  -- Previous history of uterine fibroid that has been monitored outpatient by nurse practitioner with Dr. Thakkar.  Of note, did have endometrial biopsy several years ago for this.   -- Has follow-up appointment  discussed  -- She held her Eliquis for the last 2 to 3 days with new onset of bleeding, has not noticed any change in terms of decreased bleeding with holding medication  -- My partner discussed with OB on-call, okay to restart Eliquis.  If having increased bleeding (more than 1 pad per hour) recommended to hold Eliquis and can pursue further work-up before already scheduled appointment  --improving and H&H stable  the last few days     Hypomag  -Replaced per protocol     Hypothyroidism  --TSH wnl, continue Synthroid       Anxiety  -- pt declines addition of Buspar, agreeable to Ativan PRN while inpatient. She does think her symptoms seem related to/coincide with her Afib. Declines any additional medications upon d/c     Insomnia  -- continue Melatonin as outpatient         Discharge Follow Up Recommendations for outpatient labs/diagnostics:   Follow up with PCP within one week  Follow up with Dr. Novak in 6 weeks    Day of Discharge     HPI:   Doing well this am, ready to go home today    Review of Systems  Gen- No fevers, chills  CV- No chest pain, palpitations  Resp- No cough, dyspnea  GI- No N/V/D, abd pain        Vital Signs:   Temp:  [97.4 °F (36.3 °C)-98.5 °F (36.9 °C)] 97.5 °F (36.4 °C)  Heart Rate:  [] 84  Resp:  [17-18] 18  BP: ()/() 137/96  Flow (L/min):  [2-6] 2      Physical Exam:  Constitutional: No acute distress, awake, alert  HENT: NCAT, mucous membranes moist  Respiratory: Clear to auscultation bilaterally, respiratory effort normal   Cardiovascular: RRR, no murmurs, rubs, or gallops  Gastrointestinal: Positive bowel sounds, soft, nontender, nondistended  Musculoskeletal: No bilateral ankle edema  Psychiatric: Appropriate affect, cooperative  Neurologic: Oriented x 3, strength symmetric in all extremities, Cranial Nerves grossly intact to confrontation, speech clear  Skin: No rashes    Pertinent  and/or Most Recent Results     LAB RESULTS:      Lab 02/14/22  0803 02/13/22  0908 02/12/22  0412 02/11/22  1010   WBC 5.30 5.65 5.55 6.25   HEMOGLOBIN 13.4 13.8 13.7 15.1   HEMATOCRIT 42.6 42.2 41.7 44.6   PLATELETS 172 181 174 200   NEUTROS ABS 3.03 3.11 3.02 3.77   IMMATURE GRANS (ABS) 0.01 0.03 0.02 0.02   LYMPHS ABS 1.51 1.64 1.77 1.81   MONOS ABS 0.63 0.77 0.64 0.55   EOS ABS 0.10 0.07 0.07 0.06   .4* 101.9* 100.7* 99.8*   PROTIME  --   --   --  17.5*         Lab 02/14/22  0803  02/13/22  0908 02/12/22  1230 02/12/22  0412 02/11/22  1010   SODIUM 138 140  --  136 138   POTASSIUM 4.0 4.0  --  4.2 4.6   CHLORIDE 106 106  --  103 101   CO2 19.0* 23.0  --  21.0* 23.0   ANION GAP 13.0 11.0  --  12.0 14.0   BUN 14 14  --  13 12   CREATININE 0.74 0.85  --  0.76 0.76   GLUCOSE 106* 103*  --  80 99   CALCIUM 9.3 9.4  --  9.1 9.3   MAGNESIUM  --   --  2.5*  --  1.4*   TSH 2.210  --   --   --  1.050         Lab 02/14/22  0803 02/11/22  1010   TOTAL PROTEIN 6.3 6.8   ALBUMIN 3.90 4.20   GLOBULIN 2.4 2.6   ALT (SGPT) 18 35*   AST (SGOT) 25 67*   BILIRUBIN 1.1 1.1   ALK PHOS 106 149*         Lab 02/11/22  1010   PROBNP 3,874.0*   TROPONIN T <0.010   PROTIME 17.5*   INR 1.49*             Lab 02/11/22  1010   ABO TYPING O   RH TYPING Positive   ANTIBODY SCREEN Negative         Brief Urine Lab Results     None        Microbiology Results (last 10 days)     Procedure Component Value - Date/Time    COVID PRE-OP / PRE-PROCEDURE SCREENING ORDER (NO ISOLATION) - Swab, Nasopharynx [[id number]]  (Normal) Collected: 02/11/22 1531    Lab Status: Final result Specimen: Swab from Nasopharynx Updated: 02/11/22 2023    Narrative:      The following orders were created for panel order COVID PRE-OP / PRE-PROCEDURE SCREENING ORDER (NO ISOLATION) - Swab, Nasopharynx.  Procedure                               Abnormality         Status                     ---------                               -----------         ------                     COVID-19, APTIMA PANTHER...[[id number]]  Normal              Final result                 Please view results for these tests on the individual orders.    COVID-19, APTIMA PANTHER [facility] IN-HOUSE NP/OP SWAB IN UTM/VTM/SALINE TRANSPORT MEDIA 24HR TAT - Swab, Nasopharynx [[id number]]  (Normal) Collected: 02/11/22 1531    Lab Status: Final result Specimen: Swab from Nasopharynx Updated: 02/11/22 2023     COVID19 Not Detected    Narrative:      Fact sheet for providers:  https://www.fda.gov/media/567080/download     Fact sheet for patients: https://www.fda.gov/media/924035/download    Test performed by RT PCR.          Adult Transesophageal Echo (MIMA) W/ Cont if Necessary Per Protocol    Result Date: 2/14/2022  · Calculated left ventricular 3D EF = 53% Estimated left ventricular EF = 55% Estimated left ventricular EF was in agreement with the calculated left ventricular EF. Left ventricular ejection fraction appears to be 51 - 55%. Left ventricular systolic function is normal. · Saline test results are negative for right to left atrial level shunt. · The right atrial cavity is moderate to severely dilated. · Moderate mitral valve regurgitation is present with a centrally-directed jet noted. · There is mild bileaflet mitral valve thickening present without vegetations or definite prolapse. · Estimated right ventricular systolic pressure from tricuspid regurgitation is normal (<35 mmHg). · There is (grade 1) layered plaque in the descending aorta present. · Normal right ventricular cavity size, wall thickness, systolic function and septal motion noted. · No evidence of pulmonary hypertension is present. · There is no evidence of pericardial effusion. · No evidence of a left atrial appendage thrombus was present.      Adult Transthoracic Echo Complete W/ Cont if Necessary Per Protocol    Result Date: 2/12/2022  · Moderate tricuspid valve regurgitation is present. · Estimated right ventricular systolic pressure from tricuspid regurgitation is moderately elevated (45-55 mmHg). Calculated right ventricular systolic pressure from tricuspid regurgitation is 50 mmHg. · Left atrial volume is moderately increased. · Moderate mitral valve regurgitation is present. · Left ventricular systolic function is normal · The study was done with the patient in atrial fibrillation      Cardioversion External in Cardiology Department    Result Date: 2/14/2022  · Post cardioversion the patient displayed a  sinus rhythm. · The cardioversion was successful.      XR Chest 1 View    Result Date: 2/11/2022  DATE OF EXAM: 2/11/2022 8:55 AM  PROCEDURE: XR CHEST 1 VW-  INDICATIONS: Dysrhythmia triage protocol  COMPARISON: April 3, 2018  TECHNIQUE: Single radiographic AP view of the chest was obtained.  FINDINGS: The heart looks enlarged. There are no definite infiltrates or obvious pleural effusions. The visualized osseous structures do not appear unusual.      1.  Cardiomegaly.  This report was finalized on 2/11/2022 9:37 AM by Edi Sage MD.                Results for orders placed during the hospital encounter of 02/11/22    Adult Transesophageal Echo (MIMA) W/ Cont if Necessary Per Protocol    Interpretation Summary  · Calculated left ventricular 3D EF = 53% Estimated left ventricular EF = 55% Estimated left ventricular EF was in agreement with the calculated left ventricular EF. Left ventricular ejection fraction appears to be 51 - 55%. Left ventricular systolic function is normal.  · Saline test results are negative for right to left atrial level shunt.  · The right atrial cavity is moderate to severely dilated.  · Moderate mitral valve regurgitation is present with a centrally-directed jet noted.  · There is mild bileaflet mitral valve thickening present without vegetations or definite prolapse.  · Estimated right ventricular systolic pressure from tricuspid regurgitation is normal (<35 mmHg).  · There is (grade 1) layered plaque in the descending aorta present.  · Normal right ventricular cavity size, wall thickness, systolic function and septal motion noted.  · No evidence of pulmonary hypertension is present.  · There is no evidence of pericardial effusion.  · No evidence of a left atrial appendage thrombus was present.      Plan for Follow-up of Pending Labs/Results:     Discharge Details        Discharge Medications      New Medications      Instructions Start Date   sotalol 80 MG tablet  Commonly known as:  BETAPACE   80 mg, Oral, Every 12 Hours Scheduled         Changes to Medications      Instructions Start Date   atorvastatin 40 MG tablet  Commonly known as: LIPITOR  What changed: how much to take   40 mg, Oral, Daily         Continue These Medications      Instructions Start Date   apixaban 5 MG tablet tablet  Commonly known as: ELIQUIS   5 mg, Oral, 2 Times Daily      citalopram 20 MG tablet  Commonly known as: CeleXA   20 mg, Daily      hydrOXYzine 10 MG tablet  Commonly known as: ATARAX   10 mg, Oral, 3 Times Daily PRN      levothyroxine 100 MCG tablet  Commonly known as: SYNTHROID, LEVOTHROID   100 mcg, Oral, Daily      lisinopril 40 MG tablet  Commonly known as: PRINIVIL,ZESTRIL   40 mg, Oral, Daily      pantoprazole 20 MG EC tablet  Commonly known as: PROTONIX   20 mg, Oral, Daily         Stop These Medications    sotalol 80 MG tablet tablet  Commonly known as: BETAPACE AF            Allergies   Allergen Reactions   • Sulfa Antibiotics Anaphylaxis         Discharge Disposition:      Diet:  Hospital:  Diet Order   Procedures   • Diet Regular; Cardiac       Activity:      Restrictions or Other Recommendations:       CODE STATUS:    Code Status and Medical Interventions:   Ordered at: 02/11/22 1444     Level Of Support Discussed With:    Patient     Code Status (Patient has no pulse and is not breathing):    CPR (Attempt to Resuscitate)     Medical Interventions (Patient has pulse or is breathing):    Full Support       Future Appointments   Date Time Provider Department Center   3/2/2022 11:00 AM [facility] [address] [facility]  [facility]   3/2/2022 11:30 AM Chayito Holt APRN [facility]  [facility]   4/18/2022 11:00 AM René Valencia MD [facility]                 Brittney Benavides MD  02/15/22      Time Spent on Discharge:  I spent  35 minutes on this discharge activity which included: face-to-face encounter with the patient, reviewing the data in the system, coordination of the care with the nursing staff  as well as consultants, documentation, and entering orders.

## 2022-02-15 NOTE — PROGRESS NOTES
"                                 [facility] Progress Note      LOS: 4 days   Patient Care Team:  Tori Iverson APRN as PCP - General (Family Medicine)  Michel White MD as Consulting Physician (Colon and Rectal Surgery)    Chief Complaint:    Chief Complaint   Patient presents with   • Shortness of Breath   • Irregular Heart Beat       Subjective     Interval History: Uneventful evening    Patient Complaints: No chest pain or dyspnea.  Vaginal bleeding has diminished considerably      Review of Systems:   A 14 point review of systems was negative except as was stated in the HPI      Objective     Vital Sign Min/Max for last 24 hours  Temp  Min: 97.4 °F (36.3 °C)  Max: 98.5 °F (36.9 °C)   BP  Min: 76/53  Max: 148/105   Pulse  Min: 59  Max: 100   Resp  Min: 17  Max: 18   SpO2  Min: 88 %  Max: 96 %   Flow (L/min)  Min: 2  Max: 6   No data recorded     Flowsheet Rows      First Filed Value   Admission Height 177.8 cm (70\") Documented at 02/11/2022 0844   Admission Weight 72.6 kg (160 lb) Documented at 02/11/2022 0844          Physical Exam:  General Appearance: Alert, appears stated age and cooperative  Lungs: Clear to auscultation  Heart:: Irregular no Murmurs, Rubs or Gallops  Abdomen: Soft and nontender with adequate bowel sounds.  No organomegaly  Extremities: No cyanosis, clubbing or edema  Pulses: Pulses palpable and equal bilaterally  Skin: Warm and dry with no rash  Psych: Normal     Results Review:     I reviewed the patient's new clinical results.  Results from last 7 days   Lab Units 02/14/22  0803 02/13/22  0908 02/13/22  0908 02/12/22  0412 02/12/22 0412   SODIUM mmol/L 138  --  140  --  136   POTASSIUM mmol/L 4.0  --  4.0  --  4.2   CHLORIDE mmol/L 106  --  106  --  103   CO2 mmol/L 19.0*  --  23.0  --  21.0*   BUN mg/dL 14  --  14  --  13   CREATININE mg/dL 0.74  --  0.85  --  0.76   GLUCOSE mg/dL 106*   < > 103*   < > 80   CALCIUM mg/dL 9.3  --  9.4  --  9.1    < > = values in " this interval not displayed.     Results from last 7 days   Lab Units 02/14/22  0803 02/13/22  0908 02/12/22  0412   WBC 10*3/mm3 5.30 5.65 5.55   HEMOGLOBIN g/dL 13.4 13.8 13.7   HEMATOCRIT % 42.6 42.2 41.7   PLATELETS 10*3/mm3 172 181 174     Lab Results   Lab Value Date/Time    TROPONINT <0.010 02/11/2022 1010         Results from last 7 days   Lab Units 02/11/22  1010   INR  1.49*               Medication Review: yes  Current Facility-Administered Medications   Medication Dose Route Frequency Provider Last Rate Last Admin   • acetaminophen (TYLENOL) tablet 650 mg  650 mg Oral Q4H PRN Bette Pantoja DO       • apixaban (ELIQUIS) tablet 5 mg  5 mg Oral Q12H Bette Pantoja DO   5 mg at 02/14/22 2120   • atorvastatin (LIPITOR) tablet 40 mg  40 mg Oral Daily Brittney Benavides MD   40 mg at 02/14/22 0847   • sennosides-docusate (PERICOLACE) 8.6-50 MG per tablet 2 tablet  2 tablet Oral BID Bette Pantoja DO   2 tablet at 02/14/22 2120    And   • polyethylene glycol (MIRALAX) packet 17 g  17 g Oral Daily PRN Bette Pantoja DO        And   • bisacodyl (DULCOLAX) EC tablet 5 mg  5 mg Oral Daily PRN Bette Pantoja DO        And   • bisacodyl (DULCOLAX) suppository 10 mg  10 mg Rectal Daily PRN Bette Pantoja DO       • levothyroxine (SYNTHROID, LEVOTHROID) tablet 100 mcg  100 mcg Oral Q AM Brittney Benavides MD   100 mcg at 02/15/22 0542   • LORazepam (ATIVAN) tablet 0.5 mg  0.5 mg Oral Q6H PRN Brittney Benavides MD   0.5 mg at 02/14/22 2120   • Magnesium Sulfate 2 gram Bolus, followed by 8 gram infusion (total Mg dose 10 grams)- Mg less than or equal to 1mg/dL  2 g Intravenous PRN Bette Pantoja DO        Or   • Magnesium Sulfate 2 gram / 50mL Infusion (GIVE X 3 BAGS TO EQUAL 6GM TOTAL DOSE) - Mg 1.1 - 1.5 mg/dl  2 g Intravenous PRN Bette Pantoja, DO 25 mL/hr at 02/11/22 2325 2 g at 02/11/22 2325    Or   • Magnesium Sulfate 4 gram  infusion- Mg 1.6-1.9 mg/dL  4 g Intravenous PRN Bette Pantoja DO       • melatonin tablet 5 mg  5 mg Oral Nightly PRN Bette Pantoja DO   5 mg at 02/14/22 2120   • ondansetron (ZOFRAN) tablet 4 mg  4 mg Oral Q6H PRN Bette Pantoja DO        Or   • ondansetron (ZOFRAN) injection 4 mg  4 mg Intravenous Q6H PRN Bette Pantoja DO       • pantoprazole (PROTONIX) EC tablet 40 mg  40 mg Oral Daily Brittney Benavides MD   40 mg at 02/14/22 0847   • potassium chloride (KLOR-CON) packet 40 mEq  40 mEq Oral PRN Bette Pantoja DO       • potassium chloride (MICRO-K) CR capsule 40 mEq  40 mEq Oral PRN Bette Pantoja DO       • sodium chloride 0.9 % flush 10 mL  10 mL Intravenous PRN Mckinley Sheffield MD       • sodium chloride 0.9 % flush 10 mL  10 mL Intravenous Q12H Bette Pantoja, DO   10 mL at 02/14/22 2119   • sodium chloride 0.9 % flush 10 mL  10 mL Intravenous PRN Bette Pantoja DO       • sotalol (BETAPACE) tablet 80 mg  80 mg Oral Q12H Bette Pantoja, DO   80 mg at 02/14/22 2120         Hypothyroidism    Atrial fibrillation with RVR (HCC)    Vaginal bleeding        Impression      PAF status post cardioversion to sinus rhythm 2/14/2022  Moderate MR with mild to moderate LAE and normal LV systolic function  Vaginal bleeding  Hypothyroidism  Others as per the consult dated 2/11/2022      Plan     Okay for discharge on Eliquis and sotalol at the present doses\  She will keep a blood pressure log at home  She'll follow-up with me in 6 weeks in the office      Orlin Novak MD   02/15/22  08:00 EST

## 2022-02-15 NOTE — CASE MANAGEMENT/SOCIAL WORK
Continued Stay Note  [facility]     Patient Name: Halie Lennon  MRN: [id number]  Today's Date: 2/15/2022    Admit Date: 2/11/2022     Discharge Plan     Row Name 02/15/22 1124       Plan    Plan home    Plan Comments I met with Mrs. Kyra Lennon at the bedside. She is being discharged home today. She denies having any discharge needs.    Final Discharge Disposition Code 01 - home or self-care               Discharge Codes    No documentation.               Expected Discharge Date and Time     Expected Discharge Date Expected Discharge Time    Feb 15, 2022             Rafi Haq RN

## 2022-02-22 ENCOUNTER — HOSPITAL ENCOUNTER (INPATIENT)
Facility: HOSPITAL | Age: 67
LOS: 3 days | Discharge: HOME | End: 2022-02-25
Attending: INTERNAL MEDICINE | Admitting: INTERNAL MEDICINE

## 2022-02-22 LAB
ALBUMIN SERPL-MCNC: 4.1 G/DL (ref 3.5–5.2)
ALBUMIN/GLOB SERPL: 1.4 G/DL
ALP SERPL-CCNC: 109 U/L (ref 39–117)
ALT SERPL W P-5'-P-CCNC: 19 U/L (ref 1–33)
ANION GAP SERPL CALCULATED.3IONS-SCNC: 11 MMOL/L (ref 5–15)
AST SERPL-CCNC: 39 U/L (ref 1–32)
BILIRUB SERPL-MCNC: 1 MG/DL (ref 0–1.2)
BUN SERPL-MCNC: 20 MG/DL (ref 8–23)
BUN/CREAT SERPL: 21.7 (ref 7–25)
CALCIUM SPEC-SCNC: 9.5 MG/DL (ref 8.6–10.5)
CHLORIDE SERPL-SCNC: 104 MMOL/L (ref 98–107)
CO2 SERPL-SCNC: 24 MMOL/L (ref 22–29)
CREAT SERPL-MCNC: 0.92 MG/DL (ref 0.57–1)
DEPRECATED RDW RBC AUTO: 47.9 FL (ref 37–54)
ERYTHROCYTE [DISTWIDTH] IN BLOOD BY AUTOMATED COUNT: 13.2 % (ref 12.3–15.4)
GFR SERPL CREATININE-BSD FRML MDRD: 61 ML/MIN/1.73
GLOBULIN UR ELPH-MCNC: 2.9 GM/DL
GLUCOSE SERPL-MCNC: 111 MG/DL (ref 65–99)
HCT VFR BLD AUTO: 44.2 % (ref 34–46.6)
HGB BLD-MCNC: 14.7 G/DL (ref 12–15.9)
MAGNESIUM SERPL-MCNC: 2 MG/DL (ref 1.6–2.4)
MCH RBC QN AUTO: 32.9 PG (ref 26.6–33)
MCHC RBC AUTO-ENTMCNC: 33.3 G/DL (ref 31.5–35.7)
MCV RBC AUTO: 98.9 FL (ref 79–97)
PLATELET # BLD AUTO: 226 10*3/MM3 (ref 140–450)
PMV BLD AUTO: 10.4 FL (ref 6–12)
POTASSIUM SERPL-SCNC: 5.1 MMOL/L (ref 3.5–5.2)
PROT SERPL-MCNC: 7 G/DL (ref 6–8.5)
QT INTERVAL: 414 MS
QTC INTERVAL: 523 MS
RBC # BLD AUTO: 4.47 10*6/MM3 (ref 3.77–5.28)
SODIUM SERPL-SCNC: 139 MMOL/L (ref 136–145)
WBC NRBC COR # BLD: 5.39 10*3/MM3 (ref 3.4–10.8)

## 2022-02-22 PROCEDURE — 83735 ASSAY OF MAGNESIUM: CPT | Performed by: INTERNAL MEDICINE

## 2022-02-22 PROCEDURE — 80053 COMPREHEN METABOLIC PANEL: CPT | Performed by: INTERNAL MEDICINE

## 2022-02-22 PROCEDURE — 93005 ELECTROCARDIOGRAM TRACING: CPT | Performed by: INTERNAL MEDICINE

## 2022-02-22 PROCEDURE — 85027 COMPLETE CBC AUTOMATED: CPT | Performed by: INTERNAL MEDICINE

## 2022-02-22 PROCEDURE — G0378 HOSPITAL OBSERVATION PER HR: HCPCS

## 2022-02-22 RX ORDER — METOPROLOL TARTRATE 50 MG/1
75 TABLET, FILM COATED ORAL 2 TIMES DAILY
COMMUNITY
End: 2022-02-25 | Stop reason: HOSPADM

## 2022-02-22 RX ORDER — LEVOTHYROXINE SODIUM 0.1 MG/1
100 TABLET ORAL
Status: DISCONTINUED | OUTPATIENT
Start: 2022-02-23 | End: 2022-02-25 | Stop reason: HOSPADM

## 2022-02-22 RX ORDER — DOFETILIDE 0.5 MG/1
500 CAPSULE ORAL ONCE
Status: COMPLETED | OUTPATIENT
Start: 2022-02-22 | End: 2022-02-22

## 2022-02-22 RX ORDER — MAGNESIUM SULFATE HEPTAHYDRATE 40 MG/ML
2 INJECTION, SOLUTION INTRAVENOUS AS NEEDED
Status: DISCONTINUED | OUTPATIENT
Start: 2022-02-22 | End: 2022-02-25 | Stop reason: HOSPADM

## 2022-02-22 RX ORDER — PANTOPRAZOLE SODIUM 40 MG/1
40 TABLET, DELAYED RELEASE ORAL DAILY
Status: DISCONTINUED | OUTPATIENT
Start: 2022-02-23 | End: 2022-02-25 | Stop reason: HOSPADM

## 2022-02-22 RX ORDER — ATORVASTATIN CALCIUM 40 MG/1
40 TABLET, FILM COATED ORAL NIGHTLY
Status: DISCONTINUED | OUTPATIENT
Start: 2022-02-22 | End: 2022-02-25 | Stop reason: HOSPADM

## 2022-02-22 RX ORDER — POTASSIUM CHLORIDE 1.5 G/1.77G
40 POWDER, FOR SOLUTION ORAL AS NEEDED
Status: DISCONTINUED | OUTPATIENT
Start: 2022-02-22 | End: 2022-02-25 | Stop reason: HOSPADM

## 2022-02-22 RX ORDER — POTASSIUM CHLORIDE 750 MG/1
40 CAPSULE, EXTENDED RELEASE ORAL AS NEEDED
Status: DISCONTINUED | OUTPATIENT
Start: 2022-02-22 | End: 2022-02-25 | Stop reason: HOSPADM

## 2022-02-22 RX ORDER — MAGNESIUM SULFATE HEPTAHYDRATE 40 MG/ML
4 INJECTION, SOLUTION INTRAVENOUS AS NEEDED
Status: DISCONTINUED | OUTPATIENT
Start: 2022-02-22 | End: 2022-02-25 | Stop reason: HOSPADM

## 2022-02-22 RX ORDER — DOFETILIDE 0.5 MG/1
500 CAPSULE ORAL EVERY 12 HOURS SCHEDULED
Status: DISCONTINUED | OUTPATIENT
Start: 2022-02-23 | End: 2022-02-24

## 2022-02-22 RX ADMIN — ATORVASTATIN CALCIUM 40 MG: 40 TABLET, FILM COATED ORAL at 21:09

## 2022-02-22 RX ADMIN — DOFETILIDE 500 MCG: 0.5 CAPSULE ORAL at 22:47

## 2022-02-22 RX ADMIN — METOPROLOL TARTRATE 75 MG: 50 TABLET, FILM COATED ORAL at 21:09

## 2022-02-22 RX ADMIN — APIXABAN 5 MG: 5 TABLET, FILM COATED ORAL at 21:09

## 2022-02-22 RX ADMIN — DOFETILIDE 500 MCG: 0.5 CAPSULE ORAL at 13:18

## 2022-02-23 PROBLEM — I48.91 A-FIB: Status: ACTIVE | Noted: 2022-02-23

## 2022-02-23 LAB
ANION GAP SERPL CALCULATED.3IONS-SCNC: 11 MMOL/L (ref 5–15)
BUN SERPL-MCNC: 18 MG/DL (ref 8–23)
BUN/CREAT SERPL: 18.6 (ref 7–25)
CALCIUM SPEC-SCNC: 9.9 MG/DL (ref 8.6–10.5)
CHLORIDE SERPL-SCNC: 105 MMOL/L (ref 98–107)
CO2 SERPL-SCNC: 25 MMOL/L (ref 22–29)
CREAT SERPL-MCNC: 0.97 MG/DL (ref 0.57–1)
GFR SERPL CREATININE-BSD FRML MDRD: 57 ML/MIN/1.73
GLUCOSE SERPL-MCNC: 93 MG/DL (ref 65–99)
MAGNESIUM SERPL-MCNC: 1.8 MG/DL (ref 1.6–2.4)
POTASSIUM SERPL-SCNC: 4.7 MMOL/L (ref 3.5–5.2)
QT INTERVAL: 426 MS
QT INTERVAL: 434 MS
QT INTERVAL: 448 MS
QTC INTERVAL: 500 MS
QTC INTERVAL: 507 MS
QTC INTERVAL: 509 MS
SODIUM SERPL-SCNC: 141 MMOL/L (ref 136–145)

## 2022-02-23 PROCEDURE — 93005 ELECTROCARDIOGRAM TRACING: CPT | Performed by: INTERNAL MEDICINE

## 2022-02-23 PROCEDURE — 83735 ASSAY OF MAGNESIUM: CPT | Performed by: INTERNAL MEDICINE

## 2022-02-23 PROCEDURE — 80048 BASIC METABOLIC PNL TOTAL CA: CPT | Performed by: INTERNAL MEDICINE

## 2022-02-23 PROCEDURE — 0 MAGNESIUM SULFATE 4 GM/100ML SOLUTION: Performed by: INTERNAL MEDICINE

## 2022-02-23 RX ORDER — TRAZODONE HYDROCHLORIDE 50 MG/1
50 TABLET ORAL NIGHTLY
Status: DISCONTINUED | OUTPATIENT
Start: 2022-02-23 | End: 2022-02-25 | Stop reason: HOSPADM

## 2022-02-23 RX ADMIN — DOFETILIDE 500 MCG: 0.5 CAPSULE ORAL at 21:01

## 2022-02-23 RX ADMIN — APIXABAN 5 MG: 5 TABLET, FILM COATED ORAL at 21:01

## 2022-02-23 RX ADMIN — APIXABAN 5 MG: 5 TABLET, FILM COATED ORAL at 09:39

## 2022-02-23 RX ADMIN — DOFETILIDE 500 MCG: 0.5 CAPSULE ORAL at 09:38

## 2022-02-23 RX ADMIN — ATORVASTATIN CALCIUM 40 MG: 40 TABLET, FILM COATED ORAL at 21:01

## 2022-02-23 RX ADMIN — LEVOTHYROXINE SODIUM 100 MCG: 100 TABLET ORAL at 05:21

## 2022-02-23 RX ADMIN — METOPROLOL TARTRATE 75 MG: 50 TABLET, FILM COATED ORAL at 09:38

## 2022-02-23 RX ADMIN — TRAZODONE HYDROCHLORIDE 50 MG: 50 TABLET ORAL at 21:01

## 2022-02-23 RX ADMIN — PANTOPRAZOLE SODIUM 40 MG: 40 TABLET, DELAYED RELEASE ORAL at 09:39

## 2022-02-23 RX ADMIN — MAGNESIUM SULFATE HEPTAHYDRATE 4 G: 40 INJECTION, SOLUTION INTRAVENOUS at 09:39

## 2022-02-24 LAB
ANION GAP SERPL CALCULATED.3IONS-SCNC: 12 MMOL/L (ref 5–15)
BUN SERPL-MCNC: 17 MG/DL (ref 8–23)
BUN/CREAT SERPL: 17.5 (ref 7–25)
CALCIUM SPEC-SCNC: 9.7 MG/DL (ref 8.6–10.5)
CHLORIDE SERPL-SCNC: 106 MMOL/L (ref 98–107)
CO2 SERPL-SCNC: 24 MMOL/L (ref 22–29)
CREAT SERPL-MCNC: 0.97 MG/DL (ref 0.57–1)
GFR SERPL CREATININE-BSD FRML MDRD: 57 ML/MIN/1.73
GLUCOSE SERPL-MCNC: 79 MG/DL (ref 65–99)
MAGNESIUM SERPL-MCNC: 2 MG/DL (ref 1.6–2.4)
POTASSIUM SERPL-SCNC: 4.3 MMOL/L (ref 3.5–5.2)
SODIUM SERPL-SCNC: 142 MMOL/L (ref 136–145)

## 2022-02-24 PROCEDURE — 80048 BASIC METABOLIC PNL TOTAL CA: CPT | Performed by: INTERNAL MEDICINE

## 2022-02-24 PROCEDURE — 83735 ASSAY OF MAGNESIUM: CPT | Performed by: INTERNAL MEDICINE

## 2022-02-24 PROCEDURE — 93005 ELECTROCARDIOGRAM TRACING: CPT | Performed by: INTERNAL MEDICINE

## 2022-02-24 RX ORDER — DOFETILIDE 0.25 MG/1
250 CAPSULE ORAL EVERY 12 HOURS SCHEDULED
Status: DISCONTINUED | OUTPATIENT
Start: 2022-02-24 | End: 2022-02-25 | Stop reason: HOSPADM

## 2022-02-24 RX ORDER — DOFETILIDE 0.25 MG/1
250 CAPSULE ORAL EVERY 12 HOURS SCHEDULED
Status: DISCONTINUED | OUTPATIENT
Start: 2022-02-24 | End: 2022-02-24

## 2022-02-24 RX ADMIN — LEVOTHYROXINE SODIUM 100 MCG: 100 TABLET ORAL at 05:00

## 2022-02-24 RX ADMIN — APIXABAN 5 MG: 5 TABLET, FILM COATED ORAL at 09:17

## 2022-02-24 RX ADMIN — DOFETILIDE 250 MCG: 0.25 CAPSULE ORAL at 11:55

## 2022-02-24 RX ADMIN — ATORVASTATIN CALCIUM 40 MG: 40 TABLET, FILM COATED ORAL at 21:05

## 2022-02-24 RX ADMIN — METOPROLOL TARTRATE 25 MG: 25 TABLET, FILM COATED ORAL at 21:05

## 2022-02-24 RX ADMIN — TRAZODONE HYDROCHLORIDE 50 MG: 50 TABLET ORAL at 21:05

## 2022-02-24 RX ADMIN — APIXABAN 5 MG: 5 TABLET, FILM COATED ORAL at 21:05

## 2022-02-24 RX ADMIN — METOPROLOL TARTRATE 25 MG: 25 TABLET, FILM COATED ORAL at 09:18

## 2022-02-24 RX ADMIN — PANTOPRAZOLE SODIUM 40 MG: 40 TABLET, DELAYED RELEASE ORAL at 09:17

## 2022-02-25 VITALS
DIASTOLIC BLOOD PRESSURE: 74 MMHG | TEMPERATURE: 98.5 F | HEART RATE: 63 BPM | OXYGEN SATURATION: 98 % | RESPIRATION RATE: 16 BRPM | SYSTOLIC BLOOD PRESSURE: 119 MMHG

## 2022-02-25 LAB
ANION GAP SERPL CALCULATED.3IONS-SCNC: 11 MMOL/L (ref 5–15)
BUN SERPL-MCNC: 17 MG/DL (ref 8–23)
BUN/CREAT SERPL: 18.5 (ref 7–25)
CALCIUM SPEC-SCNC: 9.5 MG/DL (ref 8.6–10.5)
CHLORIDE SERPL-SCNC: 107 MMOL/L (ref 98–107)
CO2 SERPL-SCNC: 24 MMOL/L (ref 22–29)
CREAT SERPL-MCNC: 0.92 MG/DL (ref 0.57–1)
GFR SERPL CREATININE-BSD FRML MDRD: 61 ML/MIN/1.73
GLUCOSE SERPL-MCNC: 89 MG/DL (ref 65–99)
MAGNESIUM SERPL-MCNC: 1.6 MG/DL (ref 1.6–2.4)
POTASSIUM SERPL-SCNC: 4.5 MMOL/L (ref 3.5–5.2)
QT INTERVAL: 482 MS
QT INTERVAL: 512 MS
QT INTERVAL: 564 MS
QTC INTERVAL: 512 MS
QTC INTERVAL: 536 MS
QTC INTERVAL: 572 MS
SODIUM SERPL-SCNC: 142 MMOL/L (ref 136–145)

## 2022-02-25 PROCEDURE — 93005 ELECTROCARDIOGRAM TRACING: CPT | Performed by: INTERNAL MEDICINE

## 2022-02-25 PROCEDURE — 80048 BASIC METABOLIC PNL TOTAL CA: CPT | Performed by: INTERNAL MEDICINE

## 2022-02-25 PROCEDURE — 83735 ASSAY OF MAGNESIUM: CPT | Performed by: INTERNAL MEDICINE

## 2022-02-25 RX ORDER — PANTOPRAZOLE SODIUM 40 MG/1
40 TABLET, DELAYED RELEASE ORAL DAILY
Qty: 30 TABLET | Refills: 6 | Status: SHIPPED | OUTPATIENT
Start: 2022-02-26

## 2022-02-25 RX ORDER — DOFETILIDE 0.25 MG/1
250 CAPSULE ORAL EVERY 12 HOURS SCHEDULED
Qty: 60 CAPSULE | Refills: 6 | Status: SHIPPED | OUTPATIENT
Start: 2022-02-26

## 2022-02-25 RX ADMIN — Medication 400 MG: at 13:21

## 2022-02-25 RX ADMIN — APIXABAN 5 MG: 5 TABLET, FILM COATED ORAL at 08:34

## 2022-02-25 RX ADMIN — LEVOTHYROXINE SODIUM 100 MCG: 100 TABLET ORAL at 05:10

## 2022-02-25 RX ADMIN — PANTOPRAZOLE SODIUM 40 MG: 40 TABLET, DELAYED RELEASE ORAL at 08:34

## 2022-02-25 RX ADMIN — METOPROLOL TARTRATE 25 MG: 25 TABLET, FILM COATED ORAL at 08:34

## 2022-02-25 RX ADMIN — DOFETILIDE 250 MCG: 0.25 CAPSULE ORAL at 00:00

## 2022-02-25 RX ADMIN — DOFETILIDE 250 MCG: 0.25 CAPSULE ORAL at 12:17

## 2022-02-28 LAB
QT INTERVAL: 460 MS
QTC INTERVAL: 517 MS

## 2022-03-02 ENCOUNTER — OFFICE VISIT (OUTPATIENT)
Dept: OBSTETRICS AND GYNECOLOGY | Facility: CLINIC | Age: 67
End: 2022-03-02

## 2022-03-02 VITALS
HEIGHT: 70 IN | WEIGHT: 161.2 LBS | DIASTOLIC BLOOD PRESSURE: 80 MMHG | SYSTOLIC BLOOD PRESSURE: 110 MMHG | BODY MASS INDEX: 23.08 KG/M2

## 2022-03-02 DIAGNOSIS — Z01.419 PAP TEST, AS PART OF ROUTINE GYNECOLOGICAL EXAMINATION: ICD-10-CM

## 2022-03-02 DIAGNOSIS — N95.0 POST-MENOPAUSAL BLEEDING: Primary | ICD-10-CM

## 2022-03-02 DIAGNOSIS — N94.89 ADNEXAL MASS: ICD-10-CM

## 2022-03-02 PROCEDURE — 99214 OFFICE O/P EST MOD 30 MIN: CPT | Performed by: NURSE PRACTITIONER

## 2022-03-02 NOTE — PROGRESS NOTES
Chief Complaint   Patient presents with   • NEW PATIENT     Follow-up with from the ER, last visit in [place]             HPI  Halie Perez is a 66 y.o. female, , who presents with post menopausal bleeding.  PT was seen in [facility] ER, they consulted Dr. Fernandes for post menopausal bleeding. Dr. Fernandes said it was ok to start her back on her Eliquis, since she was not saturating a pad Q1hr.   Patient states she was on vacation and started light vaginal spotting on 22 and had to change a panty liner approximately 2 times a day. She is on Eloquis for A fib and called , her cardiologist and he advised her to stop her Eloquis. She was off of it for 2 days and had a fib again and had to have a cardioversion performed. She restarted Eloquis again after that. She continues to have very light spotting daily since 22. She denies any pelvic pain.      No LMP recorded (lmp unknown). Patient is postmenopausal..    Patient reports that she is not currently experiencing any symptoms of urinary incontinence.    She states she has experienced this problem since 2022 while on Vacation.  She describes the severity as mild. She continues to have daily spotting.  The patient reports additional symptoms as vaginal dryness and not sexually active.  The patient has been evaluated: with TVUS, performed 2022, result Showed bilateral adnexal masses, lobulated with internal echoes, septations, and border excrescences present. .    Additional OB/GYN History   Last Pap : - Normal in [place]  Last Completed Pap Smear     This patient has no relevant Health Maintenance data.        History of abnormal Pap smear: no  Tobacco Usage?: No     Pt states she has breast exams with her PCP.   Last MAMMOGRAM:  at [place] per pt.  Last Colonoscopy:  per pt  Last DEXA: 2012, Osteopenia in [place]    The additional following portions of the patient's history were reviewed and updated as  "appropriate: allergies, current medications, past family history, past medical history, past social history, past surgical history and problem list.    Review of Systems   Constitutional: Negative.    Cardiovascular: Negative.    Gastrointestinal: Negative.    Genitourinary: Positive for vaginal bleeding ( spotting).     All other systems reviewed and are negative.     I have reviewed and agree with the HPI, ROS, and historical information as entered above. Chayito Holt, APRN    Objective   /80   Ht 177.8 cm (70\")   Wt 73.1 kg (161 lb 3.2 oz)   LMP  (LMP Unknown)   Breastfeeding No   BMI 23.13 kg/m²     Physical Exam  Vitals and nursing note reviewed. Exam conducted with a chaperone present.   Constitutional:       Appearance: Normal appearance. She is normal weight.   Cardiovascular:      Rate and Rhythm: Normal rate and regular rhythm.   Abdominal:      Palpations: Abdomen is soft.      Tenderness: There is no abdominal tenderness.   Genitourinary:     General: Normal vulva.      Labia:         Right: No rash, tenderness or lesion.         Left: No rash, tenderness or lesion.       Urethra: No prolapse, urethral pain or urethral swelling.      Vagina: Normal.      Cervix: No cervical motion tenderness, discharge, friability, lesion, erythema or cervical bleeding.      Uterus: Normal. Not deviated, not enlarged, not fixed, not tender and no uterine prolapse.       Adnexa:         Right: Mass present. No tenderness.          Left: Mass present. No tenderness.        Comments: See Ultrasound report  Neurological:      Mental Status: She is alert.            Assessment and Plan    Problem List Items Addressed This Visit     None      Visit Diagnoses     Post-menopausal bleeding    -  Primary    Relevant Orders    US Non-ob Transvaginal (Completed)    Pap IG, Rfx HPV ASCU    Pap test, as part of routine gynecological examination        Relevant Orders    Pap IG, Rfx HPV ASCU    Adnexal mass        Relevant " Orders    CT Abdomen Pelvis With & Without Contrast          1. Post menopausal bleeding  2. Adnexal mass  3. GYN ultrasound    Plan: Ultrasound findings reviewed with .(on call MD). Reviewed ultrasound report with patient. Plan per : CT scan ordered of abdomen and pelvis, with and without contrast. Pt. Will see  next week for follow up lab work ( and CEA) and possible EMB. Pap smear performed today.     ARMANDO Simon  03/02/2022

## 2022-03-04 ENCOUNTER — HOSPITAL ENCOUNTER (OUTPATIENT)
Dept: GENERAL RADIOLOGY | Facility: HOSPITAL | Age: 67
Discharge: HOME | End: 2022-03-04

## 2022-03-04 ENCOUNTER — LAB (OUTPATIENT)
Dept: LAB | Facility: HOSPITAL | Age: 67
End: 2022-03-04

## 2022-03-04 ENCOUNTER — HOSPITAL ENCOUNTER (OUTPATIENT)
Dept: CT IMAGING | Facility: HOSPITAL | Age: 67
Discharge: HOME | End: 2022-03-04

## 2022-03-04 DIAGNOSIS — D47.2 MONOCLONAL GAMMOPATHY: Chronic | ICD-10-CM

## 2022-03-04 DIAGNOSIS — N94.89 ADNEXAL MASS: ICD-10-CM

## 2022-03-04 DIAGNOSIS — D47.2 MONOCLONAL GAMMOPATHY: ICD-10-CM

## 2022-03-04 LAB
ALBUMIN SERPL-MCNC: 4.8 G/DL (ref 3.5–5.2)
ALBUMIN/GLOB SERPL: 1.4 G/DL
ALP SERPL-CCNC: 120 U/L (ref 39–117)
ALT SERPL W P-5'-P-CCNC: 23 U/L (ref 1–33)
ANION GAP SERPL CALCULATED.3IONS-SCNC: 17 MMOL/L (ref 5–15)
AST SERPL-CCNC: 44 U/L (ref 1–32)
B2 MICROGLOB SERPL-MCNC: 2.9 MG/L (ref 0.8–2.2)
BILIRUB SERPL-MCNC: 1.1 MG/DL (ref 0–1.2)
BUN SERPL-MCNC: 14 MG/DL (ref 8–23)
BUN/CREAT SERPL: 15.2 (ref 7–25)
CALCIUM SPEC-SCNC: 9.9 MG/DL (ref 8.6–10.5)
CHLORIDE SERPL-SCNC: 96 MMOL/L (ref 98–107)
CO2 SERPL-SCNC: 23 MMOL/L (ref 22–29)
CREAT SERPL-MCNC: 0.92 MG/DL (ref 0.57–1)
CRP SERPL-MCNC: <0.3 MG/DL (ref 0–0.5)
EGFRCR SERPLBLD CKD-EPI 2021: 68.4 ML/MIN/1.73
ERYTHROCYTE [DISTWIDTH] IN BLOOD BY AUTOMATED COUNT: 13.5 % (ref 12.3–15.4)
ERYTHROCYTE [SEDIMENTATION RATE] IN BLOOD: 17 MM/HR (ref 0–30)
GLOBULIN UR ELPH-MCNC: 3.4 GM/DL
GLUCOSE SERPL-MCNC: 81 MG/DL (ref 65–99)
HCT VFR BLD AUTO: 51.9 % (ref 34–46.6)
HGB BLD-MCNC: 17.2 G/DL (ref 12–15.9)
LYMPHOCYTES # BLD AUTO: 2.5 10*3/MM3 (ref 0.7–3.1)
LYMPHOCYTES NFR BLD AUTO: 39 % (ref 19.6–45.3)
MCH RBC QN AUTO: 32.1 PG (ref 26.6–33)
MCHC RBC AUTO-ENTMCNC: 33.1 G/DL (ref 31.5–35.7)
MCV RBC AUTO: 97 FL (ref 79–97)
MONOCYTES # BLD AUTO: 0.4 10*3/MM3 (ref 0.1–0.9)
MONOCYTES NFR BLD AUTO: 6.1 % (ref 5–12)
NEUTROPHILS NFR BLD AUTO: 3.5 10*3/MM3 (ref 1.7–7)
NEUTROPHILS NFR BLD AUTO: 54.9 % (ref 42.7–76)
PLATELET # BLD AUTO: 263 10*3/MM3 (ref 140–450)
PMV BLD AUTO: 7.9 FL (ref 6–12)
POTASSIUM SERPL-SCNC: 4.1 MMOL/L (ref 3.5–5.2)
PROT SERPL-MCNC: 8.2 G/DL (ref 6–8.5)
RBC # BLD AUTO: 5.36 10*6/MM3 (ref 3.77–5.28)
SODIUM SERPL-SCNC: 136 MMOL/L (ref 136–145)
WBC NRBC COR # BLD: 6.3 10*3/MM3 (ref 3.4–10.8)

## 2022-03-04 PROCEDURE — 86334 IMMUNOFIX E-PHORESIS SERUM: CPT

## 2022-03-04 PROCEDURE — 82785 ASSAY OF IGE: CPT

## 2022-03-04 PROCEDURE — 36415 COLL VENOUS BLD VENIPUNCTURE: CPT

## 2022-03-04 PROCEDURE — 77075 RADEX OSSEOUS SURVEY COMPL: CPT

## 2022-03-04 PROCEDURE — 84165 PROTEIN E-PHORESIS SERUM: CPT

## 2022-03-04 PROCEDURE — 80053 COMPREHEN METABOLIC PANEL: CPT

## 2022-03-04 PROCEDURE — 82784 ASSAY IGA/IGD/IGG/IGM EACH: CPT

## 2022-03-04 PROCEDURE — 85025 COMPLETE CBC W/AUTO DIFF WBC: CPT

## 2022-03-04 PROCEDURE — 83521 IG LIGHT CHAINS FREE EACH: CPT

## 2022-03-04 PROCEDURE — 86140 C-REACTIVE PROTEIN: CPT

## 2022-03-04 PROCEDURE — 85652 RBC SED RATE AUTOMATED: CPT

## 2022-03-04 PROCEDURE — 74178 CT ABD&PLV WO CNTR FLWD CNTR: CPT

## 2022-03-04 PROCEDURE — 82232 ASSAY OF BETA-2 PROTEIN: CPT

## 2022-03-04 PROCEDURE — 25010000002 IOPAMIDOL 61 % SOLUTION: Performed by: NURSE PRACTITIONER

## 2022-03-04 RX ADMIN — IOPAMIDOL 85 ML: 612 INJECTION, SOLUTION INTRAVENOUS at 13:43

## 2022-03-07 ENCOUNTER — OFFICE VISIT (OUTPATIENT)
Dept: OBSTETRICS AND GYNECOLOGY | Facility: CLINIC | Age: 67
End: 2022-03-07

## 2022-03-07 VITALS
BODY MASS INDEX: 22.99 KG/M2 | WEIGHT: 160.6 LBS | DIASTOLIC BLOOD PRESSURE: 84 MMHG | SYSTOLIC BLOOD PRESSURE: 116 MMHG | HEIGHT: 70 IN

## 2022-03-07 DIAGNOSIS — N95.0 PMB (POSTMENOPAUSAL BLEEDING): ICD-10-CM

## 2022-03-07 DIAGNOSIS — N94.89 ADNEXAL MASS: Primary | ICD-10-CM

## 2022-03-07 DIAGNOSIS — C57.00 MALIGNANT NEOPLASM OF FALLOPIAN TUBE, UNSPECIFIED LATERALITY: ICD-10-CM

## 2022-03-07 DIAGNOSIS — C56.9 MALIGNANT NEOPLASM OF OVARY, UNSPECIFIED LATERALITY: ICD-10-CM

## 2022-03-07 DIAGNOSIS — N70.11 HYDROSALPINX: ICD-10-CM

## 2022-03-07 DIAGNOSIS — I48.91 ATRIAL FIBRILLATION WITH RVR: ICD-10-CM

## 2022-03-07 LAB
ALBUMIN SERPL ELPH-MCNC: 4.3 G/DL (ref 2.9–4.4)
ALBUMIN/GLOB SERPL: 1.1 {RATIO} (ref 0.7–1.7)
ALPHA1 GLOB SERPL ELPH-MCNC: 0.3 G/DL (ref 0–0.4)
ALPHA2 GLOB SERPL ELPH-MCNC: 0.8 G/DL (ref 0.4–1)
B-GLOBULIN SERPL ELPH-MCNC: 1.4 G/DL (ref 0.7–1.3)
GAMMA GLOB SERPL ELPH-MCNC: 1.6 G/DL (ref 0.4–1.8)
GLOBULIN SER-MCNC: 4.1 G/DL (ref 2.2–3.9)
IGA SERPL-MCNC: 156 MG/DL (ref 87–352)
IGG SERPL-MCNC: 1313 MG/DL (ref 586–1602)
IGM SERPL-MCNC: 56 MG/DL (ref 26–217)
INTERPRETATION SERPL IEP-IMP: ABNORMAL
KAPPA LC FREE SER-MCNC: 14.4 MG/L (ref 3.3–19.4)
KAPPA LC FREE/LAMBDA FREE SER: 0.17 {RATIO} (ref 0.26–1.65)
LABORATORY COMMENT REPORT: ABNORMAL
LAMBDA LC FREE SERPL-MCNC: 85.2 MG/L (ref 5.7–26.3)
M PROTEIN SERPL ELPH-MCNC: 0.8 G/DL
PROT SERPL-MCNC: 8.4 G/DL (ref 6–8.5)

## 2022-03-07 PROCEDURE — 58100 BIOPSY OF UTERUS LINING: CPT | Performed by: OBSTETRICS & GYNECOLOGY

## 2022-03-07 PROCEDURE — 99214 OFFICE O/P EST MOD 30 MIN: CPT | Performed by: OBSTETRICS & GYNECOLOGY

## 2022-03-07 NOTE — ASSESSMENT & PLAN NOTE
Postmenopausal bleeding may be due to Eliquis if endometrial biopsy benign.  Options reviewed including surgery with D&C hysteroscopy if bleeding persists.

## 2022-03-07 NOTE — PROGRESS NOTES
Chief Complaint   Patient presents with   • Follow-up     Post-menopausal bleeding       Subjective   HPI  Halie Perez is a 66 y.o. female, , who presents for post menopausal bleeding X 1 month.    She states she has experienced this problem for 1 month.  She describes the severity as mild.  She states that the problem is annoying.  The patient reports additional symptoms as none.      Her last LMP was No LMP recorded (lmp unknown). Patient is postmenopausal..  Periods are rare, lasting 0 days.  Dysmenorrhea:mild.  Patient reports problems with: none.  Partner Status: Marital Status: .  New Partners since last visit: no.  Desires STD Screening: no.    Additional OB/GYN History   Current contraception: contraceptive methods: None  Desires to: continue contraception  Last Pap : 3/2/22  Last Completed Pap Smear     This patient has no relevant Health Maintenance data.        History of abnormal Pap smear: no  Last mammogram: 10/21  Last Completed Mammogram     This patient has no relevant Health Maintenance data.        Tobacco Usage?: No   OB History        0    Para   0    Term   0            AB        Living   0       SAB        IAB        Ectopic        Molar        Multiple        Live Births   0          Obstetric Comments   One adopted child             Health Maintenance   Topic Date Due   • DXA SCAN  Never done   • TDAP/TD VACCINES (1 - Tdap) Never done   • ANNUAL WELLNESS VISIT  Never done   • PAP SMEAR  2017   • Pneumococcal Vaccine 65+ (1 of 1 - PPSV23) Never done   • MAMMOGRAM  2020   • COLORECTAL CANCER SCREENING  2027   • HEPATITIS C SCREENING  Completed   • COVID-19 Vaccine  Completed   • INFLUENZA VACCINE  Completed   • ZOSTER VACCINE  Completed       The additional following portions of the patient's history were reviewed and updated as appropriate: allergies, current medications, past family history, past medical history, past social history, past  "surgical history and problem list.    Review of Systems   Constitutional: Negative for chills, fever and unexpected weight gain.   Respiratory: Negative.    Cardiovascular: Negative.    Gastrointestinal: Negative for abdominal distention and abdominal pain.   Genitourinary: Positive for vaginal bleeding. Negative for pelvic pain and pelvic pressure.   Psychiatric/Behavioral: Negative for dysphoric mood and depressed mood.       I have reviewed and agree with the HPI, ROS, and historical information as entered above. Derian Abdalla MD    Objective   /84   Ht 177.8 cm (70\")   Wt 72.8 kg (160 lb 9.6 oz)   LMP  (LMP Unknown)   BMI 23.04 kg/m²     Physical Exam  Vitals and nursing note reviewed. Exam conducted with a chaperone present.   Constitutional:       Appearance: Normal appearance. She is normal weight.   HENT:      Head: Normocephalic and atraumatic.   Pulmonary:      Effort: Pulmonary effort is normal.   Abdominal:      General: Abdomen is flat. There is no distension.      Palpations: Abdomen is soft.      Tenderness: There is no abdominal tenderness.   Genitourinary:     General: Normal vulva.      Exam position: Lithotomy position.      Labia:         Right: No rash, tenderness or lesion.         Left: No rash, tenderness or lesion.       Urethra: No urethral pain, urethral swelling or urethral lesion.      Vagina: Bleeding present. No tenderness or lesions.      Cervix: Cervical bleeding present. No cervical motion tenderness or lesion.      Uterus: Normal. Not enlarged, not fixed and not tender.       Adnexa:         Right: No mass, tenderness or fullness.          Left: No mass, tenderness or fullness.        Rectum: No external hemorrhoid.      Comments: Chaperone Present; cervical stenosis noted.  Uterine dark red bleeding noted.  Uterus mid position; adnexa masses not palpable.    Neurological:      Mental Status: She is alert.         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  Elongated cystic adnexal masses likely hydrosalpinx.  Ovaries not visible.           Adnexal mass - Primary    Overview      Right adnexal mass measurements: 77.4 x 31.4 x 26.0 mm; lobulated with internal echoes, septations, and border excrescences present.    Left adnexal mass measurements: 69.3 x 34.6 x 33.9 mm; lobulated with internal echoes, septations, and large distal excrescences with Doppler flow present.             Current Assessment & Plan     CA-125 drawn 3/7/2022.    Surgical options reviewed including bilateral salpingectomies with or without removal of ovaries, and hysterectomy.  Surgery will likely be done by GYN oncology due to ultrasound findings suspicious for malignancy.           Relevant Orders    CEA        PMB (postmenopausal bleeding)    Overview     Postmenopausal bleeding starting in early February 2022 while on Eliquis.  Patient discontinued Eliquis from [id number] without improvement of bleeding.  Started Eliquis due to recurrence of atrial fibrillation.  Patient had unsuccessful cardioversion February 14 with subsequent conversion to sinus rhythm on Tikosyn.    Ultrasound 3/2/2022 with endometrial fluid and endometrial thickness approximately 4.46 mm.    Endometrial biopsy performed 7/20/2022 with bloody fluid obtained.           Current Assessment & Plan     Postmenopausal bleeding may be due to Eliquis if endometrial biopsy benign.  Options reviewed including surgery with D&C hysteroscopy if bleeding persists.             Relevant Orders    Tissue Pathology Exam      Other Visit Diagnoses     Malignant neoplasm of ovary, unspecified laterality (HCC)         Relevant Orders    CEA    Malignant neoplasm of fallopian tube, unspecified  laterality (HCC)         Relevant Orders              1.  Bilateral adnexal masses; probable hydrosalpinx with history of tubal infertility and several tubal surgeries.  Excrescences and septations present with large intraluminal excrescence and left distal tube.  2.  Postmenopausal bleeding.  Started on Eliquis.  Endometrial biopsy performed today with bloody fluid obtained.  3.  History of atrial fibrillation.  Currently on Eliquis.  Consider stopping Eliquis since normal sinus rhythm on Tikosyn.    Plan     No follow-ups on file.  1. Check CA-125 and CEA.  2. Options reviewed including surgical options.  Will likely need referral to GYN oncology due to ultrasound findings concerning for malignancy.  3. Consider hysteroscopy D&C if postmenopausal bleeding persists.      Derian Abdalla MD  03/07/2022

## 2022-03-07 NOTE — ASSESSMENT & PLAN NOTE
CA-125 drawn 3/7/2022.    Surgical options reviewed including bilateral salpingectomies with or without removal of ovaries, and hysterectomy.  Surgery will likely be done by GYN oncology due to ultrasound findings suspicious for malignancy.

## 2022-03-08 DIAGNOSIS — N94.89 ADNEXAL MASS: Primary | ICD-10-CM

## 2022-03-08 LAB
CANCER AG125 SERPL-ACNC: 38.4 U/ML (ref 0–38.1)
CEA SERPL-MCNC: 1.9 NG/ML
IGA SERPL-MCNC: 160 MG/DL (ref 87–352)
IGE SERPL-ACNC: 69 IU/ML (ref 6–495)
IGG SERPL-MCNC: 1317 MG/DL (ref 586–1602)
IGM SERPL-MCNC: 55 MG/DL (ref 26–217)

## 2022-03-09 ENCOUNTER — OFFICE VISIT (OUTPATIENT)
Dept: GYNECOLOGIC ONCOLOGY | Facility: CLINIC | Age: 67
End: 2022-03-09

## 2022-03-09 VITALS
OXYGEN SATURATION: 99 % | DIASTOLIC BLOOD PRESSURE: 87 MMHG | SYSTOLIC BLOOD PRESSURE: 159 MMHG | HEART RATE: 69 BPM | BODY MASS INDEX: 22.81 KG/M2 | TEMPERATURE: 97.8 F | WEIGHT: 159 LBS | RESPIRATION RATE: 16 BRPM

## 2022-03-09 DIAGNOSIS — N95.0 PMB (POSTMENOPAUSAL BLEEDING): Primary | ICD-10-CM

## 2022-03-09 DIAGNOSIS — N83.8 OVARIAN MASS, RIGHT: ICD-10-CM

## 2022-03-09 DIAGNOSIS — R97.1 ELEVATED CA-125: ICD-10-CM

## 2022-03-09 PROCEDURE — 99205 OFFICE O/P NEW HI 60 MIN: CPT | Performed by: OBSTETRICS & GYNECOLOGY

## 2022-03-09 NOTE — PROGRESS NOTES
[facility]  [id number]  1955      Reason for visit:  Bilateral adnexal masses, and postmenopausal bleeding      Consultation:  Patient is being seen at the request of Dr. Abdalla      History of present illness:  The patient is a 66 y.o. year old female who presents today for treatment and evaluation of the above issues. Patient reports that she has had postmenopausal bleeding for 1 month and was evaluated by Dr. Abdalla. He performed an US which demonstrated thickened endometrial stripe as well as bilateral adnexal masses. She underwent an endometrial biopsy on 3/7/22 with final pathology pending. Course further complicated by patient taking anticoagulation (eliquis) due to atrial fibrillation. Patient was referred to us today due to concern for possible malignancy.     Today, patient denies symptoms of change in bowel and bladder movement, bloating, weight loss, anorexia.  Her only symptom is vaginal bleeding.  She notes that it is persistent and really just spotting.  She has questions about her evaluation thus far including differential diagnosis.    For new patients, [facility] intake form from 3/9/2022 was reviewed and confirmed.    OBGYN History:  She is a .  She does not use HRT. She does not have a history of abnormal pap smears. Last pap smear 3/2022. Last mammogram 10/2021.       Oncologic History:  Oncology/Hematology History Overview Note   1.)  Monoclonal gammopathy    a) 2017 24-hour urine immunoelectrophoresis showed no monoclonal spike, lambda light chains 106 with kappa light chains 24 and a kappa to lambda ratio of 0.23 with normal IgA, G, and M levels.  2017 serum immunoelectrophoresis showed 600 mg/dL of immunoglobulin G lambda light chains in the serum.  3/8/17 bone marrow biopsy showed normal marrow with 3% plasma cells and a small lambda clonal population identified with no stainable iron.  There was erythroid expansion with mild maturation dyssynchrony and occasional  atypical nuclear features that may represent the compensatory response to her anemia though myelodysplasia could not entirely be ruled out.  Had reactive aggregates of lymphoid tissue.     b) 6/13/2017 sedimentation rate 8, immunoglobulin free light chains free kappa light chains 25.7, free lambda light chains 173.4, kappa/lambda ratio 0.15.  Serum immunoelectrophoresis M-spike 0.9g/dL, C-reactive protein less than 0.01, CMP normal, ionized calcium 1.31, beta-2 microglobulin 3.1, CBC WBC 5500, hemoglobin 14.2, hematocrit 44.6%, MCV 94.8, platelets 259,000.  24-hour urine immunoelectrophoresis pending.  C.)  -6/17/2019 data: Lambda 129 with kappa 22 and ratio 0.17.  This is in the range that it has fluctuated since January 2017 upon first testing.  M spike stable 600 mg/dL immunoglobulin G lambda.  C-reactive protein 0.04 with sedimentation rate of 8.  Creatinine 0.86 with total calcium of 10 and ionized calcium 1.34 upper limit of normal 1.32.  Beta-2 microglobulin 2.8.  CBC unremarkable.  -4/13/2021 data: Beta-2 microglobulin 2.5 stable.  Hemoglobin 15.4 with normal CBC.  CMP unremarkable including creatinine 0.94, calcium 9.0, total protein 7.5, normal alkaline phosphatase 102.  Serum M spike 800 mg/dL IgG lambda stable with normal quantitative immunoglobulins.  Immunoglobulin free lambda light chains 131 mg/L with kappa 18.7 and ratio 0.14 stable x4 years.  Sedimentation rate 7.  C-reactive protein less than 0.3.  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  2.)  History of anemia, resolved after hemorrhoidectomy repair and on oral iron.  CBC on 6/13/2017 with a hemoglobin of 14.2, hematocrit 44.6%, MCV of 94.8.         Monoclonal gammopathy    Initial Diagnosis    Monoclonal gammopathy     11/29/2017 Imaging    Bone survey IMPRESSION:  Negative skeletal survey.     6/5/2018 -  Other Event    6/5/2018 labs:  CBC WBC 4350, hemoglobin 12.6, hematocrit 38.3%, platelet count 252,000.  CMP  creatinine 0.8, serum calcium 9.4.  Ionized calcium 1.29.  Beta-2 microglobulin 2.3.  Sedimentation rate 9, CRP 0.02.  Kappa/lambda light chains-normal kappa light chains 16.6, lambda light chains elevated at 133.6, kappa/lambda ratio 0.12.  Serum immunoelectrophoresis M-spike stable 0.7 g/dL.       12/20/2018 -  Other Event    24-hour urine monoclonal protein showed no monoclonal protein.  Beta-2 microglobulin 2.5.  AST 43.  Creatinine 0.86.  Calcium 9.5.  Ionized calcium 1.3.  C-reactive protein less than 0.01.  Sedimentation rate 7.  Serum monoclonal protein 800 mg/dL of immunoglobulin G lambda.  Lambda monoclonal protein down to 98.5 with a kappa to lambda ratio 0.18.  CBC is normal.  Total body bone survey done on the day of her visit 12/20/18 was not read by the time of her visit.     6/17/2019 -  Other Event     Lambda 129 with kappa 22 and ratio 0.17.  This is in the range that it has fluctuated since January 2017 upon first testing.  M spike stable 600 mg/dL immunoglobulin G lambda.  C-reactive protein 0.04 with sedimentation rate of 8.  Creatinine 0.86 with total calcium of 10 and ionized calcium 1.34 upper limit of normal 1.32.  Beta-2 microglobulin 2.8.  CBC unremarkable.     4/6/2020 -  Other Event    Myeloma panel:  CBC WBC 5400, hemoglobin 15.2, platelet count 225,000.  Beta-2 microglobulin 2.6.  Ionized calcium 1.26.  CMP creatinine 0.77, serum calcium 10.1 C-reactive protein 0.08, sedimentation rate 13.  Serum immunoelectrophoresis M spike stable 0.6 g/dL.  Serum free light chains, normal free kappa light chains 16.3, free lambda light chains 107.7, kappa/lambda ratio 0.15.  Kappa/lambda light chains Urine normal.     4/13/2021 -  Other Event    -4/13/2021 data: Beta-2 microglobulin 2.5 stable.  Hemoglobin 15.4 with normal CBC.  CMP unremarkable including creatinine 0.94, calcium 9.0, total protein 7.5, normal alkaline phosphatase 102.  Serum M spike 800 mg/dL IgG lambda stable with normal  quantitative immunoglobulins.  Immunoglobulin free lambda light chains 131 mg/L with kappa 18.7 and ratio 0.14 stable x4 years.  Sedimentation rate 7.  C-reactive protein less than 0.3.  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           Past Medical History:   Diagnosis Date   • Atrial fibrillation (HCC)    • Corneal abrasion    • Hemorrhoids     bleeding hemorrhoids   • History of anemia    • Hypertension    • Hypothyroidism    • Osteoarthritis        Past Surgical History:   Procedure Laterality Date   • AUGMENTATION MAMMAPLASTY Bilateral 2008   • BONE MARROW BIOPSY Left 3/8/2017    Procedure: BONE MARROW BIOPSY;  Surgeon: Michel White MD;  Location:  [facility];  Service:    • COLONOSCOPY  01/09/2017   • COLONOSCOPY N/A 1/9/2017    Procedure: COLONOSCOPY;  Surgeon: Michel White MD;  Location:  [facility];  Service:    • ENDOSCOPY N/A 1/7/2017    Procedure: ESOPHAGOGASTRODUODENOSCOPY;  Surgeon: Abhishek Benton MD;  Location:  [facility];  Service:    • HEMORRHOIDECTOMY N/A 3/8/2017    Procedure: HEMORRHOIDECTOMY,;  Surgeon: Michel White MD;  Location:  [facility];  Service:    • HEMORROIDECTOMY  01/2018   • HIP ARTHROSCOPY Right     2005 & 2012   • JOINT REPLACEMENT      2 right hips   • TONSILLECTOMY  1964       MEDICATIONS: The current medication list was reviewed with the patient and updated in the EMR this date per the Medical Assistant. Medication dosages and frequencies were confirmed to be accurate.      Allergies:  is allergic to sulfa antibiotics.    Social History:   Social History     Socioeconomic History   • Marital status:    Tobacco Use   • Smoking status: Former Smoker     Packs/day: 1.00     Years: 20.00     Pack years: 20.00     Types: Cigarettes   • Smokeless tobacco: Never Used   • Tobacco comment: quit 35+ years ago.    Vaping Use   • Vaping Use: Never used   Substance and Sexual Activity   • Alcohol use: Yes     Alcohol/week: 2.0 standard  drinks     Types: 2 Shots of liquor per week     Comment: 2 drinks a day   • Drug use: No   • Sexual activity: Not Currently     Partners: Male       Family History:    Family History   Problem Relation Age of Onset   • Atrial fibrillation Mother    • Diabetes Mother    • Atrial fibrillation Brother    • Abnormal EKG Brother    • Heart attack Father    • Stroke Father    • Diabetes Sister    • Breast cancer Neg Hx    • Ovarian cancer Neg Hx    • Uterine cancer Neg Hx    • Colon cancer Neg Hx        Health Maintenance:    Health Maintenance   Topic Date Due   • DXA SCAN  Never done   • TDAP/TD VACCINES (1 - Tdap) Never done   • ANNUAL WELLNESS VISIT  Never done   • PAP SMEAR  06/28/2017   • Pneumococcal Vaccine 65+ (1 of 1 - PPSV23) Never done   • MAMMOGRAM  08/14/2020   • COLORECTAL CANCER SCREENING  01/09/2027   • HEPATITIS C SCREENING  Completed   • COVID-19 Vaccine  Completed   • INFLUENZA VACCINE  Completed   • ZOSTER VACCINE  Completed         Review of Systems  Negative except as mentioned in HPI  Physical Exam    Vitals:    03/09/22 1509   BP: 159/87   Pulse: 69   Resp: 16   Temp: 97.8 °F (36.6 °C)   TempSrc: Temporal   SpO2: 99%   Weight: 72.1 kg (159 lb)   PainSc: 0-No pain       Body mass index is 22.81 kg/m².    Wt Readings from Last 3 Encounters:   03/09/22 72.1 kg (159 lb)   03/07/22 72.8 kg (160 lb 9.6 oz)   03/02/22 73.1 kg (161 lb 3.2 oz)     GENERAL: Alert, well-appearing female appearing her stated age who is in no apparent distress.   HEENT: Sclera anicteric. Head normocephalic, atraumatic. Mucus membranes moist.   NECK: Trachea midline, supple, without masses.  No thyromegaly.   BREASTS: Deferred  CARDIOVASCULAR: Irregularly irregular, no murmurs, rubs, or gallops.  No peripheral edema.  RESPIRATORY: Clear to auscultation bilaterally, normal respiratory effort  BACK:  No CVA tenderness, no vertebral tenderness on palpation  GASTROINTESTINAL:  Abdomen is soft, non-tender, non-distended, no rebound  or guarding, or hernias. No HSM.  Vague fullness pelvis extending 1/2 way to umbilicus.  SKIN:  Warm, dry, well-perfused.  All visible areas intact.  No rashes, lesions, ulcers.  PSYCHIATRIC: AO x3, with appropriate affect, normal thought processes.  NEUROLOGIC: No focal deficits.  Moves extremities well.  MUSCULOSKELETAL: Normal gait and station.   EXTREMITIES:   No cyanosis, clubbing, symmetric.  LYMPHATICS:  No cervical or inguinal adenopathy noted.     PELVIC exam:    External genitalia are free from lesion. On speculum examination, the cervix was free from lesion.  Small blood at vaginal vault.  On bimanual examination, vague fullness was appreciated.  Uterus was normal in size and shape. There is no cervical motion or uterine tenderness. No cervical mass was palpated. Parametria were smooth. Rectovaginal exam was deferred.     ECOG PS 0    PROCEDURES: None    Diagnostic Data:      CT Abdomen Pelvis With & Without Contrast    Result Date: 3/4/2022  1.Beam hardening artifact from the patient's right total hip arthroplasty makes evaluation of the right presacral/adnexal mass difficult. There is a tubular masslike structure measuring up to 9 x 6 cm with a left posterior (presacral) mural nodular enhancing component as suggested on ultrasound. This could be related to a multilocular cystic right ovarian mass or be related to the right uterine tube. Differential favors malignant process in this age group. 2.Incidental 6 mm pulmonary nodule seen in the right lower lobe along the right hemidiaphragm. This is of uncertain clinical significance. Electronically Signed: Richie Mclean MD 3/4/2022 18:11 EST    XR Bone Survey Complete    Result Date: 3/4/2022  Negative whole body bone survey without evidence of lytic lesion or endosteal scalloping to suggest myelomatous disease.  This report was finalized on 3/4/2022 1:59 PM by Mckinley Rodriguez MD.      CT Chest With Contrast Diagnostic    Result Date: 3/10/2022   1.  No  change in 6 mm noncalcified pulmonary nodule within the right lower lobe.  Follow-up noncontrast CT scan of the chest would be recommended in 6-12 months to document continued stability.  This report was finalized on 3/10/2022 2:22 PM by Cristobal Mckeon MD.      XR Chest 1 View    Result Date: 2/11/2022  1.  Cardiomegaly.  This report was finalized on 2/11/2022 9:37 AM by Edi Sage MD.          Lab Results   Component Value Date    WBC 6.30 03/04/2022    HGB 17.2 (H) 03/04/2022    HCT 51.9 (H) 03/04/2022    MCV 97.0 03/04/2022     03/04/2022    NEUTROABS 3.50 03/04/2022    GLUCOSE 81 03/04/2022    BUN 14 03/04/2022    CREATININE 0.92 03/04/2022    EGFRIFNONA 61 02/25/2022     03/04/2022    K 4.1 03/04/2022    CL 96 (L) 03/04/2022    CO2 23.0 03/04/2022    MG 1.6 02/25/2022    PHOS 4.4 04/04/2018    CALCIUM 9.9 03/04/2022    ALBUMIN 4.80 03/04/2022    ALBUMIN 4.3 03/04/2022    AST 44 (H) 03/04/2022    ALT 23 03/04/2022    BILITOT 1.1 03/04/2022     Lab Results   Component Value Date     38.4 (H) 03/07/2022     Pathology:  Pending      Assessment/Plan   This is a 66 y.o. woman with postmenopausal bleeding, endometrial biopsy pending, hydrosalpinx on the right with complexity and solid portions, small nodule of lung on CT imaging  Encounter Diagnoses   Name Primary?   • PMB (postmenopausal bleeding) Yes   • Ovarian mass, right    • Elevated CA-125       I personally reviewed the CT imaging with the patient and her  as well as the ultrasound imaging.  Solid portion and what up otherwise appears to be a hydrosalpinx on the right was identified.   Given the clinical scenario, I have concerns the patient could have a endometrial cancer with metastasis.  Adnexal involvement of endometrial cancer would be stage III clinically.  However, endometrial biopsy results are pending still.  I showed the patient and her  the solid areas at the cystic mass.  We discussed incarcerated fimbria versus  borderline tumor versus malignancy as a differential diagnosis.  Patient was advised that her mildly elevated CA-125 is completely consistent with the radiologic findings and therefore not overly helpful.  In the EMR, it appears that someone has ordered a CT scan of chest with contrast.  This had yet to be scheduled, but my office was able to get it scheduled for 3/10/2022 with the above results.  Follow-up of lung lesion recommended, no other concerning findings.  Results from endometrial biopsy had been requested.  Final read is pending per phone call to pathology department.  Clinical scenario is concerning for malignancy, particularly a high-grade endometrial cancer of some sort with adnexal involvement.  If this was the case, this would be a stage III cancer.  Patient will require surgical intervention.  I think a diagnostic laparoscopy to determine feasibility of laparoscopic versus open approach would be appropriate the time of surgery.  However, she will require perioperative risk assessment from cardiology due to her history of atrial fibrillation and rapid ventricular response.  She is at some increased risk for bleeding complication due to her chronic Eliquis use as well.  Perioperative recommendations regarding management of anticoagulation would also be appreciated.  Typically, for laparoscopic procedure would have the patient hold anticoagulation for 48 hours prior to procedure and try to reinitiate it 1 to 2 days postoperatively depending on events at the time of surgery.    Patient was consented for examination under anesthesia, diagnostic laparoscopy, robotic assisted total laparoscopic hysterectomy, bilateral salpingo-oophorectomy, possible cancer staging versus exploratory laparotomy, total abdominal hysterectomy, bilateral salpingo-oophorectomy and possible cancer staging.      Risks and benefits of surgery were discussed.  This included, but was not limited to, infection and bleeding like when  the skin is cut; damage to surrounding structures; and incisional complications.  Risk of DVT was addressed for major surgeries.  Standard of care efforts to minimize these risks were reviewed.  Typical hospital stay and recovery were discussed as well as post-procedure precautions.  Surgical implications of chronic illnesses on recovery and surgical outcome were reviewed.     Pain medication regimen for postoperative care was discussed.  Typical regimen and avoidance of narcotics was discussed.  Patient was educated that other factors, such as existing narcotic use, can impact postoperative pain management.      If this is a malignancy involving the adnexa, removal of nonbulky lymph nodes may not be of clinical benefit.    Patient verbalized understanding of the plan including the risks and benefits.  Appropriate perioperative testing including laboratory evaluation, EKG as clinically indicated, chest x-ray as clinically indicated, and preadmission evaluation were all ordered as a part of this patient's care.    Pain assessment was performed today as a part of patient’s care.  For patients with pain related to surgery, gynecologic malignancy or cancer treatment, the plan is as noted in the assessment/plan.  For patients with pain not related to these issues, they are to seek any further needed care from a more appropriate provider, such as PCP.      No orders of the defined types were placed in this encounter.      FOLLOW UP: Discussion of pathology, surgical planning    Electronically Signed by: Christine Fam MD  Date: 3/9/2022

## 2022-03-10 ENCOUNTER — HOSPITAL ENCOUNTER (OUTPATIENT)
Dept: CT IMAGING | Facility: HOSPITAL | Age: 67
Discharge: HOME | End: 2022-03-10
Admitting: OBSTETRICS & GYNECOLOGY

## 2022-03-10 ENCOUNTER — PATIENT ROUNDING (BHMG ONLY) (OUTPATIENT)
Dept: GYNECOLOGIC ONCOLOGY | Facility: CLINIC | Age: 67
End: 2022-03-10

## 2022-03-10 DIAGNOSIS — N83.8 OVARIAN MASS, RIGHT: ICD-10-CM

## 2022-03-10 DIAGNOSIS — R97.1 ELEVATED CA-125: ICD-10-CM

## 2022-03-10 DIAGNOSIS — N95.0 PMB (POSTMENOPAUSAL BLEEDING): ICD-10-CM

## 2022-03-10 PROBLEM — R87.612 LGSIL ON PAP SMEAR OF CERVIX: Status: ACTIVE | Noted: 2022-03-10

## 2022-03-10 LAB — CREAT BLDA-MCNC: 0.8 MG/DL (ref 0.6–1.3)

## 2022-03-10 PROCEDURE — 82565 ASSAY OF CREATININE: CPT

## 2022-03-10 PROCEDURE — 25010000002 IOPAMIDOL 61 % SOLUTION: Performed by: OBSTETRICS & GYNECOLOGY

## 2022-03-10 PROCEDURE — 71260 CT THORAX DX C+: CPT

## 2022-03-10 RX ADMIN — IOPAMIDOL 80 ML: 612 INJECTION, SOLUTION INTRAVENOUS at 13:03

## 2022-03-11 ENCOUNTER — TELEPHONE (OUTPATIENT)
Dept: GYNECOLOGIC ONCOLOGY | Facility: CLINIC | Age: 67
End: 2022-03-11

## 2022-03-11 NOTE — TELEPHONE ENCOUNTER
RN called pt and let her know that we needed cardiac clearance from Dr. Novak before surgery could be scheduled.  Pt stated that he was supposed to call Dr. Fam to speak with her about that because she had been recently seen.  RN stated we would follow up with her on Monday and once we received the clearance then we could get the surgery scheduled.  Pt v/u.

## 2022-03-11 NOTE — TELEPHONE ENCOUNTER
Caller: Halie Perez    Relationship: Self    Best call back number: [phone]    What is the best time to reach you: ANYTIME     Who are you requesting to speak with (clinical staff, provider,  specific staff member): [facility] SCHEDULER    Do you know the name of the person who called: HALIE    What was the call regarding:   CALLING TO GET SCHEDULED FOR SURGERY WITH DR WARD HAS NOT HEARD TO BE SCHEDULED YET    Do you require a callback:YES

## 2022-03-14 ENCOUNTER — TELEPHONE (OUTPATIENT)
Dept: GYNECOLOGIC ONCOLOGY | Facility: CLINIC | Age: 67
End: 2022-03-14

## 2022-03-14 ENCOUNTER — PATIENT EDUCATION (SURGERY INSTRUCTIONS) (OUTPATIENT)
Dept: GYNECOLOGIC ONCOLOGY | Facility: CLINIC | Age: 67
End: 2022-03-14

## 2022-03-14 DIAGNOSIS — R97.1 ELEVATED CA-125: ICD-10-CM

## 2022-03-14 DIAGNOSIS — N95.0 PMB (POSTMENOPAUSAL BLEEDING): ICD-10-CM

## 2022-03-14 DIAGNOSIS — Z79.01 ANTICOAGULATED: ICD-10-CM

## 2022-03-14 DIAGNOSIS — N83.8 OVARIAN MASS, RIGHT: Primary | ICD-10-CM

## 2022-03-14 RX ORDER — ACETAMINOPHEN 325 MG/1
650 TABLET ORAL ONCE
Status: CANCELLED | OUTPATIENT
Start: 2022-03-14 | End: 2022-03-14

## 2022-03-14 RX ORDER — HEPARIN SODIUM 5000 [USP'U]/ML
5000 INJECTION, SOLUTION INTRAVENOUS; SUBCUTANEOUS ONCE
Status: CANCELLED | OUTPATIENT
Start: 2022-03-14 | End: 2022-03-14

## 2022-03-14 NOTE — TELEPHONE ENCOUNTER
Called OR scheduling to ask for additional robot time sooner than 03/31/2022. Was told there is no time available. Moved some non urgent cases around to make room for urgent cases.

## 2022-03-14 NOTE — PATIENT INSTRUCTIONS
"              Laparoscopic Surgery Instructions            Halie Perez  [id number]  1955      SURGEON:  Christine Fam MD    Surgery Coordinator: Adrianne   If you have any questions before or after surgery please call.  [phone]        Appointment        2. You have pre-admission testing (PAT) appointment on   03/29/2022      at        09:15 am.    You will need to be at hospital registration 10 minutes before that time. The registration department is located in the long hallway between the [facility] and [address] [place] [address]. This is on the first floor of the [facility] hospital you can enter through the [address] [place] [address].      3.  Your surgery has been scheduled on      03/31/2022  You will need to be at the [address] [place] [address] second floor surgery registration on that day at 10:30 am     The Day(s) Before Surgery      Nothing by mouth after midnight on 03/30/2022     Plan to have someone take you home from the hospital.    Do not use any products that contain nicotine or tobacco, such as cigarettes and e-cigarettes. These can delay healing after surgery. If you need help quitting, ask your health care provider.     Do not take vitamins or aspirin one week before surgery ( if applicable). Do not take Ibuprofen or NSAIDs 5 days prior to Surgery. Do not take ACE or ARB medications for blood pressure the morning of surgery. If you are taking insulin, take 1/2 dose insulin the night prior to surgery.   Bring them with you to the hospital (Diabetic patient should bring insulin if instructed by the managing physician). If you are taking a blood thinner ( Eliquis, Coumadin, Xarelto, Lovenox, Asprin, Heparin, etc.) please have the provider that manages this instruct you on when to stop taking prior to surgery.     Continue antidepressants, Beta Blockers \"olol\", anti-seizure medication, GERD medication (heartburn), Opioids and Parkinson's medication.     Suboxone and Phentermine needs to stopped 7 days prior to surgery.      If you are " feeling sick, have a fever or cough and have seen your PCP let our office know 48 hours prior to surgery. It may be subject to rescheduling.         The Day of Surgery:    Do not chew gum or tobacco, smoke, or eat mints or hard candy. Shower and wash your hair. You may brush your teeth but  do not swallow water. Use any wipes that Pre-admission testing has given you.     Please arrive for surgery as instructed by the pre-op nurse, often one to two hours before your surgery.     Remove all jewelry, including rings and piercings. Do not bring valuables to the hospital.     Wear loose-fitting clothing.     Avoid wearing eye makeup or contact lenses     Please remember to bring:  ? Photo ID and medical insurance card  ? Advanced directives, living will or power of  (if applicable)  ? Current list of all medications, including over-the- counter and herbal supplements  ? List of allergies  ? CPAP device if you have sleep apnea  ? Any assistive devices or equipment needed after surgery  ? Books/magazines to pass the time     When you arrive, check in at the surgery registration desk on the second floor of the [address] [place] [address].      Once you are called to go to your pre-op room, no one will be allowed in the pre op room.     Please note no one under age 12 is permitted to stay in the waiting area without supervision.    We make every effort to begin surgery at your scheduled start time but delays do occur. We will keep you and your family  updated about any delays.    While You are In the Pre-Op Room:   The nurse will review your health history and will place an IV (into the vein) in your hand or arm for fluids and  medicines.   An anesthesia provider will talk with you about anesthesia and pain control during and after surgery.   A member of the surgical team can answer your questions.             What can I expect after the procedure?    After Surgery and/or Discharge:    After surgery you will be moved to the  recovery area. Recovery and discharge times will vary depending on the procedure.    The recovery room nurse will provide your family/visitors with updates. Visitors are not permitted in the immediate postoperative recovery area, but your visitors will be notified when they can come to see you after surgery.    If you will not be admitted to the hospital, your nurse will assess your readiness to go home. This includes your:     Ability to walk, use crutches, etc.   Ability to urinate adequate amounts   Nausea and pain control    Before discharge, you will receive written instructions about how to care for yourself at home along with any prescriptions  for medications.     For your safety, you will need a responsible adult age 18 or older to accompany you home.     Please have a ride arranged and available when you are ready to leave. You cannot leave alone and it is recommended someone stay with you for the first 24 hours after anesthesia.       After the procedure, it is common to have:    Pain.  Soreness and numbness in your incision areas.  Vaginal bleeding and discharge up to 6 weeks after surgery.  Constipation.  Temporary change in bladder function.  Feelings of sadness or other emotions.  Small amounts of clear drainage from incisions  If you are discharged with an abdominal binder, this is to be used as needed for incisional comfort. You may choose to discontinue use at any time.           Follow these instructions at home:  Medicines    Please take any medications that have been prescribed after your surgery, you may take over the counter Tylenol and Ibuprofen, unless told otherwise by your provider.   Please take your stool softener as prescribed. If you do not have a bowel movement within 24 hours following surgery try a laxative (milk of magnesia) or you may take Diana-Lax.    Activity    Return to your normal activities as told by your health care provider.   You may be told to take short walks every day  and go farther each time.  Do not lift anything that is heavier than 20 lbs.      General instructions    Do not put anything in your vagina for 6 weeks after your surgery or as told by your health care provider. This includes tampons and douches.  Do not have sex until your health care provider says you can.  Do not take baths, swim, or use a hot tub until your health care provider approves.  Drink enough fluid to keep your urine clear or pale yellow.  Do not drive for 24 hours if you were given a sedative.  Do not drive while taking Narcotic Pain medication ( oxycodone, hydrocodone, etc.).   Keep all follow-up visits as told by your health care provider. This is important. You will have a post op appointment typically 3 weeks after surgery.  Make sure you are urinating on a scheduled basis, for example every 2 hours. This will help retrain your bladder after surgery and prevent urinary tract infections.     Contact a health care provider if:    Your pain medicine is not helping.  You have a fever over 101.0  You have redness, swelling, or pain at your incision site.  You continue to have difficulty urinating.  You have not had a bowel movement 2-3 days after surgery, experience nausea and vomiting, are unable to pass gas.   Large volumes of drainage or blood from incisions, requiring multiple guaze changes.     Get help right away if:    You have severe abdominal or back pain that is not controlled with medication.  You have heavy bleeding from your vagina that saturates a maxi pad within 1-2 hours. Note- vaginal bleeding and spotting is normal up to 6 weeks from a hysterectomy, Heavy Bleeding is not.     If you experience a medical emergency call 911 or have someone drive you to your nearest emergency department.         Parking Information:    Free parking is available for patients and guests. There is  parking at the [address] in front of the hospital. Parking is also available in the [facility] and  [address].             Financial Assistance:    If you have any questions or need assistance, contact your [facility] financial counseling office from 8:30 a.m.-4:30  p.m. Monday through Friday. Closed weekends.     [place]: [phone] or, or visit at [address] [facility], near the entrance.        Patient Payments and Correspondence    Customer service representatives are available to assist you from 8:00 a.m. to 6:00 p.m. Eastern Standard Time by calling [phone] Monday through Friday. You can also contact us through [employer].    [facility]  [address]  [place] [address] [phone]

## 2022-03-15 ENCOUNTER — TELEPHONE (OUTPATIENT)
Dept: GYNECOLOGIC ONCOLOGY | Facility: CLINIC | Age: 67
End: 2022-03-15

## 2022-03-15 ENCOUNTER — PRE-ADMISSION TESTING (OUTPATIENT)
Dept: PREADMISSION TESTING | Facility: HOSPITAL | Age: 67
End: 2022-03-15

## 2022-03-15 ENCOUNTER — TELEPHONE (OUTPATIENT)
Dept: ONCOLOGY | Facility: CLINIC | Age: 67
End: 2022-03-15

## 2022-03-15 VITALS — BODY MASS INDEX: 22.99 KG/M2 | WEIGHT: 160.6 LBS | HEIGHT: 70 IN

## 2022-03-15 DIAGNOSIS — N95.0 PMB (POSTMENOPAUSAL BLEEDING): ICD-10-CM

## 2022-03-15 DIAGNOSIS — N95.0 POST-MENOPAUSAL BLEEDING: ICD-10-CM

## 2022-03-15 DIAGNOSIS — R97.1 ELEVATED CA-125: ICD-10-CM

## 2022-03-15 DIAGNOSIS — N83.8 OVARIAN MASS, RIGHT: ICD-10-CM

## 2022-03-15 DIAGNOSIS — Z01.419 PAP TEST, AS PART OF ROUTINE GYNECOLOGICAL EXAMINATION: ICD-10-CM

## 2022-03-15 LAB
ABO GROUP BLD: NORMAL
ALBUMIN SERPL-MCNC: 5.3 G/DL (ref 3.5–5.2)
ALBUMIN/GLOB SERPL: 2.1 G/DL
ALP SERPL-CCNC: 111 U/L (ref 39–117)
ALT SERPL W P-5'-P-CCNC: 23 U/L (ref 1–33)
ANION GAP SERPL CALCULATED.3IONS-SCNC: 10 MMOL/L (ref 5–15)
AST SERPL-CCNC: 34 U/L (ref 1–32)
BASOPHILS # BLD AUTO: 0.03 10*3/MM3 (ref 0–0.2)
BASOPHILS NFR BLD AUTO: 0.5 % (ref 0–1.5)
BILIRUB SERPL-MCNC: 1 MG/DL (ref 0–1.2)
BLD GP AB SCN SERPL QL: NEGATIVE
BUN SERPL-MCNC: 16 MG/DL (ref 8–23)
BUN/CREAT SERPL: 18.6 (ref 7–25)
CALCIUM SPEC-SCNC: 10.1 MG/DL (ref 8.6–10.5)
CHLORIDE SERPL-SCNC: 100 MMOL/L (ref 98–107)
CO2 SERPL-SCNC: 28 MMOL/L (ref 22–29)
CREAT SERPL-MCNC: 0.86 MG/DL (ref 0.57–1)
DEPRECATED RDW RBC AUTO: 43.8 FL (ref 37–54)
EGFRCR SERPLBLD CKD-EPI 2021: 74.2 ML/MIN/1.73
EOSINOPHIL # BLD AUTO: 0.08 10*3/MM3 (ref 0–0.4)
EOSINOPHIL NFR BLD AUTO: 1.3 % (ref 0.3–6.2)
ERYTHROCYTE [DISTWIDTH] IN BLOOD BY AUTOMATED COUNT: 12.3 % (ref 12.3–15.4)
GLOBULIN UR ELPH-MCNC: 2.5 GM/DL
GLUCOSE SERPL-MCNC: 115 MG/DL (ref 65–99)
HBA1C MFR BLD: 5.8 % (ref 4.8–5.6)
HCT VFR BLD AUTO: 48.8 % (ref 34–46.6)
HGB BLD-MCNC: 16.3 G/DL (ref 12–15.9)
IMM GRANULOCYTES # BLD AUTO: 0.02 10*3/MM3 (ref 0–0.05)
IMM GRANULOCYTES NFR BLD AUTO: 0.3 % (ref 0–0.5)
INR PPP: 1.38 (ref 0.85–1.16)
LYMPHOCYTES # BLD AUTO: 1.71 10*3/MM3 (ref 0.7–3.1)
LYMPHOCYTES NFR BLD AUTO: 27.6 % (ref 19.6–45.3)
MCH RBC QN AUTO: 32.3 PG (ref 26.6–33)
MCHC RBC AUTO-ENTMCNC: 33.4 G/DL (ref 31.5–35.7)
MCV RBC AUTO: 96.6 FL (ref 79–97)
MONOCYTES # BLD AUTO: 0.49 10*3/MM3 (ref 0.1–0.9)
MONOCYTES NFR BLD AUTO: 7.9 % (ref 5–12)
NEUTROPHILS NFR BLD AUTO: 3.86 10*3/MM3 (ref 1.7–7)
NEUTROPHILS NFR BLD AUTO: 62.4 % (ref 42.7–76)
NRBC BLD AUTO-RTO: 0 /100 WBC (ref 0–0.2)
PLATELET # BLD AUTO: 184 10*3/MM3 (ref 140–450)
PMV BLD AUTO: 10.3 FL (ref 6–12)
POTASSIUM SERPL-SCNC: 4.2 MMOL/L (ref 3.5–5.2)
PROT SERPL-MCNC: 7.8 G/DL (ref 6–8.5)
PROTHROMBIN TIME: 16.5 SECONDS (ref 11.4–14.4)
RBC # BLD AUTO: 5.05 10*6/MM3 (ref 3.77–5.28)
RH BLD: POSITIVE
SARS-COV-2 RNA PNL SPEC NAA+PROBE: NOT DETECTED
SODIUM SERPL-SCNC: 138 MMOL/L (ref 136–145)
T&S EXPIRATION DATE: NORMAL
WBC NRBC COR # BLD: 6.19 10*3/MM3 (ref 3.4–10.8)

## 2022-03-15 PROCEDURE — 86850 RBC ANTIBODY SCREEN: CPT

## 2022-03-15 PROCEDURE — 36415 COLL VENOUS BLD VENIPUNCTURE: CPT

## 2022-03-15 PROCEDURE — C9803 HOPD COVID-19 SPEC COLLECT: HCPCS

## 2022-03-15 PROCEDURE — 85025 COMPLETE CBC W/AUTO DIFF WBC: CPT

## 2022-03-15 PROCEDURE — 86900 BLOOD TYPING SEROLOGIC ABO: CPT

## 2022-03-15 PROCEDURE — 85610 PROTHROMBIN TIME: CPT

## 2022-03-15 PROCEDURE — 86901 BLOOD TYPING SEROLOGIC RH(D): CPT

## 2022-03-15 PROCEDURE — 80053 COMPREHEN METABOLIC PANEL: CPT

## 2022-03-15 PROCEDURE — U0004 COV-19 TEST NON-CDC HGH THRU: HCPCS

## 2022-03-15 PROCEDURE — 83036 HEMOGLOBIN GLYCOSYLATED A1C: CPT

## 2022-03-15 RX ORDER — HYDROXYZINE 50 MG/1
50 TABLET, FILM COATED ORAL NIGHTLY PRN
COMMUNITY
Start: 2022-02-19

## 2022-03-15 NOTE — TELEPHONE ENCOUNTER
PATIENT STOPPED BY THE OFFICE TO DROP OFF FMLA FORMS TO BE FILLED OUT BY DR. CARRION STAFF. PATIENT WOULD LIKE FOR THE FORMS TO BE SIGNED AND MAILED. PPWK LEFT IN DR. CARRION MEDICAL ASSISTANT BOX. PLEASE ADVISE.

## 2022-03-15 NOTE — TELEPHONE ENCOUNTER
I called patient. She does not take NSAIDS and will stay away from them.     I will place order for INR as instructed preop.

## 2022-03-15 NOTE — TELEPHONE ENCOUNTER
----- Message from Christine Fam MD sent at 3/15/2022  2:40 PM EDT -----  Notify patient of mildly increased INR - I like closer to 1.2 for surgery. Repeat stat INR day of surgery - do not need to wait for results before she goes to OR. Pls advise no NSAIDs since I don't want any further risk of bleeding. MARIO why her INR is abnormal.      ----- Message -----  From: Lab, Background User  Sent: 3/15/2022   1:01 PM EDT  To: Christine Fam MD

## 2022-03-16 ENCOUNTER — ANESTHESIA EVENT (OUTPATIENT)
Dept: PERIOP | Facility: HOSPITAL | Age: 67
End: 2022-03-16

## 2022-03-16 NOTE — TELEPHONE ENCOUNTER
Completed, attached MD RA signed/dated.    Gave to Kimberly to scan.    Mailed to pt's address on file as requested.

## 2022-03-17 ENCOUNTER — ANESTHESIA (OUTPATIENT)
Dept: PERIOP | Facility: HOSPITAL | Age: 67
End: 2022-03-17

## 2022-03-17 ENCOUNTER — HOSPITAL ENCOUNTER (INPATIENT)
Facility: HOSPITAL | Age: 67
LOS: 1 days | Discharge: HOME | End: 2022-03-17
Attending: OBSTETRICS & GYNECOLOGY | Admitting: OBSTETRICS & GYNECOLOGY

## 2022-03-17 VITALS
SYSTOLIC BLOOD PRESSURE: 141 MMHG | BODY MASS INDEX: 22.9 KG/M2 | OXYGEN SATURATION: 96 % | HEIGHT: 70 IN | DIASTOLIC BLOOD PRESSURE: 92 MMHG | TEMPERATURE: 97.8 F | WEIGHT: 160 LBS | RESPIRATION RATE: 20 BRPM | HEART RATE: 86 BPM

## 2022-03-17 DIAGNOSIS — I48.91 ATRIAL FIBRILLATION WITH RVR: ICD-10-CM

## 2022-03-17 DIAGNOSIS — R97.1 ELEVATED CA-125: ICD-10-CM

## 2022-03-17 DIAGNOSIS — N83.8 OVARIAN MASS, RIGHT: ICD-10-CM

## 2022-03-17 DIAGNOSIS — N95.0 PMB (POSTMENOPAUSAL BLEEDING): ICD-10-CM

## 2022-03-17 DIAGNOSIS — C57.01 ADENOCARCINOMA OF RIGHT FALLOPIAN TUBE: Primary | ICD-10-CM

## 2022-03-17 LAB
QT INTERVAL: 386 MS
QTC INTERVAL: 515 MS

## 2022-03-17 PROCEDURE — C1889 IMPLANT/INSERT DEVICE, NOC: HCPCS | Performed by: OBSTETRICS & GYNECOLOGY

## 2022-03-17 PROCEDURE — 0DTJ4ZZ RESECTION OF APPENDIX, PERCUTANEOUS ENDOSCOPIC APPROACH: ICD-10-PCS | Performed by: OBSTETRICS & GYNECOLOGY

## 2022-03-17 PROCEDURE — 0UT24ZZ RESECTION OF BILATERAL OVARIES, PERCUTANEOUS ENDOSCOPIC APPROACH: ICD-10-PCS | Performed by: OBSTETRICS & GYNECOLOGY

## 2022-03-17 PROCEDURE — 25010000002 FENTANYL CITRATE (PF) 50 MCG/ML SOLUTION: Performed by: NURSE ANESTHETIST, CERTIFIED REGISTERED

## 2022-03-17 PROCEDURE — 25010000002 MIDAZOLAM PER 1 MG: Performed by: ANESTHESIOLOGY

## 2022-03-17 PROCEDURE — 0DBU4ZZ EXCISION OF OMENTUM, PERCUTANEOUS ENDOSCOPIC APPROACH: ICD-10-PCS | Performed by: OBSTETRICS & GYNECOLOGY

## 2022-03-17 PROCEDURE — 88304 TISSUE EXAM BY PATHOLOGIST: CPT | Performed by: OBSTETRICS & GYNECOLOGY

## 2022-03-17 PROCEDURE — 88305 TISSUE EXAM BY PATHOLOGIST: CPT | Performed by: OBSTETRICS & GYNECOLOGY

## 2022-03-17 PROCEDURE — 88112 CYTOPATH CELL ENHANCE TECH: CPT | Performed by: OBSTETRICS & GYNECOLOGY

## 2022-03-17 PROCEDURE — 25010000002 KETOROLAC TROMETHAMINE PER 15 MG

## 2022-03-17 PROCEDURE — 25010000002 DEXAMETHASONE PER 1 MG: Performed by: NURSE ANESTHETIST, CERTIFIED REGISTERED

## 2022-03-17 PROCEDURE — 0UT74ZZ RESECTION OF BILATERAL FALLOPIAN TUBES, PERCUTANEOUS ENDOSCOPIC APPROACH: ICD-10-PCS | Performed by: OBSTETRICS & GYNECOLOGY

## 2022-03-17 PROCEDURE — 25010000002 NEOSTIGMINE 10 MG/10ML SOLUTION: Performed by: NURSE ANESTHETIST, CERTIFIED REGISTERED

## 2022-03-17 PROCEDURE — 0UT94ZZ RESECTION OF UTERUS, PERCUTANEOUS ENDOSCOPIC APPROACH: ICD-10-PCS | Performed by: OBSTETRICS & GYNECOLOGY

## 2022-03-17 PROCEDURE — 58575 LAPS TOT HYST RESJ MAL: CPT | Performed by: OBSTETRICS & GYNECOLOGY

## 2022-03-17 PROCEDURE — 25010000002 PROPOFOL 10 MG/ML EMULSION: Performed by: NURSE ANESTHETIST, CERTIFIED REGISTERED

## 2022-03-17 PROCEDURE — 0DBW4ZX EXCISION OF PERITONEUM, PERCUTANEOUS ENDOSCOPIC APPROACH, DIAGNOSTIC: ICD-10-PCS | Performed by: OBSTETRICS & GYNECOLOGY

## 2022-03-17 PROCEDURE — 93010 ELECTROCARDIOGRAM REPORT: CPT | Performed by: STUDENT IN AN ORGANIZED HEALTH CARE EDUCATION/TRAINING PROGRAM

## 2022-03-17 PROCEDURE — 88331 PATH CONSLTJ SURG 1 BLK 1SPC: CPT | Performed by: PATHOLOGY

## 2022-03-17 PROCEDURE — 58575 LAPS TOT HYST RESJ MAL: CPT | Performed by: PHYSICIAN ASSISTANT

## 2022-03-17 PROCEDURE — 93005 ELECTROCARDIOGRAM TRACING: CPT | Performed by: ANESTHESIOLOGY

## 2022-03-17 PROCEDURE — 25010000002 ONDANSETRON PER 1 MG: Performed by: NURSE ANESTHETIST, CERTIFIED REGISTERED

## 2022-03-17 PROCEDURE — 88309 TISSUE EXAM BY PATHOLOGIST: CPT | Performed by: OBSTETRICS & GYNECOLOGY

## 2022-03-17 PROCEDURE — 8E0W4CZ ROBOTIC ASSISTED PROCEDURE OF TRUNK REGION, PERCUTANEOUS ENDOSCOPIC APPROACH: ICD-10-PCS | Performed by: OBSTETRICS & GYNECOLOGY

## 2022-03-17 PROCEDURE — 25010000002 HYDROMORPHONE 1 MG/ML SOLUTION

## 2022-03-17 PROCEDURE — 0TN64ZZ RELEASE RIGHT URETER, PERCUTANEOUS ENDOSCOPIC APPROACH: ICD-10-PCS | Performed by: OBSTETRICS & GYNECOLOGY

## 2022-03-17 PROCEDURE — 25010000002 HEPARIN (PORCINE) PER 1000 UNITS: Performed by: OBSTETRICS & GYNECOLOGY

## 2022-03-17 PROCEDURE — G0378 HOSPITAL OBSERVATION PER HR: HCPCS

## 2022-03-17 PROCEDURE — 25010000002 CEFAZOLIN IN DEXTROSE 2-4 GM/100ML-% SOLUTION: Performed by: OBSTETRICS & GYNECOLOGY

## 2022-03-17 DEVICE — THE ECHELON, ECHELON ENDOPATH™ AND ECHELON FLEX™ FAMILIES OF ENDOSCOPIC LINEAR CUTTERS AND RELOADS ARE STERILE, SINGLE PATIENT USE INSTRUMENTS THAT SIMULTANEOUSLY CUT AND STAPLE TISSUE. THERE ARE SIX STAGGERED ROWS OF STAPLES, THREE ON EITHER SIDE OF THE CUT LINE. THE 45 MM INSTRUMENTS HAVE A STAPLE LINE THATIS APPROXIMATELY 45 MM LONG AND A CUT LINE THAT IS APPROXIMATELY 42 MM LONG. THE SHAFT CAN ROTATE FREELY IN BOTH DIRECTIONS AND AN ARTICULATION MECHANISM ON ARTICULATING INSTRUMENTS ENABLES BENDING THE DISTAL PORTIONOF THE SHAFT TO FACILITATE LATERAL ACCESS OF THE OPERATIVE SITE.THE INSTRUMENTS ARE SHIPPED WITHOUT A RELOAD AND MUST BE LOADED PRIOR TO USE. A STAPLE RETAINING CAP ON THE RELOAD PROTECTS THE STAPLE LEG POINTS DURING SHIPPING AND TRANSPORTATION. THE INSTRUMENTS’ LOCK-OUT FEATURE IS DESIGNED TO PREVENT A USED RELOAD FROM BEING REFIRED.
Type: IMPLANTABLE DEVICE | Site: ABDOMEN | Status: FUNCTIONAL
Brand: ECHELON ENDOPATH

## 2022-03-17 DEVICE — FLOSEAL HEMOSTATIC MATRIX, 5ML
Type: IMPLANTABLE DEVICE | Site: ABDOMEN | Status: FUNCTIONAL
Brand: FLOSEAL HEMOSTATIC MATRIX

## 2022-03-17 RX ORDER — OXYCODONE HYDROCHLORIDE 5 MG/1
5 TABLET ORAL EVERY 4 HOURS PRN
Status: DISCONTINUED | OUTPATIENT
Start: 2022-03-17 | End: 2022-03-17 | Stop reason: HOSPADM

## 2022-03-17 RX ORDER — ONDANSETRON 2 MG/ML
INJECTION INTRAMUSCULAR; INTRAVENOUS AS NEEDED
Status: DISCONTINUED | OUTPATIENT
Start: 2022-03-17 | End: 2022-03-17 | Stop reason: SURG

## 2022-03-17 RX ORDER — LIDOCAINE HYDROCHLORIDE 10 MG/ML
INJECTION, SOLUTION EPIDURAL; INFILTRATION; INTRACAUDAL; PERINEURAL AS NEEDED
Status: DISCONTINUED | OUTPATIENT
Start: 2022-03-17 | End: 2022-03-17 | Stop reason: SURG

## 2022-03-17 RX ORDER — EPHEDRINE SULFATE 50 MG/ML
INJECTION, SOLUTION INTRAVENOUS AS NEEDED
Status: DISCONTINUED | OUTPATIENT
Start: 2022-03-17 | End: 2022-03-17 | Stop reason: SURG

## 2022-03-17 RX ORDER — SODIUM CHLORIDE, SODIUM LACTATE, POTASSIUM CHLORIDE, CALCIUM CHLORIDE 600; 310; 30; 20 MG/100ML; MG/100ML; MG/100ML; MG/100ML
9 INJECTION, SOLUTION INTRAVENOUS CONTINUOUS
Status: DISCONTINUED | OUTPATIENT
Start: 2022-03-17 | End: 2022-03-17 | Stop reason: HOSPADM

## 2022-03-17 RX ORDER — DOCUSATE SODIUM 100 MG/1
200 CAPSULE, LIQUID FILLED ORAL 2 TIMES DAILY PRN
Qty: 60 CAPSULE | Refills: 2 | Status: SHIPPED | OUTPATIENT
Start: 2022-03-17 | End: 2022-06-06

## 2022-03-17 RX ORDER — PROPOFOL 10 MG/ML
VIAL (ML) INTRAVENOUS AS NEEDED
Status: DISCONTINUED | OUTPATIENT
Start: 2022-03-17 | End: 2022-03-17 | Stop reason: SURG

## 2022-03-17 RX ORDER — ONDANSETRON 4 MG/1
4 TABLET, FILM COATED ORAL EVERY 6 HOURS PRN
Qty: 30 TABLET | Refills: 0 | Status: SHIPPED | OUTPATIENT
Start: 2022-03-17 | End: 2022-04-01

## 2022-03-17 RX ORDER — KETOROLAC TROMETHAMINE 30 MG/ML
30 INJECTION, SOLUTION INTRAMUSCULAR; INTRAVENOUS ONCE
Status: COMPLETED | OUTPATIENT
Start: 2022-03-17 | End: 2022-03-17

## 2022-03-17 RX ORDER — ACETAMINOPHEN 325 MG/1
650 TABLET ORAL ONCE
Status: COMPLETED | OUTPATIENT
Start: 2022-03-17 | End: 2022-03-17

## 2022-03-17 RX ORDER — OXYCODONE HYDROCHLORIDE 5 MG/1
5 TABLET ORAL EVERY 4 HOURS PRN
Qty: 5 TABLET | Refills: 0 | Status: SHIPPED | OUTPATIENT
Start: 2022-03-17 | End: 2022-03-27

## 2022-03-17 RX ORDER — HYDROMORPHONE HYDROCHLORIDE 1 MG/ML
0.5 INJECTION, SOLUTION INTRAMUSCULAR; INTRAVENOUS; SUBCUTANEOUS
Status: DISCONTINUED | OUTPATIENT
Start: 2022-03-17 | End: 2022-03-17 | Stop reason: HOSPADM

## 2022-03-17 RX ORDER — MAGNESIUM HYDROXIDE 1200 MG/15ML
LIQUID ORAL AS NEEDED
Status: DISCONTINUED | OUTPATIENT
Start: 2022-03-17 | End: 2022-03-17 | Stop reason: HOSPADM

## 2022-03-17 RX ORDER — NEOSTIGMINE METHYLSULFATE 1 MG/ML
INJECTION, SOLUTION INTRAVENOUS AS NEEDED
Status: DISCONTINUED | OUTPATIENT
Start: 2022-03-17 | End: 2022-03-17 | Stop reason: SURG

## 2022-03-17 RX ORDER — FAMOTIDINE 20 MG/1
20 TABLET, FILM COATED ORAL ONCE
Status: COMPLETED | OUTPATIENT
Start: 2022-03-17 | End: 2022-03-17

## 2022-03-17 RX ORDER — MIDAZOLAM HYDROCHLORIDE 1 MG/ML
0.5 INJECTION INTRAMUSCULAR; INTRAVENOUS
Status: DISCONTINUED | OUTPATIENT
Start: 2022-03-17 | End: 2022-03-17 | Stop reason: HOSPADM

## 2022-03-17 RX ORDER — SODIUM CHLORIDE 9 MG/ML
INJECTION, SOLUTION INTRAVENOUS AS NEEDED
Status: DISCONTINUED | OUTPATIENT
Start: 2022-03-17 | End: 2022-03-17 | Stop reason: HOSPADM

## 2022-03-17 RX ORDER — BUPIVACAINE HYDROCHLORIDE AND EPINEPHRINE 5; 5 MG/ML; UG/ML
INJECTION, SOLUTION PERINEURAL AS NEEDED
Status: DISCONTINUED | OUTPATIENT
Start: 2022-03-17 | End: 2022-03-17 | Stop reason: HOSPADM

## 2022-03-17 RX ORDER — HEPARIN SODIUM 5000 [USP'U]/ML
5000 INJECTION, SOLUTION INTRAVENOUS; SUBCUTANEOUS ONCE
Status: COMPLETED | OUTPATIENT
Start: 2022-03-17 | End: 2022-03-17

## 2022-03-17 RX ORDER — OXYCODONE HYDROCHLORIDE 5 MG/1
5 TABLET ORAL ONCE
Status: COMPLETED | OUTPATIENT
Start: 2022-03-17 | End: 2022-03-17

## 2022-03-17 RX ORDER — SODIUM CHLORIDE 0.9 % (FLUSH) 0.9 %
10 SYRINGE (ML) INJECTION AS NEEDED
Status: DISCONTINUED | OUTPATIENT
Start: 2022-03-17 | End: 2022-03-17 | Stop reason: HOSPADM

## 2022-03-17 RX ORDER — ROCURONIUM BROMIDE 10 MG/ML
INJECTION, SOLUTION INTRAVENOUS AS NEEDED
Status: DISCONTINUED | OUTPATIENT
Start: 2022-03-17 | End: 2022-03-17 | Stop reason: SURG

## 2022-03-17 RX ORDER — FAMOTIDINE 10 MG/ML
20 INJECTION, SOLUTION INTRAVENOUS ONCE
Status: DISCONTINUED | OUTPATIENT
Start: 2022-03-17 | End: 2022-03-17 | Stop reason: HOSPADM

## 2022-03-17 RX ORDER — ACETAMINOPHEN 325 MG/1
650 TABLET ORAL EVERY 6 HOURS PRN
Qty: 60 TABLET | Refills: 0 | Status: SHIPPED | OUTPATIENT
Start: 2022-03-17 | End: 2022-04-01

## 2022-03-17 RX ORDER — FENTANYL CITRATE 50 UG/ML
INJECTION, SOLUTION INTRAMUSCULAR; INTRAVENOUS AS NEEDED
Status: DISCONTINUED | OUTPATIENT
Start: 2022-03-17 | End: 2022-03-17 | Stop reason: SURG

## 2022-03-17 RX ORDER — LIDOCAINE HYDROCHLORIDE 10 MG/ML
0.5 INJECTION, SOLUTION EPIDURAL; INFILTRATION; INTRACAUDAL; PERINEURAL ONCE AS NEEDED
Status: COMPLETED | OUTPATIENT
Start: 2022-03-17 | End: 2022-03-17

## 2022-03-17 RX ORDER — ACETAMINOPHEN 325 MG/1
650 TABLET ORAL EVERY 6 HOURS PRN
Status: DISCONTINUED | OUTPATIENT
Start: 2022-03-17 | End: 2022-03-17 | Stop reason: HOSPADM

## 2022-03-17 RX ORDER — FENTANYL CITRATE 50 UG/ML
50 INJECTION, SOLUTION INTRAMUSCULAR; INTRAVENOUS
Status: DISCONTINUED | OUTPATIENT
Start: 2022-03-17 | End: 2022-03-17 | Stop reason: HOSPADM

## 2022-03-17 RX ORDER — CEFAZOLIN SODIUM 2 G/100ML
2 INJECTION, SOLUTION INTRAVENOUS ONCE
Status: COMPLETED | OUTPATIENT
Start: 2022-03-17 | End: 2022-03-17

## 2022-03-17 RX ORDER — DEXAMETHASONE SODIUM PHOSPHATE 4 MG/ML
INJECTION, SOLUTION INTRA-ARTICULAR; INTRALESIONAL; INTRAMUSCULAR; INTRAVENOUS; SOFT TISSUE AS NEEDED
Status: DISCONTINUED | OUTPATIENT
Start: 2022-03-17 | End: 2022-03-17 | Stop reason: SURG

## 2022-03-17 RX ORDER — OXYCODONE HYDROCHLORIDE 5 MG/1
TABLET ORAL
Status: COMPLETED
Start: 2022-03-17 | End: 2022-03-17

## 2022-03-17 RX ORDER — SODIUM CHLORIDE 0.9 % (FLUSH) 0.9 %
10 SYRINGE (ML) INJECTION EVERY 12 HOURS SCHEDULED
Status: DISCONTINUED | OUTPATIENT
Start: 2022-03-17 | End: 2022-03-17 | Stop reason: HOSPADM

## 2022-03-17 RX ORDER — GLYCOPYRROLATE 0.2 MG/ML
INJECTION INTRAMUSCULAR; INTRAVENOUS AS NEEDED
Status: DISCONTINUED | OUTPATIENT
Start: 2022-03-17 | End: 2022-03-17 | Stop reason: SURG

## 2022-03-17 RX ORDER — ONDANSETRON 4 MG/1
4 TABLET, FILM COATED ORAL EVERY 6 HOURS PRN
Status: DISCONTINUED | OUTPATIENT
Start: 2022-03-17 | End: 2022-03-17 | Stop reason: HOSPADM

## 2022-03-17 RX ORDER — DOCUSATE SODIUM 100 MG/1
200 CAPSULE, LIQUID FILLED ORAL 2 TIMES DAILY PRN
Status: DISCONTINUED | OUTPATIENT
Start: 2022-03-17 | End: 2022-03-17 | Stop reason: HOSPADM

## 2022-03-17 RX ORDER — KETOROLAC TROMETHAMINE 30 MG/ML
INJECTION, SOLUTION INTRAMUSCULAR; INTRAVENOUS
Status: COMPLETED
Start: 2022-03-17 | End: 2022-03-17

## 2022-03-17 RX ADMIN — SODIUM CHLORIDE, POTASSIUM CHLORIDE, SODIUM LACTATE AND CALCIUM CHLORIDE: 600; 310; 30; 20 INJECTION, SOLUTION INTRAVENOUS at 09:09

## 2022-03-17 RX ADMIN — FENTANYL CITRATE 50 MCG: 50 INJECTION, SOLUTION INTRAMUSCULAR; INTRAVENOUS at 07:09

## 2022-03-17 RX ADMIN — PROPOFOL 200 MG: 10 INJECTION, EMULSION INTRAVENOUS at 07:09

## 2022-03-17 RX ADMIN — OXYCODONE HYDROCHLORIDE 5 MG: 5 TABLET ORAL at 11:04

## 2022-03-17 RX ADMIN — OXYCODONE 5 MG: 5 TABLET ORAL at 11:04

## 2022-03-17 RX ADMIN — FAMOTIDINE 20 MG: 20 TABLET ORAL at 06:46

## 2022-03-17 RX ADMIN — GLYCOPYRROLATE 0.4 MG: 0.2 INJECTION INTRAMUSCULAR; INTRAVENOUS at 09:36

## 2022-03-17 RX ADMIN — ROCURONIUM BROMIDE 10 MG: 10 INJECTION, SOLUTION INTRAVENOUS at 08:20

## 2022-03-17 RX ADMIN — HEPARIN SODIUM 5000 UNITS: 5000 INJECTION, SOLUTION INTRAVENOUS; SUBCUTANEOUS at 06:46

## 2022-03-17 RX ADMIN — KETOROLAC TROMETHAMINE 30 MG: 30 INJECTION, SOLUTION INTRAMUSCULAR; INTRAVENOUS at 11:02

## 2022-03-17 RX ADMIN — FENTANYL CITRATE 50 MCG: 50 INJECTION, SOLUTION INTRAMUSCULAR; INTRAVENOUS at 09:27

## 2022-03-17 RX ADMIN — HYDROMORPHONE HYDROCHLORIDE 0.5 MG: 1 INJECTION, SOLUTION INTRAMUSCULAR; INTRAVENOUS; SUBCUTANEOUS at 10:50

## 2022-03-17 RX ADMIN — EPHEDRINE SULFATE 10 MG: 50 INJECTION INTRAVENOUS at 09:02

## 2022-03-17 RX ADMIN — DEXAMETHASONE SODIUM PHOSPHATE 8 MG: 4 INJECTION, SOLUTION INTRA-ARTICULAR; INTRALESIONAL; INTRAMUSCULAR; INTRAVENOUS; SOFT TISSUE at 07:38

## 2022-03-17 RX ADMIN — LIDOCAINE HYDROCHLORIDE 50 MG: 10 INJECTION, SOLUTION EPIDURAL; INFILTRATION; INTRACAUDAL; PERINEURAL at 07:09

## 2022-03-17 RX ADMIN — NEOSTIGMINE METHYLSULFATE 4 MG: 0.5 INJECTION INTRAVENOUS at 09:36

## 2022-03-17 RX ADMIN — ROCURONIUM BROMIDE 40 MG: 10 INJECTION, SOLUTION INTRAVENOUS at 07:10

## 2022-03-17 RX ADMIN — KETOROLAC TROMETHAMINE 30 MG: 30 INJECTION, SOLUTION INTRAMUSCULAR at 11:02

## 2022-03-17 RX ADMIN — MIDAZOLAM 0.5 MG: 1 INJECTION INTRAMUSCULAR; INTRAVENOUS at 06:50

## 2022-03-17 RX ADMIN — SODIUM CHLORIDE, POTASSIUM CHLORIDE, SODIUM LACTATE AND CALCIUM CHLORIDE 9 ML/HR: 600; 310; 30; 20 INJECTION, SOLUTION INTRAVENOUS at 06:46

## 2022-03-17 RX ADMIN — HYDROMORPHONE HYDROCHLORIDE 0.5 MG: 1 INJECTION, SOLUTION INTRAMUSCULAR; INTRAVENOUS; SUBCUTANEOUS at 10:39

## 2022-03-17 RX ADMIN — ONDANSETRON 4 MG: 2 INJECTION INTRAMUSCULAR; INTRAVENOUS at 09:07

## 2022-03-17 RX ADMIN — ACETAMINOPHEN 650 MG: 325 TABLET ORAL at 06:46

## 2022-03-17 RX ADMIN — PROPOFOL 25 MCG/KG/MIN: 10 INJECTION, EMULSION INTRAVENOUS at 07:24

## 2022-03-17 RX ADMIN — LIDOCAINE HYDROCHLORIDE 0.5 ML: 10 INJECTION, SOLUTION EPIDURAL; INFILTRATION; INTRACAUDAL; PERINEURAL at 06:46

## 2022-03-17 RX ADMIN — CEFAZOLIN SODIUM 2 G: 2 INJECTION, SOLUTION INTRAVENOUS at 07:06

## 2022-03-17 NOTE — ANESTHESIA PROCEDURE NOTES
Airway  Urgency: elective    Date/Time: 3/17/2022 7:11 AM  Airway not difficult    General Information and Staff    Patient location during procedure: OR  CRNA: Bibi Hernandes CRNA    Indications and Patient Condition  Indications for airway management: airway protection    Preoxygenated: yes  MILS not maintained throughout  Mask difficulty assessment: 1 - vent by mask    Final Airway Details  Final airway type: endotracheal airway      Successful airway: ETT  Cuffed: yes   Successful intubation technique: direct laryngoscopy  Endotracheal tube insertion site: oral  Blade: Dave  Blade size: 3  ETT size (mm): 7.0  Cormack-Lehane Classification: grade I - full view of glottis  Placement verified by: chest auscultation and capnometry   Measured from: lips  ETT/EBT  to lips (cm): 20  Number of attempts at approach: 1  Assessment: lips, teeth, and gum same as pre-op and atraumatic intubation    Additional Comments  Negative epigastric sounds, Breath sound equal bilaterally with symmetric chest rise and fall

## 2022-03-17 NOTE — ANESTHESIA PREPROCEDURE EVALUATION
Anesthesia Evaluation     Patient summary reviewed and Nursing notes reviewed   no history of anesthetic complications:  NPO Solid Status: > 8 hours  NPO Liquid Status: > 2 hours           Airway   Mallampati: II  TM distance: >3 FB  Neck ROM: full  No difficulty expected  Dental - normal exam     Pulmonary - normal exam   (+) a smoker Former,   Cardiovascular - normal exam    ECG reviewed  PT is on anticoagulation therapy    (+) hypertension, dysrhythmias Atrial Fib,   (-) CHF      Neuro/Psych  GI/Hepatic/Renal/Endo    (+)   thyroid problem hypothyroidism    Musculoskeletal     Abdominal    Substance History      OB/GYN          Other   arthritis,    history of cancer                  Anesthesia Plan    ASA 3     general     intravenous induction     Anesthetic plan, all risks, benefits, and alternatives have been provided, discussed and informed consent has been obtained with: patient.    Plan discussed with CRNA.        CODE STATUS:

## 2022-03-17 NOTE — ANESTHESIA POSTPROCEDURE EVALUATION
Patient: Halie Perez    Procedure Summary     Date: 03/17/22 Room / Location:  [facility]  [facility]    Anesthesia Start: 0705 Anesthesia Stop: 0954    Procedure: DIAGNOSTIC LAPAROSCOPY, ROBOTIC ASSISTED TOTAL LAPAROSCOPIC HYSTERECTOMY, BILATERAL SALPINGO-OOPHERECTOMY, CANCER STAGING, OMENTECTOMY,  OPTIMAL TUMOR DEBULKING (R=0), APPENDECTOMY (N/A Abdomen) Diagnosis:       Ovarian mass, right      Elevated CA-125      PMB (postmenopausal bleeding)      (Ovarian mass, right [N83.8])      (Elevated CA-125 [R97.1])      (PMB (postmenopausal bleeding) [N95.0])    Surgeons: Christine Fam MD Provider: Michel Duran Jr., MD    Anesthesia Type: general ASA Status: 3          Anesthesia Type: general    Vitals  Vitals Value Taken Time   /68 03/17/22 0951   Temp     Pulse     Resp     SpO2 95 % 03/17/22 0953   Vitals shown include unvalidated device data.        Post Anesthesia Care and Evaluation    Patient location during evaluation: PACU  Patient participation: waiting for patient participation  Level of consciousness: sleepy but conscious  Pain management: adequate  Airway patency: patent  Anesthetic complications: No anesthetic complications  PONV Status: none  Cardiovascular status: stable  Respiratory status: spontaneous ventilation and room air  Hydration status: acceptable  No anesthesia care post op

## 2022-03-17 NOTE — PROGRESS NOTES
Endometrial Biopsy    Halie Perez is a 66 y.o. female,   , whose last menstrual period was No LMP recorded (lmp unknown). Patient is postmenopausal..  The patient has a history of post menopausal bleeding and presents for an endometrial biopsy.      Physical Exam    Endometrial Biopsy    Date/Time: 3/17/2022 3:58 PM  Performed by: Derian Abdalla MD  Authorized by: Derian Abdalla MD     Consent:     Consent obtained: written    Consent given by: patient    Risks discussed: bleeding and infection    Alternatives discussed: observation    Patient agrees, verbalizes understanding, and wants to proceed: yes    Indications:     Indications: postmenopausal bleeding      Chronicity of post-menopausal bleeding: new    Progression of post-menopausal bleeding: worsening  Pre-procedure:     Urine pregnancy test: N/A    Procedure:     A biannual exam was performed: yes      Uterus size: <6 weeks    Uterus position: midposition    Prepped with: Betadine    Tenaculum used: yes      A local block was performed: no      Cervix dilated: no      Number of passes: 2  Findings:     Cervix: normal      Uterus depth by sound (cm): 7    Specimen collected: specimen collected and sent to pathology          Review of Systems      Plan:  Orders Placed This Encounter   Procedures   • CEA     Order Specific Question:   Release to patient     Answer:   Immediate   •      Order Specific Question:   Release to patient     Answer:   Immediate       Problem List Items Addressed This Visit     Atrial fibrillation with RVR (HCC)    Hydrosalpinx    Overview     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  Elongated cystic adnexal masses likely hydrosalpinx.  Ovaries not visible.           Adnexal mass - Primary    Overview      Right adnexal mass measurements: 77.4 x 31.4 x 26.0 mm; lobulated with internal echoes, septations, and border excrescences present.    Left adnexal mass  measurements: 69.3 x 34.6 x 33.9 mm; lobulated with internal echoes, septations, and large distal excrescences with Doppler flow present.             Current Assessment & Plan     CA-125 drawn 3/7/2022.    Surgical options reviewed including bilateral salpingectomies with or without removal of ovaries, and hysterectomy.  Surgery will likely be done by GYN oncology due to ultrasound findings suspicious for malignancy.           Relevant Orders    CEA (Completed)     (Completed)    PMB (postmenopausal bleeding)    Overview     Postmenopausal bleeding starting in early February 2022 while on Eliquis.  Patient discontinued Eliquis from [phone] without improvement of bleeding.  Started Eliquis due to recurrence of atrial fibrillation.  Patient had unsuccessful cardioversion February 14 with subsequent conversion to sinus rhythm on Tikosyn.    Ultrasound 3/2/2022 with endometrial fluid and endometrial thickness approximately 4.46 mm.    Endometrial biopsy performed 7/20/2022 with bloody fluid obtained.           Current Assessment & Plan     Postmenopausal bleeding may be due to Eliquis if endometrial biopsy benign.  Options reviewed including surgery with D&C hysteroscopy if bleeding persists.             Relevant Orders    Tissue Pathology Exam      Other Visit Diagnoses     Malignant neoplasm of ovary, unspecified laterality (HCC)         Relevant Orders    CEA (Completed)    Malignant neoplasm of fallopian tube, unspecified laterality (HCC)         Relevant Orders     (Completed)              Instructions  1. Call the office in 5 business days for biopsy results.  2. Patient instructed to call the office if develops a fever of 100.4 or greater, vaginal bleeding heavier than a period, foul vaginal discharge or pain.      Derian Abdalla MD

## 2022-03-21 LAB
LAB AP CASE REPORT: NORMAL
PATH REPORT.FINAL DX SPEC: NORMAL

## 2022-03-22 ENCOUNTER — TELEPHONE (OUTPATIENT)
Dept: GYNECOLOGIC ONCOLOGY | Facility: CLINIC | Age: 67
End: 2022-03-22

## 2022-03-22 NOTE — TELEPHONE ENCOUNTER
Reached patient on home phone. 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 Patient feels she is recovering well from surgery. Pathology will be reviewed in more detail and treatment planning discussed at her upcoming post-op appointment on 4/1/2022. She is understanding to call with any post-op concerns before that time.

## 2022-03-29 ENCOUNTER — TELEPHONE (OUTPATIENT)
Dept: GYNECOLOGIC ONCOLOGY | Facility: CLINIC | Age: 67
End: 2022-03-29

## 2022-03-29 ENCOUNTER — APPOINTMENT (OUTPATIENT)
Dept: PREADMISSION TESTING | Facility: HOSPITAL | Age: 67
End: 2022-03-29

## 2022-04-01 ENCOUNTER — OFFICE VISIT (OUTPATIENT)
Dept: GYNECOLOGIC ONCOLOGY | Facility: CLINIC | Age: 67
End: 2022-04-01

## 2022-04-01 VITALS
HEIGHT: 70 IN | BODY MASS INDEX: 22.76 KG/M2 | TEMPERATURE: 97.3 F | DIASTOLIC BLOOD PRESSURE: 82 MMHG | HEART RATE: 56 BPM | SYSTOLIC BLOOD PRESSURE: 142 MMHG | OXYGEN SATURATION: 98 % | WEIGHT: 159 LBS

## 2022-04-01 DIAGNOSIS — C57.01 ADENOCARCINOMA OF RIGHT FALLOPIAN TUBE: Primary | ICD-10-CM

## 2022-04-01 DIAGNOSIS — Z72.820 POOR SLEEP: ICD-10-CM

## 2022-04-01 DIAGNOSIS — F41.1 ANXIETY ASSOCIATED WITH CANCER DIAGNOSIS: ICD-10-CM

## 2022-04-01 DIAGNOSIS — C80.1 ANXIETY ASSOCIATED WITH CANCER DIAGNOSIS: ICD-10-CM

## 2022-04-01 PROCEDURE — 99214 OFFICE O/P EST MOD 30 MIN: CPT | Performed by: OBSTETRICS & GYNECOLOGY

## 2022-04-01 RX ORDER — LORAZEPAM 1 MG/1
1 TABLET ORAL EVERY 6 HOURS PRN
Qty: 30 TABLET | Refills: 3 | Status: SHIPPED | OUTPATIENT
Start: 2022-04-01 | End: 2022-11-16

## 2022-04-04 ENCOUNTER — CLINICAL SUPPORT NO REQUIREMENTS (OUTPATIENT)
Dept: PREADMISSION TESTING | Facility: HOSPITAL | Age: 67
End: 2022-04-04

## 2022-04-04 ENCOUNTER — TRANSCRIBE ORDERS (OUTPATIENT)
Dept: GENERAL RADIOLOGY | Facility: HOSPITAL | Age: 67
End: 2022-04-04

## 2022-04-04 ENCOUNTER — TELEPHONE (OUTPATIENT)
Dept: GYNECOLOGIC ONCOLOGY | Facility: CLINIC | Age: 67
End: 2022-04-04

## 2022-04-04 DIAGNOSIS — C57.01 MALIGNANT NEOPLASM OF RIGHT FALLOPIAN TUBE: Primary | ICD-10-CM

## 2022-04-04 LAB — SARS-COV-2 RNA PNL SPEC NAA+PROBE: NOT DETECTED

## 2022-04-04 PROCEDURE — C9803 HOPD COVID-19 SPEC COLLECT: HCPCS

## 2022-04-04 PROCEDURE — U0004 COV-19 TEST NON-CDC HGH THRU: HCPCS

## 2022-04-04 NOTE — TELEPHONE ENCOUNTER
Rec'd, completed, attached MD RA signed/dated, & faxed to F#[phone].    Copy given to Kimberly to scan.

## 2022-04-05 ENCOUNTER — OUTSIDE FACILITY SERVICE (OUTPATIENT)
Dept: GYNECOLOGIC ONCOLOGY | Facility: CLINIC | Age: 67
End: 2022-04-05

## 2022-04-05 ENCOUNTER — HOSPITAL ENCOUNTER (OUTPATIENT)
Dept: GENERAL RADIOLOGY | Facility: HOSPITAL | Age: 67
Discharge: HOME | End: 2022-04-05

## 2022-04-05 DIAGNOSIS — C57.01 MALIGNANT NEOPLASM OF RIGHT FALLOPIAN TUBE: ICD-10-CM

## 2022-04-06 ENCOUNTER — CLINICAL SUPPORT NO REQUIREMENTS (OUTPATIENT)
Dept: PREADMISSION TESTING | Facility: HOSPITAL | Age: 67
End: 2022-04-06

## 2022-04-06 LAB — SARS-COV-2 RNA PNL SPEC NAA+PROBE: NOT DETECTED

## 2022-04-06 PROCEDURE — U0004 COV-19 TEST NON-CDC HGH THRU: HCPCS

## 2022-04-06 PROCEDURE — C9803 HOPD COVID-19 SPEC COLLECT: HCPCS

## 2022-04-07 ENCOUNTER — TRANSCRIBE ORDERS (OUTPATIENT)
Dept: GENERAL RADIOLOGY | Facility: HOSPITAL | Age: 67
End: 2022-04-07

## 2022-04-07 DIAGNOSIS — C57.01 MALIGNANT NEOPLASM OF RIGHT FALLOPIAN TUBE: Primary | ICD-10-CM

## 2022-04-08 ENCOUNTER — EDUCATION (OUTPATIENT)
Dept: ONCOLOGY | Facility: HOSPITAL | Age: 67
End: 2022-04-08

## 2022-04-08 ENCOUNTER — HOSPITAL ENCOUNTER (OUTPATIENT)
Dept: GENERAL RADIOLOGY | Facility: HOSPITAL | Age: 67
Discharge: HOME | End: 2022-04-08

## 2022-04-08 ENCOUNTER — HOSPITAL ENCOUNTER (OUTPATIENT)
Dept: ONCOLOGY | Facility: HOSPITAL | Age: 67
Setting detail: INFUSION SERIES
Discharge: HOME | End: 2022-04-08

## 2022-04-08 ENCOUNTER — HOSPITAL ENCOUNTER (OUTPATIENT)
Dept: ONCOLOGY | Facility: HOSPITAL | Age: 67
Discharge: HOME | End: 2022-04-08

## 2022-04-08 ENCOUNTER — OUTSIDE FACILITY SERVICE (OUTPATIENT)
Dept: GYNECOLOGIC ONCOLOGY | Facility: CLINIC | Age: 67
End: 2022-04-08

## 2022-04-08 ENCOUNTER — OFFICE VISIT (OUTPATIENT)
Dept: GYNECOLOGIC ONCOLOGY | Facility: CLINIC | Age: 67
End: 2022-04-08

## 2022-04-08 VITALS
WEIGHT: 156 LBS | SYSTOLIC BLOOD PRESSURE: 144 MMHG | TEMPERATURE: 97.1 F | BODY MASS INDEX: 22.33 KG/M2 | OXYGEN SATURATION: 94 % | DIASTOLIC BLOOD PRESSURE: 88 MMHG | RESPIRATION RATE: 16 BRPM | HEART RATE: 72 BPM | HEIGHT: 70 IN

## 2022-04-08 DIAGNOSIS — T45.1X5A ALOPECIA DUE TO CYTOTOXIC DRUG: ICD-10-CM

## 2022-04-08 DIAGNOSIS — C57.01 ADENOCARCINOMA OF RIGHT FALLOPIAN TUBE: Primary | ICD-10-CM

## 2022-04-08 DIAGNOSIS — C57.01 MALIGNANT NEOPLASM OF RIGHT FALLOPIAN TUBE: ICD-10-CM

## 2022-04-08 DIAGNOSIS — L65.8 ALOPECIA DUE TO CYTOTOXIC DRUG: ICD-10-CM

## 2022-04-08 PROBLEM — R27.0 ATAXIA: Status: RESOLVED | Noted: 2018-04-03 | Resolved: 2022-04-08

## 2022-04-08 PROBLEM — N95.0 PMB (POSTMENOPAUSAL BLEEDING): Status: RESOLVED | Noted: 2022-03-07 | Resolved: 2022-04-08

## 2022-04-08 PROBLEM — N93.9 VAGINAL BLEEDING: Status: RESOLVED | Noted: 2022-02-12 | Resolved: 2022-04-08

## 2022-04-08 PROBLEM — R41.0 CONFUSION: Status: RESOLVED | Noted: 2018-04-03 | Resolved: 2022-04-08

## 2022-04-08 PROBLEM — H18.529 ABMD (ANTERIOR BASEMENT MEMBRANE DYSTROPHY): Status: ACTIVE | Noted: 2018-05-25

## 2022-04-08 PROBLEM — N83.8 OVARIAN MASS, RIGHT: Status: RESOLVED | Noted: 2022-03-09 | Resolved: 2022-04-08

## 2022-04-08 PROBLEM — N83.8 OVARIAN MASS: Status: RESOLVED | Noted: 2022-03-14 | Resolved: 2022-04-08

## 2022-04-08 PROBLEM — H04.123 BILATERAL DRY EYES: Status: ACTIVE | Noted: 2019-12-23

## 2022-04-08 PROBLEM — N17.9 ACUTE RENAL FAILURE: Status: RESOLVED | Noted: 2018-04-03 | Resolved: 2022-04-08

## 2022-04-08 LAB
ALBUMIN SERPL-MCNC: 4.2 G/DL (ref 3.5–5.2)
ALBUMIN/GLOB SERPL: 1.3 G/DL
ALP SERPL-CCNC: 100 U/L (ref 39–117)
ALT SERPL W P-5'-P-CCNC: 9 U/L (ref 1–33)
ANION GAP SERPL CALCULATED.3IONS-SCNC: 12 MMOL/L (ref 5–15)
AST SERPL-CCNC: 28 U/L (ref 1–32)
BILIRUB SERPL-MCNC: 0.4 MG/DL (ref 0–1.2)
BUN SERPL-MCNC: 12 MG/DL (ref 8–23)
BUN/CREAT SERPL: 15.4 (ref 7–25)
CALCIUM SPEC-SCNC: 9.1 MG/DL (ref 8.6–10.5)
CHLORIDE SERPL-SCNC: 97 MMOL/L (ref 98–107)
CO2 SERPL-SCNC: 20 MMOL/L (ref 22–29)
CREAT SERPL-MCNC: 0.78 MG/DL (ref 0.57–1)
EGFRCR SERPLBLD CKD-EPI 2021: 83.4 ML/MIN/1.73
ERYTHROCYTE [DISTWIDTH] IN BLOOD BY AUTOMATED COUNT: 13.2 % (ref 12.3–15.4)
GLOBULIN UR ELPH-MCNC: 3.2 GM/DL
GLUCOSE SERPL-MCNC: 137 MG/DL (ref 65–99)
HCT VFR BLD AUTO: 51.3 % (ref 34–46.6)
HGB BLD-MCNC: 15.5 G/DL (ref 12–15.9)
LYMPHOCYTES # BLD AUTO: 1 10*3/MM3 (ref 0.7–3.1)
LYMPHOCYTES NFR BLD AUTO: 18.4 % (ref 19.6–45.3)
MCH RBC QN AUTO: 29.3 PG (ref 26.6–33)
MCHC RBC AUTO-ENTMCNC: 30.3 G/DL (ref 31.5–35.7)
MCV RBC AUTO: 96.6 FL (ref 79–97)
MONOCYTES # BLD AUTO: 0.2 10*3/MM3 (ref 0.1–0.9)
MONOCYTES NFR BLD AUTO: 3.3 % (ref 5–12)
NEUTROPHILS NFR BLD AUTO: 4.2 10*3/MM3 (ref 1.7–7)
NEUTROPHILS NFR BLD AUTO: 78.3 % (ref 42.7–76)
PLATELET # BLD AUTO: 273 10*3/MM3 (ref 140–450)
PMV BLD AUTO: 7.6 FL (ref 6–12)
POTASSIUM SERPL-SCNC: 4.7 MMOL/L (ref 3.5–5.2)
PROT SERPL-MCNC: 7.4 G/DL (ref 6–8.5)
RBC # BLD AUTO: 5.31 10*6/MM3 (ref 3.77–5.28)
SODIUM SERPL-SCNC: 129 MMOL/L (ref 136–145)
WBC NRBC COR # BLD: 5.4 10*3/MM3 (ref 3.4–10.8)

## 2022-04-08 PROCEDURE — 85025 COMPLETE CBC W/AUTO DIFF WBC: CPT | Performed by: OBSTETRICS & GYNECOLOGY

## 2022-04-08 PROCEDURE — 36561 INSERT TUNNELED CV CATH: CPT | Performed by: OBSTETRICS & GYNECOLOGY

## 2022-04-08 PROCEDURE — 71045 X-RAY EXAM CHEST 1 VIEW: CPT

## 2022-04-08 PROCEDURE — 99214 OFFICE O/P EST MOD 30 MIN: CPT | Performed by: NURSE PRACTITIONER

## 2022-04-08 PROCEDURE — G0463 HOSPITAL OUTPT CLINIC VISIT: HCPCS

## 2022-04-08 PROCEDURE — 80053 COMPREHEN METABOLIC PANEL: CPT | Performed by: OBSTETRICS & GYNECOLOGY

## 2022-04-08 PROCEDURE — 77001 FLUOROGUIDE FOR VEIN DEVICE: CPT | Performed by: OBSTETRICS & GYNECOLOGY

## 2022-04-08 PROCEDURE — 86304 IMMUNOASSAY TUMOR CA 125: CPT | Performed by: OBSTETRICS & GYNECOLOGY

## 2022-04-08 RX ORDER — FAMOTIDINE 10 MG/ML
20 INJECTION, SOLUTION INTRAVENOUS ONCE
Status: CANCELLED | OUTPATIENT
Start: 2022-04-11

## 2022-04-08 RX ORDER — SOTALOL HYDROCHLORIDE 80 MG/1
TABLET ORAL
COMMUNITY
Start: 2022-04-02 | End: 2022-04-29

## 2022-04-08 RX ORDER — DIPHENHYDRAMINE HYDROCHLORIDE 50 MG/ML
50 INJECTION INTRAMUSCULAR; INTRAVENOUS AS NEEDED
Status: CANCELLED | OUTPATIENT
Start: 2022-04-11

## 2022-04-08 RX ORDER — LIDOCAINE AND PRILOCAINE 25; 25 MG/G; MG/G
CREAM TOPICAL AS NEEDED
Qty: 1 EACH | Refills: 5 | Status: SHIPPED | OUTPATIENT
Start: 2022-04-08 | End: 2022-11-16

## 2022-04-08 RX ORDER — PROMETHAZINE HYDROCHLORIDE 25 MG/1
25 TABLET ORAL EVERY 6 HOURS PRN
Qty: 30 TABLET | Refills: 5 | Status: SHIPPED | OUTPATIENT
Start: 2022-04-08 | End: 2022-04-18

## 2022-04-08 RX ORDER — OLANZAPINE 5 MG/1
5 TABLET ORAL ONCE
Status: CANCELLED | OUTPATIENT
Start: 2022-04-11 | End: 2022-04-11

## 2022-04-08 RX ORDER — SODIUM CHLORIDE 9 MG/ML
250 INJECTION, SOLUTION INTRAVENOUS ONCE
Status: CANCELLED | OUTPATIENT
Start: 2022-04-11

## 2022-04-08 RX ORDER — FAMOTIDINE 10 MG/ML
20 INJECTION, SOLUTION INTRAVENOUS AS NEEDED
Status: CANCELLED | OUTPATIENT
Start: 2022-04-11

## 2022-04-08 RX ORDER — PALONOSETRON 0.05 MG/ML
0.25 INJECTION, SOLUTION INTRAVENOUS ONCE
Status: CANCELLED | OUTPATIENT
Start: 2022-04-11

## 2022-04-08 RX ORDER — DEXAMETHASONE 4 MG/1
TABLET ORAL
Qty: 12 TABLET | Refills: 5 | Status: SHIPPED | OUTPATIENT
Start: 2022-04-08 | End: 2022-11-16

## 2022-04-08 RX ORDER — LORATADINE 10 MG/1
TABLET ORAL
Qty: 30 TABLET | Refills: 0 | Status: SHIPPED | OUTPATIENT
Start: 2022-04-08 | End: 2022-11-16

## 2022-04-08 NOTE — PLAN OF CARE
Outpatient Infusion • [address] [facility] • [address] • [place] [id number] • [phone]      CHEMOTHERAPY EDUCATION    NAME:  Halie Perez      : 1955           DATE: 22    Medication Education Sheets:   Carboplatin and Paclitaxel    Other Education Sheets:   CINV, Diarrhea and Symptom Tracker Sheet and BROOK Information    Chemotherapy Regimen:   OP OVARIAN PACLitaxel / CARBOplatin (Q21D)  every 21 days      TOPICS EDUCATION PROVIDED COMMENTS   ANEMIA:  role of RBC, cause, s/s, ways to manage, role of transfusion [x] Reviewed the role of RBC and the use of transfusions if hemoglobin decreases too much.  Patient to notify us if they experience shortness of breath, dizziness, or palpitations.  Also let patient know they could feel more tired than usual and to try to stay active, but rest if they need to.    THROMBOCYTOPENIA:  role of platelet, cause, s/s, ways to prevent bleeding, things to avoid, when to seek help [x] Reviewed the role of platelets in blood clotting and when to call clinic (bloody nose that bleeds for 5 mins despite pressure, a cut that won't stop bleeding despite pressure, gums that bleed excessively with brushing or flossing). Recommended using an electric razor, soft bristle toothbrush, and blowing your nose gently.   Discussed increased risk of bleed with Eliquis (apixaban).   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   NUTRITION & APPETITE CHANGES:  importance of maintaining healthy diet & weight, ways to manage to improve intake, dietary consult, exercise regimen [x] Discussed risk of decreased appetite,  reviewed plan for small more frequent meals. Informed patient of potential metallic taste and smell. Recommended flavoring water and using plastic utensils to help with this adverse effect.   DIARRHEA:  causes, s/s of dehydration, ways to manage, dietary changes, when to call MD [x] Chemotherapy - Discussed risk of diarrhea. Instructed patient that they can use OTC loperamide at first presentation of diarrhea, but call MD if 4-6 episodes in 24 hours not relieved by OTC loperamide.   NAUSEA & VOMITING:  cause, use of antiemetics, dietary changes, when to call MD [x] • Emetic risk: High  • Premeds: Fosaprepitant, Olanzapine and Palonosetron  • At home meds: Dexamethasone Loratadine Promethazine  • Pharmacy meds sent to: medications should be sent to [facility] after appointment with ARMANDO Barragan today.  Instructed the patient to take a dose of the PRN medication at the first onset of nausea and if it's not working to call us for additional medications.  Also provided non-drug measures to mitigate nausea.   MOUTH SORES:  causes, oral care, ways to manage [x] Mouth sores can be prevented by making a mouth wash mixture of salt, baking soda, and water. The patient was instructed to swish and spit four times daily after meals and before bedtime.  Use of a soft bristle toothbrush was recommended.  The patient was instructed to avoid alcohol-containing OTC mouthwashes.    ALOPECIA:  cause, ways to manage, resources [x] Discussed the possibility of hair loss with the patient. Informed patient that they could request a prescription for a wig if desired and most of the cost is usually covered by insurance.   Instructed patient to speak with ARMANDO Barragan about obtaining a wig.   INFERTILITY & SEXUALITY:    causes, sexuality changes, ways to manage [x] Discussed safe sex practices.   NERVOUS SYSTEM CHANGES:  causes, s/s, neuropathies, cognitive changes, ways to manage [x] Discussed the adverse effect of  peripheral neuropathy and signs/symptoms associated with this adverse effect. Instructed patient to call if symptoms become more frequent or worsen.   Steroids: Discussed the impact of high-dose steroids on the nervous system, such as insomnia.   PAIN:  causes, ways to manage [x] Chemo - Discussed muscle and joint aches/pains with chemotherapy, and recommended the use of OTC pain relief with ibuprofen or acetaminophen if needed.   SKIN & NAIL CHANGES:  cause, s/s, ways to manage [x] Chemotherapy - Informed patient that they may see dark or white lines on finger and toenails during treatment, and that their nails may become brittle and even fall off in extreme cases. But that this would likely reverse after treatment concludes.    HOME CARE:  How to manage bodily fluids [x] IV - Counseled on management of soiled linens and proper flush technique.  Discussed how to manage all the side effects at home and advised when to contact the MD office   INFUSION RELATED REACTIONS:  Cause, s/s, anaphylaxis, vesicant, etc. [x] Premeds: Dexamethasone, Diphenhydramine and Famotidine  At home meds: dexamethasone and loratadine  Discussed risks of infusion related reactions, s/s, management plan  Discussed drowsiness with diphenhydramine and the need for a ride from the infusion center on the days of treatment.   MISCELLANEOUS:  drug interactions, administration, labs, etc. [x] Discussed chemotherapy schedule, lab draws, infusion times, and total expected visit time.   • DDIs: Tikosyn + Zofran: Discussed the risk of QTc interval prolongation and instructed patient to discuss with ARMANDO Barragan. Instructed patient to take Phenergan as needed for N/V instead of Zofran. Tikosyn and Zyprexa: Discussed the low risk of QTc prolongation and arrhythmias given she will be receiving a small dose of Zyprexa (5 mg) once every 21 days.   • Lab draws: On or before day 1 of each cycle (no sooner than 3 days early). The patient plans to get  labs drawn every 21 days on the Friday prior to chemotherapy on Mondays.  • EMLA cream: discussed application prior to port access.       Medications:  Prior to Admission medications    Medication Sig Start Date End Date Taking? Authorizing Provider   apixaban (ELIQUIS) 5 MG tablet tablet Take 5 mg by mouth 2 (Two) Times a Day.    Madiha Morse MD   atorvastatin (LIPITOR) 40 MG tablet Take 1 tablet by mouth Daily. 12/27/16   Vidhya Dominguez APRN   docusate sodium (COLACE) 100 MG capsule Take 2 capsules by mouth 2 (Two) Times a Day As Needed for Constipation. 3/17/22   Christine Fam MD   dofetilide (TIKOSYN) 250 MCG capsule Take 1 capsule by mouth Every 12 (Twelve) Hours. 2/26/22   Orlin Novak MD   hydrOXYzine (ATARAX) 50 MG tablet Take 50 mg by mouth At Night As Needed. 2/19/22   Madiha Morse MD   levothyroxine (SYNTHROID, LEVOTHROID) 100 MCG tablet Take 100 mcg by mouth Daily.    Madiha Morse MD   LORazepam (Ativan) 1 MG tablet Take 1 tablet by mouth Every 6 (Six) Hours As Needed for Anxiety. 4/1/22   Christine Fam MD   metoprolol tartrate (LOPRESSOR) 25 MG tablet Take 1 tablet by mouth 2 (Two) Times a Day. 2/25/22   Orlin Novak MD   pantoprazole (PROTONIX) 40 MG EC tablet Take 1 tablet by mouth Daily. 2/26/22   Orlin Novak MD         Notes: All questions and concerns were addressed. Provided a personalized treatment calendar to patient (includes treatment and lab schedule). Provided patient with contact information for the pharmacist and clinic while instructing them to call if any questions or concerns arise. Informed consent for treatment was obtained. Patient was receptive to information and expressed understanding.      Kay Rodriguez, Pharmacy Intern  Date and Time: 04/08/2022 @ 10:55

## 2022-04-08 NOTE — PROGRESS NOTES
CHEMOTHERAPY PREPARATION    Halie Perez  [id number]  1955    Subjective   Chief Complaint: Treatment Preparation and Needs Assessment    History of present illness:  Halie Perez is a 66 y.o. year old female who is here today for chemotherapy preparation and needs assessment. The patient has been diagnosed with adenocarcinoma of right fallopian tube and is scheduled to begin treatment with carboplatin + paclitaxel.     Oncology History:    Oncology/Hematology History Overview Note   1.)  Monoclonal gammopathy    a) March 2017 24-hour urine immunoelectrophoresis showed no monoclonal spike, lambda light chains 106 with kappa light chains 24 and a kappa to lambda ratio of 0.23 with normal IgA, G, and M levels.  January 2017 serum immunoelectrophoresis showed 600 mg/dL of immunoglobulin G lambda light chains in the serum.  3/8/17 bone marrow biopsy showed normal marrow with 3% plasma cells and a small lambda clonal population identified with no stainable iron.  There was erythroid expansion with mild maturation dyssynchrony and occasional atypical nuclear features that may represent the compensatory response to her anemia though myelodysplasia could not entirely be ruled out.  Had reactive aggregates of lymphoid tissue.     b) 6/13/2017 sedimentation rate 8, immunoglobulin free light chains free kappa light chains 25.7, free lambda light chains 173.4, kappa/lambda ratio 0.15.  Serum immunoelectrophoresis M-spike 0.9g/dL, C-reactive protein less than 0.01, CMP normal, ionized calcium 1.31, beta-2 microglobulin 3.1, CBC WBC 5500, hemoglobin 14.2, hematocrit 44.6%, MCV 94.8, platelets 259,000.  24-hour urine immunoelectrophoresis pending.  C.)  -6/17/2019 data: Lambda 129 with kappa 22 and ratio 0.17.  This is in the range that it has fluctuated since January 2017 upon first testing.  M spike stable 600 mg/dL immunoglobulin G lambda.  C-reactive protein 0.04 with sedimentation rate of 8.   Creatinine 0.86 with total calcium of 10 and ionized calcium 1.34 upper limit of normal 1.32.  Beta-2 microglobulin 2.8.  CBC unremarkable.  -4/13/2021 data: Beta-2 microglobulin 2.5 stable.  Hemoglobin 15.4 with normal CBC.  CMP unremarkable including creatinine 0.94, calcium 9.0, total protein 7.5, normal alkaline phosphatase 102.  Serum M spike 800 mg/dL IgG lambda stable with normal quantitative immunoglobulins.  Immunoglobulin free lambda light chains 131 mg/L with kappa 18.7 and ratio 0.14 stable x4 years.  Sedimentation rate 7.  C-reactive protein less than 0.3.  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  2.)  History of anemia, resolved after hemorrhoidectomy repair and on oral iron.  CBC on 6/13/2017 with a hemoglobin of 14.2, hematocrit 44.6%, MCV of 94.8.         Monoclonal gammopathy    Initial Diagnosis    Monoclonal gammopathy     11/29/2017 Imaging    Bone survey IMPRESSION:  Negative skeletal survey.     6/5/2018 -  Other Event    6/5/2018 labs:  CBC WBC 4350, hemoglobin 12.6, hematocrit 38.3%, platelet count 252,000.  CMP creatinine 0.8, serum calcium 9.4.  Ionized calcium 1.29.  Beta-2 microglobulin 2.3.  Sedimentation rate 9, CRP 0.02.  Kappa/lambda light chains-normal kappa light chains 16.6, lambda light chains elevated at 133.6, kappa/lambda ratio 0.12.  Serum immunoelectrophoresis M-spike stable 0.7 g/dL.       12/20/2018 -  Other Event    24-hour urine monoclonal protein showed no monoclonal protein.  Beta-2 microglobulin 2.5.  AST 43.  Creatinine 0.86.  Calcium 9.5.  Ionized calcium 1.3.  C-reactive protein less than 0.01.  Sedimentation rate 7.  Serum monoclonal protein 800 mg/dL of immunoglobulin G lambda.  Lambda monoclonal protein down to 98.5 with a kappa to lambda ratio 0.18.  CBC is normal.  Total body bone survey done on the day of her visit 12/20/18 was not read by the time of her visit.     6/17/2019 -  Other Event     Lambda 129 with kappa 22 and ratio 0.17.   This is in the range that it has fluctuated since January 2017 upon first testing.  M spike stable 600 mg/dL immunoglobulin G lambda.  C-reactive protein 0.04 with sedimentation rate of 8.  Creatinine 0.86 with total calcium of 10 and ionized calcium 1.34 upper limit of normal 1.32.  Beta-2 microglobulin 2.8.  CBC unremarkable.     4/6/2020 -  Other Event    Myeloma panel:  CBC WBC 5400, hemoglobin 15.2, platelet count 225,000.  Beta-2 microglobulin 2.6.  Ionized calcium 1.26.  CMP creatinine 0.77, serum calcium 10.1 C-reactive protein 0.08, sedimentation rate 13.  Serum immunoelectrophoresis M spike stable 0.6 g/dL.  Serum free light chains, normal free kappa light chains 16.3, free lambda light chains 107.7, kappa/lambda ratio 0.15.  Kappa/lambda light chains Urine normal.     4/13/2021 -  Other Event    -4/13/2021 data: Beta-2 microglobulin 2.5 stable.  Hemoglobin 15.4 with normal CBC.  CMP unremarkable including creatinine 0.94, calcium 9.0, total protein 7.5, normal alkaline phosphatase 102.  Serum M spike 800 mg/dL IgG lambda stable with normal quantitative immunoglobulins.  Immunoglobulin free lambda light chains 131 mg/L with kappa 18.7 and ratio 0.14 stable x4 years.  Sedimentation rate 7.  C-reactive protein less than 0.3.  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     Adenocarcinoma of right fallopian tube (HCC)   3/2/2022 Imaging    Patient presented to Dr. Abdalla with c/o postmenopausal bleeding.   Transvaginal ultrasound demonstrated a thickened endometrial stripe and bilateral adnexal masses.  Referred to Gyn Oncology due to concern for possible malignancy.      3/4/2022 Imaging    CT abdomen pelvis:  1.Beam hardening artifact from the patient's right total hip arthroplasty makes evaluation of the right presacral/adnexal mass difficult. There is a tubular masslike structure measuring up to 9 x 6 cm with a left posterior (presacral) mural nodular  enhancing component as suggested  "on ultrasound. This could be related to a multilocular cystic right ovarian mass or be related to the right uterine tube. Differential favors malignant process in this age group.  2.Incidental 6 mm pulmonary nodule seen in the right lower lobe along the right hemidiaphragm. This is of uncertain clinical significance.     3/10/2022 Imaging    CT chest:  1.  No change in 6 mm noncalcified pulmonary nodule within the right lower lobe.  Follow-up noncontrast CT scan of the chest would be recommended in 6-12 months to document continued stability.     3/17/2022 Surgery    Diagnostic laparoscopy, RTLH/BSO, cancer staging, omentectomy, optimal tumor debulking (R=0), append[facility] by Dr. Christine Fam at [facility]    Final pathology showed high-grade serous carcinoma involving the lumen and wall. Right uterosacral ligament and omentum involved by tumor. 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     Past Medical History, Past Surgical History, Social History, Family History have been reviewed and are without significant changes except as mentioned.    Review of Systems   Constitutional: Negative.    Gastrointestinal: Negative.    Genitourinary: Negative.        Objective   Physical Exam  Vital Signs: /88 Comment: RUE  Pulse 72   Temp 97.1 °F (36.2 °C) (Infrared)   Resp 16   Ht 177.8 cm (70\")   Wt 70.8 kg (156 lb)   LMP  (LMP Unknown)   SpO2 94% Comment: RA  BMI 22.38 kg/m²   Vitals:    04/08/22 1105   PainSc: 0-No pain           General Appearance:  alert, cooperative, no apparent distress, appears stated age and normal weight   Neurologic/Psychiatric: A&O x 3, gait steady, appropriate affect   HEENT:  Normocephalic, without obvious abnormality, mucous membranes moist   Lungs:   Clear " to auscultation bilaterally; respirations regular, even, and unlabored bilaterally   Heart:  Regular rate and rhythm, no murmurs appreciated   Extremities: Normal, atraumatic; no clubbing, cyanosis, or edema    Skin: No rashes, lesions, or abnormal coloration noted     ECOG Performance Status: 0 - Asymptomatic            NEEDS ASSESSMENTS    Genetics  The patient's new diagnosis and family history have been reviewed for genetic counseling needs. A genetic referral has been ordered, scheduled for 4/20/2022.     Molecular Testing  Further molecular testing on tumor is indicated. CARIS testing has been ordered and is pending.     Psychosocial  The patient has completed a PHQ-9 Depression Screening and the Distress Thermometer (DT) today.  PHQ-9 results show 5-9 (Mild Depression). The patient scored their distress today as 6 on a scale of 0-10 with 0 being no distress and 10 being extreme distress.   Problems marked by the patient as being an issue for them within the last week include emotional problems and physical problems.   Results were reviewed along with psychosocial resources offered by our cancer center. Our oncology social worker will be flagged for a DT score of 4 or above, and a same day call will be made for a score of 9 or 10. A mental health referral is recommended at this time. The patient is not interested in a referral to ARMANDO Saavedra up front. She understands this will remain available to her if needed in the future.     Copies of patient's questionnaires will be scanned into EMR for details and further reference.    Barriers to care  A barriers form was also completed by the patient today. We discussed services offered by our facility to help her have adequate access to care. The patient was given the name and card for our Oncology Social Worker, Sofi Browne. Based upon barriers assessment today, the patient will require a follow-up call from the  to further discuss needs.   A  "copy of the barriers form will also be scanned into EMR for details and further reference.     VAD Assessment  The patient and I discussed planned intervenous chemotherapy as well as other IV treatments that are often needed throughout the course of treatment. These may include, but are not limited to blood transfusions, antibiotics, and IV hydration. The vasculature does not appear to be adequate for multiple peripheral IVs throughout their treatment course. Discussed risks and benefits of VADs. The patient underwent insertion of port-a-cath earlier today.     Advance Care Planning   ACP discussion was held with the patient during this visit. Patient has an advance directive (not in EMR), copy requested.  The patient and I discussed advanced care planning, \"Conversations that Matter\".   This service was offered, free of charge, for development of advance directives with a certified ACP facilitator.  The patient does have an up-to-date advanced directive. This document is not on file with our office. The patient is not interested in an appointment with one of our facilitators to create or update their advanced directives.         Palliative Care  The patient and I discussed palliative care services. Palliative care is not the same as Hospice care. This is specialized medical care for people living with serious illness with the goal of improving quality of life for the patient and their family. [facility] has partnered with [facility] to offer our patients outpatient palliative care early along with their treatment to assist in coordination of care, symptom management, pain management, and medical decision making.  Oncology criteria for palliative care referral is met at this time. The patient is not interested in a palliative care consultation.     Additional Referral needs  none      CHEMOTHERAPY EDUCATION    Booklets Given: Chemotherapy and You [x]  Eating Hints [x]    Sexuality/Fertility Books []    "   Chemotherapy/Biotherapy Education Sheets: (list all that apply)  nausea management, acid reflux management, diarrhea management, Cancer resourse contacts information, skin and mouth care, vaccination information, wig information and Treatment Calendar                                                                                                                                                                 Chemotherapy Regimen:   Treatment Plans     Name Type Plan Dates Plan Provider         Active    OP OVARIAN PACLitaxel / CARBOplatin (Q21D) ONCOLOGY TREATMENT  4/10/2022 - Present Christine Fam MD                    DETAILED CHEMOTHERAPY TEACHING COMPLETED BY PHARMACY. CHEMOTHERAPY CONSENT COMPLETED BY PHARMACY. SEE PHARMACY EDUCATION FOR DOCUMENTATION.     Chemotherapy education comprehension reviewed. Questions answered and additional information discussed on topics including:  Anemia, Neutropenia, Nutrition and appetite changes, Nausea & vomiting, Alopecia, Nervous system changes, Pain, Organ toxicities, Survivorship and Home care  - Zofran and Compazine have interactions with patient's Tikosyn. She is able to take phenergan for anti-emetic. Zofran taken out of care plan, will keep phenergan. RN will reach out to pharmacy to discuss other antiemetic options if needed during treatment course.   - Patient has PRN PO Ativan as prescribed by Dr. Fam.   - We will add Emla cream to home medications. We reviewed medication instructions, risks, benefits, and potential side effects. Use 30 minutes prior to any port access event.     Assessment and Plan:    Diagnoses and all orders for this visit:    1. Adenocarcinoma of right fallopian tube (HCC) (Primary)  -     Prosthetic Cranial  -     Ambulatory Referral to ONC Social Work    2. 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  Dispense: 1 each; Refill: 5          I spent 40 minutes caring for Halie on this date of service. This time includes time spent by me in the following activities: preparing for the visit, counseling and educating the patient/family/caregiver, ordering medications, tests, or procedures, documenting information in the medical record and care coordination.     The patient and I have reviewed their new cancer diagnosis and scheduled treatment plan. Needs assessment was completed including genetics, psychosocial needs, barriers to care, VAD evaluation, advanced care planning, and palliative care services. Referrals have been ordered as appropriate based upon our evaluation and patient desires.     Chemotherapy teaching was also completed today as documented above. Adequate time was given to answer all questions to her satisfaction. Patient and family are aware of their care team members and contact information if they have questions or problems throughout the treatment course. Needs assessments and education has been completed. The patient is adequately prepared to begin treatment as scheduled.     Pain assessment was performed today as a part of patient’s care. For patients with pain related to surgery, gynecologic malignancy or cancer treatment, the plan is as noted in the assessment/plan.  For patients with pain not related to these issues, they are to seek any further needed care from a more appropriate provider, such as PCP.      Electronically signed by ARMANDO Moore on 04/08/22 at 13:52 EDT.

## 2022-04-09 LAB — CANCER AG125 SERPL QL: 21 U/ML (ref 0–38.1)

## 2022-04-11 ENCOUNTER — HOSPITAL ENCOUNTER (OUTPATIENT)
Dept: ONCOLOGY | Facility: HOSPITAL | Age: 67
Setting detail: INFUSION SERIES
Discharge: HOME | End: 2022-04-11

## 2022-04-11 ENCOUNTER — DOCUMENTATION (OUTPATIENT)
Dept: NUTRITION | Facility: HOSPITAL | Age: 67
End: 2022-04-11

## 2022-04-11 VITALS
WEIGHT: 156 LBS | HEART RATE: 67 BPM | TEMPERATURE: 96.7 F | SYSTOLIC BLOOD PRESSURE: 143 MMHG | RESPIRATION RATE: 20 BRPM | BODY MASS INDEX: 22.38 KG/M2 | DIASTOLIC BLOOD PRESSURE: 80 MMHG

## 2022-04-11 DIAGNOSIS — C57.01 ADENOCARCINOMA OF RIGHT FALLOPIAN TUBE: Primary | ICD-10-CM

## 2022-04-11 PROCEDURE — 25010000002 PACLITAXEL PER 1 MG: Performed by: OBSTETRICS & GYNECOLOGY

## 2022-04-11 PROCEDURE — 96415 CHEMO IV INFUSION ADDL HR: CPT

## 2022-04-11 PROCEDURE — 96417 CHEMO IV INFUS EACH ADDL SEQ: CPT

## 2022-04-11 PROCEDURE — 96375 TX/PRO/DX INJ NEW DRUG ADDON: CPT

## 2022-04-11 PROCEDURE — 96413 CHEMO IV INFUSION 1 HR: CPT

## 2022-04-11 PROCEDURE — 25010000002 CARBOPLATIN PER 50 MG: Performed by: OBSTETRICS & GYNECOLOGY

## 2022-04-11 PROCEDURE — 96374 THER/PROPH/DIAG INJ IV PUSH: CPT

## 2022-04-11 PROCEDURE — 25010000002 DEXAMETHASONE SODIUM PHOSPHATE 100 MG/10ML SOLUTION: Performed by: OBSTETRICS & GYNECOLOGY

## 2022-04-11 PROCEDURE — 25010000002 FOSAPREPITANT PER 1 MG: Performed by: OBSTETRICS & GYNECOLOGY

## 2022-04-11 PROCEDURE — 25010000002 PALONOSETRON 0.25 MG/5ML SOLUTION PREFILLED SYRINGE: Performed by: OBSTETRICS & GYNECOLOGY

## 2022-04-11 PROCEDURE — 25010000002 DIPHENHYDRAMINE PER 50 MG: Performed by: OBSTETRICS & GYNECOLOGY

## 2022-04-11 PROCEDURE — 25010000002 HEPARIN LOCK FLUSH PER 10 UNITS: Performed by: OBSTETRICS & GYNECOLOGY

## 2022-04-11 PROCEDURE — 96367 TX/PROPH/DG ADDL SEQ IV INF: CPT

## 2022-04-11 RX ORDER — FAMOTIDINE 10 MG/ML
20 INJECTION, SOLUTION INTRAVENOUS ONCE
Status: COMPLETED | OUTPATIENT
Start: 2022-04-11 | End: 2022-04-11

## 2022-04-11 RX ORDER — PALONOSETRON 0.05 MG/ML
0.25 INJECTION, SOLUTION INTRAVENOUS ONCE
Status: COMPLETED | OUTPATIENT
Start: 2022-04-11 | End: 2022-04-11

## 2022-04-11 RX ORDER — HEPARIN SODIUM (PORCINE) LOCK FLUSH IV SOLN 100 UNIT/ML 100 UNIT/ML
500 SOLUTION INTRAVENOUS AS NEEDED
Status: CANCELLED | OUTPATIENT
Start: 2022-04-11

## 2022-04-11 RX ORDER — OLANZAPINE 5 MG/1
5 TABLET ORAL ONCE
Status: COMPLETED | OUTPATIENT
Start: 2022-04-11 | End: 2022-04-11

## 2022-04-11 RX ORDER — HEPARIN SODIUM (PORCINE) LOCK FLUSH IV SOLN 100 UNIT/ML 100 UNIT/ML
500 SOLUTION INTRAVENOUS AS NEEDED
Status: DISCONTINUED | OUTPATIENT
Start: 2022-04-11 | End: 2022-04-12 | Stop reason: HOSPADM

## 2022-04-11 RX ADMIN — FAMOTIDINE 20 MG: 10 INJECTION INTRAVENOUS at 08:34

## 2022-04-11 RX ADMIN — OLANZAPINE 5 MG: 5 TABLET, FILM COATED ORAL at 08:35

## 2022-04-11 RX ADMIN — PALONOSETRON 0.25 MG: 0.25 INJECTION, SOLUTION INTRAVENOUS at 08:35

## 2022-04-11 RX ADMIN — CARBOPLATIN 580 MG: 10 INJECTION, SOLUTION INTRAVENOUS at 13:01

## 2022-04-11 RX ADMIN — DIPHENHYDRAMINE HYDROCHLORIDE 50 MG: 50 INJECTION, SOLUTION INTRAMUSCULAR; INTRAVENOUS at 08:33

## 2022-04-11 RX ADMIN — HEPARIN SODIUM (PORCINE) LOCK FLUSH IV SOLN 100 UNIT/ML 500 UNITS: 100 SOLUTION at 13:38

## 2022-04-11 RX ADMIN — PACLITAXEL 330 MG: 6 INJECTION, SOLUTION INTRAVENOUS at 09:56

## 2022-04-11 RX ADMIN — DEXAMETHASONE SODIUM PHOSPHATE 20 MG: 10 INJECTION, SOLUTION INTRAMUSCULAR; INTRAVENOUS at 09:01

## 2022-04-11 RX ADMIN — FOSAPREPITANT 150 MG: 150 INJECTION, POWDER, LYOPHILIZED, FOR SOLUTION INTRAVENOUS at 08:34

## 2022-04-11 NOTE — PROGRESS NOTES
"Outpatient Oncology Nutrition     Reason for Visit:    Oncology Nutrition Screening and Patient Education / Met with patient during her initial chemotherapy infusion appointment.     Patient Name:  Halie Perez    :  1955    MRN:  [id number]    Date of Encounter: 2022    Nutrition Assessment     Diagnosis: adenocarcinoma of right fallopian tube    Surgery: Diagnostic laparoscopy, RTLH/BSO, cancer staging, omentectomy, optimal tumor debulking (R=0), appendectomy (3/17/22)    Chemotherapy: Carbo / Taxol - every 21 days     Patient Active Problem List:    Patient Active Problem List   Diagnosis   • Hypertension   • History of anemia   • Hypothyroidism   • Osteoarthritis   • Symptomatic anemia   • Monoclonal gammopathy   • Paroxysmal atrial fibrillation (HCC)   • Hemorrhoids with complication   • Atrial fibrillation with RVR (HCC)   • A-fib (HCC)   • Hydrosalpinx   • Adnexal mass   • Elevated CA-125   • LGSIL on Pap smear of cervix   • Adenocarcinoma of right fallopian tube (HCC)   • Poor sleep   • Anxiety associated with cancer diagnosis (HCC)   • Bilateral dry eyes   • ABMD (anterior basement membrane dystrophy)       Food / Nutrition Related History   Patient reports being a vegetarian.  Patient complains of a decreased appetite since diagnosis.    Hydration Status   Discussed the importance of hydration and recommended she continue drinking water.    Goal: ~80 ounces     How many 8 ounces glasses of water do you consume per day? She reports increasing her water intake recently.    Enteral Feeding       Anthropometric Measurements     Height:    Ht Readings from Last 1 Encounters:   22 177.8 cm (70\")       Weight:    Wt Readings from Last 1 Encounters:   22 70.8 kg (156 lb)       BMI:  22.4 - Normal     Weight Change: ~17# (9.8%) weight loss x 8 weeks     Review of Lab Data (Time Frame - 1 month / 2 month)   Labs reviewed - 22     Medication Review   MAR reviewed     Nutrition " "Focused Physical Findings       Nutrition Impact Symptoms   Altered appetite    Physical Activity   Normal with no limitations    Current Nutritional Intake     Oral diet:  Regular / Vegetarian     Oral nutritional supplements: homemade protein shake     Intake: oral intake has been less than normal     Malnutrition Risk Assessment     Recent weight loss over the past 6 months:  Yes    How much weight loss:  2 = 14-23 lbs    Eating poorly because of a decreased appetite:  1 = Yes    Malnutrition Screening Score:     MST = 2 more Patient at risk for malnutrition     Nutrition Diagnosis     Problem Severe chronic disease related malnutrition    Etiology Newly diagnosed fallopian tube cancer / recent surgery / clinical condition / decreased appetite   Signs / Symptoms ~17# (9.8%) weight loss x 8 weeks / insufficient energy intake      Nutrition Intervention   Discussed the importance of good nutrition during her treatment course focusing on adequate calorie, protein, nutrient and fluid intake.  Advised her to be consuming smaller more frequent meals/snacks throughout the day to aid with oral intake and potential nausea management.  Suggested she \"watch the clock\" to remind herself to eat every couple of hours throughout her waking hours.  Emphasized the importance of protein and its role in the diet; reviewed high protein foods; and recommended she have a protein source at each meal/snack.  Discussed different types of ONS and encouraged her to continue drinking her homemade protein shakes as needed.      Goal   To achieve adequate nutritional and hydration intake to aid with weight maintenance.  To aid with nutrition impact symptom management as needed.     Monitoring / Evaluation   Answered her questions and she voiced understanding of information discussed.  RD's contact information provided and encouraged to call with questions.  Will monitor as needed during her treatment course.    Jayashree Mclean MS, RD, LD   "

## 2022-04-18 ENCOUNTER — OFFICE VISIT (OUTPATIENT)
Dept: ONCOLOGY | Facility: CLINIC | Age: 67
End: 2022-04-18

## 2022-04-18 VITALS
HEART RATE: 75 BPM | RESPIRATION RATE: 16 BRPM | OXYGEN SATURATION: 98 % | SYSTOLIC BLOOD PRESSURE: 166 MMHG | BODY MASS INDEX: 22.9 KG/M2 | HEIGHT: 70 IN | DIASTOLIC BLOOD PRESSURE: 99 MMHG | WEIGHT: 160 LBS | TEMPERATURE: 97.2 F

## 2022-04-18 DIAGNOSIS — C57.01 ADENOCARCINOMA OF RIGHT FALLOPIAN TUBE: Chronic | ICD-10-CM

## 2022-04-18 DIAGNOSIS — D47.2 MONOCLONAL GAMMOPATHY: Primary | Chronic | ICD-10-CM

## 2022-04-18 PROCEDURE — 99214 OFFICE O/P EST MOD 30 MIN: CPT | Performed by: INTERNAL MEDICINE

## 2022-04-18 RX ORDER — METOCLOPRAMIDE 10 MG/1
TABLET ORAL
Qty: 30 TABLET | Refills: 4 | Status: SHIPPED | OUTPATIENT
Start: 2022-04-18 | End: 2022-11-16

## 2022-04-18 NOTE — PROGRESS NOTES
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  Monoclonal gammopathy   3.  History of anemia, resolved after hemorrhoidectomy repair and on oral iron.  CBC on 6/13/2017 with a hemoglobin of 14.2, hematocrit 44.6%, MCV of 94.8.  4.  Atrial fibrillation  5.  Pulmonary nodule  6.  Erythrocytosis      Monoclonal gammopathy and uterine cancer history timeline:    -March 2017 24-hour urine immunoelectrophoresis showed no monoclonal spike, lambda light chains 106 with kappa light chains 24 and a kappa to lambda ratio of 0.23 with normal IgA, G, and M levels.  January 2017 serum immunoelectrophoresis showed 600 mg/dL of immunoglobulin G lambda light chains in the serum.  3/8/17 bone marrow biopsy showed normal marrow with 3% plasma cells and a small lambda clonal population identified with no stainable iron.  There was erythroid expansion with mild maturation dyssynchrony and occasional atypical nuclear features that may represent the compensatory response to her anemia though myelodysplasia could not entirely be ruled out.  Had reactive aggregates of lymphoid tissue.     -6/13/2017 sedimentation rate 8, immunoglobulin free light chains free kappa light chains 25.7, free lambda light chains 173.4, kappa/lambda ratio 0.15.  Serum immunoelectrophoresis M-spike 0.9g/dL, C-reactive protein less than 0.01, CMP normal, ionized calcium 1.31, beta-2 microglobulin 3.1, CBC WBC 5500, hemoglobin 14.2, hematocrit 44.6%, MCV 94.8, platelets 259,000.  24-hour urine immunoelectrophoresis pending.   -6/17/2019 data: Lambda 129 with kappa 22 and ratio 0.17.  This is in the range that it has fluctuated since January 2017 upon first testing.  M spike stable 600 mg/dL immunoglobulin G lambda.  C-reactive protein 0.04 with sedimentation rate  of 8.  Creatinine 0.86 with total calcium of 10 and ionized calcium 1.34 upper limit of normal 1.32.  Beta-2 microglobulin 2.8.  CBC unremarkable.  -4/13/2021 data: Beta-2 microglobulin 2.5 stable.  Hemoglobin 15.4 with normal CBC.  CMP unremarkable including creatinine 0.94, calcium 9.0, total protein 7.5, normal alkaline phosphatase 102.  Serum M spike 800 mg/dL IgG lambda stable with normal quantitative immunoglobulins.  Immunoglobulin free lambda light chains 131 mg/L with kappa 18.7 and ratio 0.14 stable x4 years.  Sedimentation rate 7.  C-reactive protein less than 0.3.  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  -2/22/2022 through 2/25/2022 admitted by Dr. Khurram Novak for atrial fibrillation with rapid ventricular response with worsening exercise tolerance.  Started on dofetilide and converted to sinus rhythm after second dose.  Dose decreased due to lengthening of QT corrected.  No further atrial fib after conversion.  Discharged on Eliquis.  -2/11/2022 through 2/15/2022 admitted for atrial fibrillation with RVR and vaginal bleeding.  On Eliquis.    -3/4/2022 hematocrit 51.9% with hemoglobin 714.2 otherwise unremarkable CBC.  CMP unremarkable with normal creatinine 0.92, normal calcium 9.9, alkaline phosphatase minimally elevated 120 upper limit of normal 117, AST 44 otherwise unremarkable, otherwise unremarkable CMP.  IgG lambda stable 800 mg/dL with normal quantitative immunoglobulins and no significant change in M spike over the last 5 years.  Serum lambda light chain 85 continuing to come down from a high of 173 on presentation June 2017 with normal kappa and stable ratio over time at 0.17.  Sedimentation rate 7 with C-reactive protein less than 0.3.  Beta-2 microglobulin 2.9.    -3/4/2022 CT abdomen pelvis with and without contrastShowed right hip arthroplasty being artifact but with tubular masslike structure 9 x 6 cm presacral as suggested on ultrasound.  Could be related to  multilocular cystic right ovarian mass or be related to the right uterine tube.  Malignancy favored.  Incidental 6 mm pulmonary nodule right lower lobe of undetermined significance    -3/7/2022 appointment with Loki Abdalla,gynecologist.  Had postmenopausal bleeding for the previous month.  3/2/2022 ultrasound showed 7.7 cm right adnexal mass and 6.9 cm left adnexal mass with  elevated at 38.4 upper limit of normal 38.1.  CEA normal 1.9.  Endometrial biopsy benign.    -3/10/2022 CT chest compared to CT abdomen 3/4/2022 showed stable 6 mm noncalcified right lower lobe pulmonary nodule with suggestion of follow-up in 6-12 months for stability.    -3/17/2022 JANET/BSO Dr. Fam.  Bilateral benign adenofibroma's of the ovaries.  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  No bessy involvement found.  No lymph vascular invasion.  Pathological stage IIIc.    -4/8/2022 Ca1 25 normal at 21.  CBC unremarkable save for hematocrit 51.3%.  CMP sodium 129 otherwise unremarkable.    -4/18/2022 gynecologic oncology postoperative evaluation.  She had an R0 resection 3/17/2022 for high-grade serous carcinoma.  Plans for Caris MI profile, genetic testing, and adjuvant carboplatin Taxol.    -4/18/2022 [facility] medical oncology follow-up visit: She has had a complex year since last I saw her about a year ago.  She is under care of Dr. Fam for stage III fallopian tube serous carcinoma for which she is due for adjuvant carbotaxol.  Genetic testing is ordered.  Caris MI profile sent but not back.  From my standpoint, the monoclonal gammopathy given stability over time and with unremarkable calcium and creatinine and quantitative immunoglobulins is very likely just benign and we will continue to check this annually.  Her serum light chains actually have gone down over time with no  interventions.  No further work-up from that standpoint.  With reference to her erythrocytosis, I would not put her through work-up for myeloproliferative disorder as I doubt that that is operative especially with recent staging CTs not showing splenomegaly.  She does have small right lower lobe pulmonary nodule that does need follow-up but I am certain that she will be getting routine radiographic/CT follow-up along with serum tumor markers with Dr. Fam and company.  I will remain available to her in the interim but will not increase the frequency of hematology follow-up referable to the newly diagnosed oncologic gynecologic issue that is being expertly managed by Dr. Fam and her team.  It is reassuring that her CA-125 normalized after JANET/BSO and omentectomy etc.     Monoclonal gammopathy    Initial Diagnosis    Monoclonal gammopathy     11/29/2017 Imaging    Bone survey IMPRESSION:  Negative skeletal survey.     6/5/2018 -  Other Event    6/5/2018 labs:  CBC WBC 4350, hemoglobin 12.6, hematocrit 38.3%, platelet count 252,000.  CMP creatinine 0.8, serum calcium 9.4.  Ionized calcium 1.29.  Beta-2 microglobulin 2.3.  Sedimentation rate 9, CRP 0.02.  Kappa/lambda light chains-normal kappa light chains 16.6, lambda light chains elevated at 133.6, kappa/lambda ratio 0.12.  Serum immunoelectrophoresis M-spike stable 0.7 g/dL.       12/20/2018 -  Other Event    24-hour urine monoclonal protein showed no monoclonal protein.  Beta-2 microglobulin 2.5.  AST 43.  Creatinine 0.86.  Calcium 9.5.  Ionized calcium 1.3.  C-reactive protein less than 0.01.  Sedimentation rate 7.  Serum monoclonal protein 800 mg/dL of immunoglobulin G lambda.  Lambda monoclonal protein down to 98.5 with a kappa to lambda ratio 0.18.  CBC is normal.  Total body bone survey done on the day of her visit 12/20/18 was not read by the time of her visit.     6/17/2019 -  Other Event     Lambda 129 with kappa 22 and ratio 0.17.  This is in the  range that it has fluctuated since January 2017 upon first testing.  M spike stable 600 mg/dL immunoglobulin G lambda.  C-reactive protein 0.04 with sedimentation rate of 8.  Creatinine 0.86 with total calcium of 10 and ionized calcium 1.34 upper limit of normal 1.32.  Beta-2 microglobulin 2.8.  CBC unremarkable.     4/6/2020 -  Other Event    Myeloma panel:  CBC WBC 5400, hemoglobin 15.2, platelet count 225,000.  Beta-2 microglobulin 2.6.  Ionized calcium 1.26.  CMP creatinine 0.77, serum calcium 10.1 C-reactive protein 0.08, sedimentation rate 13.  Serum immunoelectrophoresis M spike stable 0.6 g/dL.  Serum free light chains, normal free kappa light chains 16.3, free lambda light chains 107.7, kappa/lambda ratio 0.15.  Kappa/lambda light chains Urine normal.     4/13/2021 -  Other Event    -4/13/2021 data: Beta-2 microglobulin 2.5 stable.  Hemoglobin 15.4 with normal CBC.  CMP unremarkable including creatinine 0.94, calcium 9.0, total protein 7.5, normal alkaline phosphatase 102.  Serum M spike 800 mg/dL IgG lambda stable with normal quantitative immunoglobulins.  Immunoglobulin free lambda light chains 131 mg/L with kappa 18.7 and ratio 0.14 stable x4 years.  Sedimentation rate 7.  C-reactive protein less than 0.3.  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     Adenocarcinoma of right fallopian tube (HCC)   3/2/2022 Imaging    Patient presented to Dr. Abdalla with c/o postmenopausal bleeding.   Transvaginal ultrasound demonstrated a thickened endometrial stripe and bilateral adnexal masses.  Referred to Gyn Oncology due to concern for possible malignancy.      3/4/2022 Imaging    CT abdomen pelvis:  1.Beam hardening artifact from the patient's right total hip arthroplasty makes evaluation of the right presacral/adnexal mass difficult. There is a tubular masslike structure measuring up to 9 x 6 cm with a left posterior (presacral) mural nodular  enhancing component as suggested on ultrasound.  This could be related to a multilocular cystic right ovarian mass or be related to the right uterine tube. Differential favors malignant process in this age group.  2.Incidental 6 mm pulmonary nodule seen in the right lower lobe along the right hemidiaphragm. This is of uncertain clinical significance.     3/10/2022 Imaging    CT chest:  1.  No change in 6 mm noncalcified pulmonary nodule within the right lower lobe.  Follow-up noncontrast CT scan of the chest would be recommended in 6-12 months to document continued stability.     3/17/2022 Surgery    Diagnostic laparoscopy, RTLH/BSO, cancer staging, omentectomy, optimal tumor debulking (R=0), appendectomy by Dr. Christine Fam at [facility]    Final pathology showed high-grade serous carcinoma involving the lumen and wall. Right uterosacral ligament and omentum involved by tumor. Pelvic washings malignant, adenocarcinoma compatible with papillary serous carcinoma. Stage IIIC grade 3       4/11/2022 -  Chemotherapy    OP OVARIAN PACLitaxel / CARBOplatin (Q21D)     4/11/2022 -  Chemotherapy    OP CENTRAL VENOUS ACCESS DEVICE ACCESS, CARE, AND MAINTENANCE (CVAD)         HISTORY OF PRESENT ILLNESS:  The patient is a 66 y.o. female, here for follow up on management of history of monoclonal gammopathy stable over time now with recently diagnosed serous carcinoma right Fallopian tube stage IIIc status post JANET/BSO and due for adjuvant carbotaxol with Dr. Fam    Past Medical History:   Diagnosis Date   • Atrial fibrillation (HCC)    • Corneal abrasion    • Hemorrhoids     bleeding hemorrhoids   • History of anemia    • History of transfusion    • Hypertension    • Hypothyroidism    • LGSIL on Pap smear of cervix 03/10/2022   • Osteoarthritis      Past Surgical History:   Procedure Laterality Date   • AUGMENTATION MAMMAPLASTY Bilateral 2008   • BONE MARROW BIOPSY Left 03/08/2017    Procedure: BONE MARROW BIOPSY;  Surgeon: Michel White MD;  Location: [facility]  "OR;  Service:    • COLONOSCOPY N/A 01/09/2017    Procedure: COLONOSCOPY;  Surgeon: Michel White MD;  Location:  [facility];  Service:    • ENDOSCOPY N/A 01/07/2017    Procedure: ESOPHAGOGASTRODUODENOSCOPY;  Surgeon: Abhishek Benton MD;  Location:  [facility];  Service:    • HEMORRHOIDECTOMY N/A 03/08/2017    Procedure: HEMORRHOIDECTOMY,;  Surgeon: Michel White MD;  Location:  [facility];  Service:    • HIP ARTHROSCOPY Right     2005 & 2012   • JOINT REPLACEMENT      2 right hips   • TONSILLECTOMY  1964   • TOTAL LAPAROSCOPIC HYSTERECTOMY N/A 03/17/2022    Procedure: DIAGNOSTIC LAPAROSCOPY, ROBOTIC ASSISTED TOTAL LAPAROSCOPIC HYSTERECTOMY, BILATERAL SALPINGO-OOPHERECTOMY, CANCER STAGING, OMENTECTOMY,  OPTIMAL TUMOR DEBULKING, APPENDECTOMY;  Surgeon: Christine Fam MD;  Location:  [facility];  Service: Robotics - DaVinci;  Laterality: N/A;   • VENOUS ACCESS DEVICE (PORT) INSERTION Left 04/08/2022       Allergies   Allergen Reactions   • Sulfa Antibiotics Anaphylaxis       Family History and Social History reviewed and changed as necessary    REVIEW OF SYSTEM:   No new somatic complaints.  Uterine bleeding of course has resolved with hysterectomy    PHYSICAL EXAM:  No pallor or plethora.  No respiratory distress.    Vitals:    04/18/22 1045   BP: 166/99   Pulse: 75   Resp: 16   Temp: 97.2 °F (36.2 °C)   SpO2: 98%   Weight: 72.6 kg (160 lb)   Height: 177.8 cm (70\")     Vitals:    04/18/22 1045   PainSc: 0-No pain          ECOG score: 1           Vitals reviewed.    ECOG: (1) Restricted in Physically Strenuous Activity, Ambulatory & Able to Do Work of Light Nature    Lab Results   Component Value Date    HGB 15.5 04/08/2022    HCT 51.3 (H) 04/08/2022    MCV 96.6 04/08/2022     04/08/2022    WBC 5.40 04/08/2022    NEUTROABS 4.20 04/08/2022    LYMPHSABS 1.00 04/08/2022    MONOSABS 0.20 04/08/2022    EOSABS 0.08 03/15/2022    BASOSABS 0.03 03/15/2022       Lab Results   Component Value Date    " GLUCOSE 137 (H) 04/08/2022    BUN 12 04/08/2022    CREATININE 0.78 04/08/2022     (L) 04/08/2022    K 4.7 04/08/2022    CL 97 (L) 04/08/2022    CO2 20.0 (L) 04/08/2022    CALCIUM 9.1 04/08/2022    PROTEINTOT 7.4 04/08/2022    ALBUMIN 4.20 04/08/2022    BILITOT 0.4 04/08/2022    ALKPHOS 100 04/08/2022    AST 28 04/08/2022    ALT 9 04/08/2022             ASSESSMENT & PLAN:  1.  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  Monoclonal gammopathy   3.  History of anemia, resolved after hemorrhoidectomy repair and on oral iron.  CBC on 6/13/2017 with a hemoglobin of 14.2, hematocrit 44.6%, MCV of 94.8.  4.  Atrial fibrillation  5.  Pulmonary nodule  6.  Erythrocytosis      Monoclonal gammopathy and uterine cancer history timeline:    -March 2017 24-hour urine immunoelectrophoresis showed no monoclonal spike, lambda light chains 106 with kappa light chains 24 and a kappa to lambda ratio of 0.23 with normal IgA, G, and M levels.  January 2017 serum immunoelectrophoresis showed 600 mg/dL of immunoglobulin G lambda light chains in the serum.  3/8/17 bone marrow biopsy showed normal marrow with 3% plasma cells and a small lambda clonal population identified with no stainable iron.  There was erythroid expansion with mild maturation dyssynchrony and occasional atypical nuclear features that may represent the compensatory response to her anemia though myelodysplasia could not entirely be ruled out.  Had reactive aggregates of lymphoid tissue.     -6/13/2017 sedimentation rate 8, immunoglobulin free light chains free kappa light chains 25.7, free lambda light chains 173.4, kappa/lambda ratio 0.15.  Serum immunoelectrophoresis M-spike 0.9g/dL, C-reactive protein less than 0.01, CMP normal, ionized calcium 1.31, beta-2 microglobulin 3.1, CBC WBC 5500, hemoglobin 14.2, hematocrit 44.6%, MCV 94.8, platelets 259,000.  24-hour urine immunoelectrophoresis pending.   -6/17/2019 data: Lambda 129 with kappa 22 and ratio  0.17.  This is in the range that it has fluctuated since January 2017 upon first testing.  M spike stable 600 mg/dL immunoglobulin G lambda.  C-reactive protein 0.04 with sedimentation rate of 8.  Creatinine 0.86 with total calcium of 10 and ionized calcium 1.34 upper limit of normal 1.32.  Beta-2 microglobulin 2.8.  CBC unremarkable.  -4/13/2021 data: Beta-2 microglobulin 2.5 stable.  Hemoglobin 15.4 with normal CBC.  CMP unremarkable including creatinine 0.94, calcium 9.0, total protein 7.5, normal alkaline phosphatase 102.  Serum M spike 800 mg/dL IgG lambda stable with normal quantitative immunoglobulins.  Immunoglobulin free lambda light chains 131 mg/L with kappa 18.7 and ratio 0.14 stable x4 years.  Sedimentation rate 7.  C-reactive protein less than 0.3.  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  -2/22/2022 through 2/25/2022 admitted by Dr. Khurram Novak for atrial fibrillation with rapid ventricular response with worsening exercise tolerance.  Started on dofetilide and converted to sinus rhythm after second dose.  Dose decreased due to lengthening of QT corrected.  No further atrial fib after conversion.  Discharged on Eliquis.  -2/11/2022 through 2/15/2022 admitted for atrial fibrillation with RVR and vaginal bleeding.  On Eliquis.    -3/4/2022 hematocrit 51.9% with hemoglobin 714.2 otherwise unremarkable CBC.  CMP unremarkable with normal creatinine 0.92, normal calcium 9.9, alkaline phosphatase minimally elevated 120 upper limit of normal 117, AST 44 otherwise unremarkable, otherwise unremarkable CMP.  IgG lambda stable 800 mg/dL with normal quantitative immunoglobulins and no significant change in M spike over the last 5 years.  Serum lambda light chain 85 continuing to come down from a high of 173 on presentation June 2017 with normal kappa and stable ratio over time at 0.17.  Sedimentation rate 7 with C-reactive protein less than 0.3.  Beta-2 microglobulin 2.9.    -3/4/2022 CT  abdomen pelvis with and without contrastShowed right hip arthroplasty being artifact but with tubular masslike structure 9 x 6 cm presacral as suggested on ultrasound.  Could be related to multilocular cystic right ovarian mass or be related to the right uterine tube.  Malignancy favored.  Incidental 6 mm pulmonary nodule right lower lobe of undetermined significance    -3/7/2022 appointment with Loki Abdalla,gynecologist.  Had postmenopausal bleeding for the previous month.  3/2/2022 ultrasound showed 7.7 cm right adnexal mass and 6.9 cm left adnexal mass with  elevated at 38.4 upper limit of normal 38.1.  CEA normal 1.9.  Endometrial biopsy benign.    -3/10/2022 CT chest compared to CT abdomen 3/4/2022 showed stable 6 mm noncalcified right lower lobe pulmonary nodule with suggestion of follow-up in 6-12 months for stability.    -3/17/2022 JANET/BSO Dr. Fam.  Bilateral benign adenofibroma's of the ovaries.  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  No bessy involvement found.  No lymph vascular invasion.  Pathological stage IIIc.    -4/8/2022 Ca125 normal at 21.  CBC unremarkable save for hematocrit 51.3%.  CMP sodium 129 otherwise unremarkable.    -4/18/2022 gynecologic oncology postoperative evaluation.  She had an R0 resection 3/17/2022 for high-grade serous carcinoma.  Plans for Caris MI profile, genetic testing, and adjuvant carboplatin Taxol.    -4/18/2022 [facility] medical oncology follow-up visit: She has had a complex year since last I saw her about a year ago.  She is under care of Dr. Fam for stage III fallopian tube serous carcinoma for which she is due for adjuvant carbotaxol.  Genetic testing is ordered.  Caris MI profile sent but not back.  From my standpoint, the monoclonal gammopathy given stability over time and with unremarkable calcium  and creatinine and quantitative immunoglobulins is very likely just benign and we will continue to check this annually.  Her serum light chains actually have gone down over time with no interventions.  No further work-up from that standpoint.  With reference to her erythrocytosis, I would not put her through work-up for myeloproliferative disorder as I doubt that that is operative especially with recent staging CTs not showing splenomegaly.  She does have small right lower lobe pulmonary nodule that does need follow-up but I am certain that she will be getting routine radiographic/CT follow-up along with serum tumor markers with Dr. Fam and company.  I will remain available to her in the interim but will not increase the frequency of hematology follow-up referable to the newly diagnosed oncologic gynecologic issue that is being expertly managed by Dr. Fam and her team.  It is reassuring that her CA-125 normalized after JANET/BSO and omentectomy etc.    Total time of care today inclusive of time spent today prior to patient's arrival reviewing interval data from her gynecologist, gynecologic oncologist, CTs, ultrasounds, pathology reports, multiple lab reports and tumor markers etc. and during visit translating all this to the patient and the plan as outlined above and after visit instituting this plan took 30 minutes of patient care time throughout the day today.  René Valencia MD    04/18/2022

## 2022-04-20 ENCOUNTER — LAB (OUTPATIENT)
Dept: LAB | Facility: HOSPITAL | Age: 67
End: 2022-04-20

## 2022-04-20 ENCOUNTER — CLINICAL SUPPORT (OUTPATIENT)
Dept: GENETICS | Facility: HOSPITAL | Age: 67
End: 2022-04-20

## 2022-04-20 DIAGNOSIS — C57.00 FALLOPIAN TUBE CANCER, CARCINOMA, UNSPECIFIED LATERALITY: ICD-10-CM

## 2022-04-20 DIAGNOSIS — Z13.79 GENETIC TESTING: Primary | ICD-10-CM

## 2022-04-20 DIAGNOSIS — Z80.0 FAMILY HISTORY OF MALIGNANT NEOPLASM OF GASTROINTESTINAL TRACT: ICD-10-CM

## 2022-04-20 PROCEDURE — 96040: CPT | Performed by: GENETIC COUNSELOR, MS

## 2022-04-25 NOTE — PROGRESS NOTES
Halie Arriola, a 66-year-old female, was seen for genetic counseling due to a personal history of fallopian tube cancer. Ms. Arriola was recently diagnosed at age 66. She also reports a personal history of melanoma diagnosed at age 39. Her current cancer screening includes annual clinical breast exam, annual mammogram, and colonoscopy every five years. She does not report a personal history of polyps. She was interested in discussing her risk for a hereditary cancer syndrome.  Ms. Arriola opted to pursue comprehensive testing via the CancerNext panel ordered through [employer] which includes BRCA1/2 and 34 additional genes associated with an increased risk of cancer.     PERTINENT FAMILY HISTORY: (See attached pedigree)   Mother:  Lung cancer, 90  Mat. Grandfather: Brain tumor, 87  Mat. Great Aunt: Colon cancer, 88    Records regarding the family history were not available for review.    RISK ASSESSMENT:  Ms. Arriola’s personal history of cancer led to concern for a hereditary cancer syndrome. We discussed BRCA1/2 testing that is typically completed through a multigene panel that allows for evaluation of other genes known to impact cancer risk in addition to BRCA1/2.   Ms. Arriola clearly meets NCCN guidelines criteria for BRCA1/2 testing based on her personal history alone. These risk assessments are based on the family history information provided at the time of the appointment and could change in the future should new information be obtained.    GENETIC COUNSELING: (30 minutes) We reviewed the family history information in detail. Cases of cancer follow three general patterns: sporadic, familial, and hereditary.  While most cancer is sporadic, some cases appear to occur in family clusters.  These cases are said to be familial and account for 10-20% of cancer cases.  Familial cases may be due to a combination of shared genes and environmental factors among family members.  In even fewer  families, the cancer is said to be inherited, and the genes responsible for the cancer are known.      Family histories typical of hereditary cancer syndromes usually include multiple first- and second-degree relatives diagnosed with cancer types that define a syndrome.  These cases tend to be diagnosed at younger-than-expected ages and can be bilateral or multifocal.  The cancer in these families follows an autosomal dominant inheritance pattern, which indicates the likely presence of a mutation in a cancer susceptibility gene.  Children and siblings of an individual believed to carry this mutation have a 50% chance of inheriting that mutation, thereby inheriting the increased risk to develop cancer.  These mutations can be passed down from the maternal or the paternal lineage.    Hereditary ovarian cancer accounts for approximately 10% of all cases of ovarian/fallopian rube/primary peritoneal cancer.  A significant proportion of hereditary breast and ovarian cancer can be attributed to mutations in the BRCA1 and BRCA2 genes.  Mutations in these genes confer an increased risk for breast cancer, ovarian cancer, male breast cancer, prostate cancer, and pancreatic cancer.  Women with a BRCA1 or BRCA2 mutation have up to an 87% lifetime risk of breast cancer and up to a 44% risk of ovarian cancer.  There are other genes that are known to be associated with an increased risk for cancer.  Some of these genes have well defined cancer risks and established management guidelines.  Other genes that can be tested for have been more recently described, and there may be less data regarding the risks and therefore may not have established management guidelines. We reviewed that in some cases, the identification of a genetic mutation may impact treatment options for some types of cancer. We discussed these limitations at length.  In order to get as much information as possible regarding Ms. Arriola’s personal risks and  potential risks for her family, she opted to pursue testing through a panel that would look at several other genes known to increase the risk for cancer.    GENETIC TESTING:  The risks, benefits and limitations of genetic testing and implications for clinical management following testing were reviewed.  DNA test results can influence decisions regarding screening, prevention and surgical management.  Genetic testing can have significant psychological implications for both individuals and families.  Also discussed was the possibility of employment and insurance discrimination based on genetic test results and the laws in place to prevent this (GRICEL).    We discussed panel testing, which would involve testing for BRCA1/2 as well as 34 additional genes that are associated with increased cancer risk. The benefits and limitations of genetic testing were discussed and Ms. Arriola decided to pursue testing via the panel. The implications of a positive or negative test result were discussed. We discussed the possibility that, in some cases, genetic test results may be informative or may be ambiguous due to the identification of a genetic variant. These variants may or may not be associated with an increased cancer risk.  With multigene panel testing, it is not uncommon for a variant of uncertain significance (VUS) to be identified.  If a VUS is identified, testing family members is typically not recommended and screening recommendations are made based on the family history.  The laboratories that perform genetic testing work to reclassify the VUS and send out an amended report if and when a VUS is reclassified.  The majority of variant findings are ultimately reclassified to a negative result.  Given her personal history, a negative test result would not eliminate all cancer risk to her relatives, although the risk would not be as high as it would with positive genetic testing.      PLAN:  Genetic testing via the  CancerNext panel was ordered. Results are expected in 2-3 weeks and Ms. Arriola will be contacted at that time.  She is welcome to call in the meantime with any questions or concerns at [phone].      Mary Jo Jacinto MS, [facility] [place] [facility]  Licensed Certified Genetic Counselor

## 2022-04-26 ENCOUNTER — TELEPHONE (OUTPATIENT)
Dept: ONCOLOGY | Facility: HOSPITAL | Age: 67
End: 2022-04-26

## 2022-04-26 NOTE — TELEPHONE ENCOUNTER
I spoke to the patient to inform her that the [facility] needs a new blood specimen due to failure of the previous specimen. She has an upcoming lab appointment and will have additional specimen drawn to be resent to [facility].

## 2022-04-27 DIAGNOSIS — C57.01 ADENOCARCINOMA OF RIGHT FALLOPIAN TUBE: Primary | ICD-10-CM

## 2022-04-28 LAB
BEAKER LAB AP INTRAOPERATIVE CONSULTATION: NORMAL
CYTO UR: NORMAL
LAB AP CARIS, ADDENDUM: NORMAL
LAB AP CASE REPORT: NORMAL
LAB AP CLINICAL INFORMATION: NORMAL
PATH REPORT.FINAL DX SPEC: NORMAL
PATH REPORT.GROSS SPEC: NORMAL

## 2022-04-29 ENCOUNTER — OFFICE VISIT (OUTPATIENT)
Dept: GYNECOLOGIC ONCOLOGY | Facility: CLINIC | Age: 67
End: 2022-04-29

## 2022-04-29 ENCOUNTER — HOSPITAL ENCOUNTER (OUTPATIENT)
Dept: ONCOLOGY | Facility: HOSPITAL | Age: 67
Setting detail: INFUSION SERIES
Discharge: HOME | End: 2022-04-29

## 2022-04-29 VITALS
TEMPERATURE: 96.4 F | HEART RATE: 87 BPM | HEIGHT: 70 IN | BODY MASS INDEX: 22.05 KG/M2 | SYSTOLIC BLOOD PRESSURE: 142 MMHG | DIASTOLIC BLOOD PRESSURE: 77 MMHG | WEIGHT: 154 LBS | RESPIRATION RATE: 15 BRPM

## 2022-04-29 VITALS
HEIGHT: 70 IN | TEMPERATURE: 96.4 F | WEIGHT: 154 LBS | BODY MASS INDEX: 22.05 KG/M2 | SYSTOLIC BLOOD PRESSURE: 142 MMHG | HEART RATE: 87 BPM | RESPIRATION RATE: 18 BRPM | DIASTOLIC BLOOD PRESSURE: 77 MMHG

## 2022-04-29 DIAGNOSIS — E86.0 DEHYDRATION: ICD-10-CM

## 2022-04-29 DIAGNOSIS — C57.01 ADENOCARCINOMA OF RIGHT FALLOPIAN TUBE: Primary | ICD-10-CM

## 2022-04-29 DIAGNOSIS — C57.01 ADENOCARCINOMA OF RIGHT FALLOPIAN TUBE: Primary | Chronic | ICD-10-CM

## 2022-04-29 DIAGNOSIS — R11.0 CHEMOTHERAPY-INDUCED NAUSEA: ICD-10-CM

## 2022-04-29 DIAGNOSIS — T45.1X5A CHEMOTHERAPY-INDUCED NAUSEA: ICD-10-CM

## 2022-04-29 LAB
ALBUMIN SERPL-MCNC: 4.1 G/DL (ref 3.5–5.2)
ALBUMIN/GLOB SERPL: 1.5 G/DL
ALP SERPL-CCNC: 93 U/L (ref 39–117)
ALT SERPL W P-5'-P-CCNC: 16 U/L (ref 1–33)
ANION GAP SERPL CALCULATED.3IONS-SCNC: 9 MMOL/L (ref 5–15)
AST SERPL-CCNC: 27 U/L (ref 1–32)
BILIRUB SERPL-MCNC: 0.3 MG/DL (ref 0–1.2)
BUN SERPL-MCNC: 11 MG/DL (ref 8–23)
BUN/CREAT SERPL: 16.9 (ref 7–25)
CALCIUM SPEC-SCNC: 8.8 MG/DL (ref 8.6–10.5)
CHLORIDE SERPL-SCNC: 100 MMOL/L (ref 98–107)
CO2 SERPL-SCNC: 21 MMOL/L (ref 22–29)
CREAT SERPL-MCNC: 0.65 MG/DL (ref 0.57–1)
EGFRCR SERPLBLD CKD-EPI 2021: 96.6 ML/MIN/1.73
ERYTHROCYTE [DISTWIDTH] IN BLOOD BY AUTOMATED COUNT: 14.3 % (ref 12.3–15.4)
GLOBULIN UR ELPH-MCNC: 2.7 GM/DL
GLUCOSE SERPL-MCNC: 106 MG/DL (ref 65–99)
HCT VFR BLD AUTO: 38.7 % (ref 34–46.6)
HGB BLD-MCNC: 12 G/DL (ref 12–15.9)
LYMPHOCYTES # BLD AUTO: 2.5 10*3/MM3 (ref 0.7–3.1)
LYMPHOCYTES NFR BLD AUTO: 38.4 % (ref 19.6–45.3)
MCH RBC QN AUTO: 29.7 PG (ref 26.6–33)
MCHC RBC AUTO-ENTMCNC: 31.1 G/DL (ref 31.5–35.7)
MCV RBC AUTO: 95.4 FL (ref 79–97)
MONOCYTES # BLD AUTO: 0.5 10*3/MM3 (ref 0.1–0.9)
MONOCYTES NFR BLD AUTO: 8.4 % (ref 5–12)
NEUTROPHILS NFR BLD AUTO: 3.5 10*3/MM3 (ref 1.7–7)
NEUTROPHILS NFR BLD AUTO: 53.2 % (ref 42.7–76)
PLATELET # BLD AUTO: 134 10*3/MM3 (ref 140–450)
PMV BLD AUTO: 6.4 FL (ref 6–12)
POTASSIUM SERPL-SCNC: 4.1 MMOL/L (ref 3.5–5.2)
PROT SERPL-MCNC: 6.8 G/DL (ref 6–8.5)
RBC # BLD AUTO: 4.06 10*6/MM3 (ref 3.77–5.28)
SODIUM SERPL-SCNC: 130 MMOL/L (ref 136–145)
WBC NRBC COR # BLD: 6.5 10*3/MM3 (ref 3.4–10.8)

## 2022-04-29 PROCEDURE — 36591 DRAW BLOOD OFF VENOUS DEVICE: CPT

## 2022-04-29 PROCEDURE — 86304 IMMUNOASSAY TUMOR CA 125: CPT | Performed by: NURSE PRACTITIONER

## 2022-04-29 PROCEDURE — 99214 OFFICE O/P EST MOD 30 MIN: CPT | Performed by: OBSTETRICS & GYNECOLOGY

## 2022-04-29 PROCEDURE — 25010000002 HEPARIN LOCK FLUSH PER 10 UNITS: Performed by: OBSTETRICS & GYNECOLOGY

## 2022-04-29 PROCEDURE — 80053 COMPREHEN METABOLIC PANEL: CPT | Performed by: NURSE PRACTITIONER

## 2022-04-29 PROCEDURE — 85025 COMPLETE CBC W/AUTO DIFF WBC: CPT | Performed by: NURSE PRACTITIONER

## 2022-04-29 RX ORDER — HEPARIN SODIUM (PORCINE) LOCK FLUSH IV SOLN 100 UNIT/ML 100 UNIT/ML
500 SOLUTION INTRAVENOUS AS NEEDED
Status: DISCONTINUED | OUTPATIENT
Start: 2022-04-29 | End: 2022-04-30 | Stop reason: HOSPADM

## 2022-04-29 RX ORDER — HEPARIN SODIUM (PORCINE) LOCK FLUSH IV SOLN 100 UNIT/ML 100 UNIT/ML
500 SOLUTION INTRAVENOUS AS NEEDED
Status: CANCELLED | OUTPATIENT
Start: 2022-04-29

## 2022-04-29 RX ADMIN — HEPARIN SODIUM (PORCINE) LOCK FLUSH IV SOLN 100 UNIT/ML 500 UNITS: 100 SOLUTION at 15:10

## 2022-04-30 LAB — CANCER AG125 SERPL QL: 10.8 U/ML (ref 0–38.1)

## 2022-05-01 PROBLEM — E86.0 DEHYDRATION: Status: ACTIVE | Noted: 2022-05-01

## 2022-05-01 PROBLEM — T45.1X5A CHEMOTHERAPY-INDUCED NAUSEA: Status: ACTIVE | Noted: 2022-05-01

## 2022-05-01 PROBLEM — R11.0 CHEMOTHERAPY-INDUCED NAUSEA: Status: ACTIVE | Noted: 2022-05-01

## 2022-05-01 RX ORDER — FAMOTIDINE 10 MG/ML
20 INJECTION, SOLUTION INTRAVENOUS AS NEEDED
Status: CANCELLED | OUTPATIENT
Start: 2022-05-02

## 2022-05-01 RX ORDER — FAMOTIDINE 10 MG/ML
20 INJECTION, SOLUTION INTRAVENOUS ONCE
Status: CANCELLED | OUTPATIENT
Start: 2022-05-02

## 2022-05-01 RX ORDER — PALONOSETRON 0.05 MG/ML
0.25 INJECTION, SOLUTION INTRAVENOUS ONCE
Status: CANCELLED | OUTPATIENT
Start: 2022-05-02

## 2022-05-01 RX ORDER — OLANZAPINE 5 MG/1
5 TABLET ORAL ONCE
Status: CANCELLED | OUTPATIENT
Start: 2022-05-02 | End: 2022-05-02

## 2022-05-01 RX ORDER — DIPHENHYDRAMINE HYDROCHLORIDE 50 MG/ML
50 INJECTION INTRAMUSCULAR; INTRAVENOUS AS NEEDED
Status: CANCELLED | OUTPATIENT
Start: 2022-05-02

## 2022-05-01 RX ORDER — SODIUM CHLORIDE 9 MG/ML
250 INJECTION, SOLUTION INTRAVENOUS ONCE
Status: CANCELLED | OUTPATIENT
Start: 2022-05-02

## 2022-05-02 ENCOUNTER — HOSPITAL ENCOUNTER (OUTPATIENT)
Dept: ONCOLOGY | Facility: HOSPITAL | Age: 67
Setting detail: INFUSION SERIES
Discharge: HOME | End: 2022-05-02

## 2022-05-02 VITALS — HEART RATE: 64 BPM | SYSTOLIC BLOOD PRESSURE: 166 MMHG | DIASTOLIC BLOOD PRESSURE: 93 MMHG

## 2022-05-02 DIAGNOSIS — C57.01 ADENOCARCINOMA OF RIGHT FALLOPIAN TUBE: Primary | ICD-10-CM

## 2022-05-02 PROCEDURE — 96367 TX/PROPH/DG ADDL SEQ IV INF: CPT

## 2022-05-02 PROCEDURE — 96415 CHEMO IV INFUSION ADDL HR: CPT

## 2022-05-02 PROCEDURE — 25010000002 PALONOSETRON 0.25 MG/5ML SOLUTION PREFILLED SYRINGE: Performed by: OBSTETRICS & GYNECOLOGY

## 2022-05-02 PROCEDURE — 25010000002 FOSAPREPITANT PER 1 MG: Performed by: OBSTETRICS & GYNECOLOGY

## 2022-05-02 PROCEDURE — 96374 THER/PROPH/DIAG INJ IV PUSH: CPT

## 2022-05-02 PROCEDURE — 25010000002 PACLITAXEL PER 1 MG: Performed by: OBSTETRICS & GYNECOLOGY

## 2022-05-02 PROCEDURE — 96413 CHEMO IV INFUSION 1 HR: CPT

## 2022-05-02 PROCEDURE — 25010000002 DEXAMETHASONE SODIUM PHOSPHATE 100 MG/10ML SOLUTION 10 ML VIAL: Performed by: OBSTETRICS & GYNECOLOGY

## 2022-05-02 PROCEDURE — 25010000002 CARBOPLATIN PER 50 MG: Performed by: OBSTETRICS & GYNECOLOGY

## 2022-05-02 PROCEDURE — 96375 TX/PRO/DX INJ NEW DRUG ADDON: CPT

## 2022-05-02 PROCEDURE — 96417 CHEMO IV INFUS EACH ADDL SEQ: CPT

## 2022-05-02 PROCEDURE — 25010000002 HEPARIN LOCK FLUSH PER 10 UNITS: Performed by: OBSTETRICS & GYNECOLOGY

## 2022-05-02 PROCEDURE — 25010000002 DIPHENHYDRAMINE PER 50 MG: Performed by: OBSTETRICS & GYNECOLOGY

## 2022-05-02 RX ORDER — SODIUM CHLORIDE 9 MG/ML
250 INJECTION, SOLUTION INTRAVENOUS ONCE
Status: COMPLETED | OUTPATIENT
Start: 2022-05-02 | End: 2022-05-02

## 2022-05-02 RX ORDER — OLANZAPINE 5 MG/1
5 TABLET ORAL ONCE
Status: COMPLETED | OUTPATIENT
Start: 2022-05-02 | End: 2022-05-02

## 2022-05-02 RX ORDER — HEPARIN SODIUM (PORCINE) LOCK FLUSH IV SOLN 100 UNIT/ML 100 UNIT/ML
500 SOLUTION INTRAVENOUS AS NEEDED
Status: DISCONTINUED | OUTPATIENT
Start: 2022-05-02 | End: 2022-05-03 | Stop reason: HOSPADM

## 2022-05-02 RX ORDER — FAMOTIDINE 10 MG/ML
20 INJECTION, SOLUTION INTRAVENOUS ONCE
Status: COMPLETED | OUTPATIENT
Start: 2022-05-02 | End: 2022-05-02

## 2022-05-02 RX ORDER — HEPARIN SODIUM (PORCINE) LOCK FLUSH IV SOLN 100 UNIT/ML 100 UNIT/ML
500 SOLUTION INTRAVENOUS AS NEEDED
Status: CANCELLED | OUTPATIENT
Start: 2022-05-02

## 2022-05-02 RX ORDER — PALONOSETRON 0.05 MG/ML
0.25 INJECTION, SOLUTION INTRAVENOUS ONCE
Status: COMPLETED | OUTPATIENT
Start: 2022-05-02 | End: 2022-05-02

## 2022-05-02 RX ADMIN — DEXAMETHASONE SODIUM PHOSPHATE 20 MG: 10 INJECTION, SOLUTION INTRAMUSCULAR; INTRAVENOUS at 09:20

## 2022-05-02 RX ADMIN — FAMOTIDINE 20 MG: 10 INJECTION INTRAVENOUS at 09:19

## 2022-05-02 RX ADMIN — DIPHENHYDRAMINE HYDROCHLORIDE 50 MG: 50 INJECTION, SOLUTION INTRAMUSCULAR; INTRAVENOUS at 09:40

## 2022-05-02 RX ADMIN — HEPARIN 500 UNITS: 100 SYRINGE at 14:15

## 2022-05-02 RX ADMIN — SODIUM CHLORIDE 250 ML: 9 INJECTION, SOLUTION INTRAVENOUS at 09:19

## 2022-05-02 RX ADMIN — PACLITAXEL 330 MG: 6 INJECTION, SOLUTION INTRAVENOUS at 10:32

## 2022-05-02 RX ADMIN — SODIUM CHLORIDE 150 MG: 9 INJECTION, SOLUTION INTRAVENOUS at 09:20

## 2022-05-02 RX ADMIN — OLANZAPINE 5 MG: 5 TABLET, FILM COATED ORAL at 09:28

## 2022-05-02 RX ADMIN — CARBOPLATIN 580 MG: 10 INJECTION, SOLUTION INTRAVENOUS at 13:38

## 2022-05-02 RX ADMIN — PALONOSETRON 0.25 MG: 0.25 INJECTION, SOLUTION INTRAVENOUS at 09:00

## 2022-05-03 ENCOUNTER — APPOINTMENT (OUTPATIENT)
Dept: ONCOLOGY | Facility: HOSPITAL | Age: 67
End: 2022-05-03

## 2022-05-04 ENCOUNTER — APPOINTMENT (OUTPATIENT)
Dept: ONCOLOGY | Facility: HOSPITAL | Age: 67
End: 2022-05-04

## 2022-05-04 DIAGNOSIS — C57.01 ADENOCARCINOMA OF RIGHT FALLOPIAN TUBE: Primary | ICD-10-CM

## 2022-05-04 DIAGNOSIS — E86.0 DEHYDRATION: ICD-10-CM

## 2022-05-05 ENCOUNTER — APPOINTMENT (OUTPATIENT)
Dept: ONCOLOGY | Facility: HOSPITAL | Age: 67
End: 2022-05-05

## 2022-05-05 ENCOUNTER — TELEPHONE (OUTPATIENT)
Dept: SOCIAL WORK | Facility: HOSPITAL | Age: 67
End: 2022-05-05

## 2022-05-06 ENCOUNTER — APPOINTMENT (OUTPATIENT)
Dept: ONCOLOGY | Facility: HOSPITAL | Age: 67
End: 2022-05-06

## 2022-05-09 ENCOUNTER — APPOINTMENT (OUTPATIENT)
Dept: ONCOLOGY | Facility: HOSPITAL | Age: 67
End: 2022-05-09

## 2022-05-16 ENCOUNTER — TELEPHONE (OUTPATIENT)
Dept: ONCOLOGY | Facility: HOSPITAL | Age: 67
End: 2022-05-16

## 2022-05-16 ENCOUNTER — DOCUMENTATION (OUTPATIENT)
Dept: GENETICS | Facility: HOSPITAL | Age: 67
End: 2022-05-16

## 2022-05-16 NOTE — PROGRESS NOTES
Halie Arriola, a 66-year-old female, was seen for genetic counseling due to a personal history of fallopian tube cancer that was recently diagnosed at age 66. She also reports a personal history of melanoma diagnosed at age 39. Her current cancer screening includes annual clinical breast exam, annual mammogram, and colonoscopy every five years. She does not report a personal history of polyps. She was interested in discussing her risk for a hereditary cancer syndrome.  Ms. Arriola opted to pursue comprehensive testing via the CancerNext panel ordered through [employer] which includes BRCA1/2 and 34 additional genes associated with an increased risk of cancer. The genes on this panel include APC, BANDAR, AXIN2, BARD1, BMPR1A, BRCA1, BRCA2, BRIP1, CDH1, CDK4, CDKN2A, CHEK2, DICER1, EPCAM, GREM1, HOXB13, MLH1, MSH2, MSH3, MSH6, MUTYH, NBN, NF1, NTHL1, PALB2, PMS2, POLD1, POLE, PTEN, RAD51C, RAD51D, RECQL, SMAD4, SMARCA4, STK11, and TP53.  Genetic testing was negative for pathogenic mutations in BRCA1/2 and 34 additional genes on the CancerNext panel. These normal results were discussed with Ms. Arriola on 5/16/2022.    PERTINENT FAMILY HISTORY: (See attached pedigree)   Mother:  Lung cancer, 90  Mat. Grandfather: Brain tumor, 87  Mat. Great Aunt: Colon cancer, 88    Records regarding the family history were not available for review.    RISK ASSESSMENT:  Ms. Arriola’s personal history of cancer led to concern for a hereditary cancer syndrome. We discussed BRCA1/2 testing that is typically completed through a multigene panel that allows for evaluation of other genes known to impact cancer risk in addition to BRCA1/2.   Ms. Arriola clearly meets NCCN guidelines criteria for BRCA1/2 testing based on her personal history alone. These risk assessments are based on the family history information provided at the time of the appointment and could change in the future should new information be  obtained.    GENETIC COUNSELING: We reviewed the family history information in detail. Cases of cancer follow three general patterns: sporadic, familial, and hereditary.  While most cancer is sporadic, some cases appear to occur in family clusters.  These cases are said to be familial and account for 10-20% of cancer cases.  Familial cases may be due to a combination of shared genes and environmental factors among family members.  In even fewer families, the cancer is said to be inherited, and the genes responsible for the cancer are known.      Family histories typical of hereditary cancer syndromes usually include multiple first- and second-degree relatives diagnosed with cancer types that define a syndrome.  These cases tend to be diagnosed at younger-than-expected ages and can be bilateral or multifocal.  The cancer in these families follows an autosomal dominant inheritance pattern, which indicates the likely presence of a mutation in a cancer susceptibility gene.  Children and siblings of an individual believed to carry this mutation have a 50% chance of inheriting that mutation, thereby inheriting the increased risk to develop cancer.  These mutations can be passed down from the maternal or the paternal lineage.    Hereditary ovarian cancer accounts for approximately 10% of all cases of ovarian/fallopian rube/primary peritoneal cancer.  A significant proportion of hereditary breast and ovarian cancer can be attributed to mutations in the BRCA1 and BRCA2 genes.  Mutations in these genes confer an increased risk for breast cancer, ovarian cancer, male breast cancer, prostate cancer, and pancreatic cancer.  Women with a BRCA1 or BRCA2 mutation have up to an 87% lifetime risk of breast cancer and up to a 44% risk of ovarian cancer.  There are other genes that are known to be associated with an increased risk for cancer.  Some of these genes have well defined cancer risks and established management guidelines.   Other genes that can be tested for have been more recently described, and there may be less data regarding the risks and therefore may not have established management guidelines. We reviewed that in some cases, the identification of a genetic mutation may impact treatment options for some types of cancer. We discussed these limitations at length.  In order to get as much information as possible regarding Ms. Arriola’s personal risks and potential risks for her family, she opted to pursue testing through a panel that would look at several other genes known to increase the risk for cancer.    GENETIC TESTING:  The risks, benefits and limitations of genetic testing and implications for clinical management following testing were reviewed.  DNA test results can influence decisions regarding screening, prevention and surgical management.  Genetic testing can have significant psychological implications for both individuals and families.  Also discussed was the possibility of employment and insurance discrimination based on genetic test results and the laws in place to prevent this (GRICEL).    We discussed panel testing, which would involve testing for BRCA1/2 as well as 34 additional genes that are associated with increased cancer risk. The benefits and limitations of genetic testing were discussed and Ms. Arriola decided to pursue testing via the panel. The implications of a positive or negative test result were discussed. We discussed the possibility that, in some cases, genetic test results may be informative or may be ambiguous due to the identification of a genetic variant. These variants may or may not be associated with an increased cancer risk.  With multigene panel testing, it is not uncommon for a variant of uncertain significance (VUS) to be identified.  If a VUS is identified, testing family members is typically not recommended and screening recommendations are made based on the family history.  The  laboratories that perform genetic testing work to reclassify the VUS and send out an amended report if and when a VUS is reclassified.  The majority of variant findings are ultimately reclassified to a negative result.  Given her personal history, a negative test result would not eliminate all cancer risk to her relatives, although the risk would not be as high as it would with positive genetic testing.      TEST RESULTS: Genetic testing was negative for known pathogenic mutations by sequencing, rearrangement testing, and RNA analysis for the 36 genes on the CancerNext panel. The negative result greatly lowers but does not eliminate the risk of a hereditary cancer syndrome for Ms. Arriola.     PLAN:  Genetic counseling remains available to Ms. Arriola, and she is welcome to contact us at [phone] with any questions she may have.      Mary Jo Jacinto MS, [facility], [facility]  Licensed Certified Genetic Counselor       Cc: Halie Fam MD

## 2022-05-20 ENCOUNTER — HOSPITAL ENCOUNTER (OUTPATIENT)
Dept: ONCOLOGY | Facility: HOSPITAL | Age: 67
Setting detail: INFUSION SERIES
Discharge: HOME | End: 2022-05-20

## 2022-05-20 ENCOUNTER — OFFICE VISIT (OUTPATIENT)
Dept: GYNECOLOGIC ONCOLOGY | Facility: CLINIC | Age: 67
End: 2022-05-20

## 2022-05-20 VITALS
SYSTOLIC BLOOD PRESSURE: 159 MMHG | RESPIRATION RATE: 15 BRPM | WEIGHT: 153.4 LBS | HEIGHT: 70 IN | HEART RATE: 62 BPM | DIASTOLIC BLOOD PRESSURE: 72 MMHG | OXYGEN SATURATION: 98 % | TEMPERATURE: 98 F | BODY MASS INDEX: 21.96 KG/M2

## 2022-05-20 DIAGNOSIS — C57.01 ADENOCARCINOMA OF RIGHT FALLOPIAN TUBE: Primary | ICD-10-CM

## 2022-05-20 DIAGNOSIS — C57.01 ADENOCARCINOMA OF RIGHT FALLOPIAN TUBE: Primary | Chronic | ICD-10-CM

## 2022-05-20 LAB
ALBUMIN SERPL-MCNC: 4 G/DL (ref 3.5–5.2)
ALBUMIN/GLOB SERPL: 1.5 G/DL
ALP SERPL-CCNC: 83 U/L (ref 39–117)
ALT SERPL W P-5'-P-CCNC: 12 U/L (ref 1–33)
ANION GAP SERPL CALCULATED.3IONS-SCNC: 11 MMOL/L (ref 5–15)
AST SERPL-CCNC: 23 U/L (ref 1–32)
BILIRUB SERPL-MCNC: 0.3 MG/DL (ref 0–1.2)
BUN SERPL-MCNC: 11 MG/DL (ref 8–23)
BUN/CREAT SERPL: 17.5 (ref 7–25)
CALCIUM SPEC-SCNC: 9.1 MG/DL (ref 8.6–10.5)
CANCER AG125 SERPL QL: 8.3 U/ML (ref 0–38.1)
CHLORIDE SERPL-SCNC: 102 MMOL/L (ref 98–107)
CO2 SERPL-SCNC: 24 MMOL/L (ref 22–29)
CREAT SERPL-MCNC: 0.63 MG/DL (ref 0.57–1)
EGFRCR SERPLBLD CKD-EPI 2021: 97.4 ML/MIN/1.73
ERYTHROCYTE [DISTWIDTH] IN BLOOD BY AUTOMATED COUNT: 16 % (ref 12.3–15.4)
GLOBULIN UR ELPH-MCNC: 2.7 GM/DL
GLUCOSE SERPL-MCNC: 92 MG/DL (ref 65–99)
HCT VFR BLD AUTO: 34.4 % (ref 34–46.6)
HGB BLD-MCNC: 10.8 G/DL (ref 12–15.9)
LYMPHOCYTES # BLD AUTO: 1.9 10*3/MM3 (ref 0.7–3.1)
LYMPHOCYTES NFR BLD AUTO: 47.7 % (ref 19.6–45.3)
MCH RBC QN AUTO: 29.8 PG (ref 26.6–33)
MCHC RBC AUTO-ENTMCNC: 31.5 G/DL (ref 31.5–35.7)
MCV RBC AUTO: 94.7 FL (ref 79–97)
MONOCYTES # BLD AUTO: 0.5 10*3/MM3 (ref 0.1–0.9)
MONOCYTES NFR BLD AUTO: 13.5 % (ref 5–12)
NEUTROPHILS NFR BLD AUTO: 1.6 10*3/MM3 (ref 1.7–7)
NEUTROPHILS NFR BLD AUTO: 38.8 % (ref 42.7–76)
PLATELET # BLD AUTO: 147 10*3/MM3 (ref 140–450)
PMV BLD AUTO: 5.7 FL (ref 6–12)
POTASSIUM SERPL-SCNC: 4.2 MMOL/L (ref 3.5–5.2)
PROT SERPL-MCNC: 6.7 G/DL (ref 6–8.5)
RBC # BLD AUTO: 3.63 10*6/MM3 (ref 3.77–5.28)
SODIUM SERPL-SCNC: 137 MMOL/L (ref 136–145)
WBC NRBC COR # BLD: 4 10*3/MM3 (ref 3.4–10.8)

## 2022-05-20 PROCEDURE — 99214 OFFICE O/P EST MOD 30 MIN: CPT | Performed by: OBSTETRICS & GYNECOLOGY

## 2022-05-20 PROCEDURE — 86304 IMMUNOASSAY TUMOR CA 125: CPT | Performed by: NURSE PRACTITIONER

## 2022-05-20 PROCEDURE — 25010000002 HEPARIN LOCK FLUSH PER 10 UNITS: Performed by: OBSTETRICS & GYNECOLOGY

## 2022-05-20 PROCEDURE — 85025 COMPLETE CBC W/AUTO DIFF WBC: CPT | Performed by: NURSE PRACTITIONER

## 2022-05-20 PROCEDURE — 36591 DRAW BLOOD OFF VENOUS DEVICE: CPT

## 2022-05-20 PROCEDURE — 80053 COMPREHEN METABOLIC PANEL: CPT | Performed by: NURSE PRACTITIONER

## 2022-05-20 RX ORDER — OLANZAPINE 5 MG/1
5 TABLET ORAL ONCE
Status: CANCELLED | OUTPATIENT
Start: 2022-05-23 | End: 2022-05-23

## 2022-05-20 RX ORDER — DIPHENHYDRAMINE HYDROCHLORIDE 50 MG/ML
50 INJECTION INTRAMUSCULAR; INTRAVENOUS AS NEEDED
Status: CANCELLED | OUTPATIENT
Start: 2022-05-23

## 2022-05-20 RX ORDER — HEPARIN SODIUM (PORCINE) LOCK FLUSH IV SOLN 100 UNIT/ML 100 UNIT/ML
500 SOLUTION INTRAVENOUS AS NEEDED
Status: DISCONTINUED | OUTPATIENT
Start: 2022-05-20 | End: 2022-05-21 | Stop reason: HOSPADM

## 2022-05-20 RX ORDER — PALONOSETRON 0.05 MG/ML
0.25 INJECTION, SOLUTION INTRAVENOUS ONCE
Status: CANCELLED | OUTPATIENT
Start: 2022-05-23

## 2022-05-20 RX ORDER — FAMOTIDINE 10 MG/ML
20 INJECTION, SOLUTION INTRAVENOUS AS NEEDED
Status: CANCELLED | OUTPATIENT
Start: 2022-05-23

## 2022-05-20 RX ORDER — SODIUM CHLORIDE 9 MG/ML
250 INJECTION, SOLUTION INTRAVENOUS ONCE
Status: CANCELLED | OUTPATIENT
Start: 2022-05-23

## 2022-05-20 RX ORDER — HEPARIN SODIUM (PORCINE) LOCK FLUSH IV SOLN 100 UNIT/ML 100 UNIT/ML
500 SOLUTION INTRAVENOUS AS NEEDED
Status: CANCELLED | OUTPATIENT
Start: 2022-05-20

## 2022-05-20 RX ORDER — FAMOTIDINE 10 MG/ML
20 INJECTION, SOLUTION INTRAVENOUS ONCE
Status: CANCELLED | OUTPATIENT
Start: 2022-05-23

## 2022-05-20 RX ADMIN — HEPARIN 500 UNITS: 100 SYRINGE at 10:40

## 2022-05-20 NOTE — PROGRESS NOTES
Halie [facility]  [id number]  1955      Reason for visit: Fallopian tube cancer, toxicity evaluation, consideration of ongoing treatment    History of present illness:  The patient is a 66 y.o. year old female who presents today for treatment and evaluation of the above issues.    Today, patient reports she is overall doing very well.  She is taking walks at the [facility] for exercise and exercises daily.  She denies neuropathy.  She denies nausea and vomiting.  She notes normal bowel and bladder function and appetite.  The week after chemotherapy she complains of being foggy and drained.  She did not undergo IV hydration after her last treatment but is wondering if this would be a good idea.  She also working her weekend job as an ID APRN.    OBGYN History:  She is a .  She does not use HRT. She does not have a history of abnormal pap smears. Last pap smear 3/2022. Last mammogram 10/2021.    Oncologic History:  Oncology/Hematology History Overview Note   1.  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  Monoclonal gammopathy   3.  History of anemia, resolved after hemorrhoidectomy repair and on oral iron.  CBC on 2017 with a hemoglobin of 14.2, hematocrit 44.6%, MCV of 94.8.  4.  Atrial fibrillation  5.  Pulmonary nodule  6.  Erythrocytosis      Monoclonal gammopathy and uterine cancer history timeline:    -2017 24-hour urine immunoelectrophoresis showed no monoclonal spike, lambda light chains 106 with kappa light chains 24 and a kappa to lambda ratio of 0.23 with normal IgA, G, and M levels.  2017 serum immunoelectrophoresis showed 600 mg/dL of immunoglobulin G lambda light chains in the serum.  3/8/17 bone marrow biopsy showed normal marrow with 3% plasma cells and a small lambda clonal population identified with no stainable iron.  There was erythroid expansion with mild maturation dyssynchrony and occasional atypical nuclear features that may represent the compensatory  response to her anemia though myelodysplasia could not entirely be ruled out.  Had reactive aggregates of lymphoid tissue.     -6/13/2017 sedimentation rate 8, immunoglobulin free light chains free kappa light chains 25.7, free lambda light chains 173.4, kappa/lambda ratio 0.15.  Serum immunoelectrophoresis M-spike 0.9g/dL, C-reactive protein less than 0.01, CMP normal, ionized calcium 1.31, beta-2 microglobulin 3.1, CBC WBC 5500, hemoglobin 14.2, hematocrit 44.6%, MCV 94.8, platelets 259,000.  24-hour urine immunoelectrophoresis pending.   -6/17/2019 data: Lambda 129 with kappa 22 and ratio 0.17.  This is in the range that it has fluctuated since January 2017 upon first testing.  M spike stable 600 mg/dL immunoglobulin G lambda.  C-reactive protein 0.04 with sedimentation rate of 8.  Creatinine 0.86 with total calcium of 10 and ionized calcium 1.34 upper limit of normal 1.32.  Beta-2 microglobulin 2.8.  CBC unremarkable.  -4/13/2021 data: Beta-2 microglobulin 2.5 stable.  Hemoglobin 15.4 with normal CBC.  CMP unremarkable including creatinine 0.94, calcium 9.0, total protein 7.5, normal alkaline phosphatase 102.  Serum M spike 800 mg/dL IgG lambda stable with normal quantitative immunoglobulins.  Immunoglobulin free lambda light chains 131 mg/L with kappa 18.7 and ratio 0.14 stable x4 years.  Sedimentation rate 7.  C-reactive protein less than 0.3.  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  -2/22/2022 through 2/25/2022 admitted by Dr. Khurram Novak for atrial fibrillation with rapid ventricular response with worsening exercise tolerance.  Started on dofetilide and converted to sinus rhythm after second dose.  Dose decreased due to lengthening of QT corrected.  No further atrial fib after conversion.  Discharged on Eliquis.  -2/11/2022 through 2/15/2022 admitted for atrial fibrillation with RVR and vaginal bleeding.  On Eliquis.    -3/4/2022 hematocrit 51.9% with hemoglobin 714.2 otherwise  unremarkable CBC.  CMP unremarkable with normal creatinine 0.92, normal calcium 9.9, alkaline phosphatase minimally elevated 120 upper limit of normal 117, AST 44 otherwise unremarkable, otherwise unremarkable CMP.  IgG lambda stable 800 mg/dL with normal quantitative immunoglobulins and no significant change in M spike over the last 5 years.  Serum lambda light chain 85 continuing to come down from a high of 173 on presentation June 2017 with normal kappa and stable ratio over time at 0.17.  Sedimentation rate 7 with C-reactive protein less than 0.3.  Beta-2 microglobulin 2.9.    -3/4/2022 CT abdomen pelvis with and without contrastShowed right hip arthroplasty being artifact but with tubular masslike structure 9 x 6 cm presacral as suggested on ultrasound.  Could be related to multilocular cystic right ovarian mass or be related to the right uterine tube.  Malignancy favored.  Incidental 6 mm pulmonary nodule right lower lobe of undetermined significance    -3/7/2022 appointment with Loki Abdallagynecologist.  Had postmenopausal bleeding for the previous month.  3/2/2022 ultrasound showed 7.7 cm right adnexal mass and 6.9 cm left adnexal mass with  elevated at 38.4 upper limit of normal 38.1.  CEA normal 1.9.  Endometrial biopsy benign.    -3/10/2022 CT chest compared to CT abdomen 3/4/2022 showed stable 6 mm noncalcified right lower lobe pulmonary nodule with suggestion of follow-up in 6-12 months for stability.    -3/17/2022 JANET/BSO Dr. Fam.  Bilateral benign adenofibroma's of the ovaries.  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  No bessy involvement found.  No lymph vascular invasion.  Pathological stage IIIc.    -4/8/2022 Ca1 25 normal at 21.  CBC unremarkable save for hematocrit 51.3%.  CMP sodium 129 otherwise unremarkable.    -4/18/2022 gynecologic  oncology postoperative evaluation.  She had an R0 resection 3/17/2022 for high-grade serous carcinoma.  Plans for Caris MI profile, genetic testing, and adjuvant carboplatin Taxol.    -4/18/2022 [facility] medical oncology follow-up visit: She has had a complex year since last I saw her about a year ago.  She is under care of Dr. Fam for stage III fallopian tube serous carcinoma for which she is due for adjuvant carbotaxol.  Genetic testing is ordered.  Kermit MI profile sent but not back.  From my standpoint, the monoclonal gammopathy given stability over time and with unremarkable calcium and creatinine and quantitative immunoglobulins is very likely just benign and we will continue to check this annually.  Her serum light chains actually have gone down over time with no interventions.  No further work-up from that standpoint.  With reference to her erythrocytosis, I would not put her through work-up for myeloproliferative disorder as I doubt that that is operative especially with recent staging CTs not showing splenomegaly.  She does have small right lower lobe pulmonary nodule that does need follow-up but I am certain that she will be getting routine radiographic/CT follow-up along with serum tumor markers with Dr. Fam and company.  I will remain available to her in the interim but will not increase the frequency of hematology follow-up referable to the newly diagnosed oncologic gynecologic issue that is being expertly managed by Dr. Fam and her team.  It is reassuring that her CA-125 normalized after JANET/BSO and omentectomy etc.     Monoclonal gammopathy    Initial Diagnosis    Monoclonal gammopathy     11/29/2017 Imaging    Bone survey IMPRESSION:  Negative skeletal survey.     6/5/2018 -  Other Event    6/5/2018 labs:  CBC WBC 4350, hemoglobin 12.6, hematocrit 38.3%, platelet count 252,000.  CMP creatinine 0.8, serum calcium 9.4.  Ionized calcium 1.29.  Beta-2 microglobulin 2.3.  Sedimentation rate  9, CRP 0.02.  Kappa/lambda light chains-normal kappa light chains 16.6, lambda light chains elevated at 133.6, kappa/lambda ratio 0.12.  Serum immunoelectrophoresis M-spike stable 0.7 g/dL.       12/20/2018 -  Other Event    24-hour urine monoclonal protein showed no monoclonal protein.  Beta-2 microglobulin 2.5.  AST 43.  Creatinine 0.86.  Calcium 9.5.  Ionized calcium 1.3.  C-reactive protein less than 0.01.  Sedimentation rate 7.  Serum monoclonal protein 800 mg/dL of immunoglobulin G lambda.  Lambda monoclonal protein down to 98.5 with a kappa to lambda ratio 0.18.  CBC is normal.  Total body bone survey done on the day of her visit 12/20/18 was not read by the time of her visit.     6/17/2019 -  Other Event     Lambda 129 with kappa 22 and ratio 0.17.  This is in the range that it has fluctuated since January 2017 upon first testing.  M spike stable 600 mg/dL immunoglobulin G lambda.  C-reactive protein 0.04 with sedimentation rate of 8.  Creatinine 0.86 with total calcium of 10 and ionized calcium 1.34 upper limit of normal 1.32.  Beta-2 microglobulin 2.8.  CBC unremarkable.     4/6/2020 -  Other Event    Myeloma panel:  CBC WBC 5400, hemoglobin 15.2, platelet count 225,000.  Beta-2 microglobulin 2.6.  Ionized calcium 1.26.  CMP creatinine 0.77, serum calcium 10.1 C-reactive protein 0.08, sedimentation rate 13.  Serum immunoelectrophoresis M spike stable 0.6 g/dL.  Serum free light chains, normal free kappa light chains 16.3, free lambda light chains 107.7, kappa/lambda ratio 0.15.  Kappa/lambda light chains Urine normal.     4/13/2021 -  Other Event    -4/13/2021 data: Beta-2 microglobulin 2.5 stable.  Hemoglobin 15.4 with normal CBC.  CMP unremarkable including creatinine 0.94, calcium 9.0, total protein 7.5, normal alkaline phosphatase 102.  Serum M spike 800 mg/dL IgG lambda stable with normal quantitative immunoglobulins.  Immunoglobulin free lambda light chains 131 mg/L with kappa 18.7 and ratio 0.14  stable x4 years.  Sedimentation rate 7.  C-reactive protein less than 0.3.  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     Adenocarcinoma of right fallopian tube (HCC)   3/2/2022 Imaging    Patient presented to Dr. Abdalla with c/o postmenopausal bleeding.   Transvaginal ultrasound demonstrated a thickened endometrial stripe and bilateral adnexal masses.  Referred to Gyn Oncology due to concern for possible malignancy.      3/4/2022 Imaging    CT abdomen pelvis:  1.Beam hardening artifact from the patient's right total hip arthroplasty makes evaluation of the right presacral/adnexal mass difficult. There is a tubular masslike structure measuring up to 9 x 6 cm with a left posterior (presacral) mural nodular  enhancing component as suggested on ultrasound. This could be related to a multilocular cystic right ovarian mass or be related to the right uterine tube. Differential favors malignant process in this age group.  2.Incidental 6 mm pulmonary nodule seen in the right lower lobe along the right hemidiaphragm. This is of uncertain clinical significance.     3/10/2022 Imaging    CT chest:  1.  No change in 6 mm noncalcified pulmonary nodule within the right lower lobe.  Follow-up noncontrast CT scan of the chest would be recommended in 6-12 months to document continued stability.     3/17/2022 Surgery    Diagnostic laparoscopy, RTLH/BSO, cancer staging, omentectomy, optimal tumor debulking (R=0), appendectomy by Dr. Christine Fam at [facility]    Final pathology showed high-grade serous carcinoma involving the lumen and wall. Right uterosacral ligament and omentum involved by tumor. Pelvic washings malignant, adenocarcinoma compatible with papillary serous carcinoma. Stage IIIC grade 3       4/11/2022 -  Chemotherapy    OP OVARIAN PACLitaxel / CARBOplatin (Q21D)     4/19/2022 Molecular Testing    CARIS results:  PD-L1 positive  ER positive, 2+, 95%  MSI stable, MMR proficient, TMB low,  genomic ISACC low  KY negative  ARID1A, BANDAR, BRAF, EGFR, RAD51 C/D negative  BRCA1/2 negative  Her2/Patsy negative     5/5/2022 -  Chemotherapy    OP SUPPORTIVE HYDRATION + ANTIEMETICS           Past Medical History:   Diagnosis Date   • Atrial fibrillation (HCC)    • Corneal abrasion    • Hemorrhoids     bleeding hemorrhoids   • History of anemia    • History of transfusion    • Hypertension    • Hypothyroidism    • LGSIL on Pap smear of cervix 03/10/2022   • Osteoarthritis        Past Surgical History:   Procedure Laterality Date   • AUGMENTATION MAMMAPLASTY Bilateral 2008   • BONE MARROW BIOPSY Left 03/08/2017    Procedure: BONE MARROW BIOPSY;  Surgeon: Michel White MD;  Location:  [facility];  Service:    • COLONOSCOPY N/A 01/09/2017    Procedure: COLONOSCOPY;  Surgeon: Michel White MD;  Location:  [facility];  Service:    • ENDOSCOPY N/A 01/07/2017    Procedure: ESOPHAGOGASTRODUODENOSCOPY;  Surgeon: Abhishek Benton MD;  Location:  [facility];  Service:    • HEMORRHOIDECTOMY N/A 03/08/2017    Procedure: HEMORRHOIDECTOMY,;  Surgeon: Michel White MD;  Location:  [facility];  Service:    • HIP ARTHROSCOPY Right     2005 & 2012   • JOINT REPLACEMENT      2 right hips   • TONSILLECTOMY  1964   • TOTAL LAPAROSCOPIC HYSTERECTOMY N/A 03/17/2022    Procedure: DIAGNOSTIC LAPAROSCOPY, ROBOTIC ASSISTED TOTAL LAPAROSCOPIC HYSTERECTOMY, BILATERAL SALPINGO-OOPHERECTOMY, CANCER STAGING, OMENTECTOMY,  OPTIMAL TUMOR DEBULKING, APPENDECTOMY;  Surgeon: Christine Fam MD;  Location:  [facility];  Service: Robotics - [facility];  Laterality: N/A;   • VENOUS ACCESS DEVICE (PORT) INSERTION Left 04/08/2022       MEDICATIONS: The current medication list was reviewed with the patient and updated in the EMR this date per the Medical Assistant. Medication dosages and frequencies were confirmed to be accurate.      Allergies:  is allergic to sulfa antibiotics.    Social History:   Social History     Socioeconomic History   •  "Marital status:    Tobacco Use   • Smoking status: Former Smoker     Packs/day: 1.00     Years: 15.00     Pack years: 15.00     Types: Cigarettes     Quit date:      Years since quittin.4   • Smokeless tobacco: Never Used   • Tobacco comment: quit 35+ years ago.    Vaping Use   • Vaping Use: Never used   Substance and Sexual Activity   • Alcohol use: Not Currently   • Drug use: No   • Sexual activity: Defer       Family History:    Family History   Problem Relation Age of Onset   • Atrial fibrillation Mother    • Diabetes Mother    • Atrial fibrillation Brother    • Abnormal EKG Brother    • Heart attack Father    • Stroke Father    • Diabetes Sister    • Breast cancer Neg Hx    • Ovarian cancer Neg Hx    • Uterine cancer Neg Hx    • Colon cancer Neg Hx        Health Maintenance:    Health Maintenance   Topic Date Due   • DXA SCAN  Never done   • TDAP/TD VACCINES (1 - Tdap) Never done   • ANNUAL WELLNESS VISIT  Never done   • Pneumococcal Vaccine 65+ (1 - PCV) Never done   • MAMMOGRAM  2020   • INFLUENZA VACCINE  2022   • PAP SMEAR  2025   • COLORECTAL CANCER SCREENING  2027   • HEPATITIS C SCREENING  Completed   • COVID-19 Vaccine  Completed   • ZOSTER VACCINE  Completed     Review of Systems   Please refer to history of present illness, review of systems otherwise unremarkable.    Physical Exam    Vitals:    22 0948   BP: 159/72   Pulse: 62   Resp: 15   Temp: 98 °F (36.7 °C)   SpO2: 98%   Weight: 69.6 kg (153 lb 6.4 oz)   Height: 177.8 cm (70\")   PainSc: 0-No pain       Body mass index is 22.01 kg/m².    Wt Readings from Last 3 Encounters:   22 69.6 kg (153 lb 6.4 oz)   22 69.9 kg (154 lb)   22 69.9 kg (154 lb)     GENERAL: Alert, well-appearing female appearing her stated age who is in no apparent distress.   HEENT: Sclera anicteric. Head normocephalic, atraumatic. Mucus membranes moist.  Wearing wig  NECK: Trachea midline, supple, without masses.  "   BREASTS: Deferred  CARDIOVASCULAR: Normal rate, irregular rhythm, no murmurs, rubs, or gallops. No peripheral edema.  RESPIRATORY: Clear to auscultation bilaterally, normal respiratory effort  BACK:  No CVA tenderness, no vertebral tenderness on palpation  GASTROINTESTINAL:  Abdomen is soft, non-tender, non-distended, no rebound or guarding  SKIN:  Warm, dry, well-perfused.  All visible areas intact.  No rashes, lesions, ulcers.  PSYCHIATRIC: AO x3, with appropriate affect, normal thought processes.  NEUROLOGIC: No focal deficits.  Moves extremities well.  MUSCULOSKELETAL: Normal gait and station.   EXTREMITIES:   No cyanosis, clubbing, symmetric.  LYMPHATICS:  No cervical or inguinal adenopathy noted.   PELVIC exam:  Defferred    ECOG PS 0    PROCEDURES:  None    Diagnostic Data:      Lab Results   Component Value Date    WBC 4.00 05/20/2022    HGB 10.8 (L) 05/20/2022    HCT 34.4 05/20/2022    MCV 94.7 05/20/2022     05/20/2022    NEUTROABS 1.60 (L) 05/20/2022    GLUCOSE 106 (H) 04/29/2022    BUN 11 04/29/2022    CREATININE 0.65 04/29/2022    EGFRIFNONA 61 02/25/2022     (L) 04/29/2022    K 4.1 04/29/2022     04/29/2022    CO2 21.0 (L) 04/29/2022    MG 1.6 02/25/2022    PHOS 4.4 04/04/2018    CALCIUM 8.8 04/29/2022    ALBUMIN 4.10 04/29/2022    AST 27 04/29/2022    ALT 16 04/29/2022    BILITOT 0.3 04/29/2022     Lab Results   Component Value Date     10.8 04/29/2022     21.0 04/08/2022     38.4 (H) 03/07/2022           Assessment & Plan   This is a 66 y.o. woman with stage IIIC serous carcinoma of fallopian tube presents for chemo evaluation.  Encounter Diagnosis   Name Primary?   • Adenocarcinoma of right fallopian tube (HCC) Yes      Stage IIIC serous carcinoma of fallopian tube  -Consideration of cycle #3 of combined carboplatin plus paclitaxel.  Dose reduction not indicated.  Proceed with chemotherapy as scheduled.    Chemo induced nausea, dehydration  - significant nausea  post chemo day 3, with cycle #1.  Encouraged IV hydration.    Pain assessment was performed today as a part of patient’s care.  For patients with pain related to surgery, gynecologic malignancy or cancer treatment, the plan is as noted in the assessment/plan.  For patients with pain not related to these issues, they are to seek any further needed care from a more appropriate provider, such as PCP.      No orders of the defined types were placed in this encounter.      FOLLOW UP: in 3 weeks    I spent 31 minutes caring for Halie on this date of service. This time includes time spent by me in the following activities: preparing for the visit, reviewing tests, performing a medically appropriate examination and/or evaluation, counseling and educating the patient/family/caregiver, ordering medications, tests, or procedures and documenting information in the medical record    Christine Fam MD  05/20/22  13:53 EDT

## 2022-05-23 ENCOUNTER — HOSPITAL ENCOUNTER (OUTPATIENT)
Dept: ONCOLOGY | Facility: HOSPITAL | Age: 67
Setting detail: INFUSION SERIES
Discharge: HOME | End: 2022-05-23

## 2022-05-23 ENCOUNTER — TELEPHONE (OUTPATIENT)
Dept: GYNECOLOGIC ONCOLOGY | Facility: CLINIC | Age: 67
End: 2022-05-23

## 2022-05-23 VITALS
DIASTOLIC BLOOD PRESSURE: 85 MMHG | BODY MASS INDEX: 21.9 KG/M2 | HEIGHT: 70 IN | TEMPERATURE: 97.3 F | WEIGHT: 153 LBS | SYSTOLIC BLOOD PRESSURE: 153 MMHG | RESPIRATION RATE: 18 BRPM | HEART RATE: 68 BPM

## 2022-05-23 DIAGNOSIS — C80.1 ANXIETY ASSOCIATED WITH CANCER DIAGNOSIS: ICD-10-CM

## 2022-05-23 DIAGNOSIS — F41.1 ANXIETY ASSOCIATED WITH CANCER DIAGNOSIS: ICD-10-CM

## 2022-05-23 DIAGNOSIS — C57.01 ADENOCARCINOMA OF RIGHT FALLOPIAN TUBE: Primary | ICD-10-CM

## 2022-05-23 PROCEDURE — 25010000002 FOSAPREPITANT PER 1 MG: Performed by: OBSTETRICS & GYNECOLOGY

## 2022-05-23 PROCEDURE — 25010000002 PALONOSETRON 0.25 MG/5ML SOLUTION PREFILLED SYRINGE: Performed by: OBSTETRICS & GYNECOLOGY

## 2022-05-23 PROCEDURE — 25010000002 PACLITAXEL PER 1 MG: Performed by: OBSTETRICS & GYNECOLOGY

## 2022-05-23 PROCEDURE — 25010000002 DIPHENHYDRAMINE PER 50 MG: Performed by: OBSTETRICS & GYNECOLOGY

## 2022-05-23 PROCEDURE — 96368 THER/DIAG CONCURRENT INF: CPT

## 2022-05-23 PROCEDURE — 25010000002 CARBOPLATIN PER 50 MG: Performed by: NURSE PRACTITIONER

## 2022-05-23 PROCEDURE — 25010000002 DEXAMETHASONE SODIUM PHOSPHATE 100 MG/10ML SOLUTION: Performed by: OBSTETRICS & GYNECOLOGY

## 2022-05-23 PROCEDURE — 25010000002 HEPARIN LOCK FLUSH PER 10 UNITS: Performed by: OBSTETRICS & GYNECOLOGY

## 2022-05-23 PROCEDURE — 96415 CHEMO IV INFUSION ADDL HR: CPT

## 2022-05-23 PROCEDURE — 96367 TX/PROPH/DG ADDL SEQ IV INF: CPT

## 2022-05-23 PROCEDURE — 96413 CHEMO IV INFUSION 1 HR: CPT

## 2022-05-23 PROCEDURE — 96417 CHEMO IV INFUS EACH ADDL SEQ: CPT

## 2022-05-23 PROCEDURE — 96375 TX/PRO/DX INJ NEW DRUG ADDON: CPT

## 2022-05-23 RX ORDER — LORAZEPAM 1 MG/1
1 TABLET ORAL EVERY 6 HOURS PRN
Status: DISCONTINUED | OUTPATIENT
Start: 2022-05-23 | End: 2022-05-24 | Stop reason: HOSPADM

## 2022-05-23 RX ORDER — PALONOSETRON 0.05 MG/ML
0.25 INJECTION, SOLUTION INTRAVENOUS ONCE
Status: COMPLETED | OUTPATIENT
Start: 2022-05-23 | End: 2022-05-23

## 2022-05-23 RX ORDER — OLANZAPINE 5 MG/1
5 TABLET ORAL ONCE
Status: COMPLETED | OUTPATIENT
Start: 2022-05-23 | End: 2022-05-23

## 2022-05-23 RX ORDER — HEPARIN SODIUM (PORCINE) LOCK FLUSH IV SOLN 100 UNIT/ML 100 UNIT/ML
500 SOLUTION INTRAVENOUS AS NEEDED
Status: CANCELLED | OUTPATIENT
Start: 2022-05-23

## 2022-05-23 RX ORDER — SODIUM CHLORIDE 9 MG/ML
250 INJECTION, SOLUTION INTRAVENOUS ONCE
Status: COMPLETED | OUTPATIENT
Start: 2022-05-23 | End: 2022-05-23

## 2022-05-23 RX ORDER — FAMOTIDINE 10 MG/ML
20 INJECTION, SOLUTION INTRAVENOUS ONCE
Status: COMPLETED | OUTPATIENT
Start: 2022-05-23 | End: 2022-05-23

## 2022-05-23 RX ORDER — HEPARIN SODIUM (PORCINE) LOCK FLUSH IV SOLN 100 UNIT/ML 100 UNIT/ML
500 SOLUTION INTRAVENOUS AS NEEDED
Status: DISCONTINUED | OUTPATIENT
Start: 2022-05-23 | End: 2022-05-24 | Stop reason: HOSPADM

## 2022-05-23 RX ADMIN — OLANZAPINE 5 MG: 5 TABLET, FILM COATED ORAL at 08:41

## 2022-05-23 RX ADMIN — HEPARIN 500 UNITS: 100 SYRINGE at 13:53

## 2022-05-23 RX ADMIN — DEXAMETHASONE SODIUM PHOSPHATE 20 MG: 10 INJECTION, SOLUTION INTRAMUSCULAR; INTRAVENOUS at 08:46

## 2022-05-23 RX ADMIN — DIPHENHYDRAMINE HYDROCHLORIDE 50 MG: 50 INJECTION INTRAMUSCULAR; INTRAVENOUS at 09:00

## 2022-05-23 RX ADMIN — CARBOPLATIN 580 MG: 10 INJECTION, SOLUTION INTRAVENOUS at 13:14

## 2022-05-23 RX ADMIN — PACLITAXEL 330 MG: 6 INJECTION, SOLUTION INTRAVENOUS at 09:55

## 2022-05-23 RX ADMIN — SODIUM CHLORIDE 250 ML: 9 INJECTION, SOLUTION INTRAVENOUS at 08:35

## 2022-05-23 RX ADMIN — SODIUM CHLORIDE 150 MG: 9 INJECTION, SOLUTION INTRAVENOUS at 08:47

## 2022-05-23 RX ADMIN — LORAZEPAM 1 MG: 1 TABLET ORAL at 10:29

## 2022-05-23 RX ADMIN — PALONOSETRON 0.25 MG: 0.25 INJECTION, SOLUTION INTRAVENOUS at 08:44

## 2022-05-23 RX ADMIN — FAMOTIDINE 20 MG: 10 INJECTION, SOLUTION INTRAVENOUS at 08:42

## 2022-05-26 ENCOUNTER — HOSPITAL ENCOUNTER (OUTPATIENT)
Dept: ONCOLOGY | Facility: HOSPITAL | Age: 67
Setting detail: INFUSION SERIES
Discharge: HOME | End: 2022-05-26

## 2022-05-26 VITALS
BODY MASS INDEX: 21.47 KG/M2 | TEMPERATURE: 97.9 F | HEIGHT: 70 IN | HEART RATE: 88 BPM | RESPIRATION RATE: 18 BRPM | SYSTOLIC BLOOD PRESSURE: 167 MMHG | WEIGHT: 150 LBS | DIASTOLIC BLOOD PRESSURE: 86 MMHG

## 2022-05-26 DIAGNOSIS — C57.01 ADENOCARCINOMA OF RIGHT FALLOPIAN TUBE: Primary | ICD-10-CM

## 2022-05-26 DIAGNOSIS — E86.0 DEHYDRATION: ICD-10-CM

## 2022-05-26 PROCEDURE — 25010000002 HEPARIN LOCK FLUSH PER 10 UNITS: Performed by: OBSTETRICS & GYNECOLOGY

## 2022-05-26 PROCEDURE — 96360 HYDRATION IV INFUSION INIT: CPT

## 2022-05-26 RX ORDER — HEPARIN SODIUM (PORCINE) LOCK FLUSH IV SOLN 100 UNIT/ML 100 UNIT/ML
500 SOLUTION INTRAVENOUS AS NEEDED
Status: CANCELLED | OUTPATIENT
Start: 2022-05-26

## 2022-05-26 RX ORDER — HEPARIN SODIUM (PORCINE) LOCK FLUSH IV SOLN 100 UNIT/ML 100 UNIT/ML
500 SOLUTION INTRAVENOUS AS NEEDED
Status: DISCONTINUED | OUTPATIENT
Start: 2022-05-26 | End: 2022-05-27 | Stop reason: HOSPADM

## 2022-05-26 RX ADMIN — SODIUM CHLORIDE 1000 ML: 9 INJECTION, SOLUTION INTRAVENOUS at 11:18

## 2022-05-26 RX ADMIN — HEPARIN 500 UNITS: 100 SYRINGE at 12:21

## 2022-05-27 ENCOUNTER — APPOINTMENT (OUTPATIENT)
Dept: ONCOLOGY | Facility: HOSPITAL | Age: 67
End: 2022-05-27

## 2022-06-06 RX ORDER — DOCUSATE SODIUM 100 MG/1
CAPSULE, LIQUID FILLED ORAL
Qty: 60 CAPSULE | Refills: 2 | Status: SHIPPED | OUTPATIENT
Start: 2022-06-06 | End: 2022-11-16

## 2022-06-09 DIAGNOSIS — C57.01 ADENOCARCINOMA OF RIGHT FALLOPIAN TUBE: Primary | ICD-10-CM

## 2022-06-10 ENCOUNTER — HOSPITAL ENCOUNTER (OUTPATIENT)
Dept: ONCOLOGY | Facility: HOSPITAL | Age: 67
Setting detail: INFUSION SERIES
Discharge: HOME | End: 2022-06-10

## 2022-06-10 ENCOUNTER — OFFICE VISIT (OUTPATIENT)
Dept: GYNECOLOGIC ONCOLOGY | Facility: CLINIC | Age: 67
End: 2022-06-10

## 2022-06-10 VITALS
TEMPERATURE: 97.3 F | HEART RATE: 78 BPM | DIASTOLIC BLOOD PRESSURE: 71 MMHG | WEIGHT: 150 LBS | OXYGEN SATURATION: 98 % | BODY MASS INDEX: 21.52 KG/M2 | SYSTOLIC BLOOD PRESSURE: 157 MMHG | RESPIRATION RATE: 16 BRPM

## 2022-06-10 DIAGNOSIS — C57.01 ADENOCARCINOMA OF RIGHT FALLOPIAN TUBE: Primary | Chronic | ICD-10-CM

## 2022-06-10 DIAGNOSIS — C57.01 ADENOCARCINOMA OF RIGHT FALLOPIAN TUBE: Primary | ICD-10-CM

## 2022-06-10 DIAGNOSIS — T45.1X5A CHEMOTHERAPY-INDUCED NEUROPATHY: ICD-10-CM

## 2022-06-10 DIAGNOSIS — E86.0 DEHYDRATION: ICD-10-CM

## 2022-06-10 DIAGNOSIS — G62.0 CHEMOTHERAPY-INDUCED NEUROPATHY: ICD-10-CM

## 2022-06-10 LAB
ALBUMIN SERPL-MCNC: 4 G/DL (ref 3.5–5.2)
ALBUMIN/GLOB SERPL: 1.3 G/DL
ALP SERPL-CCNC: 80 U/L (ref 39–117)
ALT SERPL W P-5'-P-CCNC: 8 U/L (ref 1–33)
ANION GAP SERPL CALCULATED.3IONS-SCNC: 7 MMOL/L (ref 5–15)
AST SERPL-CCNC: 20 U/L (ref 1–32)
BILIRUB SERPL-MCNC: 0.4 MG/DL (ref 0–1.2)
BUN SERPL-MCNC: 11 MG/DL (ref 8–23)
BUN/CREAT SERPL: 16.7 (ref 7–25)
CALCIUM SPEC-SCNC: 9.3 MG/DL (ref 8.6–10.5)
CANCER AG125 SERPL QL: 2 U/ML (ref 0–38.1)
CHLORIDE SERPL-SCNC: 100 MMOL/L (ref 98–107)
CO2 SERPL-SCNC: 24 MMOL/L (ref 22–29)
CREAT SERPL-MCNC: 0.66 MG/DL (ref 0.57–1)
EGFRCR SERPLBLD CKD-EPI 2021: 96.3 ML/MIN/1.73
ERYTHROCYTE [DISTWIDTH] IN BLOOD BY AUTOMATED COUNT: 19.3 % (ref 12.3–15.4)
GLOBULIN UR ELPH-MCNC: 3.2 GM/DL
GLUCOSE SERPL-MCNC: 86 MG/DL (ref 65–99)
HCT VFR BLD AUTO: 35 % (ref 34–46.6)
HGB BLD-MCNC: 11 G/DL (ref 12–15.9)
LYMPHOCYTES # BLD AUTO: 2.3 10*3/MM3 (ref 0.7–3.1)
LYMPHOCYTES NFR BLD AUTO: 44.9 % (ref 19.6–45.3)
MCH RBC QN AUTO: 30 PG (ref 26.6–33)
MCHC RBC AUTO-ENTMCNC: 31.3 G/DL (ref 31.5–35.7)
MCV RBC AUTO: 95.7 FL (ref 79–97)
MONOCYTES # BLD AUTO: 0.5 10*3/MM3 (ref 0.1–0.9)
MONOCYTES NFR BLD AUTO: 9.7 % (ref 5–12)
NEUTROPHILS NFR BLD AUTO: 2.4 10*3/MM3 (ref 1.7–7)
NEUTROPHILS NFR BLD AUTO: 45.4 % (ref 42.7–76)
PLATELET # BLD AUTO: 180 10*3/MM3 (ref 140–450)
PMV BLD AUTO: 6.4 FL (ref 6–12)
POTASSIUM SERPL-SCNC: 4.6 MMOL/L (ref 3.5–5.2)
PROT SERPL-MCNC: 7.2 G/DL (ref 6–8.5)
RBC # BLD AUTO: 3.65 10*6/MM3 (ref 3.77–5.28)
SODIUM SERPL-SCNC: 131 MMOL/L (ref 136–145)
WBC NRBC COR # BLD: 5.2 10*3/MM3 (ref 3.4–10.8)

## 2022-06-10 PROCEDURE — 80053 COMPREHEN METABOLIC PANEL: CPT | Performed by: NURSE PRACTITIONER

## 2022-06-10 PROCEDURE — 25010000002 HEPARIN LOCK FLUSH PER 10 UNITS: Performed by: OBSTETRICS & GYNECOLOGY

## 2022-06-10 PROCEDURE — 85025 COMPLETE CBC W/AUTO DIFF WBC: CPT | Performed by: NURSE PRACTITIONER

## 2022-06-10 PROCEDURE — 86304 IMMUNOASSAY TUMOR CA 125: CPT | Performed by: NURSE PRACTITIONER

## 2022-06-10 PROCEDURE — 36591 DRAW BLOOD OFF VENOUS DEVICE: CPT

## 2022-06-10 PROCEDURE — 99214 OFFICE O/P EST MOD 30 MIN: CPT | Performed by: OBSTETRICS & GYNECOLOGY

## 2022-06-10 RX ORDER — FAMOTIDINE 10 MG/ML
20 INJECTION, SOLUTION INTRAVENOUS AS NEEDED
Status: CANCELLED | OUTPATIENT
Start: 2022-06-13

## 2022-06-10 RX ORDER — FAMOTIDINE 10 MG/ML
20 INJECTION, SOLUTION INTRAVENOUS ONCE
Status: CANCELLED | OUTPATIENT
Start: 2022-06-13

## 2022-06-10 RX ORDER — HEPARIN SODIUM (PORCINE) LOCK FLUSH IV SOLN 100 UNIT/ML 100 UNIT/ML
500 SOLUTION INTRAVENOUS AS NEEDED
Status: CANCELLED | OUTPATIENT
Start: 2022-06-10

## 2022-06-10 RX ORDER — DIPHENHYDRAMINE HYDROCHLORIDE 50 MG/ML
50 INJECTION INTRAMUSCULAR; INTRAVENOUS AS NEEDED
Status: CANCELLED | OUTPATIENT
Start: 2022-06-13

## 2022-06-10 RX ORDER — PALONOSETRON 0.05 MG/ML
0.25 INJECTION, SOLUTION INTRAVENOUS ONCE
Status: CANCELLED | OUTPATIENT
Start: 2022-06-13

## 2022-06-10 RX ORDER — OLANZAPINE 5 MG/1
5 TABLET ORAL ONCE
Status: CANCELLED | OUTPATIENT
Start: 2022-06-13 | End: 2022-06-13

## 2022-06-10 RX ORDER — SODIUM CHLORIDE 9 MG/ML
250 INJECTION, SOLUTION INTRAVENOUS ONCE
Status: CANCELLED | OUTPATIENT
Start: 2022-06-13

## 2022-06-10 RX ORDER — HEPARIN SODIUM (PORCINE) LOCK FLUSH IV SOLN 100 UNIT/ML 100 UNIT/ML
500 SOLUTION INTRAVENOUS AS NEEDED
Status: DISCONTINUED | OUTPATIENT
Start: 2022-06-10 | End: 2022-06-11 | Stop reason: HOSPADM

## 2022-06-10 RX ADMIN — HEPARIN 500 UNITS: 100 SYRINGE at 13:39

## 2022-06-10 NOTE — PROGRESS NOTES
[facility]  [id number]  1955      Reason for visit: Fallopian tube cancer, toxicity evaluation, consideration of ongoing treatment    History of present illness:  The patient is a 66 y.o. year old female who presents today for treatment and evaluation of the above issues.    Today, patient reports she is overall doing very well.  She is taking walks at the [facility] for exercise and exercises daily.  She has noticed new mild neuropathy to tips of thumbs and first fingers. Numbness/tingling is intermittent and has not interfered with her daily activities.  She denies nausea and vomiting.  She notes normal bowel and bladder function and appetite.  The week after chemotherapy she complains of being foggy and drained.  She underwent IV hydration after her last treatment but did not notice an improvement. She also working her weekend job as an ID APRN.    OBGYN History:  She is a .  She does not use HRT. She does not have a history of abnormal pap smears. Last pap smear 3/2022. Last mammogram 10/2021.    Oncologic History:  Oncology/Hematology History Overview Note   1.  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  Monoclonal gammopathy   3.  History of anemia, resolved after hemorrhoidectomy repair and on oral iron.  CBC on 2017 with a hemoglobin of 14.2, hematocrit 44.6%, MCV of 94.8.  4.  Atrial fibrillation  5.  Pulmonary nodule  6.  Erythrocytosis      Monoclonal gammopathy and uterine cancer history timeline:    -2017 24-hour urine immunoelectrophoresis showed no monoclonal spike, lambda light chains 106 with kappa light chains 24 and a kappa to lambda ratio of 0.23 with normal IgA, G, and M levels.  2017 serum immunoelectrophoresis showed 600 mg/dL of immunoglobulin G lambda light chains in the serum.  3/8/17 bone marrow biopsy showed normal marrow with 3% plasma cells and a small lambda clonal population identified with no stainable iron.  There was erythroid  expansion with mild maturation dyssynchrony and occasional atypical nuclear features that may represent the compensatory response to her anemia though myelodysplasia could not entirely be ruled out.  Had reactive aggregates of lymphoid tissue.     -6/13/2017 sedimentation rate 8, immunoglobulin free light chains free kappa light chains 25.7, free lambda light chains 173.4, kappa/lambda ratio 0.15.  Serum immunoelectrophoresis M-spike 0.9g/dL, C-reactive protein less than 0.01, CMP normal, ionized calcium 1.31, beta-2 microglobulin 3.1, CBC WBC 5500, hemoglobin 14.2, hematocrit 44.6%, MCV 94.8, platelets 259,000.  24-hour urine immunoelectrophoresis pending.   -6/17/2019 data: Lambda 129 with kappa 22 and ratio 0.17.  This is in the range that it has fluctuated since January 2017 upon first testing.  M spike stable 600 mg/dL immunoglobulin G lambda.  C-reactive protein 0.04 with sedimentation rate of 8.  Creatinine 0.86 with total calcium of 10 and ionized calcium 1.34 upper limit of normal 1.32.  Beta-2 microglobulin 2.8.  CBC unremarkable.  -4/13/2021 data: Beta-2 microglobulin 2.5 stable.  Hemoglobin 15.4 with normal CBC.  CMP unremarkable including creatinine 0.94, calcium 9.0, total protein 7.5, normal alkaline phosphatase 102.  Serum M spike 800 mg/dL IgG lambda stable with normal quantitative immunoglobulins.  Immunoglobulin free lambda light chains 131 mg/L with kappa 18.7 and ratio 0.14 stable x4 years.  Sedimentation rate 7.  C-reactive protein less than 0.3.  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  -2/22/2022 through 2/25/2022 admitted by Dr. Khurram Novak for atrial fibrillation with rapid ventricular response with worsening exercise tolerance.  Started on dofetilide and converted to sinus rhythm after second dose.  Dose decreased due to lengthening of QT corrected.  No further atrial fib after conversion.  Discharged on Eliquis.  -2/11/2022 through 2/15/2022 admitted for atrial  fibrillation with RVR and vaginal bleeding.  On Eliquis.    -3/4/2022 hematocrit 51.9% with hemoglobin 714.2 otherwise unremarkable CBC.  CMP unremarkable with normal creatinine 0.92, normal calcium 9.9, alkaline phosphatase minimally elevated 120 upper limit of normal 117, AST 44 otherwise unremarkable, otherwise unremarkable CMP.  IgG lambda stable 800 mg/dL with normal quantitative immunoglobulins and no significant change in M spike over the last 5 years.  Serum lambda light chain 85 continuing to come down from a high of 173 on presentation June 2017 with normal kappa and stable ratio over time at 0.17.  Sedimentation rate 7 with C-reactive protein less than 0.3.  Beta-2 microglobulin 2.9.    -3/4/2022 CT abdomen pelvis with and without contrastShowed right hip arthroplasty being artifact but with tubular masslike structure 9 x 6 cm presacral as suggested on ultrasound.  Could be related to multilocular cystic right ovarian mass or be related to the right uterine tube.  Malignancy favored.  Incidental 6 mm pulmonary nodule right lower lobe of undetermined significance    -3/7/2022 appointment with Loki Abdalla,gynecologist.  Had postmenopausal bleeding for the previous month.  3/2/2022 ultrasound showed 7.7 cm right adnexal mass and 6.9 cm left adnexal mass with  elevated at 38.4 upper limit of normal 38.1.  CEA normal 1.9.  Endometrial biopsy benign.    -3/10/2022 CT chest compared to CT abdomen 3/4/2022 showed stable 6 mm noncalcified right lower lobe pulmonary nodule with suggestion of follow-up in 6-12 months for stability.    -3/17/2022 JANET/BSO Dr. Fam.  Bilateral benign adenofibroma's of the ovaries.  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  No bessy involvement found.  No lymph vascular invasion.  Pathological stage IIIc.    -4/8/2022 Ca1 25  normal at 21.  CBC unremarkable save for hematocrit 51.3%.  CMP sodium 129 otherwise unremarkable.    -4/18/2022 gynecologic oncology postoperative evaluation.  She had an R0 resection 3/17/2022 for high-grade serous carcinoma.  Plans for Caris MI profile, genetic testing, and adjuvant carboplatin Taxol.    -4/18/2022 [facility] medical oncology follow-up visit: She has had a complex year since last I saw her about a year ago.  She is under care of Dr. Fam for stage III fallopian tube serous carcinoma for which she is due for adjuvant carbotaxol.  Genetic testing is ordered.  Caris MI profile sent but not back.  From my standpoint, the monoclonal gammopathy given stability over time and with unremarkable calcium and creatinine and quantitative immunoglobulins is very likely just benign and we will continue to check this annually.  Her serum light chains actually have gone down over time with no interventions.  No further work-up from that standpoint.  With reference to her erythrocytosis, I would not put her through work-up for myeloproliferative disorder as I doubt that that is operative especially with recent staging CTs not showing splenomegaly.  She does have small right lower lobe pulmonary nodule that does need follow-up but I am certain that she will be getting routine radiographic/CT follow-up along with serum tumor markers with Dr. Fam and company.  I will remain available to her in the interim but will not increase the frequency of hematology follow-up referable to the newly diagnosed oncologic gynecologic issue that is being expertly managed by Dr. Fam and her team.  It is reassuring that her CA-125 normalized after JANET/BSO and omentectomy etc.     Monoclonal gammopathy    Initial Diagnosis    Monoclonal gammopathy     11/29/2017 Imaging    Bone survey IMPRESSION:  Negative skeletal survey.     6/5/2018 -  Other Event    6/5/2018 labs:  CBC WBC 4350, hemoglobin 12.6, hematocrit 38.3%, platelet  count 252,000.  CMP creatinine 0.8, serum calcium 9.4.  Ionized calcium 1.29.  Beta-2 microglobulin 2.3.  Sedimentation rate 9, CRP 0.02.  Kappa/lambda light chains-normal kappa light chains 16.6, lambda light chains elevated at 133.6, kappa/lambda ratio 0.12.  Serum immunoelectrophoresis M-spike stable 0.7 g/dL.       12/20/2018 -  Other Event    24-hour urine monoclonal protein showed no monoclonal protein.  Beta-2 microglobulin 2.5.  AST 43.  Creatinine 0.86.  Calcium 9.5.  Ionized calcium 1.3.  C-reactive protein less than 0.01.  Sedimentation rate 7.  Serum monoclonal protein 800 mg/dL of immunoglobulin G lambda.  Lambda monoclonal protein down to 98.5 with a kappa to lambda ratio 0.18.  CBC is normal.  Total body bone survey done on the day of her visit 12/20/18 was not read by the time of her visit.     6/17/2019 -  Other Event     Lambda 129 with kappa 22 and ratio 0.17.  This is in the range that it has fluctuated since January 2017 upon first testing.  M spike stable 600 mg/dL immunoglobulin G lambda.  C-reactive protein 0.04 with sedimentation rate of 8.  Creatinine 0.86 with total calcium of 10 and ionized calcium 1.34 upper limit of normal 1.32.  Beta-2 microglobulin 2.8.  CBC unremarkable.     4/6/2020 -  Other Event    Myeloma panel:  CBC WBC 5400, hemoglobin 15.2, platelet count 225,000.  Beta-2 microglobulin 2.6.  Ionized calcium 1.26.  CMP creatinine 0.77, serum calcium 10.1 C-reactive protein 0.08, sedimentation rate 13.  Serum immunoelectrophoresis M spike stable 0.6 g/dL.  Serum free light chains, normal free kappa light chains 16.3, free lambda light chains 107.7, kappa/lambda ratio 0.15.  Kappa/lambda light chains Urine normal.     4/13/2021 -  Other Event    -4/13/2021 data: Beta-2 microglobulin 2.5 stable.  Hemoglobin 15.4 with normal CBC.  CMP unremarkable including creatinine 0.94, calcium 9.0, total protein 7.5, normal alkaline phosphatase 102.  Serum M spike 800 mg/dL IgG lambda stable  with normal quantitative immunoglobulins.  Immunoglobulin free lambda light chains 131 mg/L with kappa 18.7 and ratio 0.14 stable x4 years.  Sedimentation rate 7.  C-reactive protein less than 0.3.  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     Adenocarcinoma of right fallopian tube (HCC)   3/2/2022 Imaging    Patient presented to Dr. Abdalla with c/o postmenopausal bleeding.   Transvaginal ultrasound demonstrated a thickened endometrial stripe and bilateral adnexal masses.  Referred to Gyn Oncology due to concern for possible malignancy.      3/4/2022 Imaging    CT abdomen pelvis:  1.Beam hardening artifact from the patient's right total hip arthroplasty makes evaluation of the right presacral/adnexal mass difficult. There is a tubular masslike structure measuring up to 9 x 6 cm with a left posterior (presacral) mural nodular  enhancing component as suggested on ultrasound. This could be related to a multilocular cystic right ovarian mass or be related to the right uterine tube. Differential favors malignant process in this age group.  2.Incidental 6 mm pulmonary nodule seen in the right lower lobe along the right hemidiaphragm. This is of uncertain clinical significance.     3/10/2022 Imaging    CT chest:  1.  No change in 6 mm noncalcified pulmonary nodule within the right lower lobe.  Follow-up noncontrast CT scan of the chest would be recommended in 6-12 months to document continued stability.     3/17/2022 Surgery    Diagnostic laparoscopy, RTLH/BSO, cancer staging, omentectomy, optimal tumor debulking (R=0), appendectomy by Dr. Christine Fam at [facility]    Final pathology showed high-grade serous carcinoma involving the lumen and wall. Right uterosacral ligament and omentum involved by tumor. Pelvic washings malignant, adenocarcinoma compatible with papillary serous carcinoma. Stage IIIC grade 3       4/11/2022 -  Chemotherapy    OP OVARIAN PACLitaxel / CARBOplatin (Q21D)      4/19/2022 Molecular Testing    CARIS results:  PD-L1 positive  ER positive, 2+, 95%  MSI stable, MMR proficient, TMB low, genomic ISACC low  MS negative  ARID1A, BANDAR, BRAF, EGFR, RAD51 C/D negative  BRCA1/2 negative  Her2/Patsy negative     5/26/2022 -  Chemotherapy    OP SUPPORTIVE HYDRATION + ANTIEMETICS           Past Medical History:   Diagnosis Date   • Atrial fibrillation (HCC)    • Corneal abrasion    • Hemorrhoids     bleeding hemorrhoids   • History of anemia    • History of transfusion    • Hypertension    • Hypothyroidism    • LGSIL on Pap smear of cervix 03/10/2022   • Osteoarthritis        Past Surgical History:   Procedure Laterality Date   • AUGMENTATION MAMMAPLASTY Bilateral 2008   • BONE MARROW BIOPSY Left 03/08/2017    Procedure: BONE MARROW BIOPSY;  Surgeon: Michel White MD;  Location:  [facility];  Service:    • COLONOSCOPY N/A 01/09/2017    Procedure: COLONOSCOPY;  Surgeon: Michel White MD;  Location:  [facility];  Service:    • ENDOSCOPY N/A 01/07/2017    Procedure: ESOPHAGOGASTRODUODENOSCOPY;  Surgeon: Abhishek Benton MD;  Location:  [facility];  Service:    • HEMORRHOIDECTOMY N/A 03/08/2017    Procedure: HEMORRHOIDECTOMY,;  Surgeon: Michel White MD;  Location:  [facility];  Service:    • HIP ARTHROSCOPY Right     2005 & 2012   • JOINT REPLACEMENT      2 right hips   • TONSILLECTOMY  1964   • TOTAL LAPAROSCOPIC HYSTERECTOMY N/A 03/17/2022    Procedure: DIAGNOSTIC LAPAROSCOPY, ROBOTIC ASSISTED TOTAL LAPAROSCOPIC HYSTERECTOMY, BILATERAL SALPINGO-OOPHERECTOMY, CANCER STAGING, OMENTECTOMY,  OPTIMAL TUMOR DEBULKING, APPENDECTOMY;  Surgeon: Christine Fam MD;  Location:  [facility];  Service: Robotics - DaVinci;  Laterality: N/A;   • VENOUS ACCESS DEVICE (PORT) INSERTION Left 04/08/2022       MEDICATIONS: The current medication list was reviewed with the patient and updated in the EMR this date per the Medical Assistant. Medication dosages and frequencies were confirmed to be  accurate.      Allergies:  is allergic to sulfa antibiotics.    Social History:   Social History     Socioeconomic History   • Marital status:    Tobacco Use   • Smoking status: Former Smoker     Packs/day: 1.00     Years: 15.00     Pack years: 15.00     Types: Cigarettes     Quit date:      Years since quittin.4   • Smokeless tobacco: Never Used   • Tobacco comment: quit 35+ years ago.    Vaping Use   • Vaping Use: Never used   Substance and Sexual Activity   • Alcohol use: Not Currently   • Drug use: No   • Sexual activity: Defer       Family History:    Family History   Problem Relation Age of Onset   • Atrial fibrillation Mother    • Diabetes Mother    • Atrial fibrillation Brother    • Abnormal EKG Brother    • Heart attack Father    • Stroke Father    • Diabetes Sister    • Breast cancer Neg Hx    • Ovarian cancer Neg Hx    • Uterine cancer Neg Hx    • Colon cancer Neg Hx        Health Maintenance:    Health Maintenance   Topic Date Due   • DXA SCAN  Never done   • TDAP/TD VACCINES (1 - Tdap) Never done   • ANNUAL WELLNESS VISIT  Never done   • Pneumococcal Vaccine 65+ (1 - PCV) Never done   • MAMMOGRAM  2020   • INFLUENZA VACCINE  2022   • PAP SMEAR  2025   • COLORECTAL CANCER SCREENING  2027   • HEPATITIS C SCREENING  Completed   • COVID-19 Vaccine  Completed   • ZOSTER VACCINE  Completed     Review of Systems   Please refer to history of present illness, review of systems otherwise negative.    Physical Exam    Vitals:    06/10/22 1303   BP: 157/71   Pulse: 78   Resp: 16   Temp: 97.3 °F (36.3 °C)   TempSrc: Temporal   SpO2: 98%   Weight: 68 kg (150 lb)   PainSc: 0-No pain       Body mass index is 21.52 kg/m².    Wt Readings from Last 3 Encounters:   06/10/22 68 kg (150 lb)   22 68 kg (150 lb)   22 69.4 kg (153 lb)     GENERAL: Alert, well-appearing female appearing her stated age who is in no apparent distress.   HEENT: Sclera anicteric. Head  normocephalic, atraumatic. Mucus membranes moist.  Wearing wig  NECK: Trachea midline, supple, without masses.    BREASTS: Deferred  CARDIOVASCULAR: Normal rate, irregular rhythm, no murmurs, rubs, or gallops. No peripheral edema.  RESPIRATORY: Clear to auscultation bilaterally, normal respiratory effort  BACK:  No CVA tenderness, no vertebral tenderness on palpation  GASTROINTESTINAL:  Abdomen is soft, non-tender, non-distended, no rebound or guarding  SKIN:  Warm, dry, well-perfused.  All visible areas intact.  No rashes, lesions, ulcers.  PSYCHIATRIC: AO x3, with appropriate affect, normal thought processes.  NEUROLOGIC: No focal deficits.  Moves extremities well.  MUSCULOSKELETAL: Normal gait and station.   EXTREMITIES:   No cyanosis, clubbing, symmetric.  LYMPHATICS:  No cervical or inguinal adenopathy noted.   PELVIC exam:  Defferred    ECOG PS 0    PROCEDURES:  None    Diagnostic Data:      Lab Results   Component Value Date    WBC 5.20 06/10/2022    HGB 11.0 (L) 06/10/2022    HCT 35.0 06/10/2022    MCV 95.7 06/10/2022     06/10/2022    NEUTROABS 2.40 06/10/2022    GLUCOSE 86 06/10/2022    BUN 11 06/10/2022    CREATININE 0.66 06/10/2022    EGFRIFNONA 61 02/25/2022     (L) 06/10/2022    K 4.6 06/10/2022     06/10/2022    CO2 24.0 06/10/2022    MG 1.6 02/25/2022    PHOS 4.4 04/04/2018    CALCIUM 9.3 06/10/2022    ALBUMIN 4.00 06/10/2022    AST 20 06/10/2022    ALT 8 06/10/2022    BILITOT 0.4 06/10/2022     Lab Results   Component Value Date     2.0 06/10/2022     8.3 05/20/2022     10.8 04/29/2022     21.0 04/08/2022     38.4 (H) 03/07/2022           Assessment & Plan   This is a 66 y.o. woman with stage IIIC serous carcinoma of fallopian tube presents for chemo evaluation.  Encounter Diagnoses   Name Primary?   • Adenocarcinoma of right fallopian tube (HCC) Yes   • Dehydration    • Chemotherapy-induced neuropathy (HCC)       Stage IIIC serous carcinoma of fallopian  tube  -Consideration of cycle #4 of combined carboplatin plus paclitaxel.  Dose reduction not indicated.  Proceed with chemotherapy as scheduled.    Chemo induced nausea, dehydration  - significant nausea post chemo day 3, with cycle #1.  Encouraged IV hydration.    Neuropathy, grade 1  - Intermittent numbness to fingertips of thumbs + first fingers bilaterally; not interfering with any daily activities  - Continue to monitor    Pain assessment was performed today as a part of patient’s care.  For patients with pain related to surgery, gynecologic malignancy or cancer treatment, the plan is as noted in the assessment/plan.  For patients with pain not related to these issues, they are to seek any further needed care from a more appropriate provider, such as PCP.      No orders of the defined types were placed in this encounter.      FOLLOW UP: in 3 weeks    I spent 30 minutes caring for Haile on this date of service. This time includes time spent by me in the following activities: preparing for the visit, reviewing tests, performing a medically appropriate examination and/or evaluation, counseling and educating the patient/family/caregiver, ordering medications, tests, or procedures and documenting information in the medical record       Patient was seen and examined with Dr. Naik,  resident, who performed portions of the examination and documentation for this patient's care under my direct supervision.  I agree with the above documentation and plan.    Christine Fam MD  06/11/22  14:40 EDT

## 2022-06-11 PROBLEM — T45.1X5A CHEMOTHERAPY-INDUCED NEUROPATHY (HCC): Status: ACTIVE | Noted: 2022-06-11

## 2022-06-11 PROBLEM — G62.0 CHEMOTHERAPY-INDUCED NEUROPATHY: Status: ACTIVE | Noted: 2022-06-11

## 2022-06-13 ENCOUNTER — HOSPITAL ENCOUNTER (OUTPATIENT)
Dept: ONCOLOGY | Facility: HOSPITAL | Age: 67
Setting detail: INFUSION SERIES
Discharge: HOME | End: 2022-06-13

## 2022-06-13 VITALS
WEIGHT: 149.91 LBS | RESPIRATION RATE: 16 BRPM | TEMPERATURE: 97.4 F | SYSTOLIC BLOOD PRESSURE: 145 MMHG | BODY MASS INDEX: 21.46 KG/M2 | DIASTOLIC BLOOD PRESSURE: 77 MMHG | HEIGHT: 70 IN | HEART RATE: 68 BPM

## 2022-06-13 DIAGNOSIS — C57.01 ADENOCARCINOMA OF RIGHT FALLOPIAN TUBE: Primary | ICD-10-CM

## 2022-06-13 PROCEDURE — 25010000002 PALONOSETRON 0.25 MG/5ML SOLUTION PREFILLED SYRINGE: Performed by: OBSTETRICS & GYNECOLOGY

## 2022-06-13 PROCEDURE — 25010000002 DIPHENHYDRAMINE PER 50 MG: Performed by: OBSTETRICS & GYNECOLOGY

## 2022-06-13 PROCEDURE — 96367 TX/PROPH/DG ADDL SEQ IV INF: CPT

## 2022-06-13 PROCEDURE — 25010000002 HEPARIN LOCK FLUSH PER 10 UNITS: Performed by: OBSTETRICS & GYNECOLOGY

## 2022-06-13 PROCEDURE — 25010000002 CARBOPLATIN PER 50 MG: Performed by: OBSTETRICS & GYNECOLOGY

## 2022-06-13 PROCEDURE — 25010000002 FOSAPREPITANT PER 1 MG: Performed by: OBSTETRICS & GYNECOLOGY

## 2022-06-13 PROCEDURE — 96375 TX/PRO/DX INJ NEW DRUG ADDON: CPT

## 2022-06-13 PROCEDURE — 25010000002 DEXAMETHASONE SODIUM PHOSPHATE 100 MG/10ML SOLUTION: Performed by: OBSTETRICS & GYNECOLOGY

## 2022-06-13 PROCEDURE — 25010000002 PACLITAXEL PER 1 MG: Performed by: OBSTETRICS & GYNECOLOGY

## 2022-06-13 PROCEDURE — 96415 CHEMO IV INFUSION ADDL HR: CPT

## 2022-06-13 PROCEDURE — 96417 CHEMO IV INFUS EACH ADDL SEQ: CPT

## 2022-06-13 PROCEDURE — 96413 CHEMO IV INFUSION 1 HR: CPT

## 2022-06-13 RX ORDER — HEPARIN SODIUM (PORCINE) LOCK FLUSH IV SOLN 100 UNIT/ML 100 UNIT/ML
500 SOLUTION INTRAVENOUS AS NEEDED
Status: DISCONTINUED | OUTPATIENT
Start: 2022-06-13 | End: 2022-06-14 | Stop reason: HOSPADM

## 2022-06-13 RX ORDER — HEPARIN SODIUM (PORCINE) LOCK FLUSH IV SOLN 100 UNIT/ML 100 UNIT/ML
500 SOLUTION INTRAVENOUS AS NEEDED
Status: CANCELLED | OUTPATIENT
Start: 2022-06-13

## 2022-06-13 RX ORDER — PALONOSETRON 0.05 MG/ML
0.25 INJECTION, SOLUTION INTRAVENOUS ONCE
Status: COMPLETED | OUTPATIENT
Start: 2022-06-13 | End: 2022-06-13

## 2022-06-13 RX ORDER — OLANZAPINE 5 MG/1
5 TABLET ORAL ONCE
Status: COMPLETED | OUTPATIENT
Start: 2022-06-13 | End: 2022-06-13

## 2022-06-13 RX ORDER — SODIUM CHLORIDE 9 MG/ML
250 INJECTION, SOLUTION INTRAVENOUS ONCE
Status: COMPLETED | OUTPATIENT
Start: 2022-06-13 | End: 2022-06-13

## 2022-06-13 RX ORDER — FAMOTIDINE 20 MG/1
20 TABLET, FILM COATED ORAL ONCE
Status: COMPLETED | OUTPATIENT
Start: 2022-06-13 | End: 2022-06-13

## 2022-06-13 RX ADMIN — SODIUM CHLORIDE 150 MG: 9 INJECTION, SOLUTION INTRAVENOUS at 09:06

## 2022-06-13 RX ADMIN — FAMOTIDINE 20 MG: 20 TABLET ORAL at 08:44

## 2022-06-13 RX ADMIN — DIPHENHYDRAMINE HYDROCHLORIDE 50 MG: 50 INJECTION, SOLUTION INTRAMUSCULAR; INTRAVENOUS at 08:48

## 2022-06-13 RX ADMIN — CARBOPLATIN 580 MG: 10 INJECTION, SOLUTION INTRAVENOUS at 13:19

## 2022-06-13 RX ADMIN — OLANZAPINE 5 MG: 5 TABLET, FILM COATED ORAL at 08:44

## 2022-06-13 RX ADMIN — PALONOSETRON 0.25 MG: 0.25 INJECTION, SOLUTION INTRAVENOUS at 08:44

## 2022-06-13 RX ADMIN — HEPARIN 500 UNITS: 100 SYRINGE at 13:56

## 2022-06-13 RX ADMIN — DEXAMETHASONE SODIUM PHOSPHATE 20 MG: 10 INJECTION, SOLUTION INTRAMUSCULAR; INTRAVENOUS at 09:05

## 2022-06-13 RX ADMIN — PACLITAXEL 330 MG: 6 INJECTION, SOLUTION INTRAVENOUS at 10:10

## 2022-06-13 RX ADMIN — SODIUM CHLORIDE 250 ML: 9 INJECTION, SOLUTION INTRAVENOUS at 08:40

## 2022-06-16 ENCOUNTER — APPOINTMENT (OUTPATIENT)
Dept: ONCOLOGY | Facility: HOSPITAL | Age: 67
End: 2022-06-16

## 2022-06-29 DIAGNOSIS — C57.01 ADENOCARCINOMA OF RIGHT FALLOPIAN TUBE: Primary | ICD-10-CM

## 2022-07-01 ENCOUNTER — OFFICE VISIT (OUTPATIENT)
Dept: GYNECOLOGIC ONCOLOGY | Facility: CLINIC | Age: 67
End: 2022-07-01

## 2022-07-01 ENCOUNTER — HOSPITAL ENCOUNTER (OUTPATIENT)
Dept: ONCOLOGY | Facility: HOSPITAL | Age: 67
Setting detail: INFUSION SERIES
Discharge: HOME | End: 2022-07-01

## 2022-07-01 VITALS
SYSTOLIC BLOOD PRESSURE: 172 MMHG | HEART RATE: 82 BPM | BODY MASS INDEX: 21.38 KG/M2 | DIASTOLIC BLOOD PRESSURE: 79 MMHG | RESPIRATION RATE: 15 BRPM | WEIGHT: 149 LBS | TEMPERATURE: 97.3 F | OXYGEN SATURATION: 98 %

## 2022-07-01 DIAGNOSIS — D69.59 CHEMOTHERAPY-INDUCED THROMBOCYTOPENIA: ICD-10-CM

## 2022-07-01 DIAGNOSIS — C57.01 ADENOCARCINOMA OF RIGHT FALLOPIAN TUBE: Primary | ICD-10-CM

## 2022-07-01 DIAGNOSIS — G62.0 CHEMOTHERAPY-INDUCED NEUROPATHY: ICD-10-CM

## 2022-07-01 DIAGNOSIS — T45.1X5A CHEMOTHERAPY-INDUCED NEUROPATHY: ICD-10-CM

## 2022-07-01 DIAGNOSIS — T45.1X5A CHEMOTHERAPY-INDUCED THROMBOCYTOPENIA: ICD-10-CM

## 2022-07-01 DIAGNOSIS — C57.01 ADENOCARCINOMA OF RIGHT FALLOPIAN TUBE: Primary | Chronic | ICD-10-CM

## 2022-07-01 LAB
ALBUMIN SERPL-MCNC: 4.2 G/DL (ref 3.5–5.2)
ALBUMIN/GLOB SERPL: 1.3 G/DL
ALP SERPL-CCNC: 100 U/L (ref 39–117)
ALT SERPL W P-5'-P-CCNC: 11 U/L (ref 1–33)
ANION GAP SERPL CALCULATED.3IONS-SCNC: 12 MMOL/L (ref 5–15)
AST SERPL-CCNC: 23 U/L (ref 1–32)
BILIRUB SERPL-MCNC: 0.5 MG/DL (ref 0–1.2)
BUN SERPL-MCNC: 9 MG/DL (ref 8–23)
BUN/CREAT SERPL: 13.8 (ref 7–25)
CALCIUM SPEC-SCNC: 9.2 MG/DL (ref 8.6–10.5)
CANCER AG125 SERPL QL: 6.1 U/ML (ref 0–38.1)
CHLORIDE SERPL-SCNC: 100 MMOL/L (ref 98–107)
CO2 SERPL-SCNC: 20 MMOL/L (ref 22–29)
CREAT SERPL-MCNC: 0.65 MG/DL (ref 0.57–1)
EGFRCR SERPLBLD CKD-EPI 2021: 96.6 ML/MIN/1.73
ERYTHROCYTE [DISTWIDTH] IN BLOOD BY AUTOMATED COUNT: 20.9 % (ref 12.3–15.4)
GLOBULIN UR ELPH-MCNC: 3.2 GM/DL
GLUCOSE SERPL-MCNC: 138 MG/DL (ref 65–99)
HCT VFR BLD AUTO: 30.4 % (ref 34–46.6)
HGB BLD-MCNC: 9.9 G/DL (ref 12–15.9)
LYMPHOCYTES # BLD AUTO: 0.8 10*3/MM3 (ref 0.7–3.1)
LYMPHOCYTES NFR BLD AUTO: 24.9 % (ref 19.6–45.3)
MCH RBC QN AUTO: 32.1 PG (ref 26.6–33)
MCHC RBC AUTO-ENTMCNC: 32.4 G/DL (ref 31.5–35.7)
MCV RBC AUTO: 98.9 FL (ref 79–97)
MONOCYTES # BLD AUTO: 0.1 10*3/MM3 (ref 0.1–0.9)
MONOCYTES NFR BLD AUTO: 3.1 % (ref 5–12)
NEUTROPHILS NFR BLD AUTO: 2.3 10*3/MM3 (ref 1.7–7)
NEUTROPHILS NFR BLD AUTO: 72 % (ref 42.7–76)
PLATELET # BLD AUTO: 67 10*3/MM3 (ref 140–450)
PMV BLD AUTO: 6.7 FL (ref 6–12)
POTASSIUM SERPL-SCNC: 4.1 MMOL/L (ref 3.5–5.2)
PROT SERPL-MCNC: 7.4 G/DL (ref 6–8.5)
RBC # BLD AUTO: 3.07 10*6/MM3 (ref 3.77–5.28)
SODIUM SERPL-SCNC: 132 MMOL/L (ref 136–145)
WBC NRBC COR # BLD: 3.2 10*3/MM3 (ref 3.4–10.8)

## 2022-07-01 PROCEDURE — 85025 COMPLETE CBC W/AUTO DIFF WBC: CPT | Performed by: OBSTETRICS & GYNECOLOGY

## 2022-07-01 PROCEDURE — 36591 DRAW BLOOD OFF VENOUS DEVICE: CPT

## 2022-07-01 PROCEDURE — 86304 IMMUNOASSAY TUMOR CA 125: CPT | Performed by: OBSTETRICS & GYNECOLOGY

## 2022-07-01 PROCEDURE — 25010000002 HEPARIN LOCK FLUSH PER 10 UNITS: Performed by: OBSTETRICS & GYNECOLOGY

## 2022-07-01 PROCEDURE — 80053 COMPREHEN METABOLIC PANEL: CPT | Performed by: OBSTETRICS & GYNECOLOGY

## 2022-07-01 PROCEDURE — 99215 OFFICE O/P EST HI 40 MIN: CPT | Performed by: OBSTETRICS & GYNECOLOGY

## 2022-07-01 RX ORDER — FAMOTIDINE 10 MG/ML
20 INJECTION, SOLUTION INTRAVENOUS AS NEEDED
Status: CANCELLED | OUTPATIENT
Start: 2022-07-11

## 2022-07-01 RX ORDER — HEPARIN SODIUM (PORCINE) LOCK FLUSH IV SOLN 100 UNIT/ML 100 UNIT/ML
500 SOLUTION INTRAVENOUS AS NEEDED
Status: DISCONTINUED | OUTPATIENT
Start: 2022-07-01 | End: 2022-07-02 | Stop reason: HOSPADM

## 2022-07-01 RX ORDER — OLANZAPINE 5 MG/1
5 TABLET ORAL ONCE
Status: CANCELLED | OUTPATIENT
Start: 2022-07-11 | End: 2022-07-05

## 2022-07-01 RX ORDER — SODIUM CHLORIDE 9 MG/ML
250 INJECTION, SOLUTION INTRAVENOUS ONCE
Status: CANCELLED | OUTPATIENT
Start: 2022-07-11

## 2022-07-01 RX ORDER — DIPHENHYDRAMINE HYDROCHLORIDE 50 MG/ML
50 INJECTION INTRAMUSCULAR; INTRAVENOUS AS NEEDED
Status: CANCELLED | OUTPATIENT
Start: 2022-07-11

## 2022-07-01 RX ORDER — FAMOTIDINE 10 MG/ML
20 INJECTION, SOLUTION INTRAVENOUS ONCE
Status: CANCELLED | OUTPATIENT
Start: 2022-07-11

## 2022-07-01 RX ORDER — HEPARIN SODIUM (PORCINE) LOCK FLUSH IV SOLN 100 UNIT/ML 100 UNIT/ML
500 SOLUTION INTRAVENOUS AS NEEDED
Status: CANCELLED | OUTPATIENT
Start: 2022-07-01

## 2022-07-01 RX ORDER — PALONOSETRON 0.05 MG/ML
0.25 INJECTION, SOLUTION INTRAVENOUS ONCE
Status: CANCELLED | OUTPATIENT
Start: 2022-07-11

## 2022-07-01 RX ADMIN — HEPARIN SODIUM (PORCINE) LOCK FLUSH IV SOLN 100 UNIT/ML 500 UNITS: 100 SOLUTION at 15:15

## 2022-07-01 NOTE — PROGRESS NOTES
Halie Cormier [facility]  [id number]  1955      Reason for visit: Fallopian tube cancer, toxicity evaluation, consideration of ongoing treatment    History of present illness:  The patient is a 66 y.o. year old female who presents today for treatment and evaluation of the above issues.    Today, patient notes good appetite and normal bowel function.  She has neuropathy in her left thumb.  She reports worse fatigue the first week after treatment, but returned to normal energy after that.  She continues to work while on treatment.    OBGYN History:  She is a .  She does not use HRT. She does not have a history of abnormal pap smears. Last pap smear 3/2022. Last mammogram 10/2021.    Oncologic History:  Oncology/Hematology History Overview Note   1.  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  Monoclonal gammopathy   3.  History of anemia, resolved after hemorrhoidectomy repair and on oral iron.  CBC on 2017 with a hemoglobin of 14.2, hematocrit 44.6%, MCV of 94.8.  4.  Atrial fibrillation  5.  Pulmonary nodule  6.  Erythrocytosis      Monoclonal gammopathy and uterine cancer history timeline:    -2017 24-hour urine immunoelectrophoresis showed no monoclonal spike, lambda light chains 106 with kappa light chains 24 and a kappa to lambda ratio of 0.23 with normal IgA, G, and M levels.  2017 serum immunoelectrophoresis showed 600 mg/dL of immunoglobulin G lambda light chains in the serum.  3/8/17 bone marrow biopsy showed normal marrow with 3% plasma cells and a small lambda clonal population identified with no stainable iron.  There was erythroid expansion with mild maturation dyssynchrony and occasional atypical nuclear features that may represent the compensatory response to her anemia though myelodysplasia could not entirely be ruled out.  Had reactive aggregates of lymphoid tissue.     -2017 sedimentation rate 8, immunoglobulin free light chains free kappa light chains 25.7,  free lambda light chains 173.4, kappa/lambda ratio 0.15.  Serum immunoelectrophoresis M-spike 0.9g/dL, C-reactive protein less than 0.01, CMP normal, ionized calcium 1.31, beta-2 microglobulin 3.1, CBC WBC 5500, hemoglobin 14.2, hematocrit 44.6%, MCV 94.8, platelets 259,000.  24-hour urine immunoelectrophoresis pending.   -6/17/2019 data: Lambda 129 with kappa 22 and ratio 0.17.  This is in the range that it has fluctuated since January 2017 upon first testing.  M spike stable 600 mg/dL immunoglobulin G lambda.  C-reactive protein 0.04 with sedimentation rate of 8.  Creatinine 0.86 with total calcium of 10 and ionized calcium 1.34 upper limit of normal 1.32.  Beta-2 microglobulin 2.8.  CBC unremarkable.  -4/13/2021 data: Beta-2 microglobulin 2.5 stable.  Hemoglobin 15.4 with normal CBC.  CMP unremarkable including creatinine 0.94, calcium 9.0, total protein 7.5, normal alkaline phosphatase 102.  Serum M spike 800 mg/dL IgG lambda stable with normal quantitative immunoglobulins.  Immunoglobulin free lambda light chains 131 mg/L with kappa 18.7 and ratio 0.14 stable x4 years.  Sedimentation rate 7.  C-reactive protein less than 0.3.  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  -2/22/2022 through 2/25/2022 admitted by Dr. Khurram Novak for atrial fibrillation with rapid ventricular response with worsening exercise tolerance.  Started on dofetilide and converted to sinus rhythm after second dose.  Dose decreased due to lengthening of QT corrected.  No further atrial fib after conversion.  Discharged on Eliquis.  -2/11/2022 through 2/15/2022 admitted for atrial fibrillation with RVR and vaginal bleeding.  On Eliquis.    -3/4/2022 hematocrit 51.9% with hemoglobin 714.2 otherwise unremarkable CBC.  CMP unremarkable with normal creatinine 0.92, normal calcium 9.9, alkaline phosphatase minimally elevated 120 upper limit of normal 117, AST 44 otherwise unremarkable, otherwise unremarkable CMP.  IgG lambda  stable 800 mg/dL with normal quantitative immunoglobulins and no significant change in M spike over the last 5 years.  Serum lambda light chain 85 continuing to come down from a high of 173 on presentation June 2017 with normal kappa and stable ratio over time at 0.17.  Sedimentation rate 7 with C-reactive protein less than 0.3.  Beta-2 microglobulin 2.9.    -3/4/2022 CT abdomen pelvis with and without contrastShowed right hip arthroplasty being artifact but with tubular masslike structure 9 x 6 cm presacral as suggested on ultrasound.  Could be related to multilocular cystic right ovarian mass or be related to the right uterine tube.  Malignancy favored.  Incidental 6 mm pulmonary nodule right lower lobe of undetermined significance    -3/7/2022 appointment with Loki Abdalla,gynecologist.  Had postmenopausal bleeding for the previous month.  3/2/2022 ultrasound showed 7.7 cm right adnexal mass and 6.9 cm left adnexal mass with  elevated at 38.4 upper limit of normal 38.1.  CEA normal 1.9.  Endometrial biopsy benign.    -3/10/2022 CT chest compared to CT abdomen 3/4/2022 showed stable 6 mm noncalcified right lower lobe pulmonary nodule with suggestion of follow-up in 6-12 months for stability.    -3/17/2022 JANET/BSO Dr. Fam.  Bilateral benign adenofibroma's of the ovaries.  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  No bessy involvement found.  No lymph vascular invasion.  Pathological stage IIIc.    -4/8/2022 Ca1 25 normal at 21.  CBC unremarkable save for hematocrit 51.3%.  CMP sodium 129 otherwise unremarkable.    -4/18/2022 gynecologic oncology postoperative evaluation.  She had an R0 resection 3/17/2022 for high-grade serous carcinoma.  Plans for Kermit MI profile, genetic testing, and adjuvant carboplatin Taxol.    -4/18/2022 [facility] medical oncology  follow-up visit: She has had a complex year since last I saw her about a year ago.  She is under care of Dr. Fam for stage III fallopian tube serous carcinoma for which she is due for adjuvant carbotaxol.  Genetic testing is ordered.  Kermit SPANGLER profile sent but not back.  From my standpoint, the monoclonal gammopathy given stability over time and with unremarkable calcium and creatinine and quantitative immunoglobulins is very likely just benign and we will continue to check this annually.  Her serum light chains actually have gone down over time with no interventions.  No further work-up from that standpoint.  With reference to her erythrocytosis, I would not put her through work-up for myeloproliferative disorder as I doubt that that is operative especially with recent staging CTs not showing splenomegaly.  She does have small right lower lobe pulmonary nodule that does need follow-up but I am certain that she will be getting routine radiographic/CT follow-up along with serum tumor markers with Dr. Fam and company.  I will remain available to her in the interim but will not increase the frequency of hematology follow-up referable to the newly diagnosed oncologic gynecologic issue that is being expertly managed by Dr. Fam and her team.  It is reassuring that her CA-125 normalized after JANET/BSO and omentectomy etc.     Monoclonal gammopathy    Initial Diagnosis    Monoclonal gammopathy     11/29/2017 Imaging    Bone survey IMPRESSION:  Negative skeletal survey.     6/5/2018 -  Other Event    6/5/2018 labs:  CBC WBC 4350, hemoglobin 12.6, hematocrit 38.3%, platelet count 252,000.  CMP creatinine 0.8, serum calcium 9.4.  Ionized calcium 1.29.  Beta-2 microglobulin 2.3.  Sedimentation rate 9, CRP 0.02.  Kappa/lambda light chains-normal kappa light chains 16.6, lambda light chains elevated at 133.6, kappa/lambda ratio 0.12.  Serum immunoelectrophoresis M-spike stable 0.7 g/dL.       12/20/2018 -  Other  Event    24-hour urine monoclonal protein showed no monoclonal protein.  Beta-2 microglobulin 2.5.  AST 43.  Creatinine 0.86.  Calcium 9.5.  Ionized calcium 1.3.  C-reactive protein less than 0.01.  Sedimentation rate 7.  Serum monoclonal protein 800 mg/dL of immunoglobulin G lambda.  Lambda monoclonal protein down to 98.5 with a kappa to lambda ratio 0.18.  CBC is normal.  Total body bone survey done on the day of her visit 12/20/18 was not read by the time of her visit.     6/17/2019 -  Other Event     Lambda 129 with kappa 22 and ratio 0.17.  This is in the range that it has fluctuated since January 2017 upon first testing.  M spike stable 600 mg/dL immunoglobulin G lambda.  C-reactive protein 0.04 with sedimentation rate of 8.  Creatinine 0.86 with total calcium of 10 and ionized calcium 1.34 upper limit of normal 1.32.  Beta-2 microglobulin 2.8.  CBC unremarkable.     4/6/2020 -  Other Event    Myeloma panel:  CBC WBC 5400, hemoglobin 15.2, platelet count 225,000.  Beta-2 microglobulin 2.6.  Ionized calcium 1.26.  CMP creatinine 0.77, serum calcium 10.1 C-reactive protein 0.08, sedimentation rate 13.  Serum immunoelectrophoresis M spike stable 0.6 g/dL.  Serum free light chains, normal free kappa light chains 16.3, free lambda light chains 107.7, kappa/lambda ratio 0.15.  Kappa/lambda light chains Urine normal.     4/13/2021 -  Other Event    -4/13/2021 data: Beta-2 microglobulin 2.5 stable.  Hemoglobin 15.4 with normal CBC.  CMP unremarkable including creatinine 0.94, calcium 9.0, total protein 7.5, normal alkaline phosphatase 102.  Serum M spike 800 mg/dL IgG lambda stable with normal quantitative immunoglobulins.  Immunoglobulin free lambda light chains 131 mg/L with kappa 18.7 and ratio 0.14 stable x4 years.  Sedimentation rate 7.  C-reactive protein less than 0.3.  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     Adenocarcinoma of right fallopian tube (HCC)   3/2/2022 Imaging    Patient  presented to Dr. Abdalla with c/o postmenopausal bleeding.   Transvaginal ultrasound demonstrated a thickened endometrial stripe and bilateral adnexal masses.  Referred to Gyn Oncology due to concern for possible malignancy.      3/4/2022 Imaging    CT abdomen pelvis:  1.Beam hardening artifact from the patient's right total hip arthroplasty makes evaluation of the right presacral/adnexal mass difficult. There is a tubular masslike structure measuring up to 9 x 6 cm with a left posterior (presacral) mural nodular  enhancing component as suggested on ultrasound. This could be related to a multilocular cystic right ovarian mass or be related to the right uterine tube. Differential favors malignant process in this age group.  2.Incidental 6 mm pulmonary nodule seen in the right lower lobe along the right hemidiaphragm. This is of uncertain clinical significance.     3/10/2022 Imaging    CT chest:  1.  No change in 6 mm noncalcified pulmonary nodule within the right lower lobe.  Follow-up noncontrast CT scan of the chest would be recommended in 6-12 months to document continued stability.     3/17/2022 Surgery    Diagnostic laparoscopy, RTLH/BSO, cancer staging, omentectomy, optimal tumor debulking (R=0), appendectomy by Dr. Christine Fam at [facility]    Final pathology showed high-grade serous carcinoma involving the lumen and wall. Right uterosacral ligament and omentum involved by tumor. Pelvic washings malignant, adenocarcinoma compatible with papillary serous carcinoma. Stage IIIC grade 3       4/11/2022 -  Chemotherapy    OP OVARIAN PACLitaxel / CARBOplatin (Q21D)     4/19/2022 Molecular Testing    CARIS results:  PD-L1 positive  ER positive, 2+, 95%  MSI stable, MMR proficient, TMB low, genomic ISACC low  MD negative  ARID1A, BANDAR, BRAF, EGFR, RAD51 C/D negative  BRCA1/2 negative  Her2/Patsy negative     5/26/2022 -  Chemotherapy    OP SUPPORTIVE HYDRATION + ANTIEMETICS           Past Medical History:    Diagnosis Date   • Atrial fibrillation (HCC)    • Corneal abrasion    • Hemorrhoids     bleeding hemorrhoids   • History of anemia    • History of transfusion    • Hypertension    • Hypothyroidism    • LGSIL on Pap smear of cervix 03/10/2022   • Osteoarthritis        Past Surgical History:   Procedure Laterality Date   • AUGMENTATION MAMMAPLASTY Bilateral    • BONE MARROW BIOPSY Left 2017    Procedure: BONE MARROW BIOPSY;  Surgeon: Michel White MD;  Location:  [facility];  Service:    • COLONOSCOPY N/A 2017    Procedure: COLONOSCOPY;  Surgeon: Michel White MD;  Location:  [facility];  Service:    • ENDOSCOPY N/A 2017    Procedure: ESOPHAGOGASTRODUODENOSCOPY;  Surgeon: Abhishek Benton MD;  Location:  [facility];  Service:    • HEMORRHOIDECTOMY N/A 2017    Procedure: HEMORRHOIDECTOMY,;  Surgeon: Michel White MD;  Location:  [facility];  Service:    • HIP ARTHROSCOPY Right      &    • JOINT REPLACEMENT      2 right hips   • TONSILLECTOMY  1964   • TOTAL LAPAROSCOPIC HYSTERECTOMY N/A 2022    Procedure: DIAGNOSTIC LAPAROSCOPY, ROBOTIC ASSISTED TOTAL LAPAROSCOPIC HYSTERECTOMY, BILATERAL SALPINGO-OOPHERECTOMY, CANCER STAGING, OMENTECTOMY,  OPTIMAL TUMOR DEBULKING, APPENDECTOMY;  Surgeon: Christine Fam MD;  Location:  [facility];  Service: Robotics - DaVinci;  Laterality: N/A;   • VENOUS ACCESS DEVICE (PORT) INSERTION Left 2022       MEDICATIONS: The current medication list was reviewed with the patient and updated in the EMR this date per the Medical Assistant. Medication dosages and frequencies were confirmed to be accurate.      Allergies:  is allergic to sulfa antibiotics.    Social History:   Social History     Socioeconomic History   • Marital status:    Tobacco Use   • Smoking status: Former Smoker     Packs/day: 1.00     Years: 15.00     Pack years: 15.00     Types: Cigarettes     Quit date:      Years since quittin.5   •  Smokeless tobacco: Never Used   • Tobacco comment: quit 35+ years ago.    Vaping Use   • Vaping Use: Never used   Substance and Sexual Activity   • Alcohol use: Not Currently   • Drug use: No   • Sexual activity: Defer       Family History:    Family History   Problem Relation Age of Onset   • Atrial fibrillation Mother    • Diabetes Mother    • Atrial fibrillation Brother    • Abnormal EKG Brother    • Heart attack Father    • Stroke Father    • Diabetes Sister    • Breast cancer Neg Hx    • Ovarian cancer Neg Hx    • Uterine cancer Neg Hx    • Colon cancer Neg Hx        Health Maintenance:    Health Maintenance   Topic Date Due   • DXA SCAN  Never done   • TDAP/TD VACCINES (1 - Tdap) Never done   • ANNUAL WELLNESS VISIT  Never done   • Pneumococcal Vaccine 65+ (1 - PCV) Never done   • MAMMOGRAM  08/14/2020   • INFLUENZA VACCINE  10/01/2022   • PAP SMEAR  03/09/2025   • COLORECTAL CANCER SCREENING  01/09/2027   • HEPATITIS C SCREENING  Completed   • COVID-19 Vaccine  Completed   • ZOSTER VACCINE  Completed     Review of Systems   Please refer to history of present illness, review of systems otherwise negative.    Physical Exam    Vitals:    07/01/22 1523   BP: 172/79   Pulse: 82   Resp: 15   Temp: 97.3 °F (36.3 °C)   SpO2: 98%   Weight: 67.6 kg (149 lb)   PainSc: 0-No pain       Body mass index is 21.38 kg/m².    Wt Readings from Last 3 Encounters:   07/01/22 67.6 kg (149 lb)   06/13/22 68 kg (149 lb 14.6 oz)   06/10/22 68 kg (150 lb)     GENERAL: Alert, well-appearing female appearing her stated age who is in no apparent distress.   HEENT: Sclera anicteric. Head normocephalic, atraumatic. Mucus membranes moist.  Wearing wig  NECK: Trachea midline, supple, without masses.    BREASTS: Deferred  CARDIOVASCULAR: Normal rate, irregular rhythm, no murmurs, rubs, or gallops. No peripheral edema.  RESPIRATORY: Clear to auscultation bilaterally, normal respiratory effort  BACK:  No CVA tenderness, no vertebral tenderness  on palpation  GASTROINTESTINAL:  Abdomen is soft, non-tender, non-distended, no rebound or guarding  SKIN:  Warm, dry, well-perfused.  All visible areas intact.  No rashes, lesions, ulcers.  PSYCHIATRIC: AO x3, with appropriate affect, normal thought processes.  NEUROLOGIC: No focal deficits.  Moves extremities well.  MUSCULOSKELETAL: Normal gait and station.   EXTREMITIES:   No cyanosis, clubbing, symmetric.  LYMPHATICS:  No cervical or inguinal adenopathy noted.   PELVIC exam:  Defferred    ECOG PS 0    PROCEDURES:  None    Diagnostic Data:      Lab Results   Component Value Date    WBC 3.20 (L) 07/01/2022    HGB 9.9 (L) 07/01/2022    HCT 30.4 (L) 07/01/2022    MCV 98.9 (H) 07/01/2022    PLT 67 (L) 07/01/2022    NEUTROABS 2.30 07/01/2022    GLUCOSE 138 (H) 07/01/2022    BUN 9 07/01/2022    CREATININE 0.65 07/01/2022    EGFRIFNONA 61 02/25/2022     (L) 07/01/2022    K 4.1 07/01/2022     07/01/2022    CO2 20.0 (L) 07/01/2022    MG 1.6 02/25/2022    PHOS 4.4 04/04/2018    CALCIUM 9.2 07/01/2022    ALBUMIN 4.20 07/01/2022    AST 23 07/01/2022    ALT 11 07/01/2022    BILITOT 0.5 07/01/2022     Lab Results   Component Value Date     2.0 06/10/2022     8.3 05/20/2022     10.8 04/29/2022     21.0 04/08/2022     38.4 (H) 03/07/2022           Assessment & Plan   This is a 66 y.o. woman with stage IIIC serous carcinoma of fallopian tube presents for chemo evaluation.  Encounter Diagnoses   Name Primary?   • Adenocarcinoma of right fallopian tube (HCC) Yes   • Chemotherapy-induced neuropathy (HCC)    • Chemotherapy-induced thrombocytopenia       Stage IIIC serous carcinoma of fallopian tube  -Consideration of cycle #5 of combined carboplatin plus paclitaxel.  Dose reduction not indicated.  Proceed with chemotherapy as scheduled.    Chemo induced nausea, dehydration  -Largely resolved    Neuropathy, grade 1  - Intermittent numbness mainly at left thumb tip; not interfering with any daily  activities  - Continue to monitor    Chemo induced TCP  -I called patient at approximately 430 on 7/1/2022 and left a message that I needed her to call my cell phone regarding thrombocytopenia.  At the minimum, she would need labs rechecked prior to chemotherapy.  We also need to discuss fall precautions.  I would really like to keep her dose and there is a little bit of a tricky timeframe with the 3-day weekend.  If her labs normalize by 7/5/2022, we could keep her dose the same.  If her thrombocytopenia persists, we will have to make a dose modification.  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    Pain assessment was performed today as a part of patient’s care.  For patients with pain related to surgery, gynecologic malignancy or cancer treatment, the plan is as noted in the assessment/plan.  For patients with pain not related to these issues, they are to seek any further needed care from a more appropriate provider, such as PCP.      No orders of the defined types were placed in this encounter.    FOLLOW UP: in 3 weeks  I spent 41 minutes caring for Halie on this date of service. This time includes time spent by me in the following activities: preparing for the visit, reviewing tests, performing a medically appropriate examination and/or evaluation, counseling and educating the patient/family/caregiver, ordering medications, tests, or procedures and documenting information in the medical record    Christine Fam MD  07/01/22  14:40 EDT

## 2022-07-04 ENCOUNTER — LAB (OUTPATIENT)
Dept: LAB | Facility: HOSPITAL | Age: 67
End: 2022-07-04

## 2022-07-04 DIAGNOSIS — D47.2 MONOCLONAL GAMMOPATHY: ICD-10-CM

## 2022-07-04 LAB
BASOPHILS # BLD AUTO: 0.01 10*3/MM3 (ref 0–0.2)
BASOPHILS NFR BLD AUTO: 0.2 % (ref 0–1.5)
DEPRECATED RDW RBC AUTO: 68.3 FL (ref 37–54)
EOSINOPHIL # BLD AUTO: 0.03 10*3/MM3 (ref 0–0.4)
EOSINOPHIL NFR BLD AUTO: 0.7 % (ref 0.3–6.2)
ERYTHROCYTE [DISTWIDTH] IN BLOOD BY AUTOMATED COUNT: 18.5 % (ref 12.3–15.4)
HCT VFR BLD AUTO: 33.9 % (ref 34–46.6)
HGB BLD-MCNC: 11 G/DL (ref 12–15.9)
IMM GRANULOCYTES # BLD AUTO: 0.01 10*3/MM3 (ref 0–0.05)
IMM GRANULOCYTES NFR BLD AUTO: 0.2 % (ref 0–0.5)
LYMPHOCYTES # BLD AUTO: 2.12 10*3/MM3 (ref 0.7–3.1)
LYMPHOCYTES NFR BLD AUTO: 52.3 % (ref 19.6–45.3)
MCH RBC QN AUTO: 33.2 PG (ref 26.6–33)
MCHC RBC AUTO-ENTMCNC: 32.4 G/DL (ref 31.5–35.7)
MCV RBC AUTO: 102.4 FL (ref 79–97)
MONOCYTES # BLD AUTO: 0.61 10*3/MM3 (ref 0.1–0.9)
MONOCYTES NFR BLD AUTO: 15.1 % (ref 5–12)
NEUTROPHILS NFR BLD AUTO: 1.27 10*3/MM3 (ref 1.7–7)
NEUTROPHILS NFR BLD AUTO: 31.5 % (ref 42.7–76)
NRBC BLD AUTO-RTO: 0 /100 WBC (ref 0–0.2)
PLATELET # BLD AUTO: 66 10*3/MM3 (ref 140–450)
PMV BLD AUTO: 10.7 FL (ref 6–12)
RBC # BLD AUTO: 3.31 10*6/MM3 (ref 3.77–5.28)
WBC NRBC COR # BLD: 4.05 10*3/MM3 (ref 3.4–10.8)

## 2022-07-04 PROCEDURE — 85025 COMPLETE CBC W/AUTO DIFF WBC: CPT

## 2022-07-04 PROCEDURE — 36415 COLL VENOUS BLD VENIPUNCTURE: CPT

## 2022-07-05 ENCOUNTER — DOCUMENTATION (OUTPATIENT)
Dept: GYNECOLOGIC ONCOLOGY | Facility: CLINIC | Age: 67
End: 2022-07-05

## 2022-07-05 ENCOUNTER — APPOINTMENT (OUTPATIENT)
Dept: ONCOLOGY | Facility: HOSPITAL | Age: 67
End: 2022-07-05

## 2022-07-05 DIAGNOSIS — T45.1X5A CHEMOTHERAPY-INDUCED THROMBOCYTOPENIA: Primary | ICD-10-CM

## 2022-07-05 DIAGNOSIS — D69.59 CHEMOTHERAPY-INDUCED THROMBOCYTOPENIA: Primary | ICD-10-CM

## 2022-07-05 NOTE — PROGRESS NOTES
I communicated with the patient over the holiday weekend regarding her ongoing thrombocytopenia.  We will have to hold chemotherapy this week and recheck labs in 1 week's time.  We will work with team to reschedule.  I notified the infusion center patient was not coming early this morning.  Electronically signed by Christine Fam MD, 07/05/22, 1:40 PM EDT.

## 2022-07-06 ENCOUNTER — TELEPHONE (OUTPATIENT)
Dept: GYNECOLOGIC ONCOLOGY | Facility: CLINIC | Age: 67
End: 2022-07-06

## 2022-07-06 NOTE — TELEPHONE ENCOUNTER
----- Message from Christine Fam MD sent at 7/5/2022  1:40 PM EDT -----  Had to hold chemotherapy today due to thrombocytopenia.  Lets have her get her labs on Sunday the 10th and plan on chemotherapy Monday the 11th if platelets greater than 100,000 (or at least very close).  Please send message via Swipely or call patient to notify her labs due on Sunday.  Thank you      
Called patient. CHASEM that she should get labs drawn on Sunday and treatment on Monday. Advised that she should look at her my chart appointments and call with any questions.   
Patient is a 51 year old woman with PMH of melanoma, BCC, prinzmetal angina, asthma, and GERD who presented POD 14 s/p bilateral breast reduction for left breast cellulitis. Her initial post op course was uncomplicated and drains were removed in the office. On POD 9 (Sunday) she noticed some redness in the left breast under the nipple, persisted through the week. US showed 6cm collection of fluid so she underwent successful uncomplicated incision and drainage through her previous surgical site. Serous fluid was evacuated and the wound was irrigated copiously. Her post op course was uncomplicated and she is ready to be discharged at this time on POD 1.

## 2022-07-07 ENCOUNTER — APPOINTMENT (OUTPATIENT)
Dept: ONCOLOGY | Facility: HOSPITAL | Age: 67
End: 2022-07-07

## 2022-07-10 ENCOUNTER — LAB (OUTPATIENT)
Dept: LAB | Facility: HOSPITAL | Age: 67
End: 2022-07-10

## 2022-07-10 DIAGNOSIS — D69.59 CHEMOTHERAPY-INDUCED THROMBOCYTOPENIA: ICD-10-CM

## 2022-07-10 DIAGNOSIS — T45.1X5A CHEMOTHERAPY-INDUCED THROMBOCYTOPENIA: ICD-10-CM

## 2022-07-10 LAB
ALBUMIN SERPL-MCNC: 4.1 G/DL (ref 3.5–5.2)
ALBUMIN/GLOB SERPL: 1.3 G/DL
ALP SERPL-CCNC: 92 U/L (ref 39–117)
ALT SERPL W P-5'-P-CCNC: 11 U/L (ref 1–33)
ANION GAP SERPL CALCULATED.3IONS-SCNC: 10 MMOL/L (ref 5–15)
ANISOCYTOSIS BLD QL: NORMAL
AST SERPL-CCNC: 22 U/L (ref 1–32)
BASOPHILS # BLD AUTO: 0.01 10*3/MM3 (ref 0–0.2)
BASOPHILS NFR BLD AUTO: 0.2 % (ref 0–1.5)
BILIRUB SERPL-MCNC: 0.5 MG/DL (ref 0–1.2)
BUN SERPL-MCNC: 17 MG/DL (ref 8–23)
BUN/CREAT SERPL: 21.5 (ref 7–25)
CALCIUM SPEC-SCNC: 9.5 MG/DL (ref 8.6–10.5)
CHLORIDE SERPL-SCNC: 105 MMOL/L (ref 98–107)
CO2 SERPL-SCNC: 25 MMOL/L (ref 22–29)
CREAT SERPL-MCNC: 0.79 MG/DL (ref 0.57–1)
DEPRECATED RDW RBC AUTO: 75.3 FL (ref 37–54)
EGFRCR SERPLBLD CKD-EPI 2021: 82.1 ML/MIN/1.73
EOSINOPHIL # BLD AUTO: 0.11 10*3/MM3 (ref 0–0.4)
EOSINOPHIL NFR BLD AUTO: 2.5 % (ref 0.3–6.2)
ERYTHROCYTE [DISTWIDTH] IN BLOOD BY AUTOMATED COUNT: 19.7 % (ref 12.3–15.4)
GLOBULIN UR ELPH-MCNC: 3.2 GM/DL
GLUCOSE SERPL-MCNC: 94 MG/DL (ref 65–99)
HCT VFR BLD AUTO: 32.1 % (ref 34–46.6)
HGB BLD-MCNC: 10.3 G/DL (ref 12–15.9)
IMM GRANULOCYTES # BLD AUTO: 0.01 10*3/MM3 (ref 0–0.05)
IMM GRANULOCYTES NFR BLD AUTO: 0.2 % (ref 0–0.5)
LYMPHOCYTES # BLD AUTO: 1.8 10*3/MM3 (ref 0.7–3.1)
LYMPHOCYTES NFR BLD AUTO: 41 % (ref 19.6–45.3)
MACROCYTES BLD QL SMEAR: NORMAL
MCH RBC QN AUTO: 33.8 PG (ref 26.6–33)
MCHC RBC AUTO-ENTMCNC: 32.1 G/DL (ref 31.5–35.7)
MCV RBC AUTO: 105.2 FL (ref 79–97)
MONOCYTES # BLD AUTO: 0.49 10*3/MM3 (ref 0.1–0.9)
MONOCYTES NFR BLD AUTO: 11.2 % (ref 5–12)
NEUTROPHILS NFR BLD AUTO: 1.97 10*3/MM3 (ref 1.7–7)
NEUTROPHILS NFR BLD AUTO: 44.9 % (ref 42.7–76)
NRBC BLD AUTO-RTO: 0 /100 WBC (ref 0–0.2)
PLATELET # BLD AUTO: 100 10*3/MM3 (ref 140–450)
PMV BLD AUTO: 10.1 FL (ref 6–12)
POTASSIUM SERPL-SCNC: 4.3 MMOL/L (ref 3.5–5.2)
PROT SERPL-MCNC: 7.3 G/DL (ref 6–8.5)
RBC # BLD AUTO: 3.05 10*6/MM3 (ref 3.77–5.28)
SMALL PLATELETS BLD QL SMEAR: NORMAL
SODIUM SERPL-SCNC: 140 MMOL/L (ref 136–145)
WBC MORPH BLD: NORMAL
WBC NRBC COR # BLD: 4.39 10*3/MM3 (ref 3.4–10.8)

## 2022-07-10 PROCEDURE — 85007 BL SMEAR W/DIFF WBC COUNT: CPT

## 2022-07-10 PROCEDURE — 80053 COMPREHEN METABOLIC PANEL: CPT

## 2022-07-10 PROCEDURE — 85025 COMPLETE CBC W/AUTO DIFF WBC: CPT

## 2022-07-10 PROCEDURE — 36415 COLL VENOUS BLD VENIPUNCTURE: CPT

## 2022-07-11 ENCOUNTER — HOSPITAL ENCOUNTER (OUTPATIENT)
Dept: ONCOLOGY | Facility: HOSPITAL | Age: 67
Setting detail: INFUSION SERIES
Discharge: HOME | End: 2022-07-11

## 2022-07-11 VITALS
HEIGHT: 70 IN | TEMPERATURE: 96.1 F | SYSTOLIC BLOOD PRESSURE: 158 MMHG | BODY MASS INDEX: 20.9 KG/M2 | HEART RATE: 64 BPM | RESPIRATION RATE: 18 BRPM | WEIGHT: 146 LBS | DIASTOLIC BLOOD PRESSURE: 63 MMHG

## 2022-07-11 DIAGNOSIS — C57.01 ADENOCARCINOMA OF RIGHT FALLOPIAN TUBE: Primary | ICD-10-CM

## 2022-07-11 PROCEDURE — 96417 CHEMO IV INFUS EACH ADDL SEQ: CPT

## 2022-07-11 PROCEDURE — 25010000002 FOSAPREPITANT PER 1 MG: Performed by: OBSTETRICS & GYNECOLOGY

## 2022-07-11 PROCEDURE — 25010000002 CARBOPLATIN PER 50 MG: Performed by: OBSTETRICS & GYNECOLOGY

## 2022-07-11 PROCEDURE — 25010000002 PALONOSETRON 0.25 MG/5ML SOLUTION PREFILLED SYRINGE: Performed by: OBSTETRICS & GYNECOLOGY

## 2022-07-11 PROCEDURE — 25010000002 DEXAMETHASONE SODIUM PHOSPHATE 100 MG/10ML SOLUTION: Performed by: OBSTETRICS & GYNECOLOGY

## 2022-07-11 PROCEDURE — 25010000002 PACLITAXEL PER 1 MG: Performed by: OBSTETRICS & GYNECOLOGY

## 2022-07-11 PROCEDURE — 96367 TX/PROPH/DG ADDL SEQ IV INF: CPT

## 2022-07-11 PROCEDURE — 96376 TX/PRO/DX INJ SAME DRUG ADON: CPT

## 2022-07-11 PROCEDURE — 96413 CHEMO IV INFUSION 1 HR: CPT

## 2022-07-11 PROCEDURE — 96415 CHEMO IV INFUSION ADDL HR: CPT

## 2022-07-11 PROCEDURE — 25010000002 DIPHENHYDRAMINE PER 50 MG: Performed by: OBSTETRICS & GYNECOLOGY

## 2022-07-11 PROCEDURE — 25010000002 HEPARIN LOCK FLUSH PER 10 UNITS: Performed by: OBSTETRICS & GYNECOLOGY

## 2022-07-11 PROCEDURE — 96375 TX/PRO/DX INJ NEW DRUG ADDON: CPT

## 2022-07-11 RX ORDER — PALONOSETRON 0.05 MG/ML
0.25 INJECTION, SOLUTION INTRAVENOUS ONCE
Status: COMPLETED | OUTPATIENT
Start: 2022-07-11 | End: 2022-07-11

## 2022-07-11 RX ORDER — OLANZAPINE 5 MG/1
5 TABLET ORAL ONCE
Status: COMPLETED | OUTPATIENT
Start: 2022-07-11 | End: 2022-07-11

## 2022-07-11 RX ORDER — FAMOTIDINE 10 MG/ML
20 INJECTION, SOLUTION INTRAVENOUS ONCE
Status: COMPLETED | OUTPATIENT
Start: 2022-07-11 | End: 2022-07-11

## 2022-07-11 RX ORDER — SODIUM CHLORIDE 9 MG/ML
250 INJECTION, SOLUTION INTRAVENOUS ONCE
Status: COMPLETED | OUTPATIENT
Start: 2022-07-11 | End: 2022-07-11

## 2022-07-11 RX ORDER — HEPARIN SODIUM (PORCINE) LOCK FLUSH IV SOLN 100 UNIT/ML 100 UNIT/ML
500 SOLUTION INTRAVENOUS AS NEEDED
Status: DISCONTINUED | OUTPATIENT
Start: 2022-07-11 | End: 2022-07-12 | Stop reason: HOSPADM

## 2022-07-11 RX ORDER — HEPARIN SODIUM (PORCINE) LOCK FLUSH IV SOLN 100 UNIT/ML 100 UNIT/ML
500 SOLUTION INTRAVENOUS AS NEEDED
Status: CANCELLED | OUTPATIENT
Start: 2022-07-11

## 2022-07-11 RX ADMIN — PALONOSETRON 0.25 MG: 0.25 INJECTION, SOLUTION INTRAVENOUS at 08:25

## 2022-07-11 RX ADMIN — CARBOPLATIN 520 MG: 10 INJECTION, SOLUTION INTRAVENOUS at 13:10

## 2022-07-11 RX ADMIN — SODIUM CHLORIDE 300 MG: 9 INJECTION, SOLUTION INTRAVENOUS at 09:45

## 2022-07-11 RX ADMIN — OLANZAPINE 5 MG: 5 TABLET, FILM COATED ORAL at 08:34

## 2022-07-11 RX ADMIN — SODIUM CHLORIDE 150 MG: 9 INJECTION, SOLUTION INTRAVENOUS at 08:45

## 2022-07-11 RX ADMIN — DIPHENHYDRAMINE HYDROCHLORIDE 50 MG: 50 INJECTION, SOLUTION INTRAMUSCULAR; INTRAVENOUS at 08:30

## 2022-07-11 RX ADMIN — HEPARIN SODIUM (PORCINE) LOCK FLUSH IV SOLN 100 UNIT/ML 500 UNITS: 100 SOLUTION at 13:45

## 2022-07-11 RX ADMIN — FAMOTIDINE 20 MG: 10 INJECTION, SOLUTION INTRAVENOUS at 08:28

## 2022-07-11 RX ADMIN — SODIUM CHLORIDE 250 ML: 9 INJECTION, SOLUTION INTRAVENOUS at 08:22

## 2022-07-11 RX ADMIN — DEXAMETHASONE SODIUM PHOSPHATE 20 MG: 10 INJECTION, SOLUTION INTRAMUSCULAR; INTRAVENOUS at 08:45

## 2022-07-22 ENCOUNTER — APPOINTMENT (OUTPATIENT)
Dept: ONCOLOGY | Facility: HOSPITAL | Age: 67
End: 2022-07-22

## 2022-07-25 ENCOUNTER — APPOINTMENT (OUTPATIENT)
Dept: ONCOLOGY | Facility: HOSPITAL | Age: 67
End: 2022-07-25

## 2022-07-29 ENCOUNTER — HOSPITAL ENCOUNTER (OUTPATIENT)
Dept: ONCOLOGY | Facility: HOSPITAL | Age: 67
Setting detail: INFUSION SERIES
Discharge: HOME | End: 2022-07-29

## 2022-07-29 ENCOUNTER — OFFICE VISIT (OUTPATIENT)
Dept: GYNECOLOGIC ONCOLOGY | Facility: CLINIC | Age: 67
End: 2022-07-29

## 2022-07-29 VITALS
SYSTOLIC BLOOD PRESSURE: 116 MMHG | BODY MASS INDEX: 21.09 KG/M2 | HEART RATE: 81 BPM | DIASTOLIC BLOOD PRESSURE: 64 MMHG | RESPIRATION RATE: 12 BRPM | WEIGHT: 147 LBS

## 2022-07-29 VITALS
HEART RATE: 81 BPM | DIASTOLIC BLOOD PRESSURE: 64 MMHG | SYSTOLIC BLOOD PRESSURE: 116 MMHG | BODY MASS INDEX: 21.05 KG/M2 | HEIGHT: 70 IN | WEIGHT: 147 LBS | RESPIRATION RATE: 16 BRPM

## 2022-07-29 DIAGNOSIS — C57.01 ADENOCARCINOMA OF RIGHT FALLOPIAN TUBE: Primary | Chronic | ICD-10-CM

## 2022-07-29 DIAGNOSIS — C57.01 ADENOCARCINOMA OF RIGHT FALLOPIAN TUBE: Primary | ICD-10-CM

## 2022-07-29 DIAGNOSIS — C57.01 ADENOCARCINOMA OF RIGHT FALLOPIAN TUBE: ICD-10-CM

## 2022-07-29 DIAGNOSIS — D61.810 ANTINEOPLASTIC CHEMOTHERAPY INDUCED PANCYTOPENIA (CODE): ICD-10-CM

## 2022-07-29 DIAGNOSIS — D70.1 CHEMOTHERAPY-INDUCED NEUTROPENIA: Primary | ICD-10-CM

## 2022-07-29 DIAGNOSIS — G62.0 CHEMOTHERAPY-INDUCED NEUROPATHY: ICD-10-CM

## 2022-07-29 DIAGNOSIS — T45.1X5A CHEMOTHERAPY-INDUCED NEUTROPENIA: Primary | ICD-10-CM

## 2022-07-29 DIAGNOSIS — T45.1X5A CHEMOTHERAPY-INDUCED NEUROPATHY: ICD-10-CM

## 2022-07-29 LAB
ALBUMIN SERPL-MCNC: 3.9 G/DL (ref 3.5–5.2)
ALBUMIN/GLOB SERPL: 1.3 G/DL
ALP SERPL-CCNC: 90 U/L (ref 39–117)
ALT SERPL W P-5'-P-CCNC: 12 U/L (ref 1–33)
ANION GAP SERPL CALCULATED.3IONS-SCNC: 9 MMOL/L (ref 5–15)
AST SERPL-CCNC: 21 U/L (ref 1–32)
BILIRUB SERPL-MCNC: 0.2 MG/DL (ref 0–1.2)
BUN SERPL-MCNC: 23 MG/DL (ref 8–23)
BUN/CREAT SERPL: 34.3 (ref 7–25)
CALCIUM SPEC-SCNC: 9.1 MG/DL (ref 8.6–10.5)
CANCER AG125 SERPL QL: 5.4 U/ML (ref 0–38.1)
CHLORIDE SERPL-SCNC: 100 MMOL/L (ref 98–107)
CO2 SERPL-SCNC: 23 MMOL/L (ref 22–29)
CREAT SERPL-MCNC: 0.67 MG/DL (ref 0.57–1)
EGFRCR SERPLBLD CKD-EPI 2021: 95.9 ML/MIN/1.73
ERYTHROCYTE [DISTWIDTH] IN BLOOD BY AUTOMATED COUNT: 20.1 % (ref 12.3–15.4)
GLOBULIN UR ELPH-MCNC: 3 GM/DL
GLUCOSE SERPL-MCNC: 95 MG/DL (ref 65–99)
HCT VFR BLD AUTO: 30 % (ref 34–46.6)
HGB BLD-MCNC: 9.2 G/DL (ref 12–15.9)
LYMPHOCYTES # BLD AUTO: 1.8 10*3/MM3 (ref 0.7–3.1)
LYMPHOCYTES NFR BLD AUTO: 57 % (ref 19.6–45.3)
MCH RBC QN AUTO: 31.8 PG (ref 26.6–33)
MCHC RBC AUTO-ENTMCNC: 30.8 G/DL (ref 31.5–35.7)
MCV RBC AUTO: 103.5 FL (ref 79–97)
MONOCYTES # BLD AUTO: 0.4 10*3/MM3 (ref 0.1–0.9)
MONOCYTES NFR BLD AUTO: 13.5 % (ref 5–12)
NEUTROPHILS NFR BLD AUTO: 0.9 10*3/MM3 (ref 1.7–7)
NEUTROPHILS NFR BLD AUTO: 29.5 % (ref 42.7–76)
PLATELET # BLD AUTO: 84 10*3/MM3 (ref 140–450)
PMV BLD AUTO: 6.7 FL (ref 6–12)
POTASSIUM SERPL-SCNC: 4 MMOL/L (ref 3.5–5.2)
PROT SERPL-MCNC: 6.9 G/DL (ref 6–8.5)
RBC # BLD AUTO: 2.9 10*6/MM3 (ref 3.77–5.28)
SODIUM SERPL-SCNC: 132 MMOL/L (ref 136–145)
WBC NRBC COR # BLD: 3.1 10*3/MM3 (ref 3.4–10.8)

## 2022-07-29 PROCEDURE — 25010000002 HEPARIN LOCK FLUSH PER 10 UNITS: Performed by: OBSTETRICS & GYNECOLOGY

## 2022-07-29 PROCEDURE — 99215 OFFICE O/P EST HI 40 MIN: CPT | Performed by: OBSTETRICS & GYNECOLOGY

## 2022-07-29 PROCEDURE — 85025 COMPLETE CBC W/AUTO DIFF WBC: CPT | Performed by: OBSTETRICS & GYNECOLOGY

## 2022-07-29 PROCEDURE — 36591 DRAW BLOOD OFF VENOUS DEVICE: CPT

## 2022-07-29 PROCEDURE — 86304 IMMUNOASSAY TUMOR CA 125: CPT | Performed by: OBSTETRICS & GYNECOLOGY

## 2022-07-29 PROCEDURE — 80053 COMPREHEN METABOLIC PANEL: CPT | Performed by: OBSTETRICS & GYNECOLOGY

## 2022-07-29 RX ORDER — PALONOSETRON 0.05 MG/ML
0.25 INJECTION, SOLUTION INTRAVENOUS ONCE
Status: CANCELLED | OUTPATIENT
Start: 2022-08-08

## 2022-07-29 RX ORDER — OLANZAPINE 5 MG/1
5 TABLET ORAL ONCE
Status: CANCELLED | OUTPATIENT
Start: 2022-08-08 | End: 2022-08-08

## 2022-07-29 RX ORDER — FAMOTIDINE 10 MG/ML
20 INJECTION, SOLUTION INTRAVENOUS ONCE
Status: CANCELLED | OUTPATIENT
Start: 2022-08-08

## 2022-07-29 RX ORDER — FAMOTIDINE 10 MG/ML
20 INJECTION, SOLUTION INTRAVENOUS AS NEEDED
Status: CANCELLED | OUTPATIENT
Start: 2022-08-08

## 2022-07-29 RX ORDER — HEPARIN SODIUM (PORCINE) LOCK FLUSH IV SOLN 100 UNIT/ML 100 UNIT/ML
500 SOLUTION INTRAVENOUS AS NEEDED
Status: DISCONTINUED | OUTPATIENT
Start: 2022-07-29 | End: 2022-07-30 | Stop reason: HOSPADM

## 2022-07-29 RX ORDER — SODIUM CHLORIDE 9 MG/ML
250 INJECTION, SOLUTION INTRAVENOUS ONCE
Status: CANCELLED | OUTPATIENT
Start: 2022-08-08

## 2022-07-29 RX ORDER — HEPARIN SODIUM (PORCINE) LOCK FLUSH IV SOLN 100 UNIT/ML 100 UNIT/ML
500 SOLUTION INTRAVENOUS AS NEEDED
Status: CANCELLED | OUTPATIENT
Start: 2022-07-29

## 2022-07-29 RX ORDER — DIPHENHYDRAMINE HYDROCHLORIDE 50 MG/ML
50 INJECTION INTRAMUSCULAR; INTRAVENOUS AS NEEDED
Status: CANCELLED | OUTPATIENT
Start: 2022-08-08

## 2022-07-29 RX ADMIN — HEPARIN 500 UNITS: 100 SYRINGE at 08:50

## 2022-07-29 NOTE — PROGRESS NOTES
Halie Cormier [facility]  [id number]  1955      Reason for visit: Fallopian tube cancer, toxicity evaluation, consideration of ongoing treatment    History of present illness:  The patient is a 66 y.o. year old female who presents today for treatment and evaluation of the above issues.    Today, patient notes good appetite and normal bowel and bladder function.  She has neuropathy in her left thumb.  She reports worse fatigue the first week after treatment, but returned to normal energy after that.  She continues to work while on treatment.  Her treatment was held due to thrombocytopenia and marginal neutropenia.  She was asymptomatic.  She would like her port removed.  She is looking forward to being finished with treatment and moving towards a vacation at the end of August.  She is going to [facility] in [place].    OBGYN History:  She is a .  She does not use HRT. She does not have a history of abnormal pap smears. Last pap smear 3/2022. Last mammogram 10/2021.    Oncologic History:  Oncology/Hematology History Overview Note   1.  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  Monoclonal gammopathy   3.  History of anemia, resolved after hemorrhoidectomy repair and on oral iron.  CBC on 2017 with a hemoglobin of 14.2, hematocrit 44.6%, MCV of 94.8.  4.  Atrial fibrillation  5.  Pulmonary nodule  6.  Erythrocytosis      Monoclonal gammopathy and uterine cancer history timeline:    -2017 24-hour urine immunoelectrophoresis showed no monoclonal spike, lambda light chains 106 with kappa light chains 24 and a kappa to lambda ratio of 0.23 with normal IgA, G, and M levels.  2017 serum immunoelectrophoresis showed 600 mg/dL of immunoglobulin G lambda light chains in the serum.  3/8/17 bone marrow biopsy showed normal marrow with 3% plasma cells and a small lambda clonal population identified with no stainable iron.  There was erythroid expansion with mild maturation dyssynchrony and  occasional atypical nuclear features that may represent the compensatory response to her anemia though myelodysplasia could not entirely be ruled out.  Had reactive aggregates of lymphoid tissue.     -6/13/2017 sedimentation rate 8, immunoglobulin free light chains free kappa light chains 25.7, free lambda light chains 173.4, kappa/lambda ratio 0.15.  Serum immunoelectrophoresis M-spike 0.9g/dL, C-reactive protein less than 0.01, CMP normal, ionized calcium 1.31, beta-2 microglobulin 3.1, CBC WBC 5500, hemoglobin 14.2, hematocrit 44.6%, MCV 94.8, platelets 259,000.  24-hour urine immunoelectrophoresis pending.   -6/17/2019 data: Lambda 129 with kappa 22 and ratio 0.17.  This is in the range that it has fluctuated since January 2017 upon first testing.  M spike stable 600 mg/dL immunoglobulin G lambda.  C-reactive protein 0.04 with sedimentation rate of 8.  Creatinine 0.86 with total calcium of 10 and ionized calcium 1.34 upper limit of normal 1.32.  Beta-2 microglobulin 2.8.  CBC unremarkable.  -4/13/2021 data: Beta-2 microglobulin 2.5 stable.  Hemoglobin 15.4 with normal CBC.  CMP unremarkable including creatinine 0.94, calcium 9.0, total protein 7.5, normal alkaline phosphatase 102.  Serum M spike 800 mg/dL IgG lambda stable with normal quantitative immunoglobulins.  Immunoglobulin free lambda light chains 131 mg/L with kappa 18.7 and ratio 0.14 stable x4 years.  Sedimentation rate 7.  C-reactive protein less than 0.3.  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  -2/22/2022 through 2/25/2022 admitted by Dr. Khurram Novak for atrial fibrillation with rapid ventricular response with worsening exercise tolerance.  Started on dofetilide and converted to sinus rhythm after second dose.  Dose decreased due to lengthening of QT corrected.  No further atrial fib after conversion.  Discharged on Eliquis.  -2/11/2022 through 2/15/2022 admitted for atrial fibrillation with RVR and vaginal bleeding.  On  Eliquis.    -3/4/2022 hematocrit 51.9% with hemoglobin 714.2 otherwise unremarkable CBC.  CMP unremarkable with normal creatinine 0.92, normal calcium 9.9, alkaline phosphatase minimally elevated 120 upper limit of normal 117, AST 44 otherwise unremarkable, otherwise unremarkable CMP.  IgG lambda stable 800 mg/dL with normal quantitative immunoglobulins and no significant change in M spike over the last 5 years.  Serum lambda light chain 85 continuing to come down from a high of 173 on presentation June 2017 with normal kappa and stable ratio over time at 0.17.  Sedimentation rate 7 with C-reactive protein less than 0.3.  Beta-2 microglobulin 2.9.    -3/4/2022 CT abdomen pelvis with and without contrastShowed right hip arthroplasty being artifact but with tubular masslike structure 9 x 6 cm presacral as suggested on ultrasound.  Could be related to multilocular cystic right ovarian mass or be related to the right uterine tube.  Malignancy favored.  Incidental 6 mm pulmonary nodule right lower lobe of undetermined significance    -3/7/2022 appointment with Loki Abdalla,gynecologist.  Had postmenopausal bleeding for the previous month.  3/2/2022 ultrasound showed 7.7 cm right adnexal mass and 6.9 cm left adnexal mass with  elevated at 38.4 upper limit of normal 38.1.  CEA normal 1.9.  Endometrial biopsy benign.    -3/10/2022 CT chest compared to CT abdomen 3/4/2022 showed stable 6 mm noncalcified right lower lobe pulmonary nodule with suggestion of follow-up in 6-12 months for stability.    -3/17/2022 JANET/BSO Dr. Fam.  Bilateral benign adenofibroma's of the ovaries.  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  No bessy involvement found.  No lymph vascular invasion.  Pathological stage IIIc.    -4/8/2022 Ca1 25 normal at 21.  CBC unremarkable save for hematocrit  51.3%.  CMP sodium 129 otherwise unremarkable.    -4/18/2022 gynecologic oncology postoperative evaluation.  She had an R0 resection 3/17/2022 for high-grade serous carcinoma.  Plans for Caris MI profile, genetic testing, and adjuvant carboplatin Taxol.    -4/18/2022 [facility] medical oncology follow-up visit: She has had a complex year since last I saw her about a year ago.  She is under care of Dr. Fam for stage III fallopian tube serous carcinoma for which she is due for adjuvant carbotaxol.  Genetic testing is ordered.  Caris MI profile sent but not back.  From my standpoint, the monoclonal gammopathy given stability over time and with unremarkable calcium and creatinine and quantitative immunoglobulins is very likely just benign and we will continue to check this annually.  Her serum light chains actually have gone down over time with no interventions.  No further work-up from that standpoint.  With reference to her erythrocytosis, I would not put her through work-up for myeloproliferative disorder as I doubt that that is operative especially with recent staging CTs not showing splenomegaly.  She does have small right lower lobe pulmonary nodule that does need follow-up but I am certain that she will be getting routine radiographic/CT follow-up along with serum tumor markers with Dr. Fam and company.  I will remain available to her in the interim but will not increase the frequency of hematology follow-up referable to the newly diagnosed oncologic gynecologic issue that is being expertly managed by Dr. Fam and her team.  It is reassuring that her CA-125 normalized after JANET/BSO and omentectomy etc.     Monoclonal gammopathy    Initial Diagnosis    Monoclonal gammopathy     11/29/2017 Imaging    Bone survey IMPRESSION:  Negative skeletal survey.     6/5/2018 -  Other Event    6/5/2018 labs:  CBC WBC 4350, hemoglobin 12.6, hematocrit 38.3%, platelet count 252,000.  CMP creatinine 0.8, serum calcium  9.4.  Ionized calcium 1.29.  Beta-2 microglobulin 2.3.  Sedimentation rate 9, CRP 0.02.  Kappa/lambda light chains-normal kappa light chains 16.6, lambda light chains elevated at 133.6, kappa/lambda ratio 0.12.  Serum immunoelectrophoresis M-spike stable 0.7 g/dL.       12/20/2018 -  Other Event    24-hour urine monoclonal protein showed no monoclonal protein.  Beta-2 microglobulin 2.5.  AST 43.  Creatinine 0.86.  Calcium 9.5.  Ionized calcium 1.3.  C-reactive protein less than 0.01.  Sedimentation rate 7.  Serum monoclonal protein 800 mg/dL of immunoglobulin G lambda.  Lambda monoclonal protein down to 98.5 with a kappa to lambda ratio 0.18.  CBC is normal.  Total body bone survey done on the day of her visit 12/20/18 was not read by the time of her visit.     6/17/2019 -  Other Event     Lambda 129 with kappa 22 and ratio 0.17.  This is in the range that it has fluctuated since January 2017 upon first testing.  M spike stable 600 mg/dL immunoglobulin G lambda.  C-reactive protein 0.04 with sedimentation rate of 8.  Creatinine 0.86 with total calcium of 10 and ionized calcium 1.34 upper limit of normal 1.32.  Beta-2 microglobulin 2.8.  CBC unremarkable.     4/6/2020 -  Other Event    Myeloma panel:  CBC WBC 5400, hemoglobin 15.2, platelet count 225,000.  Beta-2 microglobulin 2.6.  Ionized calcium 1.26.  CMP creatinine 0.77, serum calcium 10.1 C-reactive protein 0.08, sedimentation rate 13.  Serum immunoelectrophoresis M spike stable 0.6 g/dL.  Serum free light chains, normal free kappa light chains 16.3, free lambda light chains 107.7, kappa/lambda ratio 0.15.  Kappa/lambda light chains Urine normal.     4/13/2021 -  Other Event    -4/13/2021 data: Beta-2 microglobulin 2.5 stable.  Hemoglobin 15.4 with normal CBC.  CMP unremarkable including creatinine 0.94, calcium 9.0, total protein 7.5, normal alkaline phosphatase 102.  Serum M spike 800 mg/dL IgG lambda stable with normal quantitative immunoglobulins.   Immunoglobulin free lambda light chains 131 mg/L with kappa 18.7 and ratio 0.14 stable x4 years.  Sedimentation rate 7.  C-reactive protein less than 0.3.  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     Adenocarcinoma of right fallopian tube (HCC)   3/2/2022 Imaging    Patient presented to Dr. Abdalla with c/o postmenopausal bleeding.   Transvaginal ultrasound demonstrated a thickened endometrial stripe and bilateral adnexal masses.  Referred to Gyn Oncology due to concern for possible malignancy.      3/4/2022 Imaging    CT abdomen pelvis:  1.Beam hardening artifact from the patient's right total hip arthroplasty makes evaluation of the right presacral/adnexal mass difficult. There is a tubular masslike structure measuring up to 9 x 6 cm with a left posterior (presacral) mural nodular  enhancing component as suggested on ultrasound. This could be related to a multilocular cystic right ovarian mass or be related to the right uterine tube. Differential favors malignant process in this age group.  2.Incidental 6 mm pulmonary nodule seen in the right lower lobe along the right hemidiaphragm. This is of uncertain clinical significance.     3/10/2022 Imaging    CT chest:  1.  No change in 6 mm noncalcified pulmonary nodule within the right lower lobe.  Follow-up noncontrast CT scan of the chest would be recommended in 6-12 months to document continued stability.     3/17/2022 Surgery    Diagnostic laparoscopy, RTLH/BSO, cancer staging, omentectomy, optimal tumor debulking (R=0), appendectomy by Dr. Christine Fam at [facility]    Final pathology showed high-grade serous carcinoma involving the lumen and wall. Right uterosacral ligament and omentum involved by tumor. Pelvic washings malignant, adenocarcinoma compatible with papillary serous carcinoma. Stage IIIC grade 3       4/11/2022 -  Chemotherapy    OP OVARIAN PACLitaxel / CARBOplatin (Q21D)     4/19/2022 Molecular Testing    STEPHANIE  results:  PD-L1 positive  ER positive, 2+, 95%  MSI stable, MMR proficient, TMB low, genomic ISACC low  AR negative  ARID1A, BANDAR, BRAF, EGFR, RAD51 C/D negative  BRCA1/2 negative  Her2/Patsy negative     5/26/2022 -  Chemotherapy    OP SUPPORTIVE HYDRATION + ANTIEMETICS           Past Medical History:   Diagnosis Date   • Atrial fibrillation (HCC)    • Corneal abrasion    • Hemorrhoids     bleeding hemorrhoids   • History of anemia    • History of transfusion    • Hypertension    • Hypothyroidism    • LGSIL on Pap smear of cervix 03/10/2022   • Osteoarthritis        Past Surgical History:   Procedure Laterality Date   • AUGMENTATION MAMMAPLASTY Bilateral 2008   • BONE MARROW BIOPSY Left 03/08/2017    Procedure: BONE MARROW BIOPSY;  Surgeon: Michel White MD;  Location:  [facility];  Service:    • COLONOSCOPY N/A 01/09/2017    Procedure: COLONOSCOPY;  Surgeon: Michel White MD;  Location:  [facility];  Service:    • ENDOSCOPY N/A 01/07/2017    Procedure: ESOPHAGOGASTRODUODENOSCOPY;  Surgeon: Abhishek Benton MD;  Location:  [facility];  Service:    • HEMORRHOIDECTOMY N/A 03/08/2017    Procedure: HEMORRHOIDECTOMY,;  Surgeon: Michel White MD;  Location:  [facility];  Service:    • HIP ARTHROSCOPY Right     2005 & 2012   • JOINT REPLACEMENT      2 right hips   • TONSILLECTOMY  1964   • TOTAL LAPAROSCOPIC HYSTERECTOMY N/A 03/17/2022    Procedure: DIAGNOSTIC LAPAROSCOPY, ROBOTIC ASSISTED TOTAL LAPAROSCOPIC HYSTERECTOMY, BILATERAL SALPINGO-OOPHERECTOMY, CANCER STAGING, OMENTECTOMY,  OPTIMAL TUMOR DEBULKING, APPENDECTOMY;  Surgeon: Christine Fam MD;  Location:  [facility];  Service: Robotics - DaVinci;  Laterality: N/A;   • VENOUS ACCESS DEVICE (PORT) INSERTION Left 04/08/2022       MEDICATIONS: The current medication list was reviewed with the patient and updated in the EMR this date per the Medical Assistant. Medication dosages and frequencies were confirmed to be accurate.      Allergies:  is allergic to  sulfa antibiotics.    Social History:   Social History     Socioeconomic History   • Marital status:    Tobacco Use   • Smoking status: Former Smoker     Packs/day: 1.00     Years: 15.00     Pack years: 15.00     Types: Cigarettes     Quit date:      Years since quittin.6   • Smokeless tobacco: Never Used   • Tobacco comment: quit 35+ years ago.    Vaping Use   • Vaping Use: Never used   Substance and Sexual Activity   • Alcohol use: Not Currently   • Drug use: No   • Sexual activity: Defer       Family History:    Family History   Problem Relation Age of Onset   • Atrial fibrillation Mother    • Diabetes Mother    • Atrial fibrillation Brother    • Abnormal EKG Brother    • Heart attack Father    • Stroke Father    • Diabetes Sister    • Breast cancer Neg Hx    • Ovarian cancer Neg Hx    • Uterine cancer Neg Hx    • Colon cancer Neg Hx        Health Maintenance:    Health Maintenance   Topic Date Due   • DXA SCAN  Never done   • TDAP/TD VACCINES (1 - Tdap) Never done   • ANNUAL WELLNESS VISIT  Never done   • Pneumococcal Vaccine 65+ (1 - PCV) Never done   • MAMMOGRAM  2020   • INFLUENZA VACCINE  10/01/2022   • PAP SMEAR  2025   • COLORECTAL CANCER SCREENING  2027   • HEPATITIS C SCREENING  Completed   • COVID-19 Vaccine  Completed   • ZOSTER VACCINE  Completed     Review of Systems   Please refer to history of present illness, review of systems otherwise negative.    Physical Exam    Vitals:    22 0915   BP: 116/64   Pulse: 81   Resp: 12   Weight: 66.7 kg (147 lb)   PainSc: 0-No pain       Body mass index is 21.09 kg/m².    Wt Readings from Last 3 Encounters:   22 66.7 kg (147 lb)   22 66.7 kg (147 lb)   22 66.2 kg (146 lb)     GENERAL: Alert, well-appearing female appearing her stated age who is in no apparent distress.   HEENT: Sclera anicteric. Head normocephalic, atraumatic. Mucus membranes moist.  Wearing wig  NECK: Trachea midline, supple, without  masses.    BREASTS: Deferred  CARDIOVASCULAR: Normal rate, irregular rhythm, no murmurs, rubs, or gallops. No peripheral edema.  RESPIRATORY: Clear to auscultation bilaterally, normal respiratory effort  BACK:  No CVA tenderness, no vertebral tenderness on palpation  GASTROINTESTINAL:  Abdomen is soft, non-tender, non-distended, no rebound or guarding  SKIN:  Warm, dry, well-perfused.  All visible areas intact.  No rashes, lesions, ulcers.  PSYCHIATRIC: AO x3, with appropriate affect, normal thought processes.  NEUROLOGIC: No focal deficits.  Moves extremities well.  MUSCULOSKELETAL: Normal gait and station.   EXTREMITIES:   No cyanosis, clubbing, symmetric.  LYMPHATICS:  No cervical or inguinal adenopathy noted.   PELVIC exam:  Defferred    ECOG PS 0    PROCEDURES:  None    Diagnostic Data:      Lab Results   Component Value Date    WBC 3.10 (L) 07/29/2022    HGB 9.2 (L) 07/29/2022    HCT 30.0 (L) 07/29/2022    .5 (H) 07/29/2022    PLT 84 (L) 07/29/2022    NEUTROABS 0.90 (L) 07/29/2022    GLUCOSE 95 07/29/2022    BUN 23 07/29/2022    CREATININE 0.67 07/29/2022    EGFRIFNONA 61 02/25/2022     (L) 07/29/2022    K 4.0 07/29/2022     07/29/2022    CO2 23.0 07/29/2022    MG 1.6 02/25/2022    PHOS 4.4 04/04/2018    CALCIUM 9.1 07/29/2022    ALBUMIN 3.90 07/29/2022    AST 21 07/29/2022    ALT 12 07/29/2022    BILITOT 0.2 07/29/2022     Lab Results   Component Value Date     5.4 07/29/2022     6.1 07/01/2022     2.0 06/10/2022     8.3 05/20/2022     10.8 04/29/2022     21.0 04/08/2022     38.4 (H) 03/07/2022           Assessment & Plan   This is a 66 y.o. woman with stage IIIC serous carcinoma of fallopian tube presents for chemo evaluation.  Encounter Diagnoses   Name Primary?   • Adenocarcinoma of right fallopian tube (HCC) Yes   • Chemotherapy-induced neuropathy (HCC)    • Antineoplastic chemotherapy induced pancytopenia (CODE) (HCC)       Stage IIIC serous carcinoma  of fallopian tube  -Consideration of cycle #5 of combined carboplatin plus paclitaxel.  See below.  Both paclitaxel and carboplatin dose reduced for cycle #6.  -We discussed options of maintenance treatment such as bevacizumab and PARP inhibitor.  I do not think either 1 of these is strongly indicated in this particular patient given her tumor genomics and lack of ascites at presentation.  I recommended observation after completion of treatment.  -Posttreatment CT scan ordered.  -Options for Port-A-Cath removal were discussed.  Patient would prefer post treatment Port-A-Cath removal in office.  She is on Eliquis.  This will need to be held the day before the procedure, the day of the procedure, and she can likely resume Eliquis the day after the procedure.  Discussed premedication with Tylenol/ibuprofen.  I do not think antibiotic prophylaxis is indicated for this minor office procedure.  We will try to work around her vacation which is at the end of August to schedule this.    Chemo induced nausea, dehydration  -Largely resolved    Neuropathy, grade 1  - Intermittent numbness mainly at left thumb tip; not interfering with any daily activities  - Continue to monitor    Chemo induced pancytopenia  -I called patient back after she left the clinic and left message for her that her chemotherapy was canceled for Monday, 8/1/2022.  Left message regarding neutropenic and fall precautions.  Recheck labs in 1 week and hopefully will be able to complete cycle #6 8/8/2022    Pain assessment was performed today as a part of patient’s care.  For patients with pain related to surgery, gynecologic malignancy or cancer treatment, the plan is as noted in the assessment/plan.  For patients with pain not related to these issues, they are to seek any further needed care from a more appropriate provider, such as PCP.      Orders Placed This Encounter   Procedures   • CT Abdomen Pelvis With Contrast     Standing Status:   Future      Standing Expiration Date:   7/29/2023     Order Specific Question:   Will Oral Contrast be needed for this procedure?     Answer:   Yes   • CT Chest With Contrast Diagnostic     Standing Status:   Future     Standing Expiration Date:   7/29/2023     FOLLOW UP: Labs in 1 week, possible treatment as noted above.  I spent 50 minutes caring for Halie on this date of service. This time includes time spent by me in the following activities: preparing for the visit, reviewing tests, performing a medically appropriate examination and/or evaluation, counseling and educating the patient/family/caregiver, ordering medications, tests, or procedures, referring and communicating with other health care professionals, documenting information in the medical record and care coordination    Patient was seen and examined with Dr. Garcia,  resident, who performed portions of the examination and documentation for this patient's care under my direct supervision.  I agree with the above documentation and plan.    Christine Fam MD  07/29/22  13:44 EDT

## 2022-08-01 ENCOUNTER — APPOINTMENT (OUTPATIENT)
Dept: ONCOLOGY | Facility: HOSPITAL | Age: 67
End: 2022-08-01

## 2022-08-05 ENCOUNTER — PATIENT MESSAGE (OUTPATIENT)
Dept: GYNECOLOGIC ONCOLOGY | Facility: CLINIC | Age: 67
End: 2022-08-05

## 2022-08-05 ENCOUNTER — HOSPITAL ENCOUNTER (OUTPATIENT)
Dept: ONCOLOGY | Facility: HOSPITAL | Age: 67
Setting detail: INFUSION SERIES
Discharge: HOME | End: 2022-08-05

## 2022-08-05 VITALS
TEMPERATURE: 97.3 F | DIASTOLIC BLOOD PRESSURE: 86 MMHG | HEIGHT: 70 IN | SYSTOLIC BLOOD PRESSURE: 171 MMHG | BODY MASS INDEX: 20.9 KG/M2 | RESPIRATION RATE: 18 BRPM | HEART RATE: 71 BPM | WEIGHT: 146 LBS

## 2022-08-05 DIAGNOSIS — C57.01 ADENOCARCINOMA OF RIGHT FALLOPIAN TUBE: Primary | ICD-10-CM

## 2022-08-05 LAB
ALBUMIN SERPL-MCNC: 4.1 G/DL (ref 3.5–5.2)
ALBUMIN/GLOB SERPL: 1.5 G/DL
ALP SERPL-CCNC: 82 U/L (ref 39–117)
ALT SERPL W P-5'-P-CCNC: 10 U/L (ref 1–33)
ANION GAP SERPL CALCULATED.3IONS-SCNC: 7 MMOL/L (ref 5–15)
AST SERPL-CCNC: 21 U/L (ref 1–32)
BILIRUB SERPL-MCNC: 0.5 MG/DL (ref 0–1.2)
BUN SERPL-MCNC: 15 MG/DL (ref 8–23)
BUN/CREAT SERPL: 23.8 (ref 7–25)
CALCIUM SPEC-SCNC: 8.9 MG/DL (ref 8.6–10.5)
CHLORIDE SERPL-SCNC: 107 MMOL/L (ref 98–107)
CO2 SERPL-SCNC: 25 MMOL/L (ref 22–29)
CREAT SERPL-MCNC: 0.63 MG/DL (ref 0.57–1)
EGFRCR SERPLBLD CKD-EPI 2021: 97.4 ML/MIN/1.73
ERYTHROCYTE [DISTWIDTH] IN BLOOD BY AUTOMATED COUNT: 20.5 % (ref 12.3–15.4)
GLOBULIN UR ELPH-MCNC: 2.7 GM/DL
GLUCOSE SERPL-MCNC: 91 MG/DL (ref 65–99)
HCT VFR BLD AUTO: 30.7 % (ref 34–46.6)
HGB BLD-MCNC: 9.5 G/DL (ref 12–15.9)
LYMPHOCYTES # BLD AUTO: 1.8 10*3/MM3 (ref 0.7–3.1)
LYMPHOCYTES NFR BLD AUTO: 50.6 % (ref 19.6–45.3)
MCH RBC QN AUTO: 32.4 PG (ref 26.6–33)
MCHC RBC AUTO-ENTMCNC: 30.9 G/DL (ref 31.5–35.7)
MCV RBC AUTO: 104.7 FL (ref 79–97)
MONOCYTES # BLD AUTO: 0.5 10*3/MM3 (ref 0.1–0.9)
MONOCYTES NFR BLD AUTO: 12.7 % (ref 5–12)
NEUTROPHILS NFR BLD AUTO: 1.3 10*3/MM3 (ref 1.7–7)
NEUTROPHILS NFR BLD AUTO: 36.7 % (ref 42.7–76)
PLATELET # BLD AUTO: 74 10*3/MM3 (ref 140–450)
PMV BLD AUTO: 7.4 FL (ref 6–12)
POTASSIUM SERPL-SCNC: 4.1 MMOL/L (ref 3.5–5.2)
PROT SERPL-MCNC: 6.8 G/DL (ref 6–8.5)
RBC # BLD AUTO: 2.93 10*6/MM3 (ref 3.77–5.28)
SODIUM SERPL-SCNC: 139 MMOL/L (ref 136–145)
WBC NRBC COR # BLD: 3.6 10*3/MM3 (ref 3.4–10.8)

## 2022-08-05 PROCEDURE — 25010000002 HEPARIN LOCK FLUSH PER 10 UNITS: Performed by: OBSTETRICS & GYNECOLOGY

## 2022-08-05 PROCEDURE — 85025 COMPLETE CBC W/AUTO DIFF WBC: CPT | Performed by: OBSTETRICS & GYNECOLOGY

## 2022-08-05 PROCEDURE — 36591 DRAW BLOOD OFF VENOUS DEVICE: CPT

## 2022-08-05 PROCEDURE — 80053 COMPREHEN METABOLIC PANEL: CPT | Performed by: OBSTETRICS & GYNECOLOGY

## 2022-08-05 RX ORDER — HEPARIN SODIUM (PORCINE) LOCK FLUSH IV SOLN 100 UNIT/ML 100 UNIT/ML
500 SOLUTION INTRAVENOUS AS NEEDED
Status: DISCONTINUED | OUTPATIENT
Start: 2022-08-05 | End: 2022-08-06 | Stop reason: HOSPADM

## 2022-08-05 RX ORDER — HEPARIN SODIUM (PORCINE) LOCK FLUSH IV SOLN 100 UNIT/ML 100 UNIT/ML
500 SOLUTION INTRAVENOUS AS NEEDED
Status: CANCELLED | OUTPATIENT
Start: 2022-08-05

## 2022-08-05 RX ADMIN — HEPARIN 500 UNITS: 100 SYRINGE at 10:33

## 2022-08-08 ENCOUNTER — HOSPITAL ENCOUNTER (OUTPATIENT)
Dept: ONCOLOGY | Facility: HOSPITAL | Age: 67
Setting detail: INFUSION SERIES
Discharge: HOME | End: 2022-08-08

## 2022-08-09 DIAGNOSIS — T45.1X5A CHEMOTHERAPY-INDUCED NEUTROPENIA: Primary | ICD-10-CM

## 2022-08-09 DIAGNOSIS — C57.01 ADENOCARCINOMA OF RIGHT FALLOPIAN TUBE: ICD-10-CM

## 2022-08-09 DIAGNOSIS — D70.1 CHEMOTHERAPY-INDUCED NEUTROPENIA: Primary | ICD-10-CM

## 2022-08-12 ENCOUNTER — APPOINTMENT (OUTPATIENT)
Dept: ONCOLOGY | Facility: HOSPITAL | Age: 67
End: 2022-08-12

## 2022-08-15 ENCOUNTER — LAB (OUTPATIENT)
Dept: LAB | Facility: HOSPITAL | Age: 67
End: 2022-08-15

## 2022-08-15 ENCOUNTER — TELEPHONE (OUTPATIENT)
Dept: GYNECOLOGIC ONCOLOGY | Facility: CLINIC | Age: 67
End: 2022-08-15

## 2022-08-15 ENCOUNTER — HOSPITAL ENCOUNTER (OUTPATIENT)
Dept: ONCOLOGY | Facility: HOSPITAL | Age: 67
Setting detail: INFUSION SERIES
Discharge: HOME | End: 2022-08-15

## 2022-08-15 DIAGNOSIS — C57.01 ADENOCARCINOMA OF RIGHT FALLOPIAN TUBE: ICD-10-CM

## 2022-08-15 LAB
ALBUMIN SERPL-MCNC: 4.8 G/DL (ref 3.5–5.2)
ALBUMIN/GLOB SERPL: 1.7 G/DL
ALP SERPL-CCNC: 93 U/L (ref 39–117)
ALT SERPL W P-5'-P-CCNC: 11 U/L (ref 1–33)
ANION GAP SERPL CALCULATED.3IONS-SCNC: 9 MMOL/L (ref 5–15)
AST SERPL-CCNC: 21 U/L (ref 1–32)
BILIRUB SERPL-MCNC: 0.6 MG/DL (ref 0–1.2)
BUN SERPL-MCNC: 16 MG/DL (ref 8–23)
BUN/CREAT SERPL: 25 (ref 7–25)
CALCIUM SPEC-SCNC: 10.1 MG/DL (ref 8.6–10.5)
CHLORIDE SERPL-SCNC: 106 MMOL/L (ref 98–107)
CO2 SERPL-SCNC: 26 MMOL/L (ref 22–29)
CREAT SERPL-MCNC: 0.64 MG/DL (ref 0.57–1)
EGFRCR SERPLBLD CKD-EPI 2021: 97 ML/MIN/1.73
ERYTHROCYTE [DISTWIDTH] IN BLOOD BY AUTOMATED COUNT: 17.7 % (ref 12.3–15.4)
GLOBULIN UR ELPH-MCNC: 2.9 GM/DL
GLUCOSE SERPL-MCNC: 90 MG/DL (ref 65–99)
HCT VFR BLD AUTO: 37.2 % (ref 34–46.6)
HGB BLD-MCNC: 11.5 G/DL (ref 12–15.9)
LYMPHOCYTES # BLD AUTO: 1.8 10*3/MM3 (ref 0.7–3.1)
LYMPHOCYTES NFR BLD AUTO: 49.8 % (ref 19.6–45.3)
MCH RBC QN AUTO: 32.6 PG (ref 26.6–33)
MCHC RBC AUTO-ENTMCNC: 30.8 G/DL (ref 31.5–35.7)
MCV RBC AUTO: 105.8 FL (ref 79–97)
MONOCYTES # BLD AUTO: 0.4 10*3/MM3 (ref 0.1–0.9)
MONOCYTES NFR BLD AUTO: 10.2 % (ref 5–12)
NEUTROPHILS NFR BLD AUTO: 1.4 10*3/MM3 (ref 1.7–7)
NEUTROPHILS NFR BLD AUTO: 40 % (ref 42.7–76)
PLATELET # BLD AUTO: 160 10*3/MM3 (ref 140–450)
PMV BLD AUTO: 6.2 FL (ref 6–12)
POTASSIUM SERPL-SCNC: 4.5 MMOL/L (ref 3.5–5.2)
PROT SERPL-MCNC: 7.7 G/DL (ref 6–8.5)
RBC # BLD AUTO: 3.51 10*6/MM3 (ref 3.77–5.28)
SODIUM SERPL-SCNC: 141 MMOL/L (ref 136–145)
WBC NRBC COR # BLD: 3.6 10*3/MM3 (ref 3.4–10.8)

## 2022-08-15 PROCEDURE — 80053 COMPREHEN METABOLIC PANEL: CPT

## 2022-08-15 PROCEDURE — 36415 COLL VENOUS BLD VENIPUNCTURE: CPT

## 2022-08-15 PROCEDURE — 85025 COMPLETE CBC W/AUTO DIFF WBC: CPT

## 2022-08-15 NOTE — TELEPHONE ENCOUNTER
Dr. Fam reviewed labs and patient is ok to receive treatment tomorrow. Notified patient of lab results, ANC 1.40, Patient verbalizes understanding.

## 2022-08-16 ENCOUNTER — HOSPITAL ENCOUNTER (OUTPATIENT)
Dept: CT IMAGING | Facility: HOSPITAL | Age: 67
Discharge: HOME | End: 2022-08-16
Admitting: OBSTETRICS & GYNECOLOGY

## 2022-08-16 ENCOUNTER — HOSPITAL ENCOUNTER (OUTPATIENT)
Dept: ONCOLOGY | Facility: HOSPITAL | Age: 67
Setting detail: INFUSION SERIES
Discharge: HOME | End: 2022-08-16

## 2022-08-16 VITALS
TEMPERATURE: 96.9 F | RESPIRATION RATE: 20 BRPM | BODY MASS INDEX: 20.95 KG/M2 | HEART RATE: 67 BPM | DIASTOLIC BLOOD PRESSURE: 79 MMHG | SYSTOLIC BLOOD PRESSURE: 134 MMHG | WEIGHT: 146 LBS

## 2022-08-16 DIAGNOSIS — C57.01 ADENOCARCINOMA OF RIGHT FALLOPIAN TUBE: Chronic | ICD-10-CM

## 2022-08-16 DIAGNOSIS — C57.01 ADENOCARCINOMA OF RIGHT FALLOPIAN TUBE: Primary | ICD-10-CM

## 2022-08-16 PROCEDURE — 96367 TX/PROPH/DG ADDL SEQ IV INF: CPT

## 2022-08-16 PROCEDURE — 25010000002 IOPAMIDOL 61 % SOLUTION: Performed by: OBSTETRICS & GYNECOLOGY

## 2022-08-16 PROCEDURE — 25010000002 PALONOSETRON 0.25 MG/5ML SOLUTION PREFILLED SYRINGE: Performed by: OBSTETRICS & GYNECOLOGY

## 2022-08-16 PROCEDURE — 96417 CHEMO IV INFUS EACH ADDL SEQ: CPT

## 2022-08-16 PROCEDURE — 74177 CT ABD & PELVIS W/CONTRAST: CPT

## 2022-08-16 PROCEDURE — 25010000002 DEXAMETHASONE SODIUM PHOSPHATE 100 MG/10ML SOLUTION: Performed by: OBSTETRICS & GYNECOLOGY

## 2022-08-16 PROCEDURE — 96375 TX/PRO/DX INJ NEW DRUG ADDON: CPT

## 2022-08-16 PROCEDURE — 25010000002 PACLITAXEL PER 1 MG: Performed by: OBSTETRICS & GYNECOLOGY

## 2022-08-16 PROCEDURE — 25010000002 FOSAPREPITANT PER 1 MG: Performed by: OBSTETRICS & GYNECOLOGY

## 2022-08-16 PROCEDURE — 96413 CHEMO IV INFUSION 1 HR: CPT

## 2022-08-16 PROCEDURE — 71260 CT THORAX DX C+: CPT

## 2022-08-16 PROCEDURE — 25010000002 CARBOPLATIN PER 50 MG: Performed by: NURSE PRACTITIONER

## 2022-08-16 PROCEDURE — 25010000002 HEPARIN LOCK FLUSH PER 10 UNITS: Performed by: OBSTETRICS & GYNECOLOGY

## 2022-08-16 PROCEDURE — 96374 THER/PROPH/DIAG INJ IV PUSH: CPT

## 2022-08-16 PROCEDURE — 25010000002 DIPHENHYDRAMINE PER 50 MG: Performed by: OBSTETRICS & GYNECOLOGY

## 2022-08-16 PROCEDURE — 96415 CHEMO IV INFUSION ADDL HR: CPT

## 2022-08-16 RX ORDER — FAMOTIDINE 10 MG/ML
20 INJECTION, SOLUTION INTRAVENOUS ONCE
Status: COMPLETED | OUTPATIENT
Start: 2022-08-16 | End: 2022-08-16

## 2022-08-16 RX ORDER — PALONOSETRON 0.05 MG/ML
0.25 INJECTION, SOLUTION INTRAVENOUS ONCE
Status: COMPLETED | OUTPATIENT
Start: 2022-08-16 | End: 2022-08-16

## 2022-08-16 RX ORDER — OLANZAPINE 5 MG/1
5 TABLET ORAL ONCE
Status: COMPLETED | OUTPATIENT
Start: 2022-08-16 | End: 2022-08-16

## 2022-08-16 RX ORDER — SODIUM CHLORIDE 9 MG/ML
250 INJECTION, SOLUTION INTRAVENOUS ONCE
Status: COMPLETED | OUTPATIENT
Start: 2022-08-16 | End: 2022-08-16

## 2022-08-16 RX ORDER — HEPARIN SODIUM (PORCINE) LOCK FLUSH IV SOLN 100 UNIT/ML 100 UNIT/ML
500 SOLUTION INTRAVENOUS AS NEEDED
OUTPATIENT
Start: 2022-08-16

## 2022-08-16 RX ORDER — HEPARIN SODIUM (PORCINE) LOCK FLUSH IV SOLN 100 UNIT/ML 100 UNIT/ML
500 SOLUTION INTRAVENOUS AS NEEDED
Status: DISCONTINUED | OUTPATIENT
Start: 2022-08-16 | End: 2022-08-17 | Stop reason: HOSPADM

## 2022-08-16 RX ADMIN — CARBOPLATIN 500 MG: 10 INJECTION, SOLUTION INTRAVENOUS at 12:58

## 2022-08-16 RX ADMIN — DIPHENHYDRAMINE HYDROCHLORIDE 50 MG: 50 INJECTION INTRAMUSCULAR; INTRAVENOUS at 08:06

## 2022-08-16 RX ADMIN — HEPARIN 500 UNITS: 100 SYRINGE at 13:36

## 2022-08-16 RX ADMIN — DEXAMETHASONE SODIUM PHOSPHATE 20 MG: 10 INJECTION, SOLUTION INTRAMUSCULAR; INTRAVENOUS at 08:38

## 2022-08-16 RX ADMIN — FAMOTIDINE 20 MG: 10 INJECTION, SOLUTION INTRAVENOUS at 08:54

## 2022-08-16 RX ADMIN — PALONOSETRON 0.25 MG: 0.25 INJECTION, SOLUTION INTRAVENOUS at 08:54

## 2022-08-16 RX ADMIN — IOPAMIDOL 65 ML: 612 INJECTION, SOLUTION INTRAVENOUS at 15:43

## 2022-08-16 RX ADMIN — SODIUM CHLORIDE 265 MG: 9 INJECTION, SOLUTION INTRAVENOUS at 09:48

## 2022-08-16 RX ADMIN — SODIUM CHLORIDE 250 ML: 9 INJECTION, SOLUTION INTRAVENOUS at 08:05

## 2022-08-16 RX ADMIN — OLANZAPINE 5 MG: 5 TABLET, FILM COATED ORAL at 08:00

## 2022-08-16 RX ADMIN — SODIUM CHLORIDE 150 MG: 9 INJECTION, SOLUTION INTRAVENOUS at 08:05

## 2022-08-17 ENCOUNTER — PATIENT EDUCATION (SURGERY INSTRUCTIONS) (OUTPATIENT)
Dept: GYNECOLOGIC ONCOLOGY | Facility: CLINIC | Age: 67
End: 2022-08-17

## 2022-08-17 ENCOUNTER — TELEPHONE (OUTPATIENT)
Dept: GYNECOLOGIC ONCOLOGY | Facility: CLINIC | Age: 67
End: 2022-08-17

## 2022-08-17 DIAGNOSIS — C57.01 ADENOCARCINOMA OF RIGHT FALLOPIAN TUBE: Primary | ICD-10-CM

## 2022-08-17 NOTE — TELEPHONE ENCOUNTER
----- Message from Christine Fam MD sent at 8/17/2022  1:40 PM EDT -----  Please call or message patient notifying her of CT scan free from obvious malignancy.  Make sure she has survivorship visit set up.  She is getting her Port-A-Cath removed at the end of her vacation.      ----- Message -----  From: Interface, Rad Results [place] [facility] In  Sent: 8/17/2022   9:33 AM EDT  To: Christine Fam MD

## 2022-08-17 NOTE — TELEPHONE ENCOUNTER
DOMONIQUE IS WANTING TO DISCUSS REMOVAL OF PORT   SHE STATES IT IS SUPPOSE TO BE DONE IN DR CARRION'S OFFICE      PLEASE ADVISE

## 2022-08-17 NOTE — TELEPHONE ENCOUNTER
I called the patient per MD and let he know results.   we also have her scheduled now for port removal. She will get her labs done on 09/08/2022 to make sure platelets have improved.     Instructions sent via [employer]

## 2022-08-17 NOTE — PATIENT INSTRUCTIONS
Patient coming for in office procedure. Called her and discussed. This is on the schedule. She is to hold her anticoagulant.

## 2022-09-05 ENCOUNTER — LAB (OUTPATIENT)
Dept: LAB | Facility: HOSPITAL | Age: 67
End: 2022-09-05

## 2022-09-05 DIAGNOSIS — C57.01 ADENOCARCINOMA OF RIGHT FALLOPIAN TUBE: ICD-10-CM

## 2022-09-05 LAB
ALBUMIN SERPL-MCNC: 4.2 G/DL (ref 3.5–5.2)
ALBUMIN/GLOB SERPL: 1.5 G/DL
ALP SERPL-CCNC: 92 U/L (ref 39–117)
ALT SERPL W P-5'-P-CCNC: 9 U/L (ref 1–33)
ANION GAP SERPL CALCULATED.3IONS-SCNC: 8 MMOL/L (ref 5–15)
AST SERPL-CCNC: 19 U/L (ref 1–32)
BASOPHILS # BLD AUTO: 0.01 10*3/MM3 (ref 0–0.2)
BASOPHILS NFR BLD AUTO: 0.3 % (ref 0–1.5)
BILIRUB SERPL-MCNC: 0.3 MG/DL (ref 0–1.2)
BUN SERPL-MCNC: 20 MG/DL (ref 8–23)
BUN/CREAT SERPL: 29.4 (ref 7–25)
CALCIUM SPEC-SCNC: 9.4 MG/DL (ref 8.6–10.5)
CHLORIDE SERPL-SCNC: 104 MMOL/L (ref 98–107)
CO2 SERPL-SCNC: 25 MMOL/L (ref 22–29)
CREAT SERPL-MCNC: 0.68 MG/DL (ref 0.57–1)
DEPRECATED RDW RBC AUTO: 54.2 FL (ref 37–54)
EGFRCR SERPLBLD CKD-EPI 2021: 95 ML/MIN/1.73
EOSINOPHIL # BLD AUTO: 0.03 10*3/MM3 (ref 0–0.4)
EOSINOPHIL NFR BLD AUTO: 0.8 % (ref 0.3–6.2)
ERYTHROCYTE [DISTWIDTH] IN BLOOD BY AUTOMATED COUNT: 14 % (ref 12.3–15.4)
GLOBULIN UR ELPH-MCNC: 2.8 GM/DL
GLUCOSE SERPL-MCNC: 69 MG/DL (ref 65–99)
HCT VFR BLD AUTO: 31 % (ref 34–46.6)
HGB BLD-MCNC: 10.3 G/DL (ref 12–15.9)
IMM GRANULOCYTES # BLD AUTO: 0.01 10*3/MM3 (ref 0–0.05)
IMM GRANULOCYTES NFR BLD AUTO: 0.3 % (ref 0–0.5)
LYMPHOCYTES # BLD AUTO: 1.82 10*3/MM3 (ref 0.7–3.1)
LYMPHOCYTES NFR BLD AUTO: 46.4 % (ref 19.6–45.3)
MCH RBC QN AUTO: 35.5 PG (ref 26.6–33)
MCHC RBC AUTO-ENTMCNC: 33.2 G/DL (ref 31.5–35.7)
MCV RBC AUTO: 106.9 FL (ref 79–97)
MONOCYTES # BLD AUTO: 0.58 10*3/MM3 (ref 0.1–0.9)
MONOCYTES NFR BLD AUTO: 14.8 % (ref 5–12)
NEUTROPHILS NFR BLD AUTO: 1.47 10*3/MM3 (ref 1.7–7)
NEUTROPHILS NFR BLD AUTO: 37.4 % (ref 42.7–76)
NRBC BLD AUTO-RTO: 0 /100 WBC (ref 0–0.2)
PLATELET # BLD AUTO: 68 10*3/MM3 (ref 140–450)
PMV BLD AUTO: 9.5 FL (ref 6–12)
POTASSIUM SERPL-SCNC: 4.4 MMOL/L (ref 3.5–5.2)
PROT SERPL-MCNC: 7 G/DL (ref 6–8.5)
RBC # BLD AUTO: 2.9 10*6/MM3 (ref 3.77–5.28)
SODIUM SERPL-SCNC: 137 MMOL/L (ref 136–145)
WBC NRBC COR # BLD: 3.92 10*3/MM3 (ref 3.4–10.8)

## 2022-09-05 PROCEDURE — 85025 COMPLETE CBC W/AUTO DIFF WBC: CPT

## 2022-09-05 PROCEDURE — 80053 COMPREHEN METABOLIC PANEL: CPT

## 2022-09-05 PROCEDURE — 36415 COLL VENOUS BLD VENIPUNCTURE: CPT

## 2022-09-06 ENCOUNTER — TELEPHONE (OUTPATIENT)
Dept: GYNECOLOGIC ONCOLOGY | Facility: CLINIC | Age: 67
End: 2022-09-06

## 2022-09-06 DIAGNOSIS — D70.1 CHEMOTHERAPY-INDUCED NEUTROPENIA: ICD-10-CM

## 2022-09-06 DIAGNOSIS — D69.59 CHEMOTHERAPY-INDUCED THROMBOCYTOPENIA: ICD-10-CM

## 2022-09-06 DIAGNOSIS — D61.810 ANTINEOPLASTIC CHEMOTHERAPY INDUCED PANCYTOPENIA (CODE): ICD-10-CM

## 2022-09-06 DIAGNOSIS — T45.1X5A CHEMOTHERAPY-INDUCED THROMBOCYTOPENIA: ICD-10-CM

## 2022-09-06 DIAGNOSIS — T45.1X5A CHEMOTHERAPY-INDUCED NEUTROPENIA: ICD-10-CM

## 2022-09-06 NOTE — TELEPHONE ENCOUNTER
Notified patient via [employer] Message of results per Dr. Fam. Informed of platelet count and to continue fall precautions. Informed of ANC. Patient verbalizes understanding and wishes to have labs drawn in one week.

## 2022-09-06 NOTE — TELEPHONE ENCOUNTER
----- Message from Christine Fma MD sent at 9/6/2022 10:09 AM EDT -----  Notify patient of results.  Continue fall precautions until platelets greater than 100,000.  Offer her recheck of labs in 1 week if she would like.  ANC marginal at 1.47.  Thanks      ----- Message -----  From: Lab, Background User  Sent: 9/5/2022   1:14 PM EDT  To: Christine Fam MD

## 2022-09-11 ENCOUNTER — LAB (OUTPATIENT)
Dept: LAB | Facility: HOSPITAL | Age: 67
End: 2022-09-11

## 2022-09-11 DIAGNOSIS — D61.810 ANTINEOPLASTIC CHEMOTHERAPY INDUCED PANCYTOPENIA (CODE): ICD-10-CM

## 2022-09-11 LAB
BASOPHILS # BLD AUTO: 0.01 10*3/MM3 (ref 0–0.2)
BASOPHILS NFR BLD AUTO: 0.3 % (ref 0–1.5)
DEPRECATED RDW RBC AUTO: 56.8 FL (ref 37–54)
EOSINOPHIL # BLD AUTO: 0.01 10*3/MM3 (ref 0–0.4)
EOSINOPHIL NFR BLD AUTO: 0.3 % (ref 0.3–6.2)
ERYTHROCYTE [DISTWIDTH] IN BLOOD BY AUTOMATED COUNT: 14.2 % (ref 12.3–15.4)
HCT VFR BLD AUTO: 37.2 % (ref 34–46.6)
HGB BLD-MCNC: 12 G/DL (ref 12–15.9)
IMM GRANULOCYTES # BLD AUTO: 0 10*3/MM3 (ref 0–0.05)
IMM GRANULOCYTES NFR BLD AUTO: 0 % (ref 0–0.5)
LYMPHOCYTES # BLD AUTO: 0.96 10*3/MM3 (ref 0.7–3.1)
LYMPHOCYTES NFR BLD AUTO: 30.2 % (ref 19.6–45.3)
MACROCYTES BLD QL SMEAR: NORMAL
MCH RBC QN AUTO: 35 PG (ref 26.6–33)
MCHC RBC AUTO-ENTMCNC: 32.3 G/DL (ref 31.5–35.7)
MCV RBC AUTO: 108.5 FL (ref 79–97)
MONOCYTES # BLD AUTO: 0.34 10*3/MM3 (ref 0.1–0.9)
MONOCYTES NFR BLD AUTO: 10.7 % (ref 5–12)
NEUTROPHILS NFR BLD AUTO: 1.86 10*3/MM3 (ref 1.7–7)
NEUTROPHILS NFR BLD AUTO: 58.5 % (ref 42.7–76)
NRBC BLD AUTO-RTO: 0 /100 WBC (ref 0–0.2)
OVALOCYTES BLD QL SMEAR: NORMAL
PLATELET # BLD AUTO: 62 10*3/MM3 (ref 140–450)
PMV BLD AUTO: 9.9 FL (ref 6–12)
RBC # BLD AUTO: 3.43 10*6/MM3 (ref 3.77–5.28)
SMALL PLATELETS BLD QL SMEAR: NORMAL
WBC MORPH BLD: NORMAL
WBC NRBC COR # BLD: 3.18 10*3/MM3 (ref 3.4–10.8)

## 2022-09-11 PROCEDURE — 85025 COMPLETE CBC W/AUTO DIFF WBC: CPT

## 2022-09-11 PROCEDURE — 85007 BL SMEAR W/DIFF WBC COUNT: CPT

## 2022-09-11 PROCEDURE — 36415 COLL VENOUS BLD VENIPUNCTURE: CPT

## 2022-09-12 ENCOUNTER — TELEPHONE (OUTPATIENT)
Dept: GYNECOLOGIC ONCOLOGY | Facility: CLINIC | Age: 67
End: 2022-09-12

## 2022-09-12 DIAGNOSIS — D69.59 CHEMOTHERAPY-INDUCED THROMBOCYTOPENIA: Primary | ICD-10-CM

## 2022-09-12 DIAGNOSIS — T45.1X5A CHEMOTHERAPY-INDUCED THROMBOCYTOPENIA: Primary | ICD-10-CM

## 2022-09-12 NOTE — TELEPHONE ENCOUNTER
----- Message from Christine Fam MD sent at 9/12/2022 12:37 PM EDT -----  Please call patient.  ANC normalized.  Still has significant thrombocytopenia.  Recheck CBC with differential in 2 weeks.      ----- Message -----  From: Lab, Background User  Sent: 9/11/2022   3:41 PM EDT  To: Christine Fam MD

## 2022-09-12 NOTE — TELEPHONE ENCOUNTER
Responded to patient message, notified her of lab results per Dr. Fam, her ANC normalized, she has significant thrombocytopenia, and that we will recheck CBC with differential in 2 weeks.

## 2022-09-25 ENCOUNTER — LAB (OUTPATIENT)
Dept: LAB | Facility: HOSPITAL | Age: 67
End: 2022-09-25

## 2022-09-25 DIAGNOSIS — D69.59 CHEMOTHERAPY-INDUCED THROMBOCYTOPENIA: ICD-10-CM

## 2022-09-25 DIAGNOSIS — T45.1X5A CHEMOTHERAPY-INDUCED THROMBOCYTOPENIA: ICD-10-CM

## 2022-09-25 LAB
BASOPHILS # BLD AUTO: 0.02 10*3/MM3 (ref 0–0.2)
BASOPHILS NFR BLD AUTO: 0.5 % (ref 0–1.5)
DEPRECATED RDW RBC AUTO: 54.3 FL (ref 37–54)
EOSINOPHIL # BLD AUTO: 0.09 10*3/MM3 (ref 0–0.4)
EOSINOPHIL NFR BLD AUTO: 2.3 % (ref 0.3–6.2)
ERYTHROCYTE [DISTWIDTH] IN BLOOD BY AUTOMATED COUNT: 13.7 % (ref 12.3–15.4)
HCT VFR BLD AUTO: 37.7 % (ref 34–46.6)
HGB BLD-MCNC: 12.2 G/DL (ref 12–15.9)
IMM GRANULOCYTES # BLD AUTO: 0 10*3/MM3 (ref 0–0.05)
IMM GRANULOCYTES NFR BLD AUTO: 0 % (ref 0–0.5)
LYMPHOCYTES # BLD AUTO: 1.95 10*3/MM3 (ref 0.7–3.1)
LYMPHOCYTES NFR BLD AUTO: 50 % (ref 19.6–45.3)
MCH RBC QN AUTO: 34.6 PG (ref 26.6–33)
MCHC RBC AUTO-ENTMCNC: 32.4 G/DL (ref 31.5–35.7)
MCV RBC AUTO: 106.8 FL (ref 79–97)
MONOCYTES # BLD AUTO: 0.63 10*3/MM3 (ref 0.1–0.9)
MONOCYTES NFR BLD AUTO: 16.2 % (ref 5–12)
NEUTROPHILS NFR BLD AUTO: 1.21 10*3/MM3 (ref 1.7–7)
NEUTROPHILS NFR BLD AUTO: 31 % (ref 42.7–76)
NRBC BLD AUTO-RTO: 0 /100 WBC (ref 0–0.2)
PLATELET # BLD AUTO: 246 10*3/MM3 (ref 140–450)
PMV BLD AUTO: 9.1 FL (ref 6–12)
RBC # BLD AUTO: 3.53 10*6/MM3 (ref 3.77–5.28)
WBC NRBC COR # BLD: 3.9 10*3/MM3 (ref 3.4–10.8)

## 2022-09-25 PROCEDURE — 36415 COLL VENOUS BLD VENIPUNCTURE: CPT

## 2022-09-25 PROCEDURE — 85025 COMPLETE CBC W/AUTO DIFF WBC: CPT

## 2022-09-26 DIAGNOSIS — T45.1X5A CHEMOTHERAPY-INDUCED THROMBOCYTOPENIA: Primary | ICD-10-CM

## 2022-09-26 DIAGNOSIS — D69.59 CHEMOTHERAPY-INDUCED THROMBOCYTOPENIA: Primary | ICD-10-CM

## 2022-10-02 ENCOUNTER — LAB (OUTPATIENT)
Dept: LAB | Facility: HOSPITAL | Age: 67
End: 2022-10-02

## 2022-10-02 DIAGNOSIS — T45.1X5A CHEMOTHERAPY-INDUCED THROMBOCYTOPENIA: ICD-10-CM

## 2022-10-02 DIAGNOSIS — D69.59 CHEMOTHERAPY-INDUCED THROMBOCYTOPENIA: ICD-10-CM

## 2022-10-02 LAB
BASOPHILS # BLD AUTO: 0.01 10*3/MM3 (ref 0–0.2)
BASOPHILS NFR BLD AUTO: 0.3 % (ref 0–1.5)
DEPRECATED RDW RBC AUTO: 50.6 FL (ref 37–54)
EOSINOPHIL # BLD AUTO: 0.04 10*3/MM3 (ref 0–0.4)
EOSINOPHIL NFR BLD AUTO: 1 % (ref 0.3–6.2)
ERYTHROCYTE [DISTWIDTH] IN BLOOD BY AUTOMATED COUNT: 13.1 % (ref 12.3–15.4)
HCT VFR BLD AUTO: 36.2 % (ref 34–46.6)
HGB BLD-MCNC: 11.8 G/DL (ref 12–15.9)
IMM GRANULOCYTES # BLD AUTO: 0.01 10*3/MM3 (ref 0–0.05)
IMM GRANULOCYTES NFR BLD AUTO: 0.3 % (ref 0–0.5)
LYMPHOCYTES # BLD AUTO: 1.88 10*3/MM3 (ref 0.7–3.1)
LYMPHOCYTES NFR BLD AUTO: 48 % (ref 19.6–45.3)
MCH RBC QN AUTO: 34.4 PG (ref 26.6–33)
MCHC RBC AUTO-ENTMCNC: 32.6 G/DL (ref 31.5–35.7)
MCV RBC AUTO: 105.5 FL (ref 79–97)
MONOCYTES # BLD AUTO: 0.55 10*3/MM3 (ref 0.1–0.9)
MONOCYTES NFR BLD AUTO: 14 % (ref 5–12)
NEUTROPHILS NFR BLD AUTO: 1.43 10*3/MM3 (ref 1.7–7)
NEUTROPHILS NFR BLD AUTO: 36.4 % (ref 42.7–76)
NRBC BLD AUTO-RTO: 0 /100 WBC (ref 0–0.2)
PLATELET # BLD AUTO: 185 10*3/MM3 (ref 140–450)
PMV BLD AUTO: 9.1 FL (ref 6–12)
RBC # BLD AUTO: 3.43 10*6/MM3 (ref 3.77–5.28)
WBC NRBC COR # BLD: 3.92 10*3/MM3 (ref 3.4–10.8)

## 2022-10-02 PROCEDURE — 36415 COLL VENOUS BLD VENIPUNCTURE: CPT

## 2022-10-02 PROCEDURE — 85025 COMPLETE CBC W/AUTO DIFF WBC: CPT

## 2022-10-04 ENCOUNTER — PROCEDURE VISIT (OUTPATIENT)
Dept: GYNECOLOGIC ONCOLOGY | Facility: CLINIC | Age: 67
End: 2022-10-04

## 2022-10-04 VITALS
SYSTOLIC BLOOD PRESSURE: 140 MMHG | HEART RATE: 78 BPM | RESPIRATION RATE: 16 BRPM | TEMPERATURE: 97.6 F | DIASTOLIC BLOOD PRESSURE: 95 MMHG

## 2022-10-04 DIAGNOSIS — Z87.898 HISTORY OF DIFFICULT VENOUS ACCESS: Primary | ICD-10-CM

## 2022-10-04 PROCEDURE — 36590 REMOVAL TUNNELED CV CATH: CPT | Performed by: OBSTETRICS & GYNECOLOGY

## 2022-10-04 NOTE — PROGRESS NOTES
Subjective     Date of Service:  10/04/22  Time of Service:  12:01 EDT    Surgical Staff:  Christine Fam MD   Additional Staff:           Pre-operative diagnosis(es):  History of ovarian cancer  Desired Port-A-Cath removal     Post-operative diagnosis(es):  Same   Procedure(s):  Left subclavian venous Port-A-Cath removal     Antibiotics: N/A     Anesthesia:  Local anesthesia, 1% lidocaine     Objective      Operative findings:  Removal of intact Port-A-Cath   Specimens removed:  None   Fluid Intake and Output:  Estimated blood loss equals less than 10 mL   Blood products used: No   Drains:  None   Implant Information:  None   Complications:  None   Intraoperative consult(s):    Condition: stable   Disposition:  Discharge home       Indications:  Patient is a pleasant 67-year-old woman with a history of ovarian cancer.  She desired Port-A-Cath removal.  Risks and benefits were discussed.  Consent was signed and on chart.    Procedure: After obtaining informed consent and confirming patient and site, left thorax overlying port was prepped and draped in the usual sterile fashion.  A total of 10 mL lidocaine was used for local anesthesia.  Transverse incision was made over pre-existing scar.  Capsule was entered without difficulty.  Retained sutures were divided and completely removed.  Port was mobilized and extracted without difficulty.  Intact port was noted.  Pressure was held.  Hemostasis was achieved with Bovie electrocautery.  Deep dermis was reapproximated with 3-0 Monocryl in a simple running stitch.  Skin was closed with 3-0 Monocryl in a subcuticular stitch.  Glue was placed at the skin.  All counts were correct.  Patient tolerated the procedure well.  There were no immediate complications.  She was discharged home afterwards.    Christine Fam MD  10/04/22  12:00 EDT

## 2022-11-16 NOTE — PROGRESS NOTES
GYN ONCOLOGY CANCER SURVIVORSHIP VISIT    Halie Perez  [id number]  1955    Subjective   Chief Complaint: Fallopian Tube Cancer (Survivorship)        History of present illness:     Halie Perez is a 67 y.o. year old female who is here today for her Cancer Survivorship visit, see oncology history below. She feels she is recovering well from her chemotherapy. She has some mild residual neuropathy in bilateral toes, but neuropathy is improving overall since treatment completion. 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 Patient also has a history of monoclonal gammopathy, followed by Dr. Valencia of our Hematology Oncology group. She is feeling generally well today. She denies vaginal bleeding, pelvic pain, abdominal fullness/bloating, or changes in bowel or bladder function. Appetite is good. CA-125 was slightly elevated at diagnosis, has been in single-digits since mid-treatment, due to repeat tumor marker today.        Cancer History:   Oncology/Hematology History Overview Note   1.  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  Monoclonal gammopathy   3.  History of anemia, resolved after hemorrhoidectomy repair and on oral iron.  CBC on 6/13/2017 with a hemoglobin of 14.2, hematocrit 44.6%, MCV of 94.8.  4.  Atrial fibrillation  5.  Pulmonary nodule  6.  Erythrocytosis      Monoclonal gammopathy and uterine cancer history timeline:    -March 2017 24-hour urine immunoelectrophoresis showed no monoclonal spike, lambda light chains 106 with kappa light chains 24 and a kappa to lambda ratio of 0.23 with normal IgA, G, and M levels.  January 2017 serum immunoelectrophoresis showed 600 mg/dL of immunoglobulin G lambda light chains in the serum.  3/8/17 bone marrow biopsy showed normal marrow with 3% plasma cells and a small lambda clonal population identified with no stainable iron.  There  was erythroid expansion with mild maturation dyssynchrony and occasional atypical nuclear features that may represent the compensatory response to her anemia though myelodysplasia could not entirely be ruled out.  Had reactive aggregates of lymphoid tissue.     -6/13/2017 sedimentation rate 8, immunoglobulin free light chains free kappa light chains 25.7, free lambda light chains 173.4, kappa/lambda ratio 0.15.  Serum immunoelectrophoresis M-spike 0.9g/dL, C-reactive protein less than 0.01, CMP normal, ionized calcium 1.31, beta-2 microglobulin 3.1, CBC WBC 5500, hemoglobin 14.2, hematocrit 44.6%, MCV 94.8, platelets 259,000.  24-hour urine immunoelectrophoresis pending.   -6/17/2019 data: Lambda 129 with kappa 22 and ratio 0.17.  This is in the range that it has fluctuated since January 2017 upon first testing.  M spike stable 600 mg/dL immunoglobulin G lambda.  C-reactive protein 0.04 with sedimentation rate of 8.  Creatinine 0.86 with total calcium of 10 and ionized calcium 1.34 upper limit of normal 1.32.  Beta-2 microglobulin 2.8.  CBC unremarkable.  -4/13/2021 data: Beta-2 microglobulin 2.5 stable.  Hemoglobin 15.4 with normal CBC.  CMP unremarkable including creatinine 0.94, calcium 9.0, total protein 7.5, normal alkaline phosphatase 102.  Serum M spike 800 mg/dL IgG lambda stable with normal quantitative immunoglobulins.  Immunoglobulin free lambda light chains 131 mg/L with kappa 18.7 and ratio 0.14 stable x4 years.  Sedimentation rate 7.  C-reactive protein less than 0.3.  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  -2/22/2022 through 2/25/2022 admitted by Dr. Khurram Novak for atrial fibrillation with rapid ventricular response with worsening exercise tolerance.  Started on dofetilide and converted to sinus rhythm after second dose.  Dose decreased due to lengthening of QT corrected.  No further atrial fib after conversion.  Discharged on Eliquis.  -2/11/2022 through 2/15/2022 admitted  for atrial fibrillation with RVR and vaginal bleeding.  On Eliquis.    -3/4/2022 hematocrit 51.9% with hemoglobin 714.2 otherwise unremarkable CBC.  CMP unremarkable with normal creatinine 0.92, normal calcium 9.9, alkaline phosphatase minimally elevated 120 upper limit of normal 117, AST 44 otherwise unremarkable, otherwise unremarkable CMP.  IgG lambda stable 800 mg/dL with normal quantitative immunoglobulins and no significant change in M spike over the last 5 years.  Serum lambda light chain 85 continuing to come down from a high of 173 on presentation June 2017 with normal kappa and stable ratio over time at 0.17.  Sedimentation rate 7 with C-reactive protein less than 0.3.  Beta-2 microglobulin 2.9.    -3/4/2022 CT abdomen pelvis with and without contrastShowed right hip arthroplasty being artifact but with tubular masslike structure 9 x 6 cm presacral as suggested on ultrasound.  Could be related to multilocular cystic right ovarian mass or be related to the right uterine tube.  Malignancy favored.  Incidental 6 mm pulmonary nodule right lower lobe of undetermined significance    -3/7/2022 appointment with Loki Abdallagynecologist.  Had postmenopausal bleeding for the previous month.  3/2/2022 ultrasound showed 7.7 cm right adnexal mass and 6.9 cm left adnexal mass with  elevated at 38.4 upper limit of normal 38.1.  CEA normal 1.9.  Endometrial biopsy benign.    -3/10/2022 CT chest compared to CT abdomen 3/4/2022 showed stable 6 mm noncalcified right lower lobe pulmonary nodule with suggestion of follow-up in 6-12 months for stability.    -3/17/2022 JANET/BSO Dr. Fam.  Bilateral benign adenofibroma's of the ovaries.  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  No bessy involvement found.  No lymph vascular invasion.  Pathological stage IIIc.    -4/8/2022  Ca1 25 normal at 21.  CBC unremarkable save for hematocrit 51.3%.  CMP sodium 129 otherwise unremarkable.    -4/18/2022 gynecologic oncology postoperative evaluation.  She had an R0 resection 3/17/2022 for high-grade serous carcinoma.  Plans for Caris MI profile, genetic testing, and adjuvant carboplatin Taxol.    -4/18/2022 [facility] medical oncology follow-up visit: She has had a complex year since last I saw her about a year ago.  She is under care of Dr. Fam for stage III fallopian tube serous carcinoma for which she is due for adjuvant carbotaxol.  Genetic testing is ordered.  Caris MI profile sent but not back.  From my standpoint, the monoclonal gammopathy given stability over time and with unremarkable calcium and creatinine and quantitative immunoglobulins is very likely just benign and we will continue to check this annually.  Her serum light chains actually have gone down over time with no interventions.  No further work-up from that standpoint.  With reference to her erythrocytosis, I would not put her through work-up for myeloproliferative disorder as I doubt that that is operative especially with recent staging CTs not showing splenomegaly.  She does have small right lower lobe pulmonary nodule that does need follow-up but I am certain that she will be getting routine radiographic/CT follow-up along with serum tumor markers with Dr. Fam and company.  I will remain available to her in the interim but will not increase the frequency of hematology follow-up referable to the newly diagnosed oncologic gynecologic issue that is being expertly managed by Dr. Fam and her team.  It is reassuring that her CA-125 normalized after JANET/BSO and omentectomy etc.     Monoclonal gammopathy    Initial Diagnosis    Monoclonal gammopathy     11/29/2017 Imaging    Bone survey IMPRESSION:  Negative skeletal survey.     6/5/2018 -  Other Event    6/5/2018 labs:  CBC WBC 4350, hemoglobin 12.6, hematocrit 38.3%,  platelet count 252,000.  CMP creatinine 0.8, serum calcium 9.4.  Ionized calcium 1.29.  Beta-2 microglobulin 2.3.  Sedimentation rate 9, CRP 0.02.  Kappa/lambda light chains-normal kappa light chains 16.6, lambda light chains elevated at 133.6, kappa/lambda ratio 0.12.  Serum immunoelectrophoresis M-spike stable 0.7 g/dL.       12/20/2018 -  Other Event    24-hour urine monoclonal protein showed no monoclonal protein.  Beta-2 microglobulin 2.5.  AST 43.  Creatinine 0.86.  Calcium 9.5.  Ionized calcium 1.3.  C-reactive protein less than 0.01.  Sedimentation rate 7.  Serum monoclonal protein 800 mg/dL of immunoglobulin G lambda.  Lambda monoclonal protein down to 98.5 with a kappa to lambda ratio 0.18.  CBC is normal.  Total body bone survey done on the day of her visit 12/20/18 was not read by the time of her visit.     6/17/2019 -  Other Event     Lambda 129 with kappa 22 and ratio 0.17.  This is in the range that it has fluctuated since January 2017 upon first testing.  M spike stable 600 mg/dL immunoglobulin G lambda.  C-reactive protein 0.04 with sedimentation rate of 8.  Creatinine 0.86 with total calcium of 10 and ionized calcium 1.34 upper limit of normal 1.32.  Beta-2 microglobulin 2.8.  CBC unremarkable.     4/6/2020 -  Other Event    Myeloma panel:  CBC WBC 5400, hemoglobin 15.2, platelet count 225,000.  Beta-2 microglobulin 2.6.  Ionized calcium 1.26.  CMP creatinine 0.77, serum calcium 10.1 C-reactive protein 0.08, sedimentation rate 13.  Serum immunoelectrophoresis M spike stable 0.6 g/dL.  Serum free light chains, normal free kappa light chains 16.3, free lambda light chains 107.7, kappa/lambda ratio 0.15.  Kappa/lambda light chains Urine normal.     4/13/2021 -  Other Event    -4/13/2021 data: Beta-2 microglobulin 2.5 stable.  Hemoglobin 15.4 with normal CBC.  CMP unremarkable including creatinine 0.94, calcium 9.0, total protein 7.5, normal alkaline phosphatase 102.  Serum M spike 800 mg/dL IgG lambda  stable with normal quantitative immunoglobulins.  Immunoglobulin free lambda light chains 131 mg/L with kappa 18.7 and ratio 0.14 stable x4 years.  Sedimentation rate 7.  C-reactive protein less than 0.3.  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     Adenocarcinoma of right fallopian tube (HCC)   3/2/2022 Imaging    Patient presented to Dr. Abdalla with c/o postmenopausal bleeding.   Transvaginal ultrasound demonstrated a thickened endometrial stripe and bilateral adnexal masses.  Referred to Gyn Oncology due to concern for possible malignancy.      3/4/2022 Imaging    CT abdomen pelvis:  1.Beam hardening artifact from the patient's right total hip arthroplasty makes evaluation of the right presacral/adnexal mass difficult. There is a tubular masslike structure measuring up to 9 x 6 cm with a left posterior (presacral) mural nodular  enhancing component as suggested on ultrasound. This could be related to a multilocular cystic right ovarian mass or be related to the right uterine tube. Differential favors malignant process in this age group.  2.Incidental 6 mm pulmonary nodule seen in the right lower lobe along the right hemidiaphragm. This is of uncertain clinical significance.     3/10/2022 Imaging    CT chest:  1.  No change in 6 mm noncalcified pulmonary nodule within the right lower lobe.  Follow-up noncontrast CT scan of the chest would be recommended in 6-12 months to document continued stability.     3/17/2022 Surgery    Diagnostic laparoscopy, RTLH/BSO, cancer staging, omentectomy, optimal tumor debulking (R=0), appendectomy by Dr. Christine Fam at [facility]    Final pathology showed high-grade serous carcinoma involving the lumen and wall. Right uterosacral ligament and omentum involved by tumor. Pelvic washings malignant, adenocarcinoma compatible with papillary serous carcinoma. Stage IIIC grade 3       4/11/2022 - 8/15/2022 Chemotherapy    OP OVARIAN PACLitaxel / CARBOplatin  "(Q21D)  - 6 cycles completed  - toxicities included nausea, dehydration, neuropathy, and low blood counts  - both drugs dose reduced at cycle #6 and cycle #6 delayed 2 weeks due to neutropenia and thrombocytopenia     4/19/2022 Molecular Testing    CARIS results:  PD-L1 positive  ER positive, 2+, 95%  MSI stable, MMR proficient, TMB low, genomic ISACC low  ME negative  ARID1A, BANDAR, BRAF, EGFR, RAD51 C/D negative  BRCA1/2 negative  Her2/Patsy negative     8/16/2022 Imaging    Post-treatment scan:  CT chest:  1. Stable appearance of a 6 mm right lower lobe pulmonary nodule abutting the right hemidiaphragm.  2. No adenopathy within the chest.     CT abdomen and pelvis:  1. Postsurgical changes of hysterectomy and bilateral salpingo-oophorectomy. No evidence of residual disease within the abdomen or pelvis.  2. No evidence of lymphadenopathy within the abdomen or pelvis.  3. Stable small 9 mm lesion within the dome of the right hepatic lobe, favor benign etiology such as hemangioma. Continued attention on follow-up would be recommended. Additional benign simple cyst is present within the left hepatic lobe.           The current medication list and allergy list were reviewed and reconciled.     Past Medical History, Past Surgical History, Social History, Family History have been reviewed and are without significant changes except as mentioned.        Review of Systems   Constitutional: Negative.    Gastrointestinal: Negative.    Genitourinary: Negative.    Psychiatric/Behavioral: Negative.        Objective   Physical Exam  Vital Signs: /72   Pulse 69   Temp 97.5 °F (36.4 °C)   Resp 18   Ht 177.8 cm (70\")   Wt 65.6 kg (144 lb 9.6 oz)   LMP  (LMP Unknown)   SpO2 98%   BMI 20.75 kg/m²    Wt Readings from Last 10 Encounters:   11/17/22 65.6 kg (144 lb 9.6 oz)   08/16/22 66.2 kg (146 lb)   08/05/22 66.2 kg (146 lb)   07/29/22 66.7 kg (147 lb)   07/29/22 66.7 kg (147 lb)   07/11/22 66.2 kg (146 lb)   07/01/22 67.6 kg " (149 lb)   22 68 kg (149 lb 14.6 oz)   06/10/22 68 kg (150 lb)   22 68 kg (150 lb)       Vitals:    22 1250   PainSc: 0-No pain           General Appearance:  alert, cooperative, no apparent distress, appears stated age and normal weight   Neurologic/Psychiatric: A&O x 3, gait steady, appropriate affect   Abdomen:   Soft, non-tender, non-distended and no organomegaly   Lymph nodes: No cervical, supraclavicular, inguinal adenopathy noted   Extremities: Normal, atraumatic; no clubbing, cyanosis, or edema    Pelvic: External Genitalia  without lesions or skin changes  Vagina  is pink, moist, without lesions.   Vaginal Cuff  Female Vaginal Cuff: smooth, intact and without visible lesions  Uterus  surgically absent  Ovaries  surgically absent bilaterally and without palpable masses or fullness  Parametria  smooth  Rectovaginal  Female rectovaginal: deferred             ECO      Result Review :   The following data was reviewed by: ARMANDO Moore on 2022:  Last imaging study was post-treatment CT chest, abdomen, pelvis 2022.   CT Abdomen Pelvis With Contrast, CT Chest With Contrast Diagnostic  Narrative: CT CHEST W CONTRAST DIAGNOSTIC-, CT ABDOMEN PELVIS W CONTRAST-     DATE OF EXAM: 2022 3:08 PM     INDICATION: Ovarian cancer, s/p chemotherapy; C57.01-Malignant neoplasm  of right fallopian tube.      COMPARISON: CT chest dated 03/10/2022, CT abdomen and pelvis dated  2022     TECHNIQUE: Serial and axial CT images of the chest, abdomen, and pelvis  were obtained following the uneventful intravenous administration of 95  mL of Isovue 300 contrast. Oral contrast was also administered.  Reconstructions in the coronal and sagittal planes were also performed.   Automated exposure control and iterative reconstruction methods were  used.     FINDINGS:  CT chest:  The visualized soft tissue structures at the base the neck demonstrate a  small thyroid gland with benign  dystrophic calcification within the left  lobe. There is no lower cervical, axillary, or internal mammary  adenopathy. There are bilateral breast implants. There is a left chest  wall port with tip terminating in the SVC.     The heart size is normal. There is no pericardial effusion. There is  coronary artery atherosclerotic calcification. The aorta is normal in  caliber without evidence of aneurysm formation. The main pulmonary  artery is normal in caliber. There is no mediastinal or hilar  lymphadenopathy by size criteria. The esophagus is normal in course and  caliber.     The central airways are patent. There is a linear area of scarring  within the medial aspect of the right upper lobe. There is mild  centrilobular emphysema. There is a stable 6 mm nodule along the right  hemidiaphragm best seen on image 79 although has more volume averaging  on today's examination with the diaphragm compared to the prior  examination due to specific slice selection. There are scattered 2 mm  pleural-based micronodules abutting the pleura which appear stable from  the prior examination. Examples include within the anterior right upper  lobe best seen on image 28.     There are mild degenerative changes of the thoracic spine. No suspicious  lytic or sclerotic osseous lesion. There is evidence of old healed  bilateral rib fractures.     CT abdomen and pelvis:  The liver is normal in size and contour. There is stable appearance of  the 9 mm lesion within the dome of the right hepatic lobe best seen on  image 27. This previously demonstrated early enhancement on the prior  imaging study. There is a benign simple cyst within the left hepatic  lobe. There are no new liver lesions. The gallbladder is present without  wall thickening. There is no intrahepatic or extrahepatic biliary ductal  dilatation. The spleen is normal in size. The adrenal glands appear  within normal limits. There is normal enhancement of the pancreas.  No  pancreatic ductal dilatation.     The kidneys are symmetric in size and enhancement. There is a simple  cyst measuring 3.3 cm arising from the anterior aspect of the right  kidney. There is no hydronephrosis or hydroureter. The urinary bladder  is fluid-filled without wall thickening. The uterus is surgically  absent. The previously noted dilated right fallopian tube and bilateral  ovaries are also surgically absent.     The stomach and duodenum are normal in caliber and configuration. There  are no abnormally dilated loops of small bowel to suggest small bowel  obstruction or small bowel inflammation. The appendix is surgically  absent with linear suture at the base of the cecum. The colon is  partially fluid-filled suggesting diarrhea. There is no evidence of wall  thickening to suggest acute colitis or diverticulitis.     The aorta is normal in caliber without evidence of aneurysm formation.  There is no mesenteric, retroperitoneal, or pelvic lymphadenopathy by  size criteria. There are no definitive omental implants or omental  disease. There is a right total hip prosthesis which causes some streak  artifact. No suspicious lytic or sclerotic osseous lesion is present  within the lumbar spine or pelvis. Degenerative disc disease at L4-5 and  L5-S1. Pelvic floor laxity is noted.     Impression: CT chest:  1. Stable appearance of a 6 mm right lower lobe pulmonary nodule  abutting the right hemidiaphragm.  2. No adenopathy within the chest.     CT abdomen and pelvis:  1. Postsurgical changes of hysterectomy and bilateral  salpingo-oophorectomy. No evidence of residual disease within the  abdomen or pelvis.  2. No evidence of lymphadenopathy within the abdomen or pelvis.  3. Stable small 9 mm lesion within the dome of the right hepatic lobe,  favor benign etiology such as hemangioma. Continued attention on  follow-up would be recommended. Additional benign simple cyst is present  within the left hepatic lobe.      This report was finalized on 8/17/2022 9:30 AM by Sravan Porter MD.         Tumor marker:     Date Value Ref Range Status   11/17/2022 6.6 0.0 - 38.1 U/mL Final   07/29/2022 5.4 0.0 - 38.1 U/mL Final   07/01/2022 6.1 0.0 - 38.1 U/mL Final   06/10/2022 2.0 0.0 - 38.1 U/mL Final       Survivorship Needs Assessment:  Nutrition Services  Health history, treatment course, and weight trends reviewed. Discussed nutrition services offered by [facility] including oncology nutrition, diabetes management, general outpatient nutrition, exercise counseling, and weight management. The patient is not in need of referral to nutrition services at this time.     PT/Rehab  Health history, treatment course, and current status. The patient is not in need of physical therapy or rehabilitation services.    Behavioral Health  A post-treatment depression screening has been completed. PHQ-9 Total Score: 0   PHQ-9 results show 0 (No Depression). The patient has not previously established mental health care. A referral is not recommended at this time.       Procedure Note:            Assessment and Plan:     Diagnoses and all orders for this visit:    1. Adenocarcinoma of right fallopian tube (HCC) (Primary)  -     CT Abdomen Pelvis With Contrast; Future  -     CT Chest With Contrast Diagnostic; Future  -     ; Future    2. Chemotherapy-induced neuropathy (HCC)        There is no evidence of disease upon today's exam. She will be notified of CA-125 results upon their return. Post-treatment CT negative for disease but did show stable lung nodule and stable liver lesion that warrant attention on follow-up. We will plan for repeat CT chest, abdomen, pelvis prior to next follow-up visit in 2/2023. Patient v/u and agrees with plan. She is understanding to call with any changes in pelvic symptoms or general GYN concerns at any time between regularly scheduled visits.     The patient and I have reviewed her Survivorship Care Plan in  detail. We discussed her diagnosis, pathology, histology, all treatments, and ongoing surveillance recommendations. All questions were answered to her satisfaction. She is in agreement with our plan for ongoing surveillance as outlined in her plan. A copy of this document was provided to her at the completion of our visit.  A copy has also been sent to her primary care provider.    Treatment-related neuropathy is slowly improving with time, but persists in bilateral toes. Patient not in need of prescription management at this time. I recommended trying OTC diabetic foot cream, massage into bilateral feet and toes qHS.     Pain assessment was performed today as a part of patient’s care. For patients with pain related to surgery, gynecologic malignancy or cancer treatment, the plan is as noted in the assessment/plan.  For patients with pain not related to these issues, they are to seek any further needed care from a more appropriate provider, such as PCP.      I spent 40 minutes caring for Halie on this date of service. This time includes time spent by me in the following activities: preparing for the visit, performing a medically appropriate examination and/or evaluation, counseling and educating the patient/family/caregiver, ordering medications, tests, or procedures, documenting information in the medical record and care coordination.       Follow Up   Return to clinic in 3 months for ongoing cancer surveillance and review of CT scan with Dr. Fam.      Electronically signed by ARMANDO Moore on 11/23/22 at 10:11 EST

## 2022-11-17 ENCOUNTER — LAB (OUTPATIENT)
Dept: LAB | Facility: HOSPITAL | Age: 67
End: 2022-11-17

## 2022-11-17 ENCOUNTER — OFFICE VISIT (OUTPATIENT)
Dept: GYNECOLOGIC ONCOLOGY | Facility: CLINIC | Age: 67
End: 2022-11-17

## 2022-11-17 VITALS
DIASTOLIC BLOOD PRESSURE: 72 MMHG | TEMPERATURE: 97.5 F | SYSTOLIC BLOOD PRESSURE: 161 MMHG | RESPIRATION RATE: 18 BRPM | BODY MASS INDEX: 20.7 KG/M2 | WEIGHT: 144.6 LBS | HEART RATE: 69 BPM | OXYGEN SATURATION: 98 % | HEIGHT: 70 IN

## 2022-11-17 DIAGNOSIS — G62.0 CHEMOTHERAPY-INDUCED NEUROPATHY: ICD-10-CM

## 2022-11-17 DIAGNOSIS — C57.01 ADENOCARCINOMA OF RIGHT FALLOPIAN TUBE: Chronic | ICD-10-CM

## 2022-11-17 DIAGNOSIS — C57.01 ADENOCARCINOMA OF RIGHT FALLOPIAN TUBE: Primary | Chronic | ICD-10-CM

## 2022-11-17 DIAGNOSIS — T45.1X5A CHEMOTHERAPY-INDUCED NEUROPATHY: ICD-10-CM

## 2022-11-17 PROBLEM — D70.1 CHEMOTHERAPY-INDUCED NEUTROPENIA (HCC): Status: RESOLVED | Noted: 2022-07-29 | Resolved: 2022-11-17

## 2022-11-17 PROBLEM — D69.59 CHEMOTHERAPY-INDUCED THROMBOCYTOPENIA: Status: RESOLVED | Noted: 2022-07-01 | Resolved: 2022-11-17

## 2022-11-17 PROBLEM — R11.0 CHEMOTHERAPY-INDUCED NAUSEA: Status: RESOLVED | Noted: 2022-05-01 | Resolved: 2022-11-17

## 2022-11-17 LAB — CANCER AG125 SERPL QL: 6.6 U/ML (ref 0–38.1)

## 2022-11-17 PROCEDURE — 99215 OFFICE O/P EST HI 40 MIN: CPT | Performed by: NURSE PRACTITIONER

## 2022-11-17 PROCEDURE — 86304 IMMUNOASSAY TUMOR CA 125: CPT

## 2022-11-17 PROCEDURE — 36415 COLL VENOUS BLD VENIPUNCTURE: CPT

## 2022-11-18 ENCOUNTER — TELEPHONE (OUTPATIENT)
Dept: GYNECOLOGIC ONCOLOGY | Facility: CLINIC | Age: 67
End: 2022-11-18

## 2022-11-18 NOTE — TELEPHONE ENCOUNTER
----- Message from ARMANDO Moore sent at 11/18/2022  9:52 AM EST -----  Please notify CA-125 low/stable. Thanks!

## 2022-12-21 ENCOUNTER — LAB (OUTPATIENT)
Dept: LAB | Facility: HOSPITAL | Age: 67
End: 2022-12-21

## 2022-12-21 ENCOUNTER — TRANSCRIBE ORDERS (OUTPATIENT)
Dept: LAB | Facility: HOSPITAL | Age: 67
End: 2022-12-21

## 2022-12-21 DIAGNOSIS — E03.9 MYXEDEMA HEART DISEASE: ICD-10-CM

## 2022-12-21 DIAGNOSIS — I10 ESSENTIAL HYPERTENSION, MALIGNANT: ICD-10-CM

## 2022-12-21 DIAGNOSIS — R74.01 NONSPECIFIC ELEVATION OF LEVELS OF TRANSAMINASE OR LACTIC ACID DEHYDROGENASE (LDH): ICD-10-CM

## 2022-12-21 DIAGNOSIS — R74.02 NONSPECIFIC ELEVATION OF LEVELS OF TRANSAMINASE OR LACTIC ACID DEHYDROGENASE (LDH): ICD-10-CM

## 2022-12-21 DIAGNOSIS — I51.9 MYXEDEMA HEART DISEASE: ICD-10-CM

## 2022-12-21 DIAGNOSIS — E78.2 MIXED HYPERLIPIDEMIA: ICD-10-CM

## 2022-12-21 DIAGNOSIS — E03.9 MYXEDEMA HEART DISEASE: Primary | ICD-10-CM

## 2022-12-21 DIAGNOSIS — I51.9 MYXEDEMA HEART DISEASE: Primary | ICD-10-CM

## 2022-12-21 LAB
CHOLEST SERPL-MCNC: 154 MG/DL (ref 0–200)
HDLC SERPL-MCNC: 61 MG/DL (ref 40–60)
LDH SERPL-CCNC: 194 U/L (ref 135–214)
LDLC SERPL CALC-MCNC: 83 MG/DL (ref 0–100)
LDLC/HDLC SERPL: 1.38 {RATIO}
TRIGL SERPL-MCNC: 45 MG/DL (ref 0–150)
TSH SERPL DL<=0.05 MIU/L-ACNC: 0.03 UIU/ML (ref 0.27–4.2)
VLDLC SERPL-MCNC: 10 MG/DL (ref 5–40)

## 2022-12-21 PROCEDURE — 36415 COLL VENOUS BLD VENIPUNCTURE: CPT

## 2022-12-21 PROCEDURE — 80061 LIPID PANEL: CPT

## 2022-12-21 PROCEDURE — 83615 LACTATE (LD) (LDH) ENZYME: CPT

## 2022-12-21 PROCEDURE — 84443 ASSAY THYROID STIM HORMONE: CPT

## 2023-02-01 ENCOUNTER — HOSPITAL ENCOUNTER (OUTPATIENT)
Dept: CT IMAGING | Facility: HOSPITAL | Age: 68
Discharge: HOME | End: 2023-02-01
Admitting: NURSE PRACTITIONER
Payer: MEDICARE

## 2023-02-01 DIAGNOSIS — C57.01 ADENOCARCINOMA OF RIGHT FALLOPIAN TUBE: Chronic | ICD-10-CM

## 2023-02-01 LAB — CREAT BLDA-MCNC: 0.9 MG/DL (ref 0.6–1.3)

## 2023-02-01 PROCEDURE — 74177 CT ABD & PELVIS W/CONTRAST: CPT

## 2023-02-01 PROCEDURE — 25010000002 IOPAMIDOL 61 % SOLUTION: Performed by: NURSE PRACTITIONER

## 2023-02-01 PROCEDURE — 71260 CT THORAX DX C+: CPT

## 2023-02-01 PROCEDURE — 82565 ASSAY OF CREATININE: CPT

## 2023-02-01 RX ADMIN — IOPAMIDOL 85 ML: 612 INJECTION, SOLUTION INTRAVENOUS at 11:58

## 2023-02-02 ENCOUNTER — TELEPHONE (OUTPATIENT)
Dept: GYNECOLOGIC ONCOLOGY | Facility: CLINIC | Age: 68
End: 2023-02-02
Payer: MEDICARE

## 2023-02-02 PROBLEM — R97.1 ELEVATED CA-125: Status: RESOLVED | Noted: 2022-03-09 | Resolved: 2023-02-02

## 2023-02-02 PROBLEM — R93.2 ABNORMAL CT OF LIVER: Status: ACTIVE | Noted: 2023-02-02

## 2023-02-02 NOTE — PROGRESS NOTES
Halie Cormier [facility]  [id number]  1955      Reason for visit: History of fallopian tube cancer, abnormal CT scan    History of present illness:  The patient is a 67 y.o. year old female who presents today for treatment and evaluation of the above issues.    Patient's  has been normal.  She underwent CT scan of chest abdomen pelvis with a goal of following up 6 mm right lung nodule.  CT scan showed possible recurrence measuring 12 mm in maximal dimension where the liver abuts the diaphragm.  I personally reviewed the imaging and my interpretation is small isolated lesion of undetermined significance at the junction of the right liver and diaphragm at the dome.  Likely small hemangioma of liver.  Otherwise no concerning findings regarding recurrence of malignancy.  Today, patient notes no symptoms of recurrence.  Specifically, she notes good appetite, good energy.  She denies changes in bowel and bladder function.  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    Oncologic History:  Oncology/Hematology History Overview Note   1.  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  Monoclonal gammopathy   3.  History of anemia, resolved after hemorrhoidectomy repair and on oral iron.  CBC on 6/13/2017 with a hemoglobin of 14.2, hematocrit 44.6%, MCV of 94.8.  4.  Atrial fibrillation  5.  Pulmonary nodule  6.  Erythrocytosis      Monoclonal gammopathy and uterine cancer history timeline:    -March 2017 24-hour urine immunoelectrophoresis showed no monoclonal spike, lambda light chains 106 with kappa light chains 24 and a kappa to lambda ratio of 0.23 with normal IgA, G, and M levels.  January 2017 serum immunoelectrophoresis showed 600 mg/dL of immunoglobulin G lambda light chains in the serum.  3/8/17 bone marrow biopsy showed normal marrow with 3% plasma cells and a small lambda clonal population identified  with no stainable iron.  There was erythroid expansion with mild maturation dyssynchrony and occasional atypical nuclear features that may represent the compensatory response to her anemia though myelodysplasia could not entirely be ruled out.  Had reactive aggregates of lymphoid tissue.     -6/13/2017 sedimentation rate 8, immunoglobulin free light chains free kappa light chains 25.7, free lambda light chains 173.4, kappa/lambda ratio 0.15.  Serum immunoelectrophoresis M-spike 0.9g/dL, C-reactive protein less than 0.01, CMP normal, ionized calcium 1.31, beta-2 microglobulin 3.1, CBC WBC 5500, hemoglobin 14.2, hematocrit 44.6%, MCV 94.8, platelets 259,000.  24-hour urine immunoelectrophoresis pending.   -6/17/2019 data: Lambda 129 with kappa 22 and ratio 0.17.  This is in the range that it has fluctuated since January 2017 upon first testing.  M spike stable 600 mg/dL immunoglobulin G lambda.  C-reactive protein 0.04 with sedimentation rate of 8.  Creatinine 0.86 with total calcium of 10 and ionized calcium 1.34 upper limit of normal 1.32.  Beta-2 microglobulin 2.8.  CBC unremarkable.  -4/13/2021 data: Beta-2 microglobulin 2.5 stable.  Hemoglobin 15.4 with normal CBC.  CMP unremarkable including creatinine 0.94, calcium 9.0, total protein 7.5, normal alkaline phosphatase 102.  Serum M spike 800 mg/dL IgG lambda stable with normal quantitative immunoglobulins.  Immunoglobulin free lambda light chains 131 mg/L with kappa 18.7 and ratio 0.14 stable x4 years.  Sedimentation rate 7.  C-reactive protein less than 0.3.  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  -2/22/2022 through 2/25/2022 admitted by Dr. Khurram Novak for atrial fibrillation with rapid ventricular response with worsening exercise tolerance.  Started on dofetilide and converted to sinus rhythm after second dose.  Dose decreased due to lengthening of QT corrected.  No further atrial fib after conversion.  Discharged on  Eliquis.  -2/11/2022 through 2/15/2022 admitted for atrial fibrillation with RVR and vaginal bleeding.  On Eliquis.    -3/4/2022 hematocrit 51.9% with hemoglobin 714.2 otherwise unremarkable CBC.  CMP unremarkable with normal creatinine 0.92, normal calcium 9.9, alkaline phosphatase minimally elevated 120 upper limit of normal 117, AST 44 otherwise unremarkable, otherwise unremarkable CMP.  IgG lambda stable 800 mg/dL with normal quantitative immunoglobulins and no significant change in M spike over the last 5 years.  Serum lambda light chain 85 continuing to come down from a high of 173 on presentation June 2017 with normal kappa and stable ratio over time at 0.17.  Sedimentation rate 7 with C-reactive protein less than 0.3.  Beta-2 microglobulin 2.9.    -3/4/2022 CT abdomen pelvis with and without contrastShowed right hip arthroplasty being artifact but with tubular masslike structure 9 x 6 cm presacral as suggested on ultrasound.  Could be related to multilocular cystic right ovarian mass or be related to the right uterine tube.  Malignancy favored.  Incidental 6 mm pulmonary nodule right lower lobe of undetermined significance    -3/7/2022 appointment with Loki Abdallagynecologist.  Had postmenopausal bleeding for the previous month.  3/2/2022 ultrasound showed 7.7 cm right adnexal mass and 6.9 cm left adnexal mass with  elevated at 38.4 upper limit of normal 38.1.  CEA normal 1.9.  Endometrial biopsy benign.    -3/10/2022 CT chest compared to CT abdomen 3/4/2022 showed stable 6 mm noncalcified right lower lobe pulmonary nodule with suggestion of follow-up in 6-12 months for stability.    -3/17/2022 JANET/BSO Dr. Fam.  Bilateral benign adenofibroma's of the ovaries.  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  No bessy involvement found.  No lymph vascular  invasion.  Pathological stage IIIc.    -4/8/2022 Ca1 25 normal at 21.  CBC unremarkable save for hematocrit 51.3%.  CMP sodium 129 otherwise unremarkable.    -4/18/2022 gynecologic oncology postoperative evaluation.  She had an R0 resection 3/17/2022 for high-grade serous carcinoma.  Plans for Caris MI profile, genetic testing, and adjuvant carboplatin Taxol.    -4/18/2022 [facility] medical oncology follow-up visit: She has had a complex year since last I saw her about a year ago.  She is under care of Dr. Fam for stage III fallopian tube serous carcinoma for which she is due for adjuvant carbotaxol.  Genetic testing is ordered.  Caris MI profile sent but not back.  From my standpoint, the monoclonal gammopathy given stability over time and with unremarkable calcium and creatinine and quantitative immunoglobulins is very likely just benign and we will continue to check this annually.  Her serum light chains actually have gone down over time with no interventions.  No further work-up from that standpoint.  With reference to her erythrocytosis, I would not put her through work-up for myeloproliferative disorder as I doubt that that is operative especially with recent staging CTs not showing splenomegaly.  She does have small right lower lobe pulmonary nodule that does need follow-up but I am certain that she will be getting routine radiographic/CT follow-up along with serum tumor markers with Dr. Fam and company.  I will remain available to her in the interim but will not increase the frequency of hematology follow-up referable to the newly diagnosed oncologic gynecologic issue that is being expertly managed by Dr. Fam and her team.  It is reassuring that her CA-125 normalized after JANET/BSO and omentectomy etc.     Monoclonal gammopathy    Initial Diagnosis    Monoclonal gammopathy     11/29/2017 Imaging    Bone survey IMPRESSION:  Negative skeletal survey.     6/5/2018 -  Other Event    6/5/2018 labs:   CBC WBC 4350, hemoglobin 12.6, hematocrit 38.3%, platelet count 252,000.  CMP creatinine 0.8, serum calcium 9.4.  Ionized calcium 1.29.  Beta-2 microglobulin 2.3.  Sedimentation rate 9, CRP 0.02.  Kappa/lambda light chains-normal kappa light chains 16.6, lambda light chains elevated at 133.6, kappa/lambda ratio 0.12.  Serum immunoelectrophoresis M-spike stable 0.7 g/dL.       12/20/2018 -  Other Event    24-hour urine monoclonal protein showed no monoclonal protein.  Beta-2 microglobulin 2.5.  AST 43.  Creatinine 0.86.  Calcium 9.5.  Ionized calcium 1.3.  C-reactive protein less than 0.01.  Sedimentation rate 7.  Serum monoclonal protein 800 mg/dL of immunoglobulin G lambda.  Lambda monoclonal protein down to 98.5 with a kappa to lambda ratio 0.18.  CBC is normal.  Total body bone survey done on the day of her visit 12/20/18 was not read by the time of her visit.     6/17/2019 -  Other Event     Lambda 129 with kappa 22 and ratio 0.17.  This is in the range that it has fluctuated since January 2017 upon first testing.  M spike stable 600 mg/dL immunoglobulin G lambda.  C-reactive protein 0.04 with sedimentation rate of 8.  Creatinine 0.86 with total calcium of 10 and ionized calcium 1.34 upper limit of normal 1.32.  Beta-2 microglobulin 2.8.  CBC unremarkable.     4/6/2020 -  Other Event    Myeloma panel:  CBC WBC 5400, hemoglobin 15.2, platelet count 225,000.  Beta-2 microglobulin 2.6.  Ionized calcium 1.26.  CMP creatinine 0.77, serum calcium 10.1 C-reactive protein 0.08, sedimentation rate 13.  Serum immunoelectrophoresis M spike stable 0.6 g/dL.  Serum free light chains, normal free kappa light chains 16.3, free lambda light chains 107.7, kappa/lambda ratio 0.15.  Kappa/lambda light chains Urine normal.     4/13/2021 -  Other Event    -4/13/2021 data: Beta-2 microglobulin 2.5 stable.  Hemoglobin 15.4 with normal CBC.  CMP unremarkable including creatinine 0.94, calcium 9.0, total protein 7.5, normal alkaline  phosphatase 102.  Serum M spike 800 mg/dL IgG lambda stable with normal quantitative immunoglobulins.  Immunoglobulin free lambda light chains 131 mg/L with kappa 18.7 and ratio 0.14 stable x4 years.  Sedimentation rate 7.  C-reactive protein less than 0.3.  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     Adenocarcinoma of right fallopian tube (HCC)   3/2/2022 Imaging    Patient presented to Dr. Abdalla with c/o postmenopausal bleeding.   Transvaginal ultrasound demonstrated a thickened endometrial stripe and bilateral adnexal masses.  Referred to Gyn Oncology due to concern for possible malignancy.      3/4/2022 Imaging    CT abdomen pelvis:  1.Beam hardening artifact from the patient's right total hip arthroplasty makes evaluation of the right presacral/adnexal mass difficult. There is a tubular masslike structure measuring up to 9 x 6 cm with a left posterior (presacral) mural nodular  enhancing component as suggested on ultrasound. This could be related to a multilocular cystic right ovarian mass or be related to the right uterine tube. Differential favors malignant process in this age group.  2.Incidental 6 mm pulmonary nodule seen in the right lower lobe along the right hemidiaphragm. This is of uncertain clinical significance.     3/10/2022 Imaging    CT chest:  1.  No change in 6 mm noncalcified pulmonary nodule within the right lower lobe.  Follow-up noncontrast CT scan of the chest would be recommended in 6-12 months to document continued stability.     3/17/2022 Surgery    Diagnostic laparoscopy, RTLH/BSO, cancer staging, omentectomy, optimal tumor debulking (R=0), appendectomy by Dr. Christine Fam at [facility]    Final pathology showed high-grade serous carcinoma involving the lumen and wall. Right uterosacral ligament and omentum involved by tumor. Pelvic washings malignant, adenocarcinoma compatible with papillary serous carcinoma. Stage IIIC grade 3       4/11/2022 -  8/15/2022 Chemotherapy    OP OVARIAN PACLitaxel / CARBOplatin (Q21D)  - 6 cycles completed  - toxicities included nausea, dehydration, neuropathy, and low blood counts  - both drugs dose reduced at cycle #6 and cycle #6 delayed 2 weeks due to neutropenia and thrombocytopenia     4/19/2022 Molecular Testing    CARIS results:  PD-L1 positive  ER positive, 2+, 95%  MSI stable, MMR proficient, TMB low, genomic ISACC low  WA negative  ARID1A, BANDAR, BRAF, EGFR, RAD51 C/D negative  BRCA1/2 negative  Her2/Patsy negative     8/16/2022 Imaging    Post-treatment scan:  CT chest:  1. Stable appearance of a 6 mm right lower lobe pulmonary nodule abutting the right hemidiaphragm.  2. No adenopathy within the chest.     CT abdomen and pelvis:  1. Postsurgical changes of hysterectomy and bilateral salpingo-oophorectomy. No evidence of residual disease within the abdomen or pelvis.  2. No evidence of lymphadenopathy within the abdomen or pelvis.  3. Stable small 9 mm lesion within the dome of the right hepatic lobe, favor benign etiology such as hemangioma. Continued attention on follow-up would be recommended. Additional benign simple cyst is present within the left hepatic lobe.             Past Medical History:   Diagnosis Date   • Adenocarcinoma of right fallopian tube (HCC) 3/17/2022   • Atrial fibrillation (HCC)    • Corneal abrasion    • Hemorrhoids     bleeding hemorrhoids   • History of anemia    • History of transfusion    • Hypertension    • Hypothyroidism    • LGSIL on Pap smear of cervix 03/10/2022   • Osteoarthritis        Past Surgical History:   Procedure Laterality Date   • AUGMENTATION MAMMAPLASTY Bilateral 2008   • BONE MARROW BIOPSY Left 03/08/2017    Procedure: BONE MARROW BIOPSY;  Surgeon: Michel White MD;  Location:  [facility];  Service:    • COLONOSCOPY N/A 01/09/2017    Procedure: COLONOSCOPY;  Surgeon: Michel White MD;  Location:  [facility];  Service:    • ENDOSCOPY N/A 01/07/2017    Procedure:  ESOPHAGOGASTRODUODENOSCOPY;  Surgeon: Abhishek Benton MD;  Location:  [facility];  Service:    • HEMORRHOIDECTOMY N/A 2017    Procedure: HEMORRHOIDECTOMY,;  Surgeon: Michel White MD;  Location:  [facility];  Service:    • HIP ARTHROSCOPY Right      &    • JOINT REPLACEMENT      2 right hips   • TONSILLECTOMY  1964   • TOTAL LAPAROSCOPIC HYSTERECTOMY N/A 2022    Procedure: DIAGNOSTIC LAPAROSCOPY, ROBOTIC ASSISTED TOTAL LAPAROSCOPIC HYSTERECTOMY, BILATERAL SALPINGO-OOPHERECTOMY, CANCER STAGING, OMENTECTOMY,  OPTIMAL TUMOR DEBULKING, APPENDECTOMY;  Surgeon: Christine Fam MD;  Location:  [facility];  Service: Robotics - DaVinci;  Laterality: N/A;   • VENOUS ACCESS DEVICE (PORT) INSERTION Left 2022       MEDICATIONS: The current medication list was reviewed with the patient and updated in the EMR this date per the Medical Assistant. Medication dosages and frequencies were confirmed to be accurate.      Allergies:  is allergic to sulfa antibiotics.    Social History:   Social History     Socioeconomic History   • Marital status:    Tobacco Use   • Smoking status: Former     Packs/day: 1.00     Years: 15.00     Pack years: 15.00     Types: Cigarettes     Quit date:      Years since quittin.1   • Smokeless tobacco: Never   • Tobacco comments:     quit 35+ years ago.    Vaping Use   • Vaping Use: Never used   Substance and Sexual Activity   • Alcohol use: Not Currently   • Drug use: No   • Sexual activity: Defer       Family History:    Family History   Problem Relation Age of Onset   • Atrial fibrillation Mother    • Diabetes Mother    • Atrial fibrillation Brother    • Abnormal EKG Brother    • Heart attack Father    • Stroke Father    • Diabetes Sister    • Breast cancer Neg Hx    • Ovarian cancer Neg Hx    • Uterine cancer Neg Hx    • Colon cancer Neg Hx        Health Maintenance:    Health Maintenance   Topic Date Due   • DXA SCAN  Never done   • TDAP/TD VACCINES  "(1 - Tdap) Never done   • ANNUAL WELLNESS VISIT  Never done   • Pneumococcal Vaccine 65+ (1 - PCV) Never done   • MAMMOGRAM  08/14/2020   • PAP SMEAR  03/09/2025   • COLORECTAL CANCER SCREENING  01/09/2027   • HEPATITIS C SCREENING  Completed   • COVID-19 Vaccine  Completed   • INFLUENZA VACCINE  Completed   • ZOSTER VACCINE  Completed       Review of Systems  Please refer to history of present illness, review of systems otherwise negative.  Physical Exam    Vitals:    02/03/23 1123   BP: 168/68   Pulse: 70   Resp: 18   Temp: 97.7 °F (36.5 °C)   TempSrc: Temporal   SpO2: 99%   Weight: 67.9 kg (149 lb 9.6 oz)   Height: 177.8 cm (70\")   PainSc: 0-No pain       Body mass index is 21.47 kg/m².  Wt Readings from Last 3 Encounters:   02/03/23 67.9 kg (149 lb 9.6 oz)   11/17/22 65.6 kg (144 lb 9.6 oz)   08/16/22 66.2 kg (146 lb)       GENERAL: Alert, well-appearing female appearing her stated age who is in no apparent distress.   HEENT: Sclera anicteric. Head normocephalic, atraumatic. Mucus membranes moist.   NECK: Trachea midline, supple, without masses.  No thyromegaly.   BREASTS: Deferred  CARDIOVASCULAR: Normal rate, regular rhythm, no murmurs, rubs, or gallops.  No peripheral edema.  RESPIRATORY: Clear to auscultation bilaterally, normal respiratory effort  BACK:  No CVA tenderness, no vertebral tenderness on palpation  GASTROINTESTINAL:  Abdomen is soft, non-tender, non-distended, no rebound or guarding, no masses, or hernias. No HSM.    SKIN:  Warm, dry, well-perfused.  All visible areas intact.  No rashes, lesions, ulcers.  PSYCHIATRIC: AO x3, with appropriate affect, normal thought processes.  NEUROLOGIC: No focal deficits.  Moves extremities well.  MUSCULOSKELETAL: Normal gait and station.   EXTREMITIES:   No cyanosis, clubbing, symmetric.  LYMPHATICS:  No cervical or inguinal adenopathy noted.     PELVIC exam:    Deferred    ECOG PS 0    PROCEDURES: None    Diagnostic Data:   CT Chest With Contrast " Diagnostic    Result Date: 2/1/2023  Impression: 1. Stable subpleural 6 no meters nodule within the right lower lobe along the right hemidiaphragm. Continued serial CT follow-up would be recommended with repeat noncontrast CT scan of the chest in 3-6 months. No new pulmonary nodules or other evidence of metastatic disease within the chest. Electronically Signed: Constantino Mckeon  2/1/2023 1:55 PM EST  Workstation ID: [id number]    CT Abdomen Pelvis With Contrast    Result Date: 2/2/2023  Impression: Interval development of a new soft tissue nodular component felt to be between the diaphragm and the posterior right hepatic dome and measuring 12 x 9 mm concerning for metastatic disease. Other findings are stable and as described. No local tumor recurrence is appreciated. Electronically Signed: Richie Mclean  2/2/2023 9:11 AM EST  Workstation ID: [id number]      Lab Results   Component Value Date    WBC 3.92 10/02/2022    HGB 11.8 (L) 10/02/2022    HCT 36.2 10/02/2022    .5 (H) 10/02/2022     10/02/2022    NEUTROABS 1.43 (L) 10/02/2022    GLUCOSE 69 09/05/2022    BUN 20 09/05/2022    CREATININE 0.90 02/01/2023    EGFRIFNONA 61 02/25/2022     09/05/2022    K 4.4 09/05/2022     09/05/2022    CO2 25.0 09/05/2022    MG 1.6 02/25/2022    PHOS 4.4 04/04/2018    CALCIUM 9.4 09/05/2022    ALBUMIN 4.20 09/05/2022    AST 19 09/05/2022    ALT 9 09/05/2022    BILITOT 0.3 09/05/2022     Lab Results   Component Value Date     9.2 02/03/2023     6.6 11/17/2022     5.4 07/29/2022     6.1 07/01/2022     2.0 06/10/2022     8.3 05/20/2022     10.8 04/29/2022     21.0 04/08/2022     38.4 (H) 03/07/2022         Assessment & Plan   This is a 67 y.o. woman with history of fallopian tube adenocarcinoma, now with minimally abnormal CT scan.  Encounter Diagnoses   Name Primary?   • Adenocarcinoma of right fallopian tube (HCC) Yes   • Abnormal CT of liver      Recommended observation  at this point given small lesion, normal Ca125.  I personally reviewed the CT imaging of the abdomen and pelvis with the patient and her  and my interpretation of the images is: Very small right posterior liver dome/diaphragmatic lesion.  This was reviewed and multiple planes.  No other findings regarding recurrent malignancy.  Small hepatic cyst stable.  At this point, I recommended observation.  She is going to be seen in 3 months time.  We will consider repeat imaging in 3 to 6 months depending on Ca125 and how she feels.    Pain assessment was performed today as a part of patient’s care.  For patients with pain related to surgery, gynecologic malignancy or cancer treatment, the plan is as noted in the assessment/plan.  For patients with pain not related to these issues, they are to seek any further needed care from a more appropriate provider, such as PCP.      Orders Placed This Encounter   Procedures   •      Standing Status:   Future     Number of Occurrences:   1     Standing Expiration Date:   2/3/2024     Order Specific Question:   Release to patient     Answer:   Routine Release       FOLLOW UP: 3 months, repeat  then.  If elevated will consider CT scan.    I spent 30 minutes caring for Halie on this date of service. This time includes time spent by me in the following activities: preparing for the visit, reviewing tests, performing a medically appropriate examination and/or evaluation, counseling and educating the patient/family/caregiver, ordering medications, tests, or procedures, documenting information in the medical record and independently interpreting results and communicating that information with the patient/family/caregiver    Electronically Signed by: Christine Fam MD  Date: 2/4/2023

## 2023-02-02 NOTE — TELEPHONE ENCOUNTER
Called patient and notified of CT results. Interval development of new soft tissue nodule between diaphragm and posterior right hepatic dome measuring 12 x 9 mm. No other findings concerning for metastatic disease. She is scheduled for follow-up with Dr. Fam on 2/15/2023, we agreed on plan to move up visit for discussion with MD. Patient added on for tomorrow.  notified to change.

## 2023-02-03 ENCOUNTER — OFFICE VISIT (OUTPATIENT)
Dept: GYNECOLOGIC ONCOLOGY | Facility: CLINIC | Age: 68
End: 2023-02-03
Payer: MEDICARE

## 2023-02-03 ENCOUNTER — LAB (OUTPATIENT)
Dept: LAB | Facility: HOSPITAL | Age: 68
End: 2023-02-03
Payer: MEDICARE

## 2023-02-03 VITALS
DIASTOLIC BLOOD PRESSURE: 68 MMHG | TEMPERATURE: 97.7 F | SYSTOLIC BLOOD PRESSURE: 168 MMHG | HEART RATE: 70 BPM | BODY MASS INDEX: 21.42 KG/M2 | WEIGHT: 149.6 LBS | HEIGHT: 70 IN | OXYGEN SATURATION: 99 % | RESPIRATION RATE: 18 BRPM

## 2023-02-03 DIAGNOSIS — R93.2 ABNORMAL CT OF LIVER: ICD-10-CM

## 2023-02-03 DIAGNOSIS — C57.01 ADENOCARCINOMA OF RIGHT FALLOPIAN TUBE: Primary | Chronic | ICD-10-CM

## 2023-02-03 DIAGNOSIS — C57.01 ADENOCARCINOMA OF RIGHT FALLOPIAN TUBE: Chronic | ICD-10-CM

## 2023-02-03 LAB — CANCER AG125 SERPL QL: 9.2 U/ML (ref 0–38.1)

## 2023-02-03 PROCEDURE — 86304 IMMUNOASSAY TUMOR CA 125: CPT

## 2023-02-03 PROCEDURE — 36415 COLL VENOUS BLD VENIPUNCTURE: CPT

## 2023-02-03 PROCEDURE — 99214 OFFICE O/P EST MOD 30 MIN: CPT | Performed by: OBSTETRICS & GYNECOLOGY

## 2023-04-04 ENCOUNTER — LAB (OUTPATIENT)
Dept: LAB | Facility: HOSPITAL | Age: 68
End: 2023-04-04
Payer: MEDICARE

## 2023-04-04 DIAGNOSIS — D47.2 MONOCLONAL GAMMOPATHY: ICD-10-CM

## 2023-04-05 ENCOUNTER — LAB (OUTPATIENT)
Dept: LAB | Facility: HOSPITAL | Age: 68
End: 2023-04-05
Payer: MEDICARE

## 2023-04-05 LAB
ALBUMIN SERPL-MCNC: 4.4 G/DL (ref 3.5–5.2)
ALBUMIN/GLOB SERPL: 1.7 G/DL
ALP SERPL-CCNC: 103 U/L (ref 39–117)
ALT SERPL W P-5'-P-CCNC: 16 U/L (ref 1–33)
ANION GAP SERPL CALCULATED.3IONS-SCNC: 10 MMOL/L (ref 5–15)
AST SERPL-CCNC: 27 U/L (ref 1–32)
BILIRUB SERPL-MCNC: 0.7 MG/DL (ref 0–1.2)
BUN SERPL-MCNC: 18 MG/DL (ref 8–23)
BUN/CREAT SERPL: 21.2 (ref 7–25)
CALCIUM SPEC-SCNC: 9.2 MG/DL (ref 8.6–10.5)
CHLORIDE SERPL-SCNC: 105 MMOL/L (ref 98–107)
CO2 SERPL-SCNC: 26 MMOL/L (ref 22–29)
CREAT SERPL-MCNC: 0.85 MG/DL (ref 0.57–1)
CRP SERPL-MCNC: <0.3 MG/DL (ref 0–0.5)
EGFRCR SERPLBLD CKD-EPI 2021: 74.7 ML/MIN/1.73
ERYTHROCYTE [SEDIMENTATION RATE] IN BLOOD: 7 MM/HR (ref 0–30)
GLOBULIN UR ELPH-MCNC: 2.6 GM/DL
GLUCOSE SERPL-MCNC: 114 MG/DL (ref 65–99)
POTASSIUM SERPL-SCNC: 4.3 MMOL/L (ref 3.5–5.2)
PROT SERPL-MCNC: 7 G/DL (ref 6–8.5)
SODIUM SERPL-SCNC: 141 MMOL/L (ref 136–145)

## 2023-04-05 PROCEDURE — 80053 COMPREHEN METABOLIC PANEL: CPT

## 2023-04-05 PROCEDURE — 83521 IG LIGHT CHAINS FREE EACH: CPT

## 2023-04-05 PROCEDURE — 86140 C-REACTIVE PROTEIN: CPT

## 2023-04-05 PROCEDURE — 36415 COLL VENOUS BLD VENIPUNCTURE: CPT

## 2023-04-05 PROCEDURE — 82784 ASSAY IGA/IGD/IGG/IGM EACH: CPT

## 2023-04-05 PROCEDURE — 84165 PROTEIN E-PHORESIS SERUM: CPT

## 2023-04-05 PROCEDURE — 86334 IMMUNOFIX E-PHORESIS SERUM: CPT

## 2023-04-05 PROCEDURE — 82785 ASSAY OF IGE: CPT

## 2023-04-05 PROCEDURE — 85652 RBC SED RATE AUTOMATED: CPT

## 2023-04-06 LAB
ALBUMIN SERPL ELPH-MCNC: 3.9 G/DL (ref 2.9–4.4)
ALBUMIN/GLOB SERPL: 1.4 {RATIO} (ref 0.7–1.7)
ALPHA1 GLOB SERPL ELPH-MCNC: 0.2 G/DL (ref 0–0.4)
ALPHA2 GLOB SERPL ELPH-MCNC: 0.7 G/DL (ref 0.4–1)
B-GLOBULIN SERPL ELPH-MCNC: 0.9 G/DL (ref 0.7–1.3)
GAMMA GLOB SERPL ELPH-MCNC: 1.1 G/DL (ref 0.4–1.8)
GLOBULIN SER-MCNC: 2.9 G/DL (ref 2.2–3.9)
IGA SERPL-MCNC: 116 MG/DL (ref 87–352)
IGG SERPL-MCNC: 1057 MG/DL (ref 586–1602)
IGM SERPL-MCNC: 56 MG/DL (ref 26–217)
INTERPRETATION SERPL IEP-IMP: ABNORMAL
KAPPA LC FREE SER-MCNC: 15.6 MG/L (ref 3.3–19.4)
KAPPA LC FREE/LAMBDA FREE SER: 0.15 {RATIO} (ref 0.26–1.65)
LABORATORY COMMENT REPORT: ABNORMAL
LAMBDA LC FREE SERPL-MCNC: 102 MG/L (ref 5.7–26.3)
M PROTEIN SERPL ELPH-MCNC: 0.4 G/DL
PROT SERPL-MCNC: 6.8 G/DL (ref 6–8.5)

## 2023-04-08 LAB — IGE SERPL-ACNC: 33 IU/ML (ref 6–495)

## 2023-04-19 DIAGNOSIS — D47.2 MONOCLONAL GAMMOPATHY: Primary | Chronic | ICD-10-CM

## 2023-04-24 ENCOUNTER — OFFICE VISIT (OUTPATIENT)
Dept: ONCOLOGY | Facility: CLINIC | Age: 68
End: 2023-04-24
Payer: MEDICARE

## 2023-04-24 ENCOUNTER — LAB (OUTPATIENT)
Dept: LAB | Facility: HOSPITAL | Age: 68
End: 2023-04-24
Payer: MEDICARE

## 2023-04-24 VITALS
HEART RATE: 63 BPM | HEIGHT: 70 IN | BODY MASS INDEX: 21.47 KG/M2 | WEIGHT: 150 LBS | SYSTOLIC BLOOD PRESSURE: 164 MMHG | DIASTOLIC BLOOD PRESSURE: 76 MMHG | TEMPERATURE: 97.2 F | RESPIRATION RATE: 16 BRPM | OXYGEN SATURATION: 99 %

## 2023-04-24 DIAGNOSIS — C57.01 ADENOCARCINOMA OF RIGHT FALLOPIAN TUBE: Primary | Chronic | ICD-10-CM

## 2023-04-24 DIAGNOSIS — D47.2 MONOCLONAL GAMMOPATHY: ICD-10-CM

## 2023-04-24 DIAGNOSIS — D47.2 MONOCLONAL GAMMOPATHY: Chronic | ICD-10-CM

## 2023-04-24 LAB
BASOPHILS # BLD AUTO: 0.02 10*3/MM3 (ref 0–0.2)
BASOPHILS NFR BLD AUTO: 0.4 % (ref 0–1.5)
DEPRECATED RDW RBC AUTO: 45.6 FL (ref 37–54)
EOSINOPHIL # BLD AUTO: 0.09 10*3/MM3 (ref 0–0.4)
EOSINOPHIL NFR BLD AUTO: 1.9 % (ref 0.3–6.2)
ERYTHROCYTE [DISTWIDTH] IN BLOOD BY AUTOMATED COUNT: 12.7 % (ref 12.3–15.4)
HCT VFR BLD AUTO: 41.7 % (ref 34–46.6)
HGB BLD-MCNC: 13.7 G/DL (ref 12–15.9)
IMM GRANULOCYTES # BLD AUTO: 0 10*3/MM3 (ref 0–0.05)
IMM GRANULOCYTES NFR BLD AUTO: 0 % (ref 0–0.5)
LYMPHOCYTES # BLD AUTO: 2.06 10*3/MM3 (ref 0.7–3.1)
LYMPHOCYTES NFR BLD AUTO: 42.6 % (ref 19.6–45.3)
MCH RBC QN AUTO: 31.6 PG (ref 26.6–33)
MCHC RBC AUTO-ENTMCNC: 32.9 G/DL (ref 31.5–35.7)
MCV RBC AUTO: 96.3 FL (ref 79–97)
MONOCYTES # BLD AUTO: 0.56 10*3/MM3 (ref 0.1–0.9)
MONOCYTES NFR BLD AUTO: 11.6 % (ref 5–12)
NEUTROPHILS NFR BLD AUTO: 2.11 10*3/MM3 (ref 1.7–7)
NEUTROPHILS NFR BLD AUTO: 43.5 % (ref 42.7–76)
PLATELET # BLD AUTO: 168 10*3/MM3 (ref 140–450)
PMV BLD AUTO: 9.1 FL (ref 6–12)
RBC # BLD AUTO: 4.33 10*6/MM3 (ref 3.77–5.28)
WBC NRBC COR # BLD: 4.84 10*3/MM3 (ref 3.4–10.8)

## 2023-04-24 PROCEDURE — 85025 COMPLETE CBC W/AUTO DIFF WBC: CPT

## 2023-04-24 PROCEDURE — 1160F RVW MEDS BY RX/DR IN RCRD: CPT | Performed by: INTERNAL MEDICINE

## 2023-04-24 PROCEDURE — 99214 OFFICE O/P EST MOD 30 MIN: CPT | Performed by: INTERNAL MEDICINE

## 2023-04-24 PROCEDURE — 3077F SYST BP >= 140 MM HG: CPT | Performed by: INTERNAL MEDICINE

## 2023-04-24 PROCEDURE — 36415 COLL VENOUS BLD VENIPUNCTURE: CPT

## 2023-04-24 PROCEDURE — 1159F MED LIST DOCD IN RCRD: CPT | Performed by: INTERNAL MEDICINE

## 2023-04-24 PROCEDURE — 1126F AMNT PAIN NOTED NONE PRSNT: CPT | Performed by: INTERNAL MEDICINE

## 2023-04-24 PROCEDURE — 3078F DIAST BP <80 MM HG: CPT | Performed by: INTERNAL MEDICINE

## 2023-04-24 RX ORDER — LISINOPRIL 40 MG/1
TABLET ORAL
COMMUNITY
Start: 2023-02-08

## 2023-04-24 NOTE — PROGRESS NOTES
CHIEF COMPLAINT: Stable neuropathy status post adjuvant chemotherapy    Problem List:  Oncology/Hematology History Overview Note   1.  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  Monoclonal gammopathy   3.  History of anemia, resolved after hemorrhoidectomy repair and on oral iron.  CBC on 6/13/2017 with a hemoglobin of 14.2, hematocrit 44.6%, MCV of 94.8.  4.  Atrial fibrillation  5.  Pulmonary nodule  6.  Erythrocytosis  7.  Fallopian tube serous carcinoma.      Monoclonal gammopathy and uterine cancer history timeline:    -March 2017 24-hour urine immunoelectrophoresis showed no monoclonal spike, lambda light chains 106 with kappa light chains 24 and a kappa to lambda ratio of 0.23 with normal IgA, G, and M levels.  January 2017 serum immunoelectrophoresis showed 600 mg/dL of immunoglobulin G lambda light chains in the serum.  3/8/17 bone marrow biopsy showed normal marrow with 3% plasma cells and a small lambda clonal population identified with no stainable iron.  There was erythroid expansion with mild maturation dyssynchrony and occasional atypical nuclear features that may represent the compensatory response to her anemia though myelodysplasia could not entirely be ruled out.  Had reactive aggregates of lymphoid tissue.     -6/13/2017 sedimentation rate 8, immunoglobulin free light chains free kappa light chains 25.7, free lambda light chains 173.4, kappa/lambda ratio 0.15.  Serum immunoelectrophoresis M-spike 0.9g/dL, C-reactive protein less than 0.01, CMP normal, ionized calcium 1.31, beta-2 microglobulin 3.1, CBC WBC 5500, hemoglobin 14.2, hematocrit 44.6%, MCV 94.8, platelets 259,000.  24-hour urine immunoelectrophoresis pending.   -6/17/2019 data: Lambda 129 with kappa 22 and ratio 0.17.  This is in the range that it has fluctuated since January 2017 upon first testing.  M spike stable 600 mg/dL immunoglobulin G lambda.  C-reactive protein 0.04 with sedimentation rate of 8.  Creatinine 0.86  with total calcium of 10 and ionized calcium 1.34 upper limit of normal 1.32.  Beta-2 microglobulin 2.8.  CBC unremarkable.  -4/13/2021 data: Beta-2 microglobulin 2.5 stable.  Hemoglobin 15.4 with normal CBC.  CMP unremarkable including creatinine 0.94, calcium 9.0, total protein 7.5, normal alkaline phosphatase 102.  Serum M spike 800 mg/dL IgG lambda stable with normal quantitative immunoglobulins.  Immunoglobulin free lambda light chains 131 mg/L with kappa 18.7 and ratio 0.14 stable x4 years.  Sedimentation rate 7.  C-reactive protein less than 0.3.  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  -2/22/2022 through 2/25/2022 admitted by Dr. Khurram Novak for atrial fibrillation with rapid ventricular response with worsening exercise tolerance.  Started on dofetilide and converted to sinus rhythm after second dose.  Dose decreased due to lengthening of QT corrected.  No further atrial fib after conversion.  Discharged on Eliquis.  -2/11/2022 through 2/15/2022 admitted for atrial fibrillation with RVR and vaginal bleeding.  On Eliquis.    -3/4/2022 hematocrit 51.9% with hemoglobin 714.2 otherwise unremarkable CBC.  CMP unremarkable with normal creatinine 0.92, normal calcium 9.9, alkaline phosphatase minimally elevated 120 upper limit of normal 117, AST 44 otherwise unremarkable, otherwise unremarkable CMP.  IgG lambda stable 800 mg/dL with normal quantitative immunoglobulins and no significant change in M spike over the last 5 years.  Serum lambda light chain 85 continuing to come down from a high of 173 on presentation June 2017 with normal kappa and stable ratio over time at 0.17.  Sedimentation rate 7 with C-reactive protein less than 0.3.  Beta-2 microglobulin 2.9.    -3/4/2022 CT abdomen pelvis with and without contrastShowed right hip arthroplasty being artifact but with tubular masslike structure 9 x 6 cm presacral as suggested on ultrasound.  Could be related to multilocular cystic right  ovarian mass or be related to the right uterine tube.  Malignancy favored.  Incidental 6 mm pulmonary nodule right lower lobe of undetermined significance    -3/7/2022 appointment with Loki Abdalla,gynecologist.  Had postmenopausal bleeding for the previous month.  3/2/2022 ultrasound showed 7.7 cm right adnexal mass and 6.9 cm left adnexal mass with  elevated at 38.4 upper limit of normal 38.1.  CEA normal 1.9.  Endometrial biopsy benign.    -3/10/2022 CT chest compared to CT abdomen 3/4/2022 showed stable 6 mm noncalcified right lower lobe pulmonary nodule with suggestion of follow-up in 6-12 months for stability.    -3/17/2022 JANET/BSO Dr. Fam.  Bilateral benign adenofibroma's of the ovaries.  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  No bessy involvement found.  No lymph vascular invasion.  Pathological stage IIIc.    -4/8/2022 Ca1 25 normal at 21.  CBC unremarkable save for hematocrit 51.3%.  CMP sodium 129 otherwise unremarkable.    -4/18/2022 gynecologic oncology postoperative evaluation.  She had an R0 resection 3/17/2022 for high-grade serous carcinoma.  Plans for Caris MI profile, genetic testing, and adjuvant carboplatin Taxol.    -4/18/2022 [facility] medical oncology follow-up visit: She has had a complex year since last I saw her about a year ago.  She is under care of Dr. Fam for stage III fallopian tube serous carcinoma for which she is due for adjuvant carbotaxol.  Genetic testing is ordered.  Caris MI profile sent but not back.  From my standpoint, the monoclonal gammopathy given stability over time and with unremarkable calcium and creatinine and quantitative immunoglobulins is very likely just benign and we will continue to check this annually.  Her serum light chains actually have gone down over time with no interventions.  No further  work-up from that standpoint.  With reference to her erythrocytosis, I would not put her through work-up for myeloproliferative disorder as I doubt that that is operative especially with recent staging CTs not showing splenomegaly.  She does have small right lower lobe pulmonary nodule that does need follow-up but I am certain that she will be getting routine radiographic/CT follow-up along with serum tumor markers with Dr. Fam and company.  I will remain available to her in the interim but will not increase the frequency of hematology follow-up referable to the newly diagnosed oncologic gynecologic issue that is being expertly managed by Dr. Fam and her team.  It is reassuring that her CA-125 normalized after JANET/BSO and omentectomy etc.    -4/11/2022 through 8/15/2022 adjuvant CarboTaxol x6.    -2/4/2023 office note from Dr. Christine Fam.  Indicates Ca1 25 normal.  2/1/2023 CT chest abdomen pelvis showed stable subpleural 6 mm nodule with suggested follow-up in 3-6 months with no new pulmonary nodules.  New soft tissue nodule right hepatic dome 12 mm concerning for metastasis.  Dr. Fam looked at the images and thought this to be hemangioma.  Dr. Fam plans follow-up imaging in 3-6 months depending on Ca1 25 and how she feels.    -3/29/2023 mammogram BI-RADS 2.    - 4/5/2023 serum M spike IgG lambda 400 mg/dL not rising.  Serum free lambda stable 102 with ratio 0.15.  Sedimentation rate normal 7.  C-reactive protein normal less than 0.3.  CMP unremarkable save for glucose 114.  CBC entirely normal.    -4/24/2023 [facility] medical oncology hematology follow-up: Since last I saw her she had surgery followed by adjuvant chemotherapy for her stage IIIc grade 3 papillary serous carcinoma of the fallopian tube.  She had pulmonary nodule follow-up that was stable and there was a reading of a possible liver metastasis that per review by Dr. Fam was felt most likely to be hemangioma and based on  patient's symptoms and desires and tumor markers which, today, have been normal, the plan was for follow-up imaging in 3 to 6 months.  Upon my discussion with her today, the patient registered her desire to have the CT of her abdomen repeated 3 months from the last scan and I will comply with that to hopefully have those results available near to the time of her follow-up with Dr. Fam.  Other than for the understandable concern relative to her malignancy, physically she is doing wonderfully and recent mammogram was also BI-RADS 2.  From the monoclonal gammopathy standpoint, the M spike is as low as it has ever been and I will continue annual labs as outlined.  With normal creatinine, hemoglobin, bone enzymes, total protein-albumin-globulin-ratios thereof, the odds of underlying plasma cell dyscrasia etc. extremely unlikely.  She is under the excellent care of Dr. Fam for her malignancy follow-up.     Monoclonal gammopathy    Initial Diagnosis    Monoclonal gammopathy     11/29/2017 Imaging    Bone survey IMPRESSION:  Negative skeletal survey.     6/5/2018 -  Other Event    6/5/2018 labs:  CBC WBC 4350, hemoglobin 12.6, hematocrit 38.3%, platelet count 252,000.  CMP creatinine 0.8, serum calcium 9.4.  Ionized calcium 1.29.  Beta-2 microglobulin 2.3.  Sedimentation rate 9, CRP 0.02.  Kappa/lambda light chains-normal kappa light chains 16.6, lambda light chains elevated at 133.6, kappa/lambda ratio 0.12.  Serum immunoelectrophoresis M-spike stable 0.7 g/dL.       12/20/2018 -  Other Event    24-hour urine monoclonal protein showed no monoclonal protein.  Beta-2 microglobulin 2.5.  AST 43.  Creatinine 0.86.  Calcium 9.5.  Ionized calcium 1.3.  C-reactive protein less than 0.01.  Sedimentation rate 7.  Serum monoclonal protein 800 mg/dL of immunoglobulin G lambda.  Lambda monoclonal protein down to 98.5 with a kappa to lambda ratio 0.18.  CBC is normal.  Total body bone survey done on the day of her visit  12/20/18 was not read by the time of her visit.     6/17/2019 -  Other Event     Lambda 129 with kappa 22 and ratio 0.17.  This is in the range that it has fluctuated since January 2017 upon first testing.  M spike stable 600 mg/dL immunoglobulin G lambda.  C-reactive protein 0.04 with sedimentation rate of 8.  Creatinine 0.86 with total calcium of 10 and ionized calcium 1.34 upper limit of normal 1.32.  Beta-2 microglobulin 2.8.  CBC unremarkable.     4/6/2020 -  Other Event    Myeloma panel:  CBC WBC 5400, hemoglobin 15.2, platelet count 225,000.  Beta-2 microglobulin 2.6.  Ionized calcium 1.26.  CMP creatinine 0.77, serum calcium 10.1 C-reactive protein 0.08, sedimentation rate 13.  Serum immunoelectrophoresis M spike stable 0.6 g/dL.  Serum free light chains, normal free kappa light chains 16.3, free lambda light chains 107.7, kappa/lambda ratio 0.15.  Kappa/lambda light chains Urine normal.     4/13/2021 -  Other Event    -4/13/2021 data: Beta-2 microglobulin 2.5 stable.  Hemoglobin 15.4 with normal CBC.  CMP unremarkable including creatinine 0.94, calcium 9.0, total protein 7.5, normal alkaline phosphatase 102.  Serum M spike 800 mg/dL IgG lambda stable with normal quantitative immunoglobulins.  Immunoglobulin free lambda light chains 131 mg/L with kappa 18.7 and ratio 0.14 stable x4 years.  Sedimentation rate 7.  C-reactive protein less than 0.3.  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     Adenocarcinoma of right fallopian tube   3/2/2022 Imaging    Patient presented to Dr. Abdalla with c/o postmenopausal bleeding.   Transvaginal ultrasound demonstrated a thickened endometrial stripe and bilateral adnexal masses.  Referred to Gyn Oncology due to concern for possible malignancy.      3/4/2022 Imaging    CT abdomen pelvis:  1.Beam hardening artifact from the patient's right total hip arthroplasty makes evaluation of the right presacral/adnexal mass difficult. There is a tubular masslike  structure measuring up to 9 x 6 cm with a left posterior (presacral) mural nodular  enhancing component as suggested on ultrasound. This could be related to a multilocular cystic right ovarian mass or be related to the right uterine tube. Differential favors malignant process in this age group.  2.Incidental 6 mm pulmonary nodule seen in the right lower lobe along the right hemidiaphragm. This is of uncertain clinical significance.     3/10/2022 Imaging    CT chest:  1.  No change in 6 mm noncalcified pulmonary nodule within the right lower lobe.  Follow-up noncontrast CT scan of the chest would be recommended in 6-12 months to document continued stability.     3/17/2022 Surgery    Diagnostic laparoscopy, RTLH/BSO, cancer staging, omentectomy, optimal tumor debulking (R=0), appendectomy by Dr. Christine Fam at [facility]    Final pathology showed high-grade serous carcinoma involving the lumen and wall. Right uterosacral ligament and omentum involved by tumor. Pelvic washings malignant, adenocarcinoma compatible with papillary serous carcinoma. Stage IIIC grade 3       4/11/2022 - 8/15/2022 Chemotherapy    OP OVARIAN PACLitaxel / CARBOplatin (Q21D)  - 6 cycles completed  - toxicities included nausea, dehydration, neuropathy, and low blood counts  - both drugs dose reduced at cycle #6 and cycle #6 delayed 2 weeks due to neutropenia and thrombocytopenia     4/19/2022 Molecular Testing    CARIS results:  PD-L1 positive  ER positive, 2+, 95%  MSI stable, MMR proficient, TMB low, genomic ISACC low  SC negative  ARID1A, BANDAR, BRAF, EGFR, RAD51 C/D negative  BRCA1/2 negative  Her2/Patsy negative     8/16/2022 Imaging    Post-treatment scan:  CT chest:  1. Stable appearance of a 6 mm right lower lobe pulmonary nodule abutting the right hemidiaphragm.  2. No adenopathy within the chest.     CT abdomen and pelvis:  1. Postsurgical changes of hysterectomy and bilateral salpingo-oophorectomy. No evidence of residual disease  within the abdomen or pelvis.  2. No evidence of lymphadenopathy within the abdomen or pelvis.  3. Stable small 9 mm lesion within the dome of the right hepatic lobe, favor benign etiology such as hemangioma. Continued attention on follow-up would be recommended. Additional benign simple cyst is present within the left hepatic lobe.           HISTORY OF PRESENT ILLNESS:  The patient is a 67 y.o. female, here for follow up on management of monoclonal gammopathy with stable neuropathy status post adjuvant carboplatin Taxol.    Past Medical History:   Diagnosis Date   • Adenocarcinoma of right fallopian tube 3/17/2022   • Atrial fibrillation    • Corneal abrasion    • Hemorrhoids     bleeding hemorrhoids   • History of anemia    • History of transfusion    • Hypertension    • Hypothyroidism    • LGSIL on Pap smear of cervix 03/10/2022   • Osteoarthritis      Past Surgical History:   Procedure Laterality Date   • AUGMENTATION MAMMAPLASTY Bilateral 2008   • BONE MARROW BIOPSY Left 03/08/2017    Procedure: BONE MARROW BIOPSY;  Surgeon: Michel White MD;  Location:  [facility];  Service:    • COLONOSCOPY N/A 01/09/2017    Procedure: COLONOSCOPY;  Surgeon: Michel White MD;  Location:  [facility];  Service:    • ENDOSCOPY N/A 01/07/2017    Procedure: ESOPHAGOGASTRODUODENOSCOPY;  Surgeon: Abhishek Benton MD;  Location:  [facility];  Service:    • HEMORRHOIDECTOMY N/A 03/08/2017    Procedure: HEMORRHOIDECTOMY,;  Surgeon: Michel White MD;  Location:  [facility];  Service:    • HIP ARTHROSCOPY Right     2005 & 2012   • JOINT REPLACEMENT      2 right hips   • TONSILLECTOMY  1964   • TOTAL LAPAROSCOPIC HYSTERECTOMY N/A 03/17/2022    Procedure: DIAGNOSTIC LAPAROSCOPY, ROBOTIC ASSISTED TOTAL LAPAROSCOPIC HYSTERECTOMY, BILATERAL SALPINGO-OOPHERECTOMY, CANCER STAGING, OMENTECTOMY,  OPTIMAL TUMOR DEBULKING, APPENDECTOMY;  Surgeon: Christine Fam MD;  Location:  [facility];  Service: Robotics - DaVinci;  Laterality:  "N/A;   • VENOUS ACCESS DEVICE (PORT) INSERTION Left 04/08/2022       Allergies   Allergen Reactions   • Sulfa Antibiotics Anaphylaxis       Family History and Social History reviewed and changed as necessary    REVIEW OF SYSTEM:   Other than neuropathy that occurred with her final dose of Taxol and carboplatin, she has no other complaints    PHYSICAL EXAM:  No respiratory distress.  No jaundice or icterus.    Vitals:    04/24/23 1115   BP: 164/76   Pulse: 63   Resp: 16   Temp: 97.2 °F (36.2 °C)   SpO2: 99%   Weight: 68 kg (150 lb)   Height: 177.8 cm (70\")     Vitals:    04/24/23 1115   PainSc: 0-No pain          ECOG score: 0           Vitals reviewed.    ECOG: (0) Fully Active - Able to Carry On All Pre-disease Performance Without Restriction    Lab Results   Component Value Date    HGB 13.7 04/24/2023    HCT 41.7 04/24/2023    MCV 96.3 04/24/2023     04/24/2023    WBC 4.84 04/24/2023    NEUTROABS 2.11 04/24/2023    LYMPHSABS 2.06 04/24/2023    MONOSABS 0.56 04/24/2023    EOSABS 0.09 04/24/2023    BASOSABS 0.02 04/24/2023       Lab Results   Component Value Date    GLUCOSE 114 (H) 04/05/2023    BUN 18 04/05/2023    CREATININE 0.85 04/05/2023     04/05/2023    K 4.3 04/05/2023     04/05/2023    CO2 26.0 04/05/2023    CALCIUM 9.2 04/05/2023    PROTEINTOT 7.0 04/05/2023    ALBUMIN 4.4 04/05/2023    ALBUMIN 3.9 04/05/2023    BILITOT 0.7 04/05/2023    ALKPHOS 103 04/05/2023    AST 27 04/05/2023    ALT 16 04/05/2023             ASSESSMENT & PLAN:  1.  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  Monoclonal gammopathy   3.  History of anemia, resolved after hemorrhoidectomy repair and on oral iron.  CBC on 6/13/2017 with a hemoglobin of 14.2, hematocrit 44.6%, MCV of 94.8.  4.  Atrial fibrillation  5.  Pulmonary nodule  6.  Erythrocytosis  7.  Fallopian tube serous carcinoma.      Monoclonal gammopathy and uterine cancer history timeline:    -March 2017 24-hour urine immunoelectrophoresis " showed no monoclonal spike, lambda light chains 106 with kappa light chains 24 and a kappa to lambda ratio of 0.23 with normal IgA, G, and M levels.  January 2017 serum immunoelectrophoresis showed 600 mg/dL of immunoglobulin G lambda light chains in the serum.  3/8/17 bone marrow biopsy showed normal marrow with 3% plasma cells and a small lambda clonal population identified with no stainable iron.  There was erythroid expansion with mild maturation dyssynchrony and occasional atypical nuclear features that may represent the compensatory response to her anemia though myelodysplasia could not entirely be ruled out.  Had reactive aggregates of lymphoid tissue.     -6/13/2017 sedimentation rate 8, immunoglobulin free light chains free kappa light chains 25.7, free lambda light chains 173.4, kappa/lambda ratio 0.15.  Serum immunoelectrophoresis M-spike 0.9g/dL, C-reactive protein less than 0.01, CMP normal, ionized calcium 1.31, beta-2 microglobulin 3.1, CBC WBC 5500, hemoglobin 14.2, hematocrit 44.6%, MCV 94.8, platelets 259,000.  24-hour urine immunoelectrophoresis pending.   -6/17/2019 data: Lambda 129 with kappa 22 and ratio 0.17.  This is in the range that it has fluctuated since January 2017 upon first testing.  M spike stable 600 mg/dL immunoglobulin G lambda.  C-reactive protein 0.04 with sedimentation rate of 8.  Creatinine 0.86 with total calcium of 10 and ionized calcium 1.34 upper limit of normal 1.32.  Beta-2 microglobulin 2.8.  CBC unremarkable.  -4/13/2021 data: Beta-2 microglobulin 2.5 stable.  Hemoglobin 15.4 with normal CBC.  CMP unremarkable including creatinine 0.94, calcium 9.0, total protein 7.5, normal alkaline phosphatase 102.  Serum M spike 800 mg/dL IgG lambda stable with normal quantitative immunoglobulins.  Immunoglobulin free lambda light chains 131 mg/L with kappa 18.7 and ratio 0.14 stable x4 years.  Sedimentation rate 7.  C-reactive protein less than 0.3.  Total body bone survey done  December 2020 - for lytic disease.  -2/22/2022 through 2/25/2022 admitted by Dr. Khurram Novak for atrial fibrillation with rapid ventricular response with worsening exercise tolerance.  Started on dofetilide and converted to sinus rhythm after second dose.  Dose decreased due to lengthening of QT corrected.  No further atrial fib after conversion.  Discharged on Eliquis.  -2/11/2022 through 2/15/2022 admitted for atrial fibrillation with RVR and vaginal bleeding.  On Eliquis.    -3/4/2022 hematocrit 51.9% with hemoglobin 714.2 otherwise unremarkable CBC.  CMP unremarkable with normal creatinine 0.92, normal calcium 9.9, alkaline phosphatase minimally elevated 120 upper limit of normal 117, AST 44 otherwise unremarkable, otherwise unremarkable CMP.  IgG lambda stable 800 mg/dL with normal quantitative immunoglobulins and no significant change in M spike over the last 5 years.  Serum lambda light chain 85 continuing to come down from a high of 173 on presentation June 2017 with normal kappa and stable ratio over time at 0.17.  Sedimentation rate 7 with C-reactive protein less than 0.3.  Beta-2 microglobulin 2.9.    -3/4/2022 CT abdomen pelvis with and without contrastShowed right hip arthroplasty being artifact but with tubular masslike structure 9 x 6 cm presacral as suggested on ultrasound.  Could be related to multilocular cystic right ovarian mass or be related to the right uterine tube.  Malignancy favored.  Incidental 6 mm pulmonary nodule right lower lobe of undetermined significance    -3/7/2022 appointment with Loki Abdalla,gynecologist.  Had postmenopausal bleeding for the previous month.  3/2/2022 ultrasound showed 7.7 cm right adnexal mass and 6.9 cm left adnexal mass with  elevated at 38.4 upper limit of normal 38.1.  CEA normal 1.9.  Endometrial biopsy benign.    -3/10/2022 CT chest compared to CT abdomen 3/4/2022 showed stable 6 mm noncalcified right lower lobe pulmonary nodule with suggestion of  follow-up in 6-12 months for stability.    -3/17/2022 JANET/BSO Dr. Fam.  Bilateral benign adenofibroma's of the ovaries.  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  No bessy involvement found.  No lymph vascular invasion.  Pathological stage IIIc.    -4/8/2022 Ca1 25 normal at 21.  CBC unremarkable save for hematocrit 51.3%.  CMP sodium 129 otherwise unremarkable.    -4/18/2022 gynecologic oncology postoperative evaluation.  She had an R0 resection 3/17/2022 for high-grade serous carcinoma.  Plans for Caris MI profile, genetic testing, and adjuvant carboplatin Taxol.    -4/18/2022 [facility] medical oncology follow-up visit: She has had a complex year since last I saw her about a year ago.  She is under care of Dr. Fam for stage III fallopian tube serous carcinoma for which she is due for adjuvant carbotaxol.  Genetic testing is ordered.  Caris MI profile sent but not back.  From my standpoint, the monoclonal gammopathy given stability over time and with unremarkable calcium and creatinine and quantitative immunoglobulins is very likely just benign and we will continue to check this annually.  Her serum light chains actually have gone down over time with no interventions.  No further work-up from that standpoint.  With reference to her erythrocytosis, I would not put her through work-up for myeloproliferative disorder as I doubt that that is operative especially with recent staging CTs not showing splenomegaly.  She does have small right lower lobe pulmonary nodule that does need follow-up but I am certain that she will be getting routine radiographic/CT follow-up along with serum tumor markers with Dr. Fam and company.  I will remain available to her in the interim but will not increase the frequency of hematology follow-up referable to the newly diagnosed  oncologic gynecologic issue that is being expertly managed by Dr. Fam and her team.  It is reassuring that her CA-125 normalized after JANET/BSO and omentectomy etc.    -4/11/2022 through 8/15/2022 adjuvant CarboTaxol x6.    -2/4/2023 office note from Dr. Christine Fam.  Indicates Ca1 25 normal.  2/1/2023 CT chest abdomen pelvis showed stable subpleural 6 mm nodule with suggested follow-up in 3-6 months with no new pulmonary nodules.  New soft tissue nodule right hepatic dome 12 mm concerning for metastasis.  Dr. Fam looked at the images and thought this to be hemangioma.  Dr. Fam plans follow-up imaging in 3-6 months depending on Ca1 25 and how she feels.    -3/29/2023 mammogram BI-RADS 2.    - 4/5/2023 serum M spike IgG lambda 400 mg/dL not rising.  Serum free lambda stable 102 with ratio 0.15.  Sedimentation rate normal 7.  C-reactive protein normal less than 0.3.  CMP unremarkable save for glucose 114.  CBC entirely normal.    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  She had pulmonary nodule follow-up that was stable and there was a reading of a possible liver metastasis that per review by Dr. Fam was felt most likely to be hemangioma and based on patient's symptoms and desires and tumor markers which, today, have been normal, the plan was for follow-up imaging in 3 to 6 months.  Upon my discussion with her today, the patient registered her desire to have the CT of her abdomen repeated 3 months from the last scan and I will comply with that to hopefully have those results available near to the time of her follow-up with Dr. Fam.  Other than for the understandable concern relative to her malignancy, physically she is doing wonderfully and recent mammogram was also BI-RADS 2.  From the monoclonal gammopathy standpoint, the M  spike is as low as it has ever been and I will continue annual labs as outlined.  With normal creatinine, hemoglobin, bone enzymes, total protein-albumin-globulin-ratios thereof, the odds of underlying plasma cell dyscrasia etc. extremely unlikely.  She is under the excellent care of Dr. Fam for her malignancy follow-up.    Total time of care today inclusive of time spent today prior to patient's arrival reviewing interval GYN oncology information and labs as outlined above and mammography and images and reports thereof and after visit instituting plan as outlined and during visit interviewing her as to signs or symptoms of her gynecologic malignancy as well as potential referable issues from her monoclonal gammopathy took 30 minutes of patient care time throughout the day today.  René Valencia MD    04/24/2023

## 2023-04-24 NOTE — LETTER
April 24, 2023       No Recipients    Patient: Halie Perez   YOB: 1955   Date of Visit: 4/24/2023       Dear ARMANDO Pugh    Halie Perez was in my office today. Below is a copy of my note.    If you have questions, please do not hesitate to call me. I look forward to following Halie along with you.         Sincerely,        René Valencia MD        CC:   No Recipients    CHIEF COMPLAINT: Stable neuropathy status post adjuvant chemotherapy    Problem List:  Oncology/Hematology History Overview Note   1.  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  Monoclonal gammopathy   3.  History of anemia, resolved after hemorrhoidectomy repair and on oral iron.  CBC on 6/13/2017 with a hemoglobin of 14.2, hematocrit 44.6%, MCV of 94.8.  4.  Atrial fibrillation  5.  Pulmonary nodule  6.  Erythrocytosis  7.  Fallopian tube serous carcinoma.      Monoclonal gammopathy and uterine cancer history timeline:    -March 2017 24-hour urine immunoelectrophoresis showed no monoclonal spike, lambda light chains 106 with kappa light chains 24 and a kappa to lambda ratio of 0.23 with normal IgA, G, and M levels.  January 2017 serum immunoelectrophoresis showed 600 mg/dL of immunoglobulin G lambda light chains in the serum.  3/8/17 bone marrow biopsy showed normal marrow with 3% plasma cells and a small lambda clonal population identified with no stainable iron.  There was erythroid expansion with mild maturation dyssynchrony and occasional atypical nuclear features that may represent the compensatory response to her anemia though myelodysplasia could not entirely be ruled out.  Had reactive aggregates of lymphoid tissue.     -6/13/2017 sedimentation rate 8, immunoglobulin free light chains free kappa light chains 25.7, free lambda light chains 173.4, kappa/lambda ratio 0.15.  Serum immunoelectrophoresis M-spike 0.9g/dL, C-reactive protein less than 0.01, CMP normal, ionized calcium 1.31, beta-2  microglobulin 3.1, CBC WBC 5500, hemoglobin 14.2, hematocrit 44.6%, MCV 94.8, platelets 259,000.  24-hour urine immunoelectrophoresis pending.   -6/17/2019 data: Lambda 129 with kappa 22 and ratio 0.17.  This is in the range that it has fluctuated since January 2017 upon first testing.  M spike stable 600 mg/dL immunoglobulin G lambda.  C-reactive protein 0.04 with sedimentation rate of 8.  Creatinine 0.86 with total calcium of 10 and ionized calcium 1.34 upper limit of normal 1.32.  Beta-2 microglobulin 2.8.  CBC unremarkable.  -4/13/2021 data: Beta-2 microglobulin 2.5 stable.  Hemoglobin 15.4 with normal CBC.  CMP unremarkable including creatinine 0.94, calcium 9.0, total protein 7.5, normal alkaline phosphatase 102.  Serum M spike 800 mg/dL IgG lambda stable with normal quantitative immunoglobulins.  Immunoglobulin free lambda light chains 131 mg/L with kappa 18.7 and ratio 0.14 stable x4 years.  Sedimentation rate 7.  C-reactive protein less than 0.3.  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  -2/22/2022 through 2/25/2022 admitted by Dr. Khurram Novak for atrial fibrillation with rapid ventricular response with worsening exercise tolerance.  Started on dofetilide and converted to sinus rhythm after second dose.  Dose decreased due to lengthening of QT corrected.  No further atrial fib after conversion.  Discharged on Eliquis.  -2/11/2022 through 2/15/2022 admitted for atrial fibrillation with RVR and vaginal bleeding.  On Eliquis.    -3/4/2022 hematocrit 51.9% with hemoglobin 714.2 otherwise unremarkable CBC.  CMP unremarkable with normal creatinine 0.92, normal calcium 9.9, alkaline phosphatase minimally elevated 120 upper limit of normal 117, AST 44 otherwise unremarkable, otherwise unremarkable CMP.  IgG lambda stable 800 mg/dL with normal quantitative immunoglobulins and no significant change in M spike over the last 5 years.  Serum lambda light chain 85 continuing to come down from a  high of 173 on presentation June 2017 with normal kappa and stable ratio over time at 0.17.  Sedimentation rate 7 with C-reactive protein less than 0.3.  Beta-2 microglobulin 2.9.    -3/4/2022 CT abdomen pelvis with and without contrastShowed right hip arthroplasty being artifact but with tubular masslike structure 9 x 6 cm presacral as suggested on ultrasound.  Could be related to multilocular cystic right ovarian mass or be related to the right uterine tube.  Malignancy favored.  Incidental 6 mm pulmonary nodule right lower lobe of undetermined significance    -3/7/2022 appointment with Loki Abdalla,gynecologist.  Had postmenopausal bleeding for the previous month.  3/2/2022 ultrasound showed 7.7 cm right adnexal mass and 6.9 cm left adnexal mass with  elevated at 38.4 upper limit of normal 38.1.  CEA normal 1.9.  Endometrial biopsy benign.    -3/10/2022 CT chest compared to CT abdomen 3/4/2022 showed stable 6 mm noncalcified right lower lobe pulmonary nodule with suggestion of follow-up in 6-12 months for stability.    -3/17/2022 JANET/BSO Dr. Fam.  Bilateral benign adenofibroma's of the ovaries.  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  No bessy involvement found.  No lymph vascular invasion.  Pathological stage IIIc.    -4/8/2022 Ca1 25 normal at 21.  CBC unremarkable save for hematocrit 51.3%.  CMP sodium 129 otherwise unremarkable.    -4/18/2022 gynecologic oncology postoperative evaluation.  She had an R0 resection 3/17/2022 for high-grade serous carcinoma.  Plans for Kermit MI profile, genetic testing, and adjuvant carboplatin Taxol.    -4/18/2022 [facility] medical oncology follow-up visit: She has had a complex year since last I saw her about a year ago.  She is under care of Dr. Fam for stage III fallopian tube serous carcinoma for which she is due  for adjuvant carbotaxol.  Genetic testing is ordered.  Kermit MI profile sent but not back.  From my standpoint, the monoclonal gammopathy given stability over time and with unremarkable calcium and creatinine and quantitative immunoglobulins is very likely just benign and we will continue to check this annually.  Her serum light chains actually have gone down over time with no interventions.  No further work-up from that standpoint.  With reference to her erythrocytosis, I would not put her through work-up for myeloproliferative disorder as I doubt that that is operative especially with recent staging CTs not showing splenomegaly.  She does have small right lower lobe pulmonary nodule that does need follow-up but I am certain that she will be getting routine radiographic/CT follow-up along with serum tumor markers with Dr. Fam and company.  I will remain available to her in the interim but will not increase the frequency of hematology follow-up referable to the newly diagnosed oncologic gynecologic issue that is being expertly managed by Dr. Fam and her team.  It is reassuring that her CA-125 normalized after JANET/BSO and omentectomy etc.    -4/11/2022 through 8/15/2022 adjuvant CarboTaxol x6.    -2/4/2023 office note from Dr. Christine Fam.  Indicates Ca1 25 normal.  2/1/2023 CT chest abdomen pelvis showed stable subpleural 6 mm nodule with suggested follow-up in 3-6 months with no new pulmonary nodules.  New soft tissue nodule right hepatic dome 12 mm concerning for metastasis.  Dr. Fam looked at the images and thought this to be hemangioma.  Dr. Fam plans follow-up imaging in 3-6 months depending on Ca1 25 and how she feels.    -3/29/2023 mammogram BI-RADS 2.    - 4/5/2023 serum M spike IgG lambda 400 mg/dL not rising.  Serum free lambda stable 102 with ratio 0.15.  Sedimentation rate normal 7.  C-reactive protein normal less than 0.3.  CMP unremarkable save for glucose 114.  CBC entirely  normal.    -4/24/2023 [facility] oncology hematology follow-up: Since last I saw her she had surgery followed by adjuvant chemotherapy for her stage IIIc grade 3 papillary serous carcinoma of the fallopian tube.  She had pulmonary nodule follow-up that was stable and there was a reading of a possible liver metastasis that per review by Dr. Fam was felt most likely to be hemangioma and based on patient's symptoms and desires and tumor markers which, today, have been normal, the plan was for follow-up imaging in 3 to 6 months.  Upon my discussion with her today, the patient registered her desire to have the CT of her abdomen repeated 3 months from the last scan and I will comply with that to hopefully have those results available near to the time of her follow-up with Dr. Fam.  Other than for the understandable concern relative to her malignancy, physically she is doing wonderfully and recent mammogram was also BI-RADS 2.  From the monoclonal gammopathy standpoint, the M spike is as low as it has ever been and I will continue annual labs as outlined.  With normal creatinine, hemoglobin, bone enzymes, total protein-albumin-globulin-ratios thereof, the odds of underlying plasma cell dyscrasia etc. extremely unlikely.  She is under the excellent care of Dr. Fam for her malignancy follow-up.     Monoclonal gammopathy    Initial Diagnosis    Monoclonal gammopathy     11/29/2017 Imaging    Bone survey IMPRESSION:  Negative skeletal survey.     6/5/2018 -  Other Event    6/5/2018 labs:  CBC WBC 4350, hemoglobin 12.6, hematocrit 38.3%, platelet count 252,000.  CMP creatinine 0.8, serum calcium 9.4.  Ionized calcium 1.29.  Beta-2 microglobulin 2.3.  Sedimentation rate 9, CRP 0.02.  Kappa/lambda light chains-normal kappa light chains 16.6, lambda light chains elevated at 133.6, kappa/lambda ratio 0.12.  Serum immunoelectrophoresis M-spike stable 0.7 g/dL.       12/20/2018 -  Other Event    24-hour urine  monoclonal protein showed no monoclonal protein.  Beta-2 microglobulin 2.5.  AST 43.  Creatinine 0.86.  Calcium 9.5.  Ionized calcium 1.3.  C-reactive protein less than 0.01.  Sedimentation rate 7.  Serum monoclonal protein 800 mg/dL of immunoglobulin G lambda.  Lambda monoclonal protein down to 98.5 with a kappa to lambda ratio 0.18.  CBC is normal.  Total body bone survey done on the day of her visit 12/20/18 was not read by the time of her visit.     6/17/2019 -  Other Event     Lambda 129 with kappa 22 and ratio 0.17.  This is in the range that it has fluctuated since January 2017 upon first testing.  M spike stable 600 mg/dL immunoglobulin G lambda.  C-reactive protein 0.04 with sedimentation rate of 8.  Creatinine 0.86 with total calcium of 10 and ionized calcium 1.34 upper limit of normal 1.32.  Beta-2 microglobulin 2.8.  CBC unremarkable.     4/6/2020 -  Other Event    Myeloma panel:  CBC WBC 5400, hemoglobin 15.2, platelet count 225,000.  Beta-2 microglobulin 2.6.  Ionized calcium 1.26.  CMP creatinine 0.77, serum calcium 10.1 C-reactive protein 0.08, sedimentation rate 13.  Serum immunoelectrophoresis M spike stable 0.6 g/dL.  Serum free light chains, normal free kappa light chains 16.3, free lambda light chains 107.7, kappa/lambda ratio 0.15.  Kappa/lambda light chains Urine normal.     4/13/2021 -  Other Event    -4/13/2021 data: Beta-2 microglobulin 2.5 stable.  Hemoglobin 15.4 with normal CBC.  CMP unremarkable including creatinine 0.94, calcium 9.0, total protein 7.5, normal alkaline phosphatase 102.  Serum M spike 800 mg/dL IgG lambda stable with normal quantitative immunoglobulins.  Immunoglobulin free lambda light chains 131 mg/L with kappa 18.7 and ratio 0.14 stable x4 years.  Sedimentation rate 7.  C-reactive protein less than 0.3.  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     Adenocarcinoma of right fallopian tube   3/2/2022 Imaging    Patient presented to Dr. Abdalla with  c/o postmenopausal bleeding.   Transvaginal ultrasound demonstrated a thickened endometrial stripe and bilateral adnexal masses.  Referred to Gyn Oncology due to concern for possible malignancy.      3/4/2022 Imaging    CT abdomen pelvis:  1.Beam hardening artifact from the patient's right total hip arthroplasty makes evaluation of the right presacral/adnexal mass difficult. There is a tubular masslike structure measuring up to 9 x 6 cm with a left posterior (presacral) mural nodular  enhancing component as suggested on ultrasound. This could be related to a multilocular cystic right ovarian mass or be related to the right uterine tube. Differential favors malignant process in this age group.  2.Incidental 6 mm pulmonary nodule seen in the right lower lobe along the right hemidiaphragm. This is of uncertain clinical significance.     3/10/2022 Imaging    CT chest:  1.  No change in 6 mm noncalcified pulmonary nodule within the right lower lobe.  Follow-up noncontrast CT scan of the chest would be recommended in 6-12 months to document continued stability.     3/17/2022 Surgery    Diagnostic laparoscopy, RTLH/BSO, cancer staging, omentectomy, optimal tumor debulking (R=0), appendectomy by Dr. Christine Fam at [facility]    Final pathology showed high-grade serous carcinoma involving the lumen and wall. Right uterosacral ligament and omentum involved by tumor. Pelvic washings malignant, adenocarcinoma compatible with papillary serous carcinoma. Stage IIIC grade 3       4/11/2022 - 8/15/2022 Chemotherapy    OP OVARIAN PACLitaxel / CARBOplatin (Q21D)  - 6 cycles completed  - toxicities included nausea, dehydration, neuropathy, and low blood counts  - both drugs dose reduced at cycle #6 and cycle #6 delayed 2 weeks due to neutropenia and thrombocytopenia     4/19/2022 Molecular Testing    CARIS results:  PD-L1 positive  ER positive, 2+, 95%  MSI stable, MMR proficient, TMB low, genomic ISACC low  AL  negative  ARID1A, BANDAR, BRAF, EGFR, RAD51 C/D negative  BRCA1/2 negative  Her2/Patsy negative     8/16/2022 Imaging    Post-treatment scan:  CT chest:  1. Stable appearance of a 6 mm right lower lobe pulmonary nodule abutting the right hemidiaphragm.  2. No adenopathy within the chest.     CT abdomen and pelvis:  1. Postsurgical changes of hysterectomy and bilateral salpingo-oophorectomy. No evidence of residual disease within the abdomen or pelvis.  2. No evidence of lymphadenopathy within the abdomen or pelvis.  3. Stable small 9 mm lesion within the dome of the right hepatic lobe, favor benign etiology such as hemangioma. Continued attention on follow-up would be recommended. Additional benign simple cyst is present within the left hepatic lobe.           HISTORY OF PRESENT ILLNESS:  The patient is a 67 y.o. female, here for follow up on management of monoclonal gammopathy with stable neuropathy status post adjuvant carboplatin Taxol.    Past Medical History:   Diagnosis Date   • Adenocarcinoma of right fallopian tube 3/17/2022   • Atrial fibrillation    • Corneal abrasion    • Hemorrhoids     bleeding hemorrhoids   • History of anemia    • History of transfusion    • Hypertension    • Hypothyroidism    • LGSIL on Pap smear of cervix 03/10/2022   • Osteoarthritis      Past Surgical History:   Procedure Laterality Date   • AUGMENTATION MAMMAPLASTY Bilateral 2008   • BONE MARROW BIOPSY Left 03/08/2017    Procedure: BONE MARROW BIOPSY;  Surgeon: Michel White MD;  Location:  [facility];  Service:    • COLONOSCOPY N/A 01/09/2017    Procedure: COLONOSCOPY;  Surgeon: Michel White MD;  Location:  [facility];  Service:    • ENDOSCOPY N/A 01/07/2017    Procedure: ESOPHAGOGASTRODUODENOSCOPY;  Surgeon: Abhishek Benton MD;  Location:  [facility];  Service:    • HEMORRHOIDECTOMY N/A 03/08/2017    Procedure: HEMORRHOIDECTOMY,;  Surgeon: Michel White MD;  Location:  [facility];  Service:    • HIP ARTHROSCOPY  "Right     2005 & 2012   • JOINT REPLACEMENT      2 right hips   • TONSILLECTOMY  1964   • TOTAL LAPAROSCOPIC HYSTERECTOMY N/A 03/17/2022    Procedure: DIAGNOSTIC LAPAROSCOPY, ROBOTIC ASSISTED TOTAL LAPAROSCOPIC HYSTERECTOMY, BILATERAL SALPINGO-OOPHERECTOMY, CANCER STAGING, OMENTECTOMY,  OPTIMAL TUMOR DEBULKING, APPENDECTOMY;  Surgeon: Christine Fam MD;  Location: [facility];  Service: Robotics - DaVinci;  Laterality: N/A;   • VENOUS ACCESS DEVICE (PORT) INSERTION Left 04/08/2022       Allergies   Allergen Reactions   • Sulfa Antibiotics Anaphylaxis       Family History and Social History reviewed and changed as necessary    REVIEW OF SYSTEM:   Other than neuropathy that occurred with her final dose of Taxol and carboplatin, she has no other complaints    PHYSICAL EXAM:  No respiratory distress.  No jaundice or icterus.    Vitals:    04/24/23 1115   BP: 164/76   Pulse: 63   Resp: 16   Temp: 97.2 °F (36.2 °C)   SpO2: 99%   Weight: 68 kg (150 lb)   Height: 177.8 cm (70\")     Vitals:    04/24/23 1115   PainSc: 0-No pain          ECOG score: 0           Vitals reviewed.    ECOG: (0) Fully Active - Able to Carry On All Pre-disease Performance Without Restriction    Lab Results   Component Value Date    HGB 13.7 04/24/2023    HCT 41.7 04/24/2023    MCV 96.3 04/24/2023     04/24/2023    WBC 4.84 04/24/2023    NEUTROABS 2.11 04/24/2023    LYMPHSABS 2.06 04/24/2023    MONOSABS 0.56 04/24/2023    EOSABS 0.09 04/24/2023    BASOSABS 0.02 04/24/2023       Lab Results   Component Value Date    GLUCOSE 114 (H) 04/05/2023    BUN 18 04/05/2023    CREATININE 0.85 04/05/2023     04/05/2023    K 4.3 04/05/2023     04/05/2023    CO2 26.0 04/05/2023    CALCIUM 9.2 04/05/2023    PROTEINTOT 7.0 04/05/2023    ALBUMIN 4.4 04/05/2023    ALBUMIN 3.9 04/05/2023    BILITOT 0.7 04/05/2023    ALKPHOS 103 04/05/2023    AST 27 04/05/2023    ALT 16 04/05/2023            ASSESSMENT & PLAN:  1.  Serous carcinoma right fallopian " tube stage IIIc  2.  Monoclonal gammopathy   3.  History of anemia, resolved after hemorrhoidectomy repair and on oral iron.  CBC on 6/13/2017 with a hemoglobin of 14.2, hematocrit 44.6%, MCV of 94.8.  4.  Atrial fibrillation  5.  Pulmonary nodule  6.  Erythrocytosis  7.  Fallopian tube serous carcinoma.      Monoclonal gammopathy and uterine cancer history timeline:    -March 2017 24-hour urine immunoelectrophoresis showed no monoclonal spike, lambda light chains 106 with kappa light chains 24 and a kappa to lambda ratio of 0.23 with normal IgA, G, and M levels.  January 2017 serum immunoelectrophoresis showed 600 mg/dL of immunoglobulin G lambda light chains in the serum.  3/8/17 bone marrow biopsy showed normal marrow with 3% plasma cells and a small lambda clonal population identified with no stainable iron.  There was erythroid expansion with mild maturation dyssynchrony and occasional atypical nuclear features that may represent the compensatory response to her anemia though myelodysplasia could not entirely be ruled out.  Had reactive aggregates of lymphoid tissue.     -6/13/2017 sedimentation rate 8, immunoglobulin free light chains free kappa light chains 25.7, free lambda light chains 173.4, kappa/lambda ratio 0.15.  Serum immunoelectrophoresis M-spike 0.9g/dL, C-reactive protein less than 0.01, CMP normal, ionized calcium 1.31, beta-2 microglobulin 3.1, CBC WBC 5500, hemoglobin 14.2, hematocrit 44.6%, MCV 94.8, platelets 259,000.  24-hour urine immunoelectrophoresis pending.   -6/17/2019 data: Lambda 129 with kappa 22 and ratio 0.17.  This is in the range that it has fluctuated since January 2017 upon first testing.  M spike stable 600 mg/dL immunoglobulin G lambda.  C-reactive protein 0.04 with sedimentation rate of 8.  Creatinine 0.86 with total calcium of 10 and ionized calcium 1.34 upper limit of normal 1.32.  Beta-2 microglobulin 2.8.  CBC unremarkable.  -4/13/2021 data: Beta-2 microglobulin 2.5  stable.  Hemoglobin 15.4 with normal CBC.  CMP unremarkable including creatinine 0.94, calcium 9.0, total protein 7.5, normal alkaline phosphatase 102.  Serum M spike 800 mg/dL IgG lambda stable with normal quantitative immunoglobulins.  Immunoglobulin free lambda light chains 131 mg/L with kappa 18.7 and ratio 0.14 stable x4 years.  Sedimentation rate 7.  C-reactive protein less than 0.3.  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  -2/22/2022 through 2/25/2022 admitted by Dr. Khurram Novak for atrial fibrillation with rapid ventricular response with worsening exercise tolerance.  Started on dofetilide and converted to sinus rhythm after second dose.  Dose decreased due to lengthening of QT corrected.  No further atrial fib after conversion.  Discharged on Eliquis.  -2/11/2022 through 2/15/2022 admitted for atrial fibrillation with RVR and vaginal bleeding.  On Eliquis.    -3/4/2022 hematocrit 51.9% with hemoglobin 714.2 otherwise unremarkable CBC.  CMP unremarkable with normal creatinine 0.92, normal calcium 9.9, alkaline phosphatase minimally elevated 120 upper limit of normal 117, AST 44 otherwise unremarkable, otherwise unremarkable CMP.  IgG lambda stable 800 mg/dL with normal quantitative immunoglobulins and no significant change in M spike over the last 5 years.  Serum lambda light chain 85 continuing to come down from a high of 173 on presentation June 2017 with normal kappa and stable ratio over time at 0.17.  Sedimentation rate 7 with C-reactive protein less than 0.3.  Beta-2 microglobulin 2.9.    -3/4/2022 CT abdomen pelvis with and without contrastShowed right hip arthroplasty being artifact but with tubular masslike structure 9 x 6 cm presacral as suggested on ultrasound.  Could be related to multilocular cystic right ovarian mass or be related to the right uterine tube.  Malignancy favored.  Incidental 6 mm pulmonary nodule right lower lobe of undetermined significance    -3/7/2022  appointment with Loki Abdalla,gynecologist.  Had postmenopausal bleeding for the previous month.  3/2/2022 ultrasound showed 7.7 cm right adnexal mass and 6.9 cm left adnexal mass with  elevated at 38.4 upper limit of normal 38.1.  CEA normal 1.9.  Endometrial biopsy benign.    -3/10/2022 CT chest compared to CT abdomen 3/4/2022 showed stable 6 mm noncalcified right lower lobe pulmonary nodule with suggestion of follow-up in 6-12 months for stability.    -3/17/2022 JANET/BSO Dr. Fam.  Bilateral benign adenofibroma's of the ovaries.  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  No bessy involvement found.  No lymph vascular invasion.  Pathological stage IIIc.    -4/8/2022 Ca1 25 normal at 21.  CBC unremarkable save for hematocrit 51.3%.  CMP sodium 129 otherwise unremarkable.    -4/18/2022 gynecologic oncology postoperative evaluation.  She had an R0 resection 3/17/2022 for high-grade serous carcinoma.  Plans for Caris MI profile, genetic testing, and adjuvant carboplatin Taxol.    -4/18/2022 [facility] medical oncology follow-up visit: She has had a complex year since last I saw her about a year ago.  She is under care of Dr. Fam for stage III fallopian tube serous carcinoma for which she is due for adjuvant carbotaxol.  Genetic testing is ordered.  Caris MI profile sent but not back.  From my standpoint, the monoclonal gammopathy given stability over time and with unremarkable calcium and creatinine and quantitative immunoglobulins is very likely just benign and we will continue to check this annually.  Her serum light chains actually have gone down over time with no interventions.  No further work-up from that standpoint.  With reference to her erythrocytosis, I would not put her through work-up for myeloproliferative disorder as I doubt that that is operative  especially with recent staging CTs not showing splenomegaly.  She does have small right lower lobe pulmonary nodule that does need follow-up but I am certain that she will be getting routine radiographic/CT follow-up along with serum tumor markers with Dr. Fam and company.  I will remain available to her in the interim but will not increase the frequency of hematology follow-up referable to the newly diagnosed oncologic gynecologic issue that is being expertly managed by Dr. Fam and her team.  It is reassuring that her CA-125 normalized after JANET/BSO and omentectomy etc.    -4/11/2022 through 8/15/2022 adjuvant CarboTaxol x6.    -2/4/2023 office note from Dr. Christine Fam.  Indicates Ca1 25 normal.  2/1/2023 CT chest abdomen pelvis showed stable subpleural 6 mm nodule with suggested follow-up in 3-6 months with no new pulmonary nodules.  New soft tissue nodule right hepatic dome 12 mm concerning for metastasis.  Dr. Fam looked at the images and thought this to be hemangioma.  Dr. Fam plans follow-up imaging in 3-6 months depending on Ca1 25 and how she feels.    -3/29/2023 mammogram BI-RADS 2.    - 4/5/2023 serum M spike IgG lambda 400 mg/dL not rising.  Serum free lambda stable 102 with ratio 0.15.  Sedimentation rate normal 7.  C-reactive protein normal less than 0.3.  CMP unremarkable save for glucose 114.  CBC entirely normal.    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  She had pulmonary nodule follow-up that was stable and there was a reading of a possible liver metastasis that per review by Dr. Fam was felt most likely to be hemangioma and based on patient's symptoms and desires and tumor markers which, today, have been normal, the plan was for follow-up imaging in 3 to 6 months.  Upon my discussion with her today, the  patient registered her desire to have the CT of her abdomen repeated 3 months from the last scan and I will comply with that to hopefully have those results available near to the time of her follow-up with Dr. Fam.  Other than for the understandable concern relative to her malignancy, physically she is doing wonderfully and recent mammogram was also BI-RADS 2.  From the monoclonal gammopathy standpoint, the M spike is as low as it has ever been and I will continue annual labs as outlined.  With normal creatinine, hemoglobin, bone enzymes, total protein-albumin-globulin-ratios thereof, the odds of underlying plasma cell dyscrasia etc. extremely unlikely.  She is under the excellent care of Dr. Fam for her malignancy follow-up.    Total time of care today inclusive of time spent today prior to patient's arrival reviewing interval GYN oncology information and labs as outlined above and mammography and images and reports thereof and after visit instituting plan as outlined and during visit interviewing her as to signs or symptoms of her gynecologic malignancy as well as potential referable issues from her monoclonal gammopathy took 30 minutes of patient care time throughout the day today.  Reén Valencia MD    04/24/2023

## 2023-04-27 ENCOUNTER — HOSPITAL ENCOUNTER (OUTPATIENT)
Dept: CT IMAGING | Facility: HOSPITAL | Age: 68
Discharge: HOME | End: 2023-04-27
Payer: MEDICARE

## 2023-04-27 DIAGNOSIS — D47.2 MONOCLONAL GAMMOPATHY: Chronic | ICD-10-CM

## 2023-04-27 DIAGNOSIS — C57.01 ADENOCARCINOMA OF RIGHT FALLOPIAN TUBE: Chronic | ICD-10-CM

## 2023-04-27 LAB — CREAT BLDA-MCNC: 0.9 MG/DL (ref 0.6–1.3)

## 2023-04-27 PROCEDURE — 74177 CT ABD & PELVIS W/CONTRAST: CPT

## 2023-04-27 PROCEDURE — 82565 ASSAY OF CREATININE: CPT

## 2023-04-27 PROCEDURE — 25510000001 IOPAMIDOL 61 % SOLUTION: Performed by: INTERNAL MEDICINE

## 2023-04-27 RX ADMIN — IOPAMIDOL 95 ML: 612 INJECTION, SOLUTION INTRAVENOUS at 10:51

## 2023-05-01 NOTE — PROGRESS NOTES
Halie Cormier [facility]  [id number]  1955      Reason for visit: History of fallopian tube cancer, abnormal CT scan    History of present illness:  The patient is a 67 y.o. year old female who presents today for treatment and evaluation of the above issues.    Ct scan showed progression.  Patient presents today for discussion of treatment options.  She feels well and is completely asymptomatic.  She is accompanied by her .  She has already looked at her CAT scan.    Oncologic History:  Oncology/Hematology History Overview Note   1.  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  Monoclonal gammopathy   3.  History of anemia, resolved after hemorrhoidectomy repair and on oral iron.  CBC on 6/13/2017 with a hemoglobin of 14.2, hematocrit 44.6%, MCV of 94.8.  4.  Atrial fibrillation  5.  Pulmonary nodule  6.  Erythrocytosis  7.  Fallopian tube serous carcinoma.      Monoclonal gammopathy and uterine cancer history timeline:    -March 2017 24-hour urine immunoelectrophoresis showed no monoclonal spike, lambda light chains 106 with kappa light chains 24 and a kappa to lambda ratio of 0.23 with normal IgA, G, and M levels.  January 2017 serum immunoelectrophoresis showed 600 mg/dL of immunoglobulin G lambda light chains in the serum.  3/8/17 bone marrow biopsy showed normal marrow with 3% plasma cells and a small lambda clonal population identified with no stainable iron.  There was erythroid expansion with mild maturation dyssynchrony and occasional atypical nuclear features that may represent the compensatory response to her anemia though myelodysplasia could not entirely be ruled out.  Had reactive aggregates of lymphoid tissue.     -6/13/2017 sedimentation rate 8, immunoglobulin free light chains free kappa light chains 25.7, free lambda light chains 173.4, kappa/lambda ratio 0.15.  Serum immunoelectrophoresis M-spike 0.9g/dL, C-reactive protein less than 0.01, CMP normal, ionized calcium 1.31,  beta-2 microglobulin 3.1, CBC WBC 5500, hemoglobin 14.2, hematocrit 44.6%, MCV 94.8, platelets 259,000.  24-hour urine immunoelectrophoresis pending.   -6/17/2019 data: Lambda 129 with kappa 22 and ratio 0.17.  This is in the range that it has fluctuated since January 2017 upon first testing.  M spike stable 600 mg/dL immunoglobulin G lambda.  C-reactive protein 0.04 with sedimentation rate of 8.  Creatinine 0.86 with total calcium of 10 and ionized calcium 1.34 upper limit of normal 1.32.  Beta-2 microglobulin 2.8.  CBC unremarkable.  -4/13/2021 data: Beta-2 microglobulin 2.5 stable.  Hemoglobin 15.4 with normal CBC.  CMP unremarkable including creatinine 0.94, calcium 9.0, total protein 7.5, normal alkaline phosphatase 102.  Serum M spike 800 mg/dL IgG lambda stable with normal quantitative immunoglobulins.  Immunoglobulin free lambda light chains 131 mg/L with kappa 18.7 and ratio 0.14 stable x4 years.  Sedimentation rate 7.  C-reactive protein less than 0.3.  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  -2/22/2022 through 2/25/2022 admitted by Dr. Khurram Novak for atrial fibrillation with rapid ventricular response with worsening exercise tolerance.  Started on dofetilide and converted to sinus rhythm after second dose.  Dose decreased due to lengthening of QT corrected.  No further atrial fib after conversion.  Discharged on Eliquis.  -2/11/2022 through 2/15/2022 admitted for atrial fibrillation with RVR and vaginal bleeding.  On Eliquis.    -3/4/2022 hematocrit 51.9% with hemoglobin 714.2 otherwise unremarkable CBC.  CMP unremarkable with normal creatinine 0.92, normal calcium 9.9, alkaline phosphatase minimally elevated 120 upper limit of normal 117, AST 44 otherwise unremarkable, otherwise unremarkable CMP.  IgG lambda stable 800 mg/dL with normal quantitative immunoglobulins and no significant change in M spike over the last 5 years.  Serum lambda light chain 85 continuing to come down  from a high of 173 on presentation June 2017 with normal kappa and stable ratio over time at 0.17.  Sedimentation rate 7 with C-reactive protein less than 0.3.  Beta-2 microglobulin 2.9.    -3/4/2022 CT abdomen pelvis with and without contrastShowed right hip arthroplasty being artifact but with tubular masslike structure 9 x 6 cm presacral as suggested on ultrasound.  Could be related to multilocular cystic right ovarian mass or be related to the right uterine tube.  Malignancy favored.  Incidental 6 mm pulmonary nodule right lower lobe of undetermined significance    -3/7/2022 appointment with Loki Abdalla,gynecologist.  Had postmenopausal bleeding for the previous month.  3/2/2022 ultrasound showed 7.7 cm right adnexal mass and 6.9 cm left adnexal mass with  elevated at 38.4 upper limit of normal 38.1.  CEA normal 1.9.  Endometrial biopsy benign.    -3/10/2022 CT chest compared to CT abdomen 3/4/2022 showed stable 6 mm noncalcified right lower lobe pulmonary nodule with suggestion of follow-up in 6-12 months for stability.    -3/17/2022 JANET/BSO Dr. Fam.  Bilateral benign adenofibroma's of the ovaries.  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  No bessy involvement found.  No lymph vascular invasion.  Pathological stage IIIc.    -4/8/2022 Ca1 25 normal at 21.  CBC unremarkable save for hematocrit 51.3%.  CMP sodium 129 otherwise unremarkable.    -4/18/2022 gynecologic oncology postoperative evaluation.  She had an R0 resection 3/17/2022 for high-grade serous carcinoma.  Plans for Kermit SPANGLER profile, genetic testing, and adjuvant carboplatin Taxol.    -4/18/2022 [facility] medical oncology follow-up visit: She has had a complex year since last I saw her about a year ago.  She is under care of Dr. Fam for stage III fallopian tube serous carcinoma for which she  is due for adjuvant carbotaxol.  Genetic testing is ordered.  Kermit SPANGLER profile sent but not back.  From my standpoint, the monoclonal gammopathy given stability over time and with unremarkable calcium and creatinine and quantitative immunoglobulins is very likely just benign and we will continue to check this annually.  Her serum light chains actually have gone down over time with no interventions.  No further work-up from that standpoint.  With reference to her erythrocytosis, I would not put her through work-up for myeloproliferative disorder as I doubt that that is operative especially with recent staging CTs not showing splenomegaly.  She does have small right lower lobe pulmonary nodule that does need follow-up but I am certain that she will be getting routine radiographic/CT follow-up along with serum tumor markers with Dr. Fam and company.  I will remain available to her in the interim but will not increase the frequency of hematology follow-up referable to the newly diagnosed oncologic gynecologic issue that is being expertly managed by Dr. Fam and her team.  It is reassuring that her CA-125 normalized after JANET/BSO and omentectomy etc.    -4/11/2022 through 8/15/2022 adjuvant CarboTaxol x6.    -2/4/2023 office note from Dr. Christine Fam.  Indicates Ca1 25 normal.  2/1/2023 CT chest abdomen pelvis showed stable subpleural 6 mm nodule with suggested follow-up in 3-6 months with no new pulmonary nodules.  New soft tissue nodule right hepatic dome 12 mm concerning for metastasis.  Dr. Fam looked at the images and thought this to be hemangioma.  Dr. Fam plans follow-up imaging in 3-6 months depending on Ca1 25 and how she feels.    -3/29/2023 mammogram BI-RADS 2.    - 4/5/2023 serum M spike IgG lambda 400 mg/dL not rising.  Serum free lambda stable 102 with ratio 0.15.  Sedimentation rate normal 7.  C-reactive protein normal less than 0.3.  CMP unremarkable save for glucose 114.  CBC  entirely normal.    -4/24/2023 [facility] oncology hematology follow-up: Since last I saw her she had surgery followed by adjuvant chemotherapy for her stage IIIc grade 3 papillary serous carcinoma of the fallopian tube.  She had pulmonary nodule follow-up that was stable and there was a reading of a possible liver metastasis that per review by Dr. Fam was felt most likely to be hemangioma and based on patient's symptoms and desires and tumor markers which, today, have been normal, the plan was for follow-up imaging in 3 to 6 months.  Upon my discussion with her today, the patient registered her desire to have the CT of her abdomen repeated 3 months from the last scan and I will comply with that to hopefully have those results available near to the time of her follow-up with Dr. Fam.  Other than for the understandable concern relative to her malignancy, physically she is doing wonderfully and recent mammogram was also BI-RADS 2.  From the monoclonal gammopathy standpoint, the M spike is as low as it has ever been and I will continue annual labs as outlined.  With normal creatinine, hemoglobin, bone enzymes, total protein-albumin-globulin-ratios thereof, the odds of underlying plasma cell dyscrasia etc. extremely unlikely.  She is under the excellent care of Dr. Fam for her malignancy follow-up.     Monoclonal gammopathy    Initial Diagnosis    Monoclonal gammopathy     11/29/2017 Imaging    Bone survey IMPRESSION:  Negative skeletal survey.     6/5/2018 -  Other Event    6/5/2018 labs:  CBC WBC 4350, hemoglobin 12.6, hematocrit 38.3%, platelet count 252,000.  CMP creatinine 0.8, serum calcium 9.4.  Ionized calcium 1.29.  Beta-2 microglobulin 2.3.  Sedimentation rate 9, CRP 0.02.  Kappa/lambda light chains-normal kappa light chains 16.6, lambda light chains elevated at 133.6, kappa/lambda ratio 0.12.  Serum immunoelectrophoresis M-spike stable 0.7 g/dL.       12/20/2018 -  Other Event    24-hour  urine monoclonal protein showed no monoclonal protein.  Beta-2 microglobulin 2.5.  AST 43.  Creatinine 0.86.  Calcium 9.5.  Ionized calcium 1.3.  C-reactive protein less than 0.01.  Sedimentation rate 7.  Serum monoclonal protein 800 mg/dL of immunoglobulin G lambda.  Lambda monoclonal protein down to 98.5 with a kappa to lambda ratio 0.18.  CBC is normal.  Total body bone survey done on the day of her visit 12/20/18 was not read by the time of her visit.     6/17/2019 -  Other Event     Lambda 129 with kappa 22 and ratio 0.17.  This is in the range that it has fluctuated since January 2017 upon first testing.  M spike stable 600 mg/dL immunoglobulin G lambda.  C-reactive protein 0.04 with sedimentation rate of 8.  Creatinine 0.86 with total calcium of 10 and ionized calcium 1.34 upper limit of normal 1.32.  Beta-2 microglobulin 2.8.  CBC unremarkable.     4/6/2020 -  Other Event    Myeloma panel:  CBC WBC 5400, hemoglobin 15.2, platelet count 225,000.  Beta-2 microglobulin 2.6.  Ionized calcium 1.26.  CMP creatinine 0.77, serum calcium 10.1 C-reactive protein 0.08, sedimentation rate 13.  Serum immunoelectrophoresis M spike stable 0.6 g/dL.  Serum free light chains, normal free kappa light chains 16.3, free lambda light chains 107.7, kappa/lambda ratio 0.15.  Kappa/lambda light chains Urine normal.     4/13/2021 -  Other Event    -4/13/2021 data: Beta-2 microglobulin 2.5 stable.  Hemoglobin 15.4 with normal CBC.  CMP unremarkable including creatinine 0.94, calcium 9.0, total protein 7.5, normal alkaline phosphatase 102.  Serum M spike 800 mg/dL IgG lambda stable with normal quantitative immunoglobulins.  Immunoglobulin free lambda light chains 131 mg/L with kappa 18.7 and ratio 0.14 stable x4 years.  Sedimentation rate 7.  C-reactive protein less than 0.3.  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     Adenocarcinoma of right fallopian tube   3/2/2022 Imaging    Patient presented to Dr. Abdalla  with c/o postmenopausal bleeding.   Transvaginal ultrasound demonstrated a thickened endometrial stripe and bilateral adnexal masses.  Referred to Gyn Oncology due to concern for possible malignancy.      3/4/2022 Imaging    CT abdomen pelvis:  1.Beam hardening artifact from the patient's right total hip arthroplasty makes evaluation of the right presacral/adnexal mass difficult. There is a tubular masslike structure measuring up to 9 x 6 cm with a left posterior (presacral) mural nodular  enhancing component as suggested on ultrasound. This could be related to a multilocular cystic right ovarian mass or be related to the right uterine tube. Differential favors malignant process in this age group.  2.Incidental 6 mm pulmonary nodule seen in the right lower lobe along the right hemidiaphragm. This is of uncertain clinical significance.     3/10/2022 Imaging    CT chest:  1.  No change in 6 mm noncalcified pulmonary nodule within the right lower lobe.  Follow-up noncontrast CT scan of the chest would be recommended in 6-12 months to document continued stability.     3/17/2022 Surgery    Diagnostic laparoscopy, RTLH/BSO, cancer staging, omentectomy, optimal tumor debulking (R=0), appendectomy by Dr. Christine Fam at [facility]    Final pathology showed high-grade serous carcinoma involving the lumen and wall. Right uterosacral ligament and omentum involved by tumor. Pelvic washings malignant, adenocarcinoma compatible with papillary serous carcinoma. Stage IIIC grade 3       3/17/2022 Cancer Staged    Staging form: Ovary, Fallopian Tube, And Primary Peritoneal Carcinoma, AJCC 8th Edition  - Pathologic stage from 3/17/2022: FIGO Stage IIIC (pT3c, pN0, cM0) - Signed by Christine Fam MD on 5/1/2023 4/11/2022 - 8/15/2022 Chemotherapy    OP OVARIAN PACLitaxel / CARBOplatin (Q21D)  - 6 cycles completed  - toxicities included nausea, dehydration, neuropathy, and low blood counts  - both drugs dose reduced at  cycle #6 and cycle #6 delayed 2 weeks due to neutropenia and thrombocytopenia     4/19/2022 Molecular Testing    CARIS results:  PD-L1 positive  ER positive, 2+, 95%  MSI stable, MMR proficient, TMB low, genomic ISACC low  VT negative  ARID1A, BANDAR, BRAF, EGFR, RAD51 C/D negative  BRCA1/2 negative  Her2/Patsy negative     8/16/2022 Imaging    Post-treatment scan:  CT chest:  1. Stable appearance of a 6 mm right lower lobe pulmonary nodule abutting the right hemidiaphragm.  2. No adenopathy within the chest.     CT abdomen and pelvis:  1. Postsurgical changes of hysterectomy and bilateral salpingo-oophorectomy. No evidence of residual disease within the abdomen or pelvis.  2. No evidence of lymphadenopathy within the abdomen or pelvis.  3. Stable small 9 mm lesion within the dome of the right hepatic lobe, favor benign etiology such as hemangioma. Continued attention on follow-up would be recommended. Additional benign simple cyst is present within the left hepatic lobe.             Past Medical History:   Diagnosis Date   • Adenocarcinoma of right fallopian tube 03/17/2022   • Atrial fibrillation    • Corneal abrasion    • Hemorrhoids     bleeding hemorrhoids   • History of anemia    • History of transfusion    • Hypertension    • Hypothyroidism    • LGSIL on Pap smear of cervix 03/10/2022   • Osteoarthritis        Past Surgical History:   Procedure Laterality Date   • AUGMENTATION MAMMAPLASTY Bilateral 2008   • BONE MARROW BIOPSY Left 03/08/2017    Procedure: BONE MARROW BIOPSY;  Surgeon: Michel White MD;  [facility];  Service:    • COLONOSCOPY N/A 01/09/2017    Procedure: COLONOSCOPY;  Surgeon: Michel White MD;  Location:  [facility];  Service:    • ENDOSCOPY N/A 01/07/2017    Procedure: ESOPHAGOGASTRODUODENOSCOPY;  Surgeon: Abhishek Benton MD;  Location:  [facility];  Service:    • HEMORRHOIDECTOMY N/A 03/08/2017    Procedure: HEMORRHOIDECTOMY,;  Surgeon: Michel White MD;  Location:   [facility];  Service:    • HIP ARTHROSCOPY Right      &    • JOINT REPLACEMENT      2 right hips   • TONSILLECTOMY  1964   • TOTAL LAPAROSCOPIC HYSTERECTOMY N/A 2022    Procedure: DIAGNOSTIC LAPAROSCOPY, ROBOTIC ASSISTED TOTAL LAPAROSCOPIC HYSTERECTOMY, BILATERAL SALPINGO-OOPHERECTOMY, CANCER STAGING, OMENTECTOMY,  OPTIMAL TUMOR DEBULKING, APPENDECTOMY;  Surgeon: Christine Fam MD;  Location:  [facility];  Service: Robotics - DaVinci;  Laterality: N/A;   • VENOUS ACCESS DEVICE (PORT) INSERTION Left 2022       MEDICATIONS: The current medication list was reviewed with the patient and updated in the EMR this date per the Medical Assistant. Medication dosages and frequencies were confirmed to be accurate.      Allergies:  is allergic to sulfa antibiotics.    Social History:   Social History     Socioeconomic History   • Marital status:    Tobacco Use   • Smoking status: Former     Packs/day: 1.00     Years: 15.00     Pack years: 15.00     Types: Cigarettes     Quit date:      Years since quittin.3   • Smokeless tobacco: Never   • Tobacco comments:     quit 35+ years ago.    Vaping Use   • Vaping Use: Never used   Substance and Sexual Activity   • Alcohol use: Not Currently   • Drug use: No   • Sexual activity: Defer       Family History:    Family History   Problem Relation Age of Onset   • Atrial fibrillation Mother    • Diabetes Mother    • Atrial fibrillation Brother    • Abnormal EKG Brother    • Heart attack Father    • Stroke Father    • Diabetes Sister    • Breast cancer Neg Hx    • Ovarian cancer Neg Hx    • Uterine cancer Neg Hx    • Colon cancer Neg Hx        Health Maintenance:    Health Maintenance   Topic Date Due   • DXA SCAN  Never done   • TDAP/TD VACCINES (1 - Tdap) Never done   • ANNUAL WELLNESS VISIT  Never done   • Pneumococcal Vaccine 65+ (1 - PCV) Never done   • INFLUENZA VACCINE  2023   • MAMMOGRAM  2024   • PAP SMEAR  2025   • COLORECTAL CANCER  "SCREENING  01/09/2027   • HEPATITIS C SCREENING  Completed   • COVID-19 Vaccine  Completed   • ZOSTER VACCINE  Completed       Review of Systems  Please refer to history of present illness, review of systems otherwise negative.  Physical Exam    Vitals:    05/02/23 1053   BP: 151/71   Pulse: 56   Resp: 18   Temp: 97.1 °F (36.2 °C)   TempSrc: Temporal   SpO2: 99%   Weight: 67.5 kg (148 lb 12.8 oz)   Height: 177.8 cm (70\")   PainSc: 0-No pain       Body mass index is 21.35 kg/m².  Wt Readings from Last 3 Encounters:   05/02/23 67.5 kg (148 lb 12.8 oz)   04/24/23 68 kg (150 lb)   02/03/23 67.9 kg (149 lb 9.6 oz)       GENERAL: Alert, well-appearing female appearing her stated age who is in no apparent distress.   HEENT: Sclera anicteric. Head normocephalic, atraumatic. Mucus membranes moist.   PSYCHIATRIC: AO x3, with appropriate affect, normal thought processes.  NEUROLOGIC: No focal deficits.  Moves extremities well.  MUSCULOSKELETAL: Normal gait and station.   EXTREMITIES:   No cyanosis, clubbing, symmetric.  LYMPHATICS:  No cervical or inguinal adenopathy noted.     PELVIC exam:    Deferred    ECOG PS 0    PROCEDURES: None    Diagnostic Data:   CT Chest With Contrast Diagnostic    Result Date: 2/1/2023  Impression: 1. Stable subpleural 6 no meters nodule within the right lower lobe along the right hemidiaphragm. Continued serial CT follow-up would be recommended with repeat noncontrast CT scan of the chest in 3-6 months. No new pulmonary nodules or other evidence of metastatic disease within the chest. Electronically Signed: Constantino Mckeon  2/1/2023 1:55 PM EST  Workstation ID: [id number]    CT Abdomen Pelvis With Contrast    Result Date: 2/2/2023  Impression: Interval development of a new soft tissue nodular component felt to be between the diaphragm and the posterior right hepatic dome and measuring 12 x 9 mm concerning for metastatic disease. Other findings are stable and as described. No local tumor recurrence is " appreciated. Electronically Signed: Richie Mclean  2/2/2023 9:11 AM EST  Workstation ID: [id number]      Lab Results   Component Value Date    WBC 4.84 04/24/2023    HGB 13.7 04/24/2023    HCT 41.7 04/24/2023    MCV 96.3 04/24/2023     04/24/2023    NEUTROABS 2.11 04/24/2023    GLUCOSE 114 (H) 04/05/2023    BUN 18 04/05/2023    CREATININE 0.90 04/27/2023    EGFRIFNONA 61 02/25/2022     04/05/2023    K 4.3 04/05/2023     04/05/2023    CO2 26.0 04/05/2023    MG 1.6 02/25/2022    PHOS 4.4 04/04/2018    CALCIUM 9.2 04/05/2023    ALBUMIN 4.4 04/05/2023    ALBUMIN 3.9 04/05/2023    AST 27 04/05/2023    ALT 16 04/05/2023    BILITOT 0.7 04/05/2023     Lab Results   Component Value Date     9.2 02/03/2023     6.6 11/17/2022     5.4 07/29/2022     6.1 07/01/2022     2.0 06/10/2022     8.3 05/20/2022     10.8 04/29/2022     21.0 04/08/2022     38.4 (H) 03/07/2022         Assessment & Plan   This is a 67 y.o. woman with history of fallopian tube adenocarcinoma, now with abnormal CT scan.  Encounter Diagnosis   Name Primary?   • Adenocarcinoma of right fallopian tube Yes     Recheck Ca125 and do this serially on a monthly basis.  This is traditionally not a great marker for this patient, but has been elevated in the past.  Repeat CT scan in 3 months, sooner if patient becomes symptomatic.  Discussed CarboTaxol bevacizumab versus carbo Gemzar bevacizumab versus clinical trial.  Patient is currently not candidate for clinical trial.  Discussed pragmatic approach to her cancer recurrence as she is currently asymptomatic.  Discussed that her cancer is treatable, but not curable.  She has an upcoming trip to [place] and she was encouraged to make the most of this.  She was notified that we will alter her treatment plan as needed for quality of life issues and important events.  All questions were answered.    Pain assessment was performed today as a part of patient’s care.   For patients with pain related to surgery, gynecologic malignancy or cancer treatment, the plan is as noted in the assessment/plan.  For patients with pain not related to these issues, they are to seek any further needed care from a more appropriate provider, such as PCP.      Orders Placed This Encounter   Procedures   • CT Abdomen Pelvis With Contrast     Standing Status:   Future     Standing Expiration Date:   5/1/2024     Order Specific Question:   Will Oral Contrast be needed for this procedure?     Answer:   Yes   • CT Chest With Contrast     Standing Status:   Future     Standing Expiration Date:   5/2/2024     Order Specific Question:   Release to patient     Answer:   Routine Release   •      Standing Status:   Standing     Number of Occurrences:   3     Standing Expiration Date:   5/2/2024     Order Specific Question:   Release to patient     Answer:   Routine Release       FOLLOW UP: 3 months    I spent 25 minutes caring for Halie on this date of service. This time includes time spent by me in the following activities: preparing for the visit, reviewing tests, performing a medically appropriate examination and/or evaluation, counseling and educating the patient/family/caregiver, ordering medications, tests, or procedures, referring and communicating with other health care professionals, documenting information in the medical record and independently interpreting results and communicating that information with the patient/family/caregiver    Electronically Signed by: Christine Fam MD  Date: 5/2/2023

## 2023-05-02 ENCOUNTER — LAB (OUTPATIENT)
Dept: LAB | Facility: HOSPITAL | Age: 68
End: 2023-05-02
Payer: MEDICARE

## 2023-05-02 ENCOUNTER — OFFICE VISIT (OUTPATIENT)
Dept: GYNECOLOGIC ONCOLOGY | Facility: CLINIC | Age: 68
End: 2023-05-02
Payer: MEDICARE

## 2023-05-02 VITALS
RESPIRATION RATE: 18 BRPM | SYSTOLIC BLOOD PRESSURE: 151 MMHG | HEIGHT: 70 IN | HEART RATE: 56 BPM | DIASTOLIC BLOOD PRESSURE: 71 MMHG | TEMPERATURE: 97.1 F | OXYGEN SATURATION: 99 % | WEIGHT: 148.8 LBS | BODY MASS INDEX: 21.3 KG/M2

## 2023-05-02 DIAGNOSIS — C57.01 ADENOCARCINOMA OF RIGHT FALLOPIAN TUBE: Primary | Chronic | ICD-10-CM

## 2023-05-02 DIAGNOSIS — C57.01 ADENOCARCINOMA OF RIGHT FALLOPIAN TUBE: Chronic | ICD-10-CM

## 2023-05-02 LAB — CANCER AG125 SERPL QL: 22.3 U/ML (ref 0–38.1)

## 2023-05-02 PROCEDURE — 3078F DIAST BP <80 MM HG: CPT | Performed by: OBSTETRICS & GYNECOLOGY

## 2023-05-02 PROCEDURE — 3077F SYST BP >= 140 MM HG: CPT | Performed by: OBSTETRICS & GYNECOLOGY

## 2023-05-02 PROCEDURE — 1159F MED LIST DOCD IN RCRD: CPT | Performed by: OBSTETRICS & GYNECOLOGY

## 2023-05-02 PROCEDURE — 1160F RVW MEDS BY RX/DR IN RCRD: CPT | Performed by: OBSTETRICS & GYNECOLOGY

## 2023-05-02 PROCEDURE — 99213 OFFICE O/P EST LOW 20 MIN: CPT | Performed by: OBSTETRICS & GYNECOLOGY

## 2023-05-02 PROCEDURE — 86304 IMMUNOASSAY TUMOR CA 125: CPT

## 2023-05-02 PROCEDURE — 1126F AMNT PAIN NOTED NONE PRSNT: CPT | Performed by: OBSTETRICS & GYNECOLOGY

## 2023-05-02 PROCEDURE — 36415 COLL VENOUS BLD VENIPUNCTURE: CPT

## 2023-05-03 ENCOUNTER — TELEPHONE (OUTPATIENT)
Dept: GYNECOLOGIC ONCOLOGY | Facility: CLINIC | Age: 68
End: 2023-05-03
Payer: MEDICARE

## 2023-05-03 NOTE — TELEPHONE ENCOUNTER
I called pt back to review Caris testing.  PD-L1 positive, CPS 10.  ER receptor positive.  Otherwise negative.  Did not do FLOR1 testing in patients not a candidate for that at this point regardless.  We do have a platinum resistant ovarian cancer trial open that looks at Keytruda plus minus novel drug that may potentiate the effects of Keytruda.  Patient's not a candidate for that trial.  We discussed the limited efficacy to date noted in most trials looking at Keytruda for ovarian cancer.  Patient verbalized understanding was thankful for the call.  All questions were answered.  Electronically signed by Christine Fam MD, 05/03/23, 4:34 PM EDT.

## 2023-05-16 NOTE — PROGRESS NOTES
Halie Cormier [facility]  [id number]  1955      Reason for visit: History of fallopian tube cancer, recurrence    History of present illness:  The patient is a 67 y.o. year old female who presents today for treatment and evaluation of the above issues.    Ct scan on 4/27 showed progression, discussed with patient at last visit.  Patient presents today for discussion of treatment options.  22 on 5/2.  She feels well and is completely asymptomatic. Tolerating PO, normal bowel and bladder function. No abdominal pain or distension, no weight changes. She is accompanied by her .   She had a recent trip to [place] with her girlfriends and had a good time.    Oncologic History:  Oncology/Hematology History Overview Note   1.  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  Monoclonal gammopathy   3.  History of anemia, resolved after hemorrhoidectomy repair and on oral iron.  CBC on 6/13/2017 with a hemoglobin of 14.2, hematocrit 44.6%, MCV of 94.8.  4.  Atrial fibrillation  5.  Pulmonary nodule  6.  Erythrocytosis  7.  Fallopian tube serous carcinoma.      Monoclonal gammopathy and uterine cancer history timeline:    -March 2017 24-hour urine immunoelectrophoresis showed no monoclonal spike, lambda light chains 106 with kappa light chains 24 and a kappa to lambda ratio of 0.23 with normal IgA, G, and M levels.  January 2017 serum immunoelectrophoresis showed 600 mg/dL of immunoglobulin G lambda light chains in the serum.  3/8/17 bone marrow biopsy showed normal marrow with 3% plasma cells and a small lambda clonal population identified with no stainable iron.  There was erythroid expansion with mild maturation dyssynchrony and occasional atypical nuclear features that may represent the compensatory response to her anemia though myelodysplasia could not entirely be ruled out.  Had reactive aggregates of lymphoid tissue.     -6/13/2017 sedimentation rate 8, immunoglobulin free light chains free kappa  light chains 25.7, free lambda light chains 173.4, kappa/lambda ratio 0.15.  Serum immunoelectrophoresis M-spike 0.9g/dL, C-reactive protein less than 0.01, CMP normal, ionized calcium 1.31, beta-2 microglobulin 3.1, CBC WBC 5500, hemoglobin 14.2, hematocrit 44.6%, MCV 94.8, platelets 259,000.  24-hour urine immunoelectrophoresis pending.   -6/17/2019 data: Lambda 129 with kappa 22 and ratio 0.17.  This is in the range that it has fluctuated since January 2017 upon first testing.  M spike stable 600 mg/dL immunoglobulin G lambda.  C-reactive protein 0.04 with sedimentation rate of 8.  Creatinine 0.86 with total calcium of 10 and ionized calcium 1.34 upper limit of normal 1.32.  Beta-2 microglobulin 2.8.  CBC unremarkable.  -4/13/2021 data: Beta-2 microglobulin 2.5 stable.  Hemoglobin 15.4 with normal CBC.  CMP unremarkable including creatinine 0.94, calcium 9.0, total protein 7.5, normal alkaline phosphatase 102.  Serum M spike 800 mg/dL IgG lambda stable with normal quantitative immunoglobulins.  Immunoglobulin free lambda light chains 131 mg/L with kappa 18.7 and ratio 0.14 stable x4 years.  Sedimentation rate 7.  C-reactive protein less than 0.3.  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  -2/22/2022 through 2/25/2022 admitted by Dr. Khurram Novak for atrial fibrillation with rapid ventricular response with worsening exercise tolerance.  Started on dofetilide and converted to sinus rhythm after second dose.  Dose decreased due to lengthening of QT corrected.  No further atrial fib after conversion.  Discharged on Eliquis.  -2/11/2022 through 2/15/2022 admitted for atrial fibrillation with RVR and vaginal bleeding.  On Eliquis.    -3/4/2022 hematocrit 51.9% with hemoglobin 714.2 otherwise unremarkable CBC.  CMP unremarkable with normal creatinine 0.92, normal calcium 9.9, alkaline phosphatase minimally elevated 120 upper limit of normal 117, AST 44 otherwise unremarkable, otherwise  unremarkable CMP.  IgG lambda stable 800 mg/dL with normal quantitative immunoglobulins and no significant change in M spike over the last 5 years.  Serum lambda light chain 85 continuing to come down from a high of 173 on presentation June 2017 with normal kappa and stable ratio over time at 0.17.  Sedimentation rate 7 with C-reactive protein less than 0.3.  Beta-2 microglobulin 2.9.    -3/4/2022 CT abdomen pelvis with and without contrastShowed right hip arthroplasty being artifact but with tubular masslike structure 9 x 6 cm presacral as suggested on ultrasound.  Could be related to multilocular cystic right ovarian mass or be related to the right uterine tube.  Malignancy favored.  Incidental 6 mm pulmonary nodule right lower lobe of undetermined significance    -3/7/2022 appointment with Loki Abdallagynecologist.  Had postmenopausal bleeding for the previous month.  3/2/2022 ultrasound showed 7.7 cm right adnexal mass and 6.9 cm left adnexal mass with  elevated at 38.4 upper limit of normal 38.1.  CEA normal 1.9.  Endometrial biopsy benign.    -3/10/2022 CT chest compared to CT abdomen 3/4/2022 showed stable 6 mm noncalcified right lower lobe pulmonary nodule with suggestion of follow-up in 6-12 months for stability.    -3/17/2022 JANET/BSO Dr. Fam.  Bilateral benign adenofibroma's of the ovaries.  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  No bessy involvement found.  No lymph vascular invasion.  Pathological stage IIIc.    -4/8/2022 Ca1 25 normal at 21.  CBC unremarkable save for hematocrit 51.3%.  CMP sodium 129 otherwise unremarkable.    -4/18/2022 gynecologic oncology postoperative evaluation.  She had an R0 resection 3/17/2022 for high-grade serous carcinoma.  Plans for Kermit MI profile, genetic testing, and adjuvant carboplatin Taxol.    -4/18/2022  [facility] medical oncology follow-up visit: She has had a complex year since last I saw her about a year ago.  She is under care of Dr. Fam for stage III fallopian tube serous carcinoma for which she is due for adjuvant carbotaxol.  Genetic testing is ordered.  Kermit SPANGLER profile sent but not back.  From my standpoint, the monoclonal gammopathy given stability over time and with unremarkable calcium and creatinine and quantitative immunoglobulins is very likely just benign and we will continue to check this annually.  Her serum light chains actually have gone down over time with no interventions.  No further work-up from that standpoint.  With reference to her erythrocytosis, I would not put her through work-up for myeloproliferative disorder as I doubt that that is operative especially with recent staging CTs not showing splenomegaly.  She does have small right lower lobe pulmonary nodule that does need follow-up but I am certain that she will be getting routine radiographic/CT follow-up along with serum tumor markers with Dr. Fam and company.  I will remain available to her in the interim but will not increase the frequency of hematology follow-up referable to the newly diagnosed oncologic gynecologic issue that is being expertly managed by Dr. Fam and her team.  It is reassuring that her CA-125 normalized after JANET/BSO and omentectomy etc.    -4/11/2022 through 8/15/2022 adjuvant CarboTaxol x6.    -2/4/2023 office note from Dr. Christine Fam.  Indicates Ca1 25 normal.  2/1/2023 CT chest abdomen pelvis showed stable subpleural 6 mm nodule with suggested follow-up in 3-6 months with no new pulmonary nodules.  New soft tissue nodule right hepatic dome 12 mm concerning for metastasis.  Dr. Fam looked at the images and thought this to be hemangioma.  Dr. Fam plans follow-up imaging in 3-6 months depending on Ca1 25 and how she feels.    -3/29/2023 mammogram BI-RADS 2.    - 4/5/2023 serum M spike  IgG lambda 400 mg/dL not rising.  Serum free lambda stable 102 with ratio 0.15.  Sedimentation rate normal 7.  C-reactive protein normal less than 0.3.  CMP unremarkable save for glucose 114.  CBC entirely normal.    -4/24/2023 [facility] oncology hematology follow-up: Since last I saw her she had surgery followed by adjuvant chemotherapy for her stage IIIc grade 3 papillary serous carcinoma of the fallopian tube.  She had pulmonary nodule follow-up that was stable and there was a reading of a possible liver metastasis that per review by Dr. Fam was felt most likely to be hemangioma and based on patient's symptoms and desires and tumor markers which, today, have been normal, the plan was for follow-up imaging in 3 to 6 months.  Upon my discussion with her today, the patient registered her desire to have the CT of her abdomen repeated 3 months from the last scan and I will comply with that to hopefully have those results available near to the time of her follow-up with Dr. Fam.  Other than for the understandable concern relative to her malignancy, physically she is doing wonderfully and recent mammogram was also BI-RADS 2.  From the monoclonal gammopathy standpoint, the M spike is as low as it has ever been and I will continue annual labs as outlined.  With normal creatinine, hemoglobin, bone enzymes, total protein-albumin-globulin-ratios thereof, the odds of underlying plasma cell dyscrasia etc. extremely unlikely.  She is under the excellent care of Dr. Fam for her malignancy follow-up.     Monoclonal gammopathy    Initial Diagnosis    Monoclonal gammopathy     11/29/2017 Imaging    Bone survey IMPRESSION:  Negative skeletal survey.     6/5/2018 -  Other Event    6/5/2018 labs:  CBC WBC 4350, hemoglobin 12.6, hematocrit 38.3%, platelet count 252,000.  CMP creatinine 0.8, serum calcium 9.4.  Ionized calcium 1.29.  Beta-2 microglobulin 2.3.  Sedimentation rate 9, CRP 0.02.  Kappa/lambda light  chains-normal kappa light chains 16.6, lambda light chains elevated at 133.6, kappa/lambda ratio 0.12.  Serum immunoelectrophoresis M-spike stable 0.7 g/dL.       12/20/2018 -  Other Event    24-hour urine monoclonal protein showed no monoclonal protein.  Beta-2 microglobulin 2.5.  AST 43.  Creatinine 0.86.  Calcium 9.5.  Ionized calcium 1.3.  C-reactive protein less than 0.01.  Sedimentation rate 7.  Serum monoclonal protein 800 mg/dL of immunoglobulin G lambda.  Lambda monoclonal protein down to 98.5 with a kappa to lambda ratio 0.18.  CBC is normal.  Total body bone survey done on the day of her visit 12/20/18 was not read by the time of her visit.     6/17/2019 -  Other Event     Lambda 129 with kappa 22 and ratio 0.17.  This is in the range that it has fluctuated since January 2017 upon first testing.  M spike stable 600 mg/dL immunoglobulin G lambda.  C-reactive protein 0.04 with sedimentation rate of 8.  Creatinine 0.86 with total calcium of 10 and ionized calcium 1.34 upper limit of normal 1.32.  Beta-2 microglobulin 2.8.  CBC unremarkable.     4/6/2020 -  Other Event    Myeloma panel:  CBC WBC 5400, hemoglobin 15.2, platelet count 225,000.  Beta-2 microglobulin 2.6.  Ionized calcium 1.26.  CMP creatinine 0.77, serum calcium 10.1 C-reactive protein 0.08, sedimentation rate 13.  Serum immunoelectrophoresis M spike stable 0.6 g/dL.  Serum free light chains, normal free kappa light chains 16.3, free lambda light chains 107.7, kappa/lambda ratio 0.15.  Kappa/lambda light chains Urine normal.     4/13/2021 -  Other Event    -4/13/2021 data: Beta-2 microglobulin 2.5 stable.  Hemoglobin 15.4 with normal CBC.  CMP unremarkable including creatinine 0.94, calcium 9.0, total protein 7.5, normal alkaline phosphatase 102.  Serum M spike 800 mg/dL IgG lambda stable with normal quantitative immunoglobulins.  Immunoglobulin free lambda light chains 131 mg/L with kappa 18.7 and ratio 0.14 stable x4 years.  Sedimentation rate  7.  C-reactive protein less than 0.3.  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     Adenocarcinoma of right fallopian tube   3/2/2022 Imaging    Patient presented to Dr. Abdalla with c/o postmenopausal bleeding.   Transvaginal ultrasound demonstrated a thickened endometrial stripe and bilateral adnexal masses.  Referred to Gyn Oncology due to concern for possible malignancy.      3/4/2022 Imaging    CT abdomen pelvis:  1.Beam hardening artifact from the patient's right total hip arthroplasty makes evaluation of the right presacral/adnexal mass difficult. There is a tubular masslike structure measuring up to 9 x 6 cm with a left posterior (presacral) mural nodular  enhancing component as suggested on ultrasound. This could be related to a multilocular cystic right ovarian mass or be related to the right uterine tube. Differential favors malignant process in this age group.  2.Incidental 6 mm pulmonary nodule seen in the right lower lobe along the right hemidiaphragm. This is of uncertain clinical significance.     3/10/2022 Imaging    CT chest:  1.  No change in 6 mm noncalcified pulmonary nodule within the right lower lobe.  Follow-up noncontrast CT scan of the chest would be recommended in 6-12 months to document continued stability.     3/17/2022 Surgery    Diagnostic laparoscopy, RTLH/BSO, cancer staging, omentectomy, optimal tumor debulking (R=0), appendectomy by Dr. Christine Fam at [facility]    Final pathology showed high-grade serous carcinoma involving the lumen and wall. Right uterosacral ligament and omentum involved by tumor. Pelvic washings malignant, adenocarcinoma compatible with papillary serous carcinoma. Stage IIIC grade 3       3/17/2022 Cancer Staged    Staging form: Ovary, Fallopian Tube, And Primary Peritoneal Carcinoma, AJCC 8th Edition  - Pathologic stage from 3/17/2022: FIGO Stage IIIC (pT3c, pN0, cM0) - Signed by Christine Fam MD on 5/1/2023 4/11/2022 -  8/15/2022 Chemotherapy    OP OVARIAN PACLitaxel / CARBOplatin (Q21D)  - 6 cycles completed  - toxicities included nausea, dehydration, neuropathy, and low blood counts  - both drugs dose reduced at cycle #6 and cycle #6 delayed 2 weeks due to neutropenia and thrombocytopenia     4/19/2022 Molecular Testing    CARIS results:  PD-L1 positive  ER positive, 2+, 95%  MSI stable, MMR proficient, TMB low, genomic ISACC low  OK negative  ARID1A, BANDAR, BRAF, EGFR, RAD51 C/D negative  BRCA1/2 negative  Her2/Patsy negative     8/16/2022 Imaging    Post-treatment scan:  CT chest:  1. Stable appearance of a 6 mm right lower lobe pulmonary nodule abutting the right hemidiaphragm.  2. No adenopathy within the chest.     CT abdomen and pelvis:  1. Postsurgical changes of hysterectomy and bilateral salpingo-oophorectomy. No evidence of residual disease within the abdomen or pelvis.  2. No evidence of lymphadenopathy within the abdomen or pelvis.  3. Stable small 9 mm lesion within the dome of the right hepatic lobe, favor benign etiology such as hemangioma. Continued attention on follow-up would be recommended. Additional benign simple cyst is present within the left hepatic lobe.       4/27/2023 Imaging    CT Abdomen  Impression:     1. Enlarging enhancing nodular mass position between the right lobe of the liver and right hemidiaphragm suspicious for worsening malignancy.  2. There is an enlarging right-sided epicardial lymph node also suspicious for malignancy.  3. Additional incidental findings as noted above.           Past Medical History:   Diagnosis Date   • Adenocarcinoma of right fallopian tube 03/17/2022   • Atrial fibrillation    • Corneal abrasion    • Hemorrhoids     bleeding hemorrhoids   • History of anemia    • History of transfusion    • Hypertension    • Hypothyroidism    • LGSIL on Pap smear of cervix 03/10/2022   • Osteoarthritis        Past Surgical History:   Procedure Laterality Date   • AUGMENTATION MAMMAPLASTY  Bilateral 2008   • BONE MARROW BIOPSY Left 2017    Procedure: BONE MARROW BIOPSY;  Surgeon: Michel White MD;  Location:  [facility];  Service:    • COLONOSCOPY N/A 2017    Procedure: COLONOSCOPY;  Surgeon: Michel White MD;  Location:  [facility];  Service:    • ENDOSCOPY N/A 2017    Procedure: ESOPHAGOGASTRODUODENOSCOPY;  Surgeon: Abhishek Benton MD;  Location:  [facility];  Service:    • HEMORRHOIDECTOMY N/A 2017    Procedure: HEMORRHOIDECTOMY,;  Surgeon: Michel White MD;  Location:  [facility];  Service:    • HIP ARTHROSCOPY Right      &    • JOINT REPLACEMENT      2 right hips   • TONSILLECTOMY     • TOTAL LAPAROSCOPIC HYSTERECTOMY N/A 2022    Procedure: DIAGNOSTIC LAPAROSCOPY, ROBOTIC ASSISTED TOTAL LAPAROSCOPIC HYSTERECTOMY, BILATERAL SALPINGO-OOPHERECTOMY, CANCER STAGING, OMENTECTOMY,  OPTIMAL TUMOR DEBULKING, APPENDECTOMY;  Surgeon: Christine Fam MD;  Location:  [facility];  Service: Robotics - DaVinci;  Laterality: N/A;   • VENOUS ACCESS DEVICE (PORT) INSERTION Left 2022       MEDICATIONS: The current medication list was reviewed with the patient and updated in the EMR this date per the Medical Assistant. Medication dosages and frequencies were confirmed to be accurate.      Allergies:  is allergic to sulfa antibiotics.    Social History:   Social History     Socioeconomic History   • Marital status:    Tobacco Use   • Smoking status: Former     Packs/day: 1.00     Years: 15.00     Pack years: 15.00     Types: Cigarettes     Quit date:      Years since quittin.4   • Smokeless tobacco: Never   • Tobacco comments:     quit 35+ years ago.    Vaping Use   • Vaping Use: Never used   Substance and Sexual Activity   • Alcohol use: Not Currently   • Drug use: No   • Sexual activity: Defer       Family History:    Family History   Problem Relation Age of Onset   • Atrial fibrillation Mother    • Diabetes Mother    • Atrial  "fibrillation Brother    • Abnormal EKG Brother    • Heart attack Father    • Stroke Father    • Diabetes Sister    • Breast cancer Neg Hx    • Ovarian cancer Neg Hx    • Uterine cancer Neg Hx    • Colon cancer Neg Hx        Health Maintenance:    Health Maintenance   Topic Date Due   • DXA SCAN  Never done   • TDAP/TD VACCINES (1 - Tdap) Never done   • ANNUAL WELLNESS VISIT  Never done   • Pneumococcal Vaccine 65+ (1 - PCV) Never done   • INFLUENZA VACCINE  08/01/2023   • MAMMOGRAM  03/29/2024   • PAP SMEAR  03/09/2025   • COLORECTAL CANCER SCREENING  01/09/2027   • HEPATITIS C SCREENING  Completed   • COVID-19 Vaccine  Completed   • ZOSTER VACCINE  Completed       Review of Systems  Please refer to history of present illness, review of systems otherwise negative.  Physical Exam    Vitals:    05/17/23 0851   BP: 122/78   Pulse: 78   Resp: 18   Temp: 97.3 °F (36.3 °C)   TempSrc: Infrared   SpO2: 99%   Weight: 66.7 kg (147 lb)   Height: 177.8 cm (70\")   PainSc: 0-No pain       Body mass index is 21.09 kg/m².  Wt Readings from Last 3 Encounters:   05/17/23 66.7 kg (147 lb)   05/02/23 67.5 kg (148 lb 12.8 oz)   04/24/23 68 kg (150 lb)       GENERAL: Alert, well-appearing female appearing her stated age who is in no apparent distress.   HEENT: Sclera anicteric. Head normocephalic, atraumatic. Mucus membranes moist.   PSYCHIATRIC: AO x3, with appropriate affect, normal thought processes.  NEUROLOGIC: No focal deficits.  Moves extremities well.  MUSCULOSKELETAL: Normal gait and station.   EXTREMITIES:   No cyanosis, clubbing, symmetric.  LYMPHATICS:  No cervical or inguinal adenopathy noted.     PELVIC exam:    Deferred    ECOG PS 0    PROCEDURES: None    Diagnostic Data:   CT Chest With Contrast Diagnostic    Result Date: 2/1/2023  Impression: 1. Stable subpleural 6 no meters nodule within the right lower lobe along the right hemidiaphragm. Continued serial CT follow-up would be recommended with repeat noncontrast CT scan " of the chest in 3-6 months. No new pulmonary nodules or other evidence of metastatic disease within the chest. Electronically Signed: Constantino Mckeon  2/1/2023 1:55 PM EST  Workstation ID: [id number]    CT Abdomen Pelvis With Contrast    Result Date: 2/2/2023  Impression: Interval development of a new soft tissue nodular component felt to be between the diaphragm and the posterior right hepatic dome and measuring 12 x 9 mm concerning for metastatic disease. Other findings are stable and as described. No local tumor recurrence is appreciated. Electronically Signed: Richie Mclean  2/2/2023 9:11 AM EST  Workstation ID: [id number]      Lab Results   Component Value Date    WBC 4.84 04/24/2023    HGB 13.7 04/24/2023    HCT 41.7 04/24/2023    MCV 96.3 04/24/2023     04/24/2023    NEUTROABS 2.11 04/24/2023    GLUCOSE 114 (H) 04/05/2023    BUN 18 04/05/2023    CREATININE 0.90 04/27/2023    EGFRIFNONA 61 02/25/2022     04/05/2023    K 4.3 04/05/2023     04/05/2023    CO2 26.0 04/05/2023    MG 1.6 02/25/2022    PHOS 4.4 04/04/2018    CALCIUM 9.2 04/05/2023    ALBUMIN 4.4 04/05/2023    ALBUMIN 3.9 04/05/2023    AST 27 04/05/2023    ALT 16 04/05/2023    BILITOT 0.7 04/05/2023     Lab Results   Component Value Date     22.3 05/02/2023     9.2 02/03/2023     6.6 11/17/2022     5.4 07/29/2022     6.1 07/01/2022     2.0 06/10/2022     8.3 05/20/2022     10.8 04/29/2022     21.0 04/08/2022     38.4 (H) 03/07/2022         Assessment & Plan   This is a 67 y.o. woman with history of fallopian tube adenocarcinoma, now with abnormal CT scan.  Encounter Diagnosis   Name Primary?   • Adenocarcinoma of right fallopian tube Yes     Discussed pragmatic approach to her cancer recurrence as she is currently asymptomatic. Discussed that currently there is a national shortage of platinum therapy, so chemotherapy options are quite limited.  Patient had questions about hepatic  embolization.  However, rereview of the imaging shows both a right diaphragmatic lesion and a right epicardial lymph node.  While ablation could be considered for the right liver lesion, it may not have any bearing on the diaphragm and certainly would not cover the epicardial lymph node.  When imaging was reviewed, patient had left the office so Dr. Fam called and readdressed questions regarding resection with patient.  She is not a good candidate for resection given the pericardial lymph node not a good candidate for liver ablation given the lymph node and likely diaphragmatic involvement.  She verbalized understanding.    Patient also is interested in getting a second opinion, discussed some regional and national options including [facility], [facility], [place], [place], [place], [facility]. Patient will discuss with her  and let us know if she wants a referral.  CT scan is scheduled for July and follow-up as scheduled in August at this point.  Patient knows to call if she develops symptoms or would like to change her plan.    Previously discussed that her cancer is treatable, but not curable.    Pain assessment was performed today as a part of patient’s care.  For patients with pain related to surgery, gynecologic malignancy or cancer treatment, the plan is as noted in the assessment/plan.  For patients with pain not related to these issues, they are to seek any further needed care from a more appropriate provider, such as PCP.      No orders of the defined types were placed in this encounter.      FOLLOW UP: 3 months  I spent 20 minutes caring for Halie on this date of service. This time includes time spent by me in the following activities: preparing for the visit, reviewing tests, performing a medically appropriate examination and/or evaluation, counseling and educating the patient/family/caregiver, referring and communicating with other health care professionals and documenting  information in the medical record    Patient was seen and examined with Dr. Lara,  resident, who performed portions of the examination and documentation for this patient's care under my direct supervision.  I agree with the above documentation and plan.    Christine Fam MD  05/17/23  09:37 EDT

## 2023-05-17 ENCOUNTER — OFFICE VISIT (OUTPATIENT)
Dept: GYNECOLOGIC ONCOLOGY | Facility: CLINIC | Age: 68
End: 2023-05-17
Payer: MEDICARE

## 2023-05-17 VITALS
WEIGHT: 147 LBS | HEART RATE: 78 BPM | HEIGHT: 70 IN | RESPIRATION RATE: 18 BRPM | SYSTOLIC BLOOD PRESSURE: 122 MMHG | DIASTOLIC BLOOD PRESSURE: 78 MMHG | OXYGEN SATURATION: 99 % | BODY MASS INDEX: 21.05 KG/M2 | TEMPERATURE: 97.3 F

## 2023-05-17 DIAGNOSIS — C57.01 ADENOCARCINOMA OF RIGHT FALLOPIAN TUBE: Primary | Chronic | ICD-10-CM

## 2023-05-17 PROCEDURE — 3078F DIAST BP <80 MM HG: CPT | Performed by: OBSTETRICS & GYNECOLOGY

## 2023-05-17 PROCEDURE — 1126F AMNT PAIN NOTED NONE PRSNT: CPT | Performed by: OBSTETRICS & GYNECOLOGY

## 2023-05-17 PROCEDURE — 3074F SYST BP LT 130 MM HG: CPT | Performed by: OBSTETRICS & GYNECOLOGY

## 2023-05-17 PROCEDURE — 1160F RVW MEDS BY RX/DR IN RCRD: CPT | Performed by: OBSTETRICS & GYNECOLOGY

## 2023-05-17 PROCEDURE — 99213 OFFICE O/P EST LOW 20 MIN: CPT | Performed by: OBSTETRICS & GYNECOLOGY

## 2023-05-17 PROCEDURE — 1159F MED LIST DOCD IN RCRD: CPT | Performed by: OBSTETRICS & GYNECOLOGY

## 2023-05-22 DIAGNOSIS — C57.01 ADENOCARCINOMA OF RIGHT FALLOPIAN TUBE: Primary | Chronic | ICD-10-CM

## 2023-05-23 ENCOUNTER — TELEPHONE (OUTPATIENT)
Dept: GYNECOLOGIC ONCOLOGY | Facility: CLINIC | Age: 68
End: 2023-05-23
Payer: MEDICARE

## 2023-05-23 NOTE — TELEPHONE ENCOUNTER
Phoned patient back. I had spoke with her this morning and informed her that [facility] states her insurance is out of network. She phoned her insurance person and was told that she would be responsible for $500 out of pocket. Informed patient that I would go ahead and call [facility] back to start the proceed which is faxing her records for clinical review prior to her getting a scheduled appointment. Patient is also OK with a referral to [facility]. I will send that referral and records as well.

## 2023-05-23 NOTE — TELEPHONE ENCOUNTER
Caller: Halie Perez    Relationship: Self    Best call back number: [phone]    Who are you requesting to speak with (clinical staff, provider,  specific staff member): MURALI    What was the call regarding: PATIENT REQUESTING A CALL BACK FROM MARCO SIMMS    Do you require a callback: YES

## 2023-05-31 ENCOUNTER — TELEPHONE (OUTPATIENT)
Dept: GYNECOLOGIC ONCOLOGY | Facility: CLINIC | Age: 68
End: 2023-05-31

## 2023-05-31 NOTE — TELEPHONE ENCOUNTER
----- Message from Kimberly Cristobal sent at 5/25/2023  2:24 PM EDT -----  Regarding: [facility]: [facility]  Contact: [phone]  FY  ----- Message -----  From: Jillian Silvestre RN  Sent: 5/25/2023  11:09 AM EDT  To: Kimberly Cristobal  Subject: FW: [facility]                                          ----- Message -----  From: Halie Perez  Sent: 5/25/2023  11:06 AM EDT  To: [facility]  Subject: [facility] Edward-  I have appt at [facility] 6/14 and [facility] - apparently [facility] has meds.   Haven’t heard from [facility] yet

## 2023-05-31 NOTE — TELEPHONE ENCOUNTER
I called patient and left voice message regarding carboplatin treatment and this time of carboplatin shortage.  I recommended that if she is starting treatment at a facility she asked the following questions: Will her dose be rounded down, will her treatments be spaced out, and is there enough drugs set aside for her to complete her treatment as planned.  Happy to discuss with her further if she would like to talk.  Regardless, I would still recommend she consider observation given her asymptomatic recurrence.  Electronically signed by Christine Fam MD, 05/31/23, 1:00 PM EDT.

## 2023-06-06 ENCOUNTER — TELEPHONE (OUTPATIENT)
Dept: GYNECOLOGIC ONCOLOGY | Facility: CLINIC | Age: 68
End: 2023-06-06
Payer: MEDICARE

## 2023-06-06 NOTE — TELEPHONE ENCOUNTER
Received call from Michel Jose. Patient case has been reviewed and denied by the clinical director stating that patient's case is not unique. He stated that there was a note that [facility] may be an option for patient. Forwarding to MD.

## 2023-06-07 ENCOUNTER — TELEPHONE (OUTPATIENT)
Dept: GYNECOLOGIC ONCOLOGY | Facility: CLINIC | Age: 68
End: 2023-06-07
Payer: MEDICARE

## 2023-06-07 NOTE — TELEPHONE ENCOUNTER
I called pt to check in -she is starting chemotherapy at  in about a month.  She has an upcoming appointment at [facility].  I notified her that [facility] had denied her referral as her case is not particularly challenging from a clinical standpoint.  She verbalized understanding.  We socially talked for a little bit and all questions were answered.  She knows she can always call with questions or concerns.  Electronically signed by Christine Fam MD, 06/07/23, 10:37 AM EDT.

## 2024-04-08 ENCOUNTER — LAB (OUTPATIENT)
Dept: LAB | Facility: HOSPITAL | Age: 69
End: 2024-04-08
Payer: MEDICARE

## 2024-04-08 DIAGNOSIS — D47.2 MONOCLONAL GAMMOPATHY: ICD-10-CM

## 2024-04-08 DIAGNOSIS — C57.01 ADENOCARCINOMA OF RIGHT FALLOPIAN TUBE: ICD-10-CM

## 2024-04-08 LAB
ALBUMIN SERPL-MCNC: 4.3 G/DL (ref 3.5–5.2)
ALBUMIN/GLOB SERPL: 1.7 G/DL
ALP SERPL-CCNC: 117 U/L (ref 39–117)
ALT SERPL W P-5'-P-CCNC: 19 U/L (ref 1–33)
ANION GAP SERPL CALCULATED.3IONS-SCNC: 9 MMOL/L (ref 5–15)
AST SERPL-CCNC: 32 U/L (ref 1–32)
BASOPHILS # BLD AUTO: 0.03 10*3/MM3 (ref 0–0.2)
BASOPHILS NFR BLD AUTO: 0.5 % (ref 0–1.5)
BILIRUB SERPL-MCNC: 0.4 MG/DL (ref 0–1.2)
BUN SERPL-MCNC: 16 MG/DL (ref 8–23)
BUN/CREAT SERPL: 19.3 (ref 7–25)
CALCIUM SPEC-SCNC: 9.1 MG/DL (ref 8.6–10.5)
CHLORIDE SERPL-SCNC: 107 MMOL/L (ref 98–107)
CO2 SERPL-SCNC: 27 MMOL/L (ref 22–29)
CREAT SERPL-MCNC: 0.83 MG/DL (ref 0.57–1)
CRP SERPL-MCNC: <0.3 MG/DL (ref 0–0.5)
DEPRECATED RDW RBC AUTO: 44.3 FL (ref 37–54)
EGFRCR SERPLBLD CKD-EPI 2021: 76.4 ML/MIN/1.73
EOSINOPHIL # BLD AUTO: 0.1 10*3/MM3 (ref 0–0.4)
EOSINOPHIL NFR BLD AUTO: 1.6 % (ref 0.3–6.2)
ERYTHROCYTE [DISTWIDTH] IN BLOOD BY AUTOMATED COUNT: 12.4 % (ref 12.3–15.4)
ERYTHROCYTE [SEDIMENTATION RATE] IN BLOOD: 8 MM/HR (ref 0–30)
GLOBULIN UR ELPH-MCNC: 2.6 GM/DL
GLUCOSE SERPL-MCNC: 84 MG/DL (ref 65–99)
HCT VFR BLD AUTO: 45.8 % (ref 34–46.6)
HGB BLD-MCNC: 15 G/DL (ref 12–15.9)
IMM GRANULOCYTES # BLD AUTO: 0.06 10*3/MM3 (ref 0–0.05)
IMM GRANULOCYTES NFR BLD AUTO: 1 % (ref 0–0.5)
LYMPHOCYTES # BLD AUTO: 2.05 10*3/MM3 (ref 0.7–3.1)
LYMPHOCYTES NFR BLD AUTO: 33.1 % (ref 19.6–45.3)
MCH RBC QN AUTO: 31.4 PG (ref 26.6–33)
MCHC RBC AUTO-ENTMCNC: 32.8 G/DL (ref 31.5–35.7)
MCV RBC AUTO: 95.8 FL (ref 79–97)
MONOCYTES # BLD AUTO: 0.58 10*3/MM3 (ref 0.1–0.9)
MONOCYTES NFR BLD AUTO: 9.4 % (ref 5–12)
NEUTROPHILS NFR BLD AUTO: 3.37 10*3/MM3 (ref 1.7–7)
NEUTROPHILS NFR BLD AUTO: 54.4 % (ref 42.7–76)
NRBC BLD AUTO-RTO: 0 /100 WBC (ref 0–0.2)
PLATELET # BLD AUTO: 192 10*3/MM3 (ref 140–450)
PMV BLD AUTO: 9.5 FL (ref 6–12)
POTASSIUM SERPL-SCNC: 4.2 MMOL/L (ref 3.5–5.2)
PROT SERPL-MCNC: 6.9 G/DL (ref 6–8.5)
RBC # BLD AUTO: 4.78 10*6/MM3 (ref 3.77–5.28)
SODIUM SERPL-SCNC: 143 MMOL/L (ref 136–145)
WBC NRBC COR # BLD AUTO: 6.19 10*3/MM3 (ref 3.4–10.8)

## 2024-04-08 PROCEDURE — 84165 PROTEIN E-PHORESIS SERUM: CPT

## 2024-04-08 PROCEDURE — 85652 RBC SED RATE AUTOMATED: CPT

## 2024-04-08 PROCEDURE — 82785 ASSAY OF IGE: CPT

## 2024-04-08 PROCEDURE — 83521 IG LIGHT CHAINS FREE EACH: CPT

## 2024-04-08 PROCEDURE — 82784 ASSAY IGA/IGD/IGG/IGM EACH: CPT

## 2024-04-08 PROCEDURE — 86334 IMMUNOFIX E-PHORESIS SERUM: CPT

## 2024-04-08 PROCEDURE — 85025 COMPLETE CBC W/AUTO DIFF WBC: CPT

## 2024-04-08 PROCEDURE — 36415 COLL VENOUS BLD VENIPUNCTURE: CPT

## 2024-04-08 PROCEDURE — 86140 C-REACTIVE PROTEIN: CPT

## 2024-04-08 PROCEDURE — 80053 COMPREHEN METABOLIC PANEL: CPT

## 2024-04-09 LAB
ALBUMIN SERPL ELPH-MCNC: 3.8 G/DL (ref 2.9–4.4)
ALBUMIN/GLOB SERPL: 1.5 {RATIO} (ref 0.7–1.7)
ALPHA1 GLOB SERPL ELPH-MCNC: 0.2 G/DL (ref 0–0.4)
ALPHA2 GLOB SERPL ELPH-MCNC: 0.7 G/DL (ref 0.4–1)
B-GLOBULIN SERPL ELPH-MCNC: 0.8 G/DL (ref 0.7–1.3)
GAMMA GLOB SERPL ELPH-MCNC: 1 G/DL (ref 0.4–1.8)
GLOBULIN SER-MCNC: 2.6 G/DL (ref 2.2–3.9)
IGA SERPL-MCNC: 126 MG/DL (ref 87–352)
IGG SERPL-MCNC: 1077 MG/DL (ref 586–1602)
IGM SERPL-MCNC: 63 MG/DL (ref 26–217)
INTERPRETATION SERPL IEP-IMP: ABNORMAL
KAPPA LC FREE SER-MCNC: 21.9 MG/L (ref 3.3–19.4)
KAPPA LC FREE/LAMBDA FREE SER: 0.2 {RATIO} (ref 0.26–1.65)
LABORATORY COMMENT REPORT: ABNORMAL
LAMBDA LC FREE SERPL-MCNC: 111.5 MG/L (ref 5.7–26.3)
M PROTEIN SERPL ELPH-MCNC: 0.4 G/DL
PROT SERPL-MCNC: 6.4 G/DL (ref 6–8.5)

## 2024-04-12 LAB — IGE SERPL-ACNC: 74 IU/ML (ref 6–495)

## 2024-04-25 ENCOUNTER — OFFICE VISIT (OUTPATIENT)
Dept: ONCOLOGY | Facility: CLINIC | Age: 69
End: 2024-04-25
Payer: MEDICARE

## 2024-04-25 VITALS
HEIGHT: 70 IN | SYSTOLIC BLOOD PRESSURE: 164 MMHG | WEIGHT: 152 LBS | DIASTOLIC BLOOD PRESSURE: 73 MMHG | BODY MASS INDEX: 21.76 KG/M2 | RESPIRATION RATE: 16 BRPM | HEART RATE: 65 BPM | TEMPERATURE: 97.6 F | OXYGEN SATURATION: 98 %

## 2024-04-25 DIAGNOSIS — C57.01 ADENOCARCINOMA OF RIGHT FALLOPIAN TUBE: Primary | Chronic | ICD-10-CM

## 2024-04-25 DIAGNOSIS — D47.2 MONOCLONAL GAMMOPATHY: Chronic | ICD-10-CM

## 2024-04-25 RX ORDER — LIDOCAINE AND PRILOCAINE 25; 25 MG/G; MG/G
1 CREAM TOPICAL AS NEEDED
COMMUNITY
Start: 2023-06-23 | End: 2024-06-22

## 2024-04-25 RX ORDER — ATORVASTATIN CALCIUM 40 MG/1
40 TABLET, FILM COATED ORAL DAILY
COMMUNITY
Start: 2023-12-27

## 2024-04-25 NOTE — LETTER
April 25, 2024       No Recipients    Patient: Halie Perez   YOB: 1955   Date of Visit: 4/25/2024     Dear ARMANDO Pugh:       Thank you for referring Halie Perez to me for evaluation. Below are the relevant portions of my assessment and plan of care.    If you have questions, please do not hesitate to call me. I look forward to following Halie along with you.         Sincerely,        René Valencia MD        CC:   No Recipients    René Valencia MD  04/25/24 1159  Sign when Signing Visit  CHIEF COMPLAINT: No new somatic complaints    Problem List:  Oncology/Hematology History Overview Note   1.  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  Monoclonal gammopathy   3.  History of anemia, resolved after hemorrhoidectomy repair and on oral iron.  CBC on 6/13/2017 with a hemoglobin of 14.2, hematocrit 44.6%, MCV of 94.8.  4.  Atrial fibrillation  5.  Pulmonary nodule  6.  Erythrocytosis  7.  Fallopian tube serous carcinoma.      Monoclonal gammopathy and uterine cancer history timeline:    -March 2017 24-hour urine immunoelectrophoresis showed no monoclonal spike, lambda light chains 106 with kappa light chains 24 and a kappa to lambda ratio of 0.23 with normal IgA, G, and M levels.  January 2017 serum immunoelectrophoresis showed 600 mg/dL of immunoglobulin G lambda light chains in the serum.  3/8/17 bone marrow biopsy showed normal marrow with 3% plasma cells and a small lambda clonal population identified with no stainable iron.  There was erythroid expansion with mild maturation dyssynchrony and occasional atypical nuclear features that may represent the compensatory response to her anemia though myelodysplasia could not entirely be ruled out.  Had reactive aggregates of lymphoid tissue.     -6/13/2017 sedimentation rate 8, immunoglobulin free light chains free kappa light chains 25.7, free lambda light chains 173.4, kappa/lambda ratio  0.15.  Serum immunoelectrophoresis M-spike 0.9g/dL, C-reactive protein less than 0.01, CMP normal, ionized calcium 1.31, beta-2 microglobulin 3.1, CBC WBC 5500, hemoglobin 14.2, hematocrit 44.6%, MCV 94.8, platelets 259,000.  24-hour urine immunoelectrophoresis pending.   -6/17/2019 data: Lambda 129 with kappa 22 and ratio 0.17.  This is in the range that it has fluctuated since January 2017 upon first testing.  M spike stable 600 mg/dL immunoglobulin G lambda.  C-reactive protein 0.04 with sedimentation rate of 8.  Creatinine 0.86 with total calcium of 10 and ionized calcium 1.34 upper limit of normal 1.32.  Beta-2 microglobulin 2.8.  CBC unremarkable.  -4/13/2021 data: Beta-2 microglobulin 2.5 stable.  Hemoglobin 15.4 with normal CBC.  CMP unremarkable including creatinine 0.94, calcium 9.0, total protein 7.5, normal alkaline phosphatase 102.  Serum M spike 800 mg/dL IgG lambda stable with normal quantitative immunoglobulins.  Immunoglobulin free lambda light chains 131 mg/L with kappa 18.7 and ratio 0.14 stable x4 years.  Sedimentation rate 7.  C-reactive protein less than 0.3.  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  -2/22/2022 through 2/25/2022 admitted by Dr. Khurram Novak for atrial fibrillation with rapid ventricular response with worsening exercise tolerance.  Started on dofetilide and converted to sinus rhythm after second dose.  Dose decreased due to lengthening of QT corrected.  No further atrial fib after conversion.  Discharged on Eliquis.  -2/11/2022 through 2/15/2022 admitted for atrial fibrillation with RVR and vaginal bleeding.  On Eliquis.    -3/4/2022 hematocrit 51.9% with hemoglobin 714.2 otherwise unremarkable CBC.  CMP unremarkable with normal creatinine 0.92, normal calcium 9.9, alkaline phosphatase minimally elevated 120 upper limit of normal 117, AST 44 otherwise unremarkable, otherwise unremarkable CMP.  IgG lambda stable 800 mg/dL with normal quantitative  immunoglobulins and no significant change in M spike over the last 5 years.  Serum lambda light chain 85 continuing to come down from a high of 173 on presentation June 2017 with normal kappa and stable ratio over time at 0.17.  Sedimentation rate 7 with C-reactive protein less than 0.3.  Beta-2 microglobulin 2.9.    -3/4/2022 CT abdomen pelvis with and without contrastShowed right hip arthroplasty being artifact but with tubular masslike structure 9 x 6 cm presacral as suggested on ultrasound.  Could be related to multilocular cystic right ovarian mass or be related to the right uterine tube.  Malignancy favored.  Incidental 6 mm pulmonary nodule right lower lobe of undetermined significance    -3/7/2022 appointment with Loki Abdalla,gynecologist.  Had postmenopausal bleeding for the previous month.  3/2/2022 ultrasound showed 7.7 cm right adnexal mass and 6.9 cm left adnexal mass with  elevated at 38.4 upper limit of normal 38.1.  CEA normal 1.9.  Endometrial biopsy benign.    -3/10/2022 CT chest compared to CT abdomen 3/4/2022 showed stable 6 mm noncalcified right lower lobe pulmonary nodule with suggestion of follow-up in 6-12 months for stability.    -3/17/2022 JANET/BSO Dr. Fam.  Bilateral benign adenofibroma's of the ovaries.  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  No bessy involvement found.  No lymph vascular invasion.  Pathological stage IIIc.    -4/8/2022 Ca1 25 normal at 21.  CBC unremarkable save for hematocrit 51.3%.  CMP sodium 129 otherwise unremarkable.    -4/18/2022 gynecologic oncology postoperative evaluation.  She had an R0 resection 3/17/2022 for high-grade serous carcinoma.  Plans for Kermit MI profile, genetic testing, and adjuvant carboplatin Taxol.    -4/18/2022 [facility] medical oncology follow-up visit: She has had a complex year  since last I saw her about a year ago.  She is under care of Dr. Fam for stage III fallopian tube serous carcinoma for which she is due for adjuvant carbotaxol.  Genetic testing is ordered.  Kermit SPANGLER profile sent but not back.  From my standpoint, the monoclonal gammopathy given stability over time and with unremarkable calcium and creatinine and quantitative immunoglobulins is very likely just benign and we will continue to check this annually.  Her serum light chains actually have gone down over time with no interventions.  No further work-up from that standpoint.  With reference to her erythrocytosis, I would not put her through work-up for myeloproliferative disorder as I doubt that that is operative especially with recent staging CTs not showing splenomegaly.  She does have small right lower lobe pulmonary nodule that does need follow-up but I am certain that she will be getting routine radiographic/CT follow-up along with serum tumor markers with Dr. Fam and company.  I will remain available to her in the interim but will not increase the frequency of hematology follow-up referable to the newly diagnosed oncologic gynecologic issue that is being expertly managed by Dr. Fam and her team.  It is reassuring that her CA-125 normalized after JANET/BSO and omentectomy etc.    -4/11/2022 through 8/15/2022 adjuvant CarboTaxol x6.    -2/4/2023 office note from Dr. Christine Fam.  Indicates Ca1 25 normal.  2/1/2023 CT chest abdomen pelvis showed stable subpleural 6 mm nodule with suggested follow-up in 3-6 months with no new pulmonary nodules.  New soft tissue nodule right hepatic dome 12 mm concerning for metastasis.  Dr. Fam looked at the images and thought this to be hemangioma.  Dr. Fam plans follow-up imaging in 3-6 months depending on Ca1 25 and how she feels.    -3/29/2023 mammogram BI-RADS 2.    - 4/5/2023 serum M spike IgG lambda 400 mg/dL not rising.  Serum free lambda stable 102 with  ratio 0.15.  Sedimentation rate normal 7.  C-reactive protein normal less than 0.3.  CMP unremarkable save for glucose 114.  CBC entirely normal.    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  She had pulmonary nodule follow-up that was stable and there was a reading of a possible liver metastasis that per review by Dr. Fam was felt most likely to be hemangioma and based on patient's symptoms and desires and tumor markers which, today, have been normal, the plan was for follow-up imaging in 3 to 6 months.  Upon my discussion with her today, the patient registered her desire to have the CT of her abdomen repeated 3 months from the last scan and I will comply with that to hopefully have those results available near to the time of her follow-up with Dr. Fam.  Other than for the understandable concern relative to her malignancy, physically she is doing wonderfully and recent mammogram was also BI-RADS 2.  From the monoclonal gammopathy standpoint, the M spike is as low as it has ever been and I will continue annual labs as outlined.  With normal creatinine, hemoglobin, bone enzymes, total protein-albumin-globulin-ratios thereof, the odds of underlying plasma cell dyscrasia etc. extremely unlikely.  She is under the excellent care of Dr. Fam for her malignancy follow-up.    -4/27/2023 CT abdomen pelvis showed an enlarging enhancing nodular mass right lobe of the liver and right hemidiaphragm worsening malignancy suspected with enlarging right sided epicardial node also suspicious for malignancy.    -5/2/2023  22.3    - 6/2/2023-second opinion [facility] Dr. Cielo Orellana.  Patient asymptomatic for recurrent fallopian tube cancer and due to platinum shortage waiting to treat until symptomatic but patient anxious regarding this  plan.  Molecular testing 4/19/2022 Kermit MI profile was PD-L1 positive ER +2+95% mismatch repair proficient with no other targetable mutations.  There was some concern that this was present within the 6-month timeframe of her original treatment but still technically platinum sensitive.   still did not have carboplatin.  Recommended CarboTaxol but patient desirous of keeping her hair.  She had discussed carboplatin and gemcitabine with Dr. Fam she expressed interest in staying at .    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  Initially diagnosed stage IIIc status post optimal TRS including JANET/BSO on 3/17/2022 followed by 6 cycles of adjuvant CarboTaxol ending 8/15/2022.  CT 4/27/2023 showed evidence of recurrence.  Therefore second opinion regarding national shortage of platinum chemotherapy.  Options of treatment included carbo gemcitabine plus or minus Adilene, carboplatin and Doxil, gemcitabine cisplatin plus or minus bevacizumab as well as clinical trials including the upgrade trial and the next trial she plans carboplatin Doxil closer to home and return for next trial after 4-6 cycles    -6/23/2023  37.6  -6/23/2023 started carbo every 21 days with gemcitabine weekly x 2 with Neulasta at .  Genetic testing negative.  -7/10/2023  39  -8/30/2023 Ca 125 18.6  -11/29/2023 Ca125 6.41    -9/25/2023 CT chest abdomen pelvis [facility] showed 11 mm centrally necrotic right epicardial node.  9 mm capsular surface dome of right hepatic lobe implant with large 18 mm implant more inferiorly.  Small 9 mm ill-defined low-attenuation left hepatic lobe likely cyst.    -10/13/2023 ending 11/3/2023 Elahere (Mirvetuximab soravtansine-gynx with ophthalmology monitoring.  Discontinued due to ocular toxicity.    -4/1/2024  39.2.    -4/3/2024 follow-up GYN oncology Dr. Orellana .  Patient on observation since  discontinuation of Mirvetuximab after ocular toxicity following second cycle.  Patient desires continued observation rather than Doxil and remaining agnostic to her tumor markers.  Plan for follow-up in 3 months with .    -4/8/2024 CBC and CMP unremarkable.  Serum M spike IgG lambda stable 400 mg/dL with normal quantitative immunoglobulins and Serum lambda light chain 111.5 kappa 21.9 ratio 0.2    -4/25/2024 [facility] hematology follow-up: From the monoclonal gammopathy standpoint she is overall stable and, in light of her recurrent fallopian tube cancer, becomes a relatively moot point in the absence of significant changes in her labs or new signs or symptoms of plasma cell dyscrasia which is unlikely to develop at this junction.  She is currently on watchful waiting under the I have Dr. Orellana at .  Should therapy become necessary and she wants that closer to home she can have that at  or with Dr. Fam or even myself and I encouraged her not to worry about territory allergy.  We all of her and any of us would be glad to care for her and I told her I would remain available to her at a moments notice.  She is not desirous of knowing her tumor marker status.  She does state that Dr. Orellana plans scans in July.  I told her that she could consider a year from now based on life and health in general is going as to whether to continue the annual follow-up of the M panel but for now since she is clinically quite fit I will continue to monitor and she liked that idea.  I offered referral to Svitlana Degroot our oncology psychiatrist and she will keep that in mind.      Monoclonal gammopathy    Initial Diagnosis    Monoclonal gammopathy     11/29/2017 Imaging    Bone survey IMPRESSION:  Negative skeletal survey.     6/5/2018 -  Other Event    6/5/2018 labs:  CBC WBC 4350, hemoglobin 12.6, hematocrit 38.3%, platelet count 252,000.  CMP creatinine 0.8, serum calcium 9.4.  Ionized calcium 1.29.  Beta-2  microglobulin 2.3.  Sedimentation rate 9, CRP 0.02.  Kappa/lambda light chains-normal kappa light chains 16.6, lambda light chains elevated at 133.6, kappa/lambda ratio 0.12.  Serum immunoelectrophoresis M-spike stable 0.7 g/dL.       12/20/2018 -  Other Event    24-hour urine monoclonal protein showed no monoclonal protein.  Beta-2 microglobulin 2.5.  AST 43.  Creatinine 0.86.  Calcium 9.5.  Ionized calcium 1.3.  C-reactive protein less than 0.01.  Sedimentation rate 7.  Serum monoclonal protein 800 mg/dL of immunoglobulin G lambda.  Lambda monoclonal protein down to 98.5 with a kappa to lambda ratio 0.18.  CBC is normal.  Total body bone survey done on the day of her visit 12/20/18 was not read by the time of her visit.     6/17/2019 -  Other Event     Lambda 129 with kappa 22 and ratio 0.17.  This is in the range that it has fluctuated since January 2017 upon first testing.  M spike stable 600 mg/dL immunoglobulin G lambda.  C-reactive protein 0.04 with sedimentation rate of 8.  Creatinine 0.86 with total calcium of 10 and ionized calcium 1.34 upper limit of normal 1.32.  Beta-2 microglobulin 2.8.  CBC unremarkable.     4/6/2020 -  Other Event    Myeloma panel:  CBC WBC 5400, hemoglobin 15.2, platelet count 225,000.  Beta-2 microglobulin 2.6.  Ionized calcium 1.26.  CMP creatinine 0.77, serum calcium 10.1 C-reactive protein 0.08, sedimentation rate 13.  Serum immunoelectrophoresis M spike stable 0.6 g/dL.  Serum free light chains, normal free kappa light chains 16.3, free lambda light chains 107.7, kappa/lambda ratio 0.15.  Kappa/lambda light chains Urine normal.     4/13/2021 -  Other Event    -4/13/2021 data: Beta-2 microglobulin 2.5 stable.  Hemoglobin 15.4 with normal CBC.  CMP unremarkable including creatinine 0.94, calcium 9.0, total protein 7.5, normal alkaline phosphatase 102.  Serum M spike 800 mg/dL IgG lambda stable with normal quantitative immunoglobulins.  Immunoglobulin free lambda light chains 131  mg/L with kappa 18.7 and ratio 0.14 stable x4 years.  Sedimentation rate 7.  C-reactive protein less than 0.3.  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     Adenocarcinoma of right fallopian tube   3/2/2022 Imaging    Patient presented to Dr. Abdalla with c/o postmenopausal bleeding.   Transvaginal ultrasound demonstrated a thickened endometrial stripe and bilateral adnexal masses.  Referred to Gyn Oncology due to concern for possible malignancy.      3/4/2022 Imaging    CT abdomen pelvis:  1.Beam hardening artifact from the patient's right total hip arthroplasty makes evaluation of the right presacral/adnexal mass difficult. There is a tubular masslike structure measuring up to 9 x 6 cm with a left posterior (presacral) mural nodular  enhancing component as suggested on ultrasound. This could be related to a multilocular cystic right ovarian mass or be related to the right uterine tube. Differential favors malignant process in this age group.  2.Incidental 6 mm pulmonary nodule seen in the right lower lobe along the right hemidiaphragm. This is of uncertain clinical significance.     3/10/2022 Imaging    CT chest:  1.  No change in 6 mm noncalcified pulmonary nodule within the right lower lobe.  Follow-up noncontrast CT scan of the chest would be recommended in 6-12 months to document continued stability.     3/17/2022 Surgery    Diagnostic laparoscopy, RTLH/BSO, cancer staging, omentectomy, optimal tumor debulking (R=0), appendectomy by Dr. Christine Fam at [facility]    Final pathology showed high-grade serous carcinoma involving the lumen and wall. Right uterosacral ligament and omentum involved by tumor. Pelvic washings malignant, adenocarcinoma compatible with papillary serous carcinoma. Stage IIIC grade 3       3/17/2022 Cancer Staged    Staging form: Ovary, Fallopian Tube, And Primary Peritoneal Carcinoma, AJCC 8th Edition  - Pathologic stage from 3/17/2022: FIGO Stage IIIC (pT3c,  pN0, cM0) - Signed by Christine Fam MD on 5/1/2023 4/11/2022 - 8/15/2022 Chemotherapy    OP OVARIAN PACLitaxel / CARBOplatin (Q21D)  - 6 cycles completed  - toxicities included nausea, dehydration, neuropathy, and low blood counts  - both drugs dose reduced at cycle #6 and cycle #6 delayed 2 weeks due to neutropenia and thrombocytopenia     4/19/2022 Molecular Testing    CARIS results:  PD-L1 positive  ER positive, 2+, 95%  MSI stable, MMR proficient, TMB low, genomic ISACC low  [place] negative  ARID1A, BANDAR, BRAF, EGFR, RAD51 C/D negative  BRCA1/2 negative  Her2/Patsy negative     8/16/2022 Imaging    Post-treatment scan:  CT chest:  1. Stable appearance of a 6 mm right lower lobe pulmonary nodule abutting the right hemidiaphragm.  2. No adenopathy within the chest.     CT abdomen and pelvis:  1. Postsurgical changes of hysterectomy and bilateral salpingo-oophorectomy. No evidence of residual disease within the abdomen or pelvis.  2. No evidence of lymphadenopathy within the abdomen or pelvis.  3. Stable small 9 mm lesion within the dome of the right hepatic lobe, favor benign etiology such as hemangioma. Continued attention on follow-up would be recommended. Additional benign simple cyst is present within the left hepatic lobe.       4/27/2023 Imaging    CT Abdomen  Impression:     1. Enlarging enhancing nodular mass position between the right lobe of the liver and right hemidiaphragm suspicious for worsening malignancy.  2. There is an enlarging right-sided epicardial lymph node also suspicious for malignancy.  3. Additional incidental findings as noted above.         HISTORY OF PRESENT ILLNESS:  The patient is a 68 y.o. female, here for follow up on management of monoclonal gammopathy now with recurrent fallopian tube cancer on watchful waiting presently after having had systemic therapy over the last year as outlined    Past Medical History:   Diagnosis Date   • Adenocarcinoma of right fallopian tube 03/17/2022  "  • Atrial fibrillation    • Corneal abrasion    • Hemorrhoids     bleeding hemorrhoids   • History of anemia    • History of transfusion    • Hypertension    • Hypothyroidism    • LGSIL on Pap smear of cervix 03/10/2022   • Osteoarthritis      Past Surgical History:   Procedure Laterality Date   • AUGMENTATION MAMMAPLASTY Bilateral 2008   • BONE MARROW BIOPSY Left 03/08/2017    Procedure: BONE MARROW BIOPSY;  Surgeon: Michel White MD;  Location:  [facility];  Service:    • COLONOSCOPY N/A 01/09/2017    Procedure: COLONOSCOPY;  Surgeon: Michel White MD;  Location:  [facility];  Service:    • ENDOSCOPY N/A 01/07/2017    Procedure: ESOPHAGOGASTRODUODENOSCOPY;  Surgeon: Abhishek Benton MD;  Location:  [facility];  Service:    • HEMORRHOIDECTOMY N/A 03/08/2017    Procedure: HEMORRHOIDECTOMY,;  Surgeon: Michel White MD;  Location:  [facility];  Service:    • HIP ARTHROSCOPY Right     2005 & 2012   • JOINT REPLACEMENT      2 right hips   • TONSILLECTOMY  1964   • TOTAL LAPAROSCOPIC HYSTERECTOMY N/A 03/17/2022    Procedure: DIAGNOSTIC LAPAROSCOPY, ROBOTIC ASSISTED TOTAL LAPAROSCOPIC HYSTERECTOMY, BILATERAL SALPINGO-OOPHERECTOMY, CANCER STAGING, OMENTECTOMY,  OPTIMAL TUMOR DEBULKING, APPENDECTOMY;  Surgeon: Christine Fam MD;  Location:  [facility];  Service: Robotics - DaVinci;  Laterality: N/A;   • VENOUS ACCESS DEVICE (PORT) INSERTION Left 04/08/2022       Allergies   Allergen Reactions   • Sulfa Antibiotics Anaphylaxis       Family History and Social History reviewed and changed as necessary    REVIEW OF SYSTEM:   No current somatic complaints    PHYSICAL EXAM:  No jaundice icterus or pallor.  No respiratory distress.    Vitals:    04/25/24 1121   BP: 164/73   Pulse: 65   Resp: 16   Temp: 97.6 °F (36.4 °C)   SpO2: 98%   Weight: 68.9 kg (152 lb)   Height: 177.8 cm (70\")     Vitals:    04/25/24 1121   PainSc: 0-No pain          ECOG score: 0           Vitals reviewed.    ECOG: (0) Fully Active - Able " to Carry On All Pre-disease Performance Without Restriction    Lab Results   Component Value Date    HGB 15.0 04/08/2024    HCT 45.8 04/08/2024    MCV 95.8 04/08/2024     04/08/2024    WBC 6.19 04/08/2024    NEUTROABS 3.37 04/08/2024    LYMPHSABS 2.05 04/08/2024    MONOSABS 0.58 04/08/2024    EOSABS 0.10 04/08/2024    BASOSABS 0.03 04/08/2024       Lab Results   Component Value Date    GLUCOSE 84 04/08/2024    BUN 16 04/08/2024    CREATININE 0.83 04/08/2024     04/08/2024    K 4.2 04/08/2024     04/08/2024    CO2 27.0 04/08/2024    CALCIUM 9.1 04/08/2024    PROTEINTOT 6.9 04/08/2024    ALBUMIN 4.3 04/08/2024    ALBUMIN 3.8 04/08/2024    BILITOT 0.4 04/08/2024    ALKPHOS 117 04/08/2024    AST 32 04/08/2024    ALT 19 04/08/2024             ASSESSMENT & PLAN:  1.  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  Monoclonal gammopathy   3.  History of anemia, resolved after hemorrhoidectomy repair and on oral iron.  CBC on 6/13/2017 with a hemoglobin of 14.2, hematocrit 44.6%, MCV of 94.8.  4.  Atrial fibrillation  5.  Pulmonary nodule  6.  Erythrocytosis  7.  Fallopian tube serous carcinoma.      Monoclonal gammopathy and uterine cancer history timeline:    -March 2017 24-hour urine immunoelectrophoresis showed no monoclonal spike, lambda light chains 106 with kappa light chains 24 and a kappa to lambda ratio of 0.23 with normal IgA, G, and M levels.  January 2017 serum immunoelectrophoresis showed 600 mg/dL of immunoglobulin G lambda light chains in the serum.  3/8/17 bone marrow biopsy showed normal marrow with 3% plasma cells and a small lambda clonal population identified with no stainable iron.  There was erythroid expansion with mild maturation dyssynchrony and occasional atypical nuclear features that may represent the compensatory response to her anemia though myelodysplasia could not entirely be ruled out.  Had reactive aggregates of lymphoid tissue.      -6/13/2017 sedimentation rate 8, immunoglobulin free light chains free kappa light chains 25.7, free lambda light chains 173.4, kappa/lambda ratio 0.15.  Serum immunoelectrophoresis M-spike 0.9g/dL, C-reactive protein less than 0.01, CMP normal, ionized calcium 1.31, beta-2 microglobulin 3.1, CBC WBC 5500, hemoglobin 14.2, hematocrit 44.6%, MCV 94.8, platelets 259,000.  24-hour urine immunoelectrophoresis pending.   -6/17/2019 data: Lambda 129 with kappa 22 and ratio 0.17.  This is in the range that it has fluctuated since January 2017 upon first testing.  M spike stable 600 mg/dL immunoglobulin G lambda.  C-reactive protein 0.04 with sedimentation rate of 8.  Creatinine 0.86 with total calcium of 10 and ionized calcium 1.34 upper limit of normal 1.32.  Beta-2 microglobulin 2.8.  CBC unremarkable.  -4/13/2021 data: Beta-2 microglobulin 2.5 stable.  Hemoglobin 15.4 with normal CBC.  CMP unremarkable including creatinine 0.94, calcium 9.0, total protein 7.5, normal alkaline phosphatase 102.  Serum M spike 800 mg/dL IgG lambda stable with normal quantitative immunoglobulins.  Immunoglobulin free lambda light chains 131 mg/L with kappa 18.7 and ratio 0.14 stable x4 years.  Sedimentation rate 7.  C-reactive protein less than 0.3.  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  -2/22/2022 through 2/25/2022 admitted by Dr. Khurram Novak for atrial fibrillation with rapid ventricular response with worsening exercise tolerance.  Started on dofetilide and converted to sinus rhythm after second dose.  Dose decreased due to lengthening of QT corrected.  No further atrial fib after conversion.  Discharged on Eliquis.  -2/11/2022 through 2/15/2022 admitted for atrial fibrillation with RVR and vaginal bleeding.  On Eliquis.    -3/4/2022 hematocrit 51.9% with hemoglobin 714.2 otherwise unremarkable CBC.  CMP unremarkable with normal creatinine 0.92, normal calcium 9.9, alkaline phosphatase minimally elevated 120  upper limit of normal 117, AST 44 otherwise unremarkable, otherwise unremarkable CMP.  IgG lambda stable 800 mg/dL with normal quantitative immunoglobulins and no significant change in M spike over the last 5 years.  Serum lambda light chain 85 continuing to come down from a high of 173 on presentation June 2017 with normal kappa and stable ratio over time at 0.17.  Sedimentation rate 7 with C-reactive protein less than 0.3.  Beta-2 microglobulin 2.9.    -3/4/2022 CT abdomen pelvis with and without contrastShowed right hip arthroplasty being artifact but with tubular masslike structure 9 x 6 cm presacral as suggested on ultrasound.  Could be related to multilocular cystic right ovarian mass or be related to the right uterine tube.  Malignancy favored.  Incidental 6 mm pulmonary nodule right lower lobe of undetermined significance    -3/7/2022 appointment with Loki Abdallagynecologist.  Had postmenopausal bleeding for the previous month.  3/2/2022 ultrasound showed 7.7 cm right adnexal mass and 6.9 cm left adnexal mass with  elevated at 38.4 upper limit of normal 38.1.  CEA normal 1.9.  Endometrial biopsy benign.    -3/10/2022 CT chest compared to CT abdomen 3/4/2022 showed stable 6 mm noncalcified right lower lobe pulmonary nodule with suggestion of follow-up in 6-12 months for stability.    -3/17/2022 JANET/BSO Dr. Fam.  Bilateral benign adenofibroma's of the ovaries.  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  No bessy involvement found.  No lymph vascular invasion.  Pathological stage IIIc.    -4/8/2022 Ca1 25 normal at 21.  CBC unremarkable save for hematocrit 51.3%.  CMP sodium 129 otherwise unremarkable.    -4/18/2022 gynecologic oncology postoperative evaluation.  She had an R0 resection 3/17/2022 for high-grade serous carcinoma.  Plans for Kermit SPANGLER  profile, genetic testing, and adjuvant carboplatin Taxol.    -4/18/2022 [facility], [place], operated by [facility] medical oncology follow-up visit: She has had a complex year since last I saw her about a year ago.  She is under care of Dr. Fam for stage III fallopian tube serous carcinoma for which she is due for adjuvant carbotaxol.  Genetic testing is ordered.  Kermit SPANGLER profile sent but not back.  From my standpoint, the monoclonal gammopathy given stability over time and with unremarkable calcium and creatinine and quantitative immunoglobulins is very likely just benign and we will continue to check this annually.  Her serum light chains actually have gone down over time with no interventions.  No further work-up from that standpoint.  With reference to her erythrocytosis, I would not put her through work-up for myeloproliferative disorder as I doubt that that is operative especially with recent staging CTs not showing splenomegaly.  She does have small right lower lobe pulmonary nodule that does need follow-up but I am certain that she will be getting routine radiographic/CT follow-up along with serum tumor markers with Dr. Fam and company.  I will remain available to her in the interim but will not increase the frequency of hematology follow-up referable to the newly diagnosed oncologic gynecologic issue that is being expertly managed by Dr. Fam and her team.  It is reassuring that her CA-125 normalized after JANET/BSO and omentectomy etc.    -4/11/2022 through 8/15/2022 adjuvant CarboTaxol x6.    -2/4/2023 office note from Dr. Christine Fam.  Indicates Ca1 25 normal.  2/1/2023 CT chest abdomen pelvis showed stable subpleural 6 mm nodule with suggested follow-up in 3-6 months with no new pulmonary nodules.  New soft tissue nodule right hepatic dome 12 mm concerning for metastasis.  Dr. Fam looked at the images and thought this to be hemangioma.  Dr. Fam plans follow-up imaging in 3-6 months depending on Ca1 25 and how she  feels.    -3/29/2023 mammogram BI-RADS 2.    - 4/5/2023 serum M spike IgG lambda 400 mg/dL not rising.  Serum free lambda stable 102 with ratio 0.15.  Sedimentation rate normal 7.  C-reactive protein normal less than 0.3.  CMP unremarkable save for glucose 114.  CBC entirely normal.    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  She had pulmonary nodule follow-up that was stable and there was a reading of a possible liver metastasis that per review by Dr. Fam was felt most likely to be hemangioma and based on patient's symptoms and desires and tumor markers which, today, have been normal, the plan was for follow-up imaging in 3 to 6 months.  Upon my discussion with her today, the patient registered her desire to have the CT of her abdomen repeated 3 months from the last scan and I will comply with that to hopefully have those results available near to the time of her follow-up with Dr. Fam.  Other than for the understandable concern relative to her malignancy, physically she is doing wonderfully and recent mammogram was also BI-RADS 2.  From the monoclonal gammopathy standpoint, the M spike is as low as it has ever been and I will continue annual labs as outlined.  With normal creatinine, hemoglobin, bone enzymes, total protein-albumin-globulin-ratios thereof, the odds of underlying plasma cell dyscrasia etc. extremely unlikely.  She is under the excellent care of Dr. Fam for her malignancy follow-up.    -4/27/2023 CT abdomen pelvis showed an enlarging enhancing nodular mass right lobe of the liver and right hemidiaphragm worsening malignancy suspected with enlarging right sided epicardial node also suspicious for malignancy.    -5/2/2023  22.3    - 6/2/2023 second opinion [facility] Dr. Cielo Orellana.  Patient  asymptomatic for recurrent fallopian tube cancer and due to platinum shortage waiting to treat until symptomatic but patient anxious regarding this plan.  Molecular testing 4/19/2022 Kermit SPANGLER profile was PD-L1 positive ER +2+95% mismatch repair proficient with no other targetable mutations.  There was some concern that this was present within the 6-month timeframe of her original treatment but still technically platinum sensitive.   still did not have carboplatin.  Recommended CarboTaxol but patient desirous of keeping her hair.  She had discussed carboplatin and gemcitabine with Dr. Fam she expressed interest in staying at .    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  Initially diagnosed stage IIIc status post optimal TRS including JANET/BSO on 3/17/2022 followed by 6 cycles of adjuvant CarboTaxol ending 8/15/2022.  CT 4/27/2023 showed evidence of recurrence.  Therefore second opinion regarding national shortage of platinum chemotherapy.  Options of treatment included carbo gemcitabine plus or minus Adilene, carboplatin and Doxil, gemcitabine cisplatin plus or minus bevacizumab as well as clinical trials including the upgrade trial and the next trial she plans carboplatin Doxil closer to home and return for next trial after 4-6 cycles    -6/23/2023  37.6  -6/23/2023 started carbo every 21 days with gemcitabine weekly x 2 with Neulasta at .  Genetic testing negative.  -7/10/2023  39  -8/30/2023 Ca 125 18.6  -11/29/2023 Ca125 6.41    -9/25/2023 CT chest abdomen pelvis [facility] showed 11 mm centrally necrotic right epicardial node.  9 mm capsular surface dome of right hepatic lobe implant with large 18 mm implant more inferiorly.  Small 9 mm ill-defined low-attenuation left hepatic lobe likely cyst.    -10/13/2023 ending 11/3/2023 Elahere (Mirvetuximab soravtansine-gynx with ophthalmology monitoring.  Discontinued  due to ocular toxicity.    -4/1/2024  39.2.    -4/3/2024 follow-up GYN oncology Dr. Orellana .  Patient on observation since discontinuation of Mirvetuximab after ocular toxicity following second cycle.  Patient desires continued observation rather than Doxil and remaining agnostic to her tumor markers.  Plan for follow-up in 3 months with .    -4/8/2024 CBC and CMP unremarkable.  Serum M spike IgG lambda stable 400 mg/dL with normal quantitative immunoglobulins andSerum lambda light chain 111.5 kappa 21.9 ratio 0.2    -4/25/2024 [facility] hematology follow-up: From the monoclonal gammopathy standpoint she is overall stable and, in light of her recurrent fallopian tube cancer, becomes a relatively moot point in the absence of significant changes in her labs or new signs or symptoms of plasma cell dyscrasia which is unlikely to develop at this junction.  She is currently on watchful waiting under the I have Dr. Orellana at .  Should therapy become necessary and she wants that closer to home she can have that at  or with Dr. Fam or even myself and I encouraged her not to worry about territory allergy.  We all of her and any of us would be glad to care for her and I told her I would remain available to her at a moments notice.  She is not desirous of knowing her tumor marker status.  She does state that Dr. Orellana plans scans in July.  I told her that she could consider a year from now based on life and health in general is going as to whether to continue the annual follow-up of the M panel but for now since she is clinically quite fit I will continue to monitor and she liked that idea.  I offered referral to Svitlana Degroot our oncology psychiatrist and she will keep that in mind.       Time of care today inclusive of time spent today prior to her arrival reviewing and cataloging multiple data sets over the last year regarding her recurrent cancer and her monoclonal gammopathy and during visit  interviewing her as to signs or symptoms of her diseases and after visit putting forth a plan as outlined above took 47 minutes patient care time throughout the day today.  René Valencia MD    04/25/2024

## 2024-04-25 NOTE — PROGRESS NOTES
CHIEF COMPLAINT: No new somatic complaints    Problem List:  Oncology/Hematology History Overview Note   1.  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  Monoclonal gammopathy   3.  History of anemia, resolved after hemorrhoidectomy repair and on oral iron.  CBC on 6/13/2017 with a hemoglobin of 14.2, hematocrit 44.6%, MCV of 94.8.  4.  Atrial fibrillation  5.  Pulmonary nodule  6.  Erythrocytosis  7.  Fallopian tube serous carcinoma.      Monoclonal gammopathy and uterine cancer history timeline:    -March 2017 24-hour urine immunoelectrophoresis showed no monoclonal spike, lambda light chains 106 with kappa light chains 24 and a kappa to lambda ratio of 0.23 with normal IgA, G, and M levels.  January 2017 serum immunoelectrophoresis showed 600 mg/dL of immunoglobulin G lambda light chains in the serum.  3/8/17 bone marrow biopsy showed normal marrow with 3% plasma cells and a small lambda clonal population identified with no stainable iron.  There was erythroid expansion with mild maturation dyssynchrony and occasional atypical nuclear features that may represent the compensatory response to her anemia though myelodysplasia could not entirely be ruled out.  Had reactive aggregates of lymphoid tissue.     -6/13/2017 sedimentation rate 8, immunoglobulin free light chains free kappa light chains 25.7, free lambda light chains 173.4, kappa/lambda ratio 0.15.  Serum immunoelectrophoresis M-spike 0.9g/dL, C-reactive protein less than 0.01, CMP normal, ionized calcium 1.31, beta-2 microglobulin 3.1, CBC WBC 5500, hemoglobin 14.2, hematocrit 44.6%, MCV 94.8, platelets 259,000.  24-hour urine immunoelectrophoresis pending.   -6/17/2019 data: Lambda 129 with kappa 22 and ratio 0.17.  This is in the range that it has fluctuated since January 2017 upon first testing.  M spike stable 600 mg/dL immunoglobulin G lambda.  C-reactive protein 0.04 with sedimentation rate of 8.  Creatinine  0.86 with total calcium of 10 and ionized calcium 1.34 upper limit of normal 1.32.  Beta-2 microglobulin 2.8.  CBC unremarkable.  -4/13/2021 data: Beta-2 microglobulin 2.5 stable.  Hemoglobin 15.4 with normal CBC.  CMP unremarkable including creatinine 0.94, calcium 9.0, total protein 7.5, normal alkaline phosphatase 102.  Serum M spike 800 mg/dL IgG lambda stable with normal quantitative immunoglobulins.  Immunoglobulin free lambda light chains 131 mg/L with kappa 18.7 and ratio 0.14 stable x4 years.  Sedimentation rate 7.  C-reactive protein less than 0.3.  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  -2/22/2022 through 2/25/2022 admitted by Dr. Khurram Novak for atrial fibrillation with rapid ventricular response with worsening exercise tolerance.  Started on dofetilide and converted to sinus rhythm after second dose.  Dose decreased due to lengthening of QT corrected.  No further atrial fib after conversion.  Discharged on Eliquis.  -2/11/2022 through 2/15/2022 admitted for atrial fibrillation with RVR and vaginal bleeding.  On Eliquis.    -3/4/2022 hematocrit 51.9% with hemoglobin 714.2 otherwise unremarkable CBC.  CMP unremarkable with normal creatinine 0.92, normal calcium 9.9, alkaline phosphatase minimally elevated 120 upper limit of normal 117, AST 44 otherwise unremarkable, otherwise unremarkable CMP.  IgG lambda stable 800 mg/dL with normal quantitative immunoglobulins and no significant change in M spike over the last 5 years.  Serum lambda light chain 85 continuing to come down from a high of 173 on presentation June 2017 with normal kappa and stable ratio over time at 0.17.  Sedimentation rate 7 with C-reactive protein less than 0.3.  Beta-2 microglobulin 2.9.    -3/4/2022 CT abdomen pelvis with and without contrastShowed right hip arthroplasty being artifact but with tubular masslike structure 9 x 6 cm presacral as suggested on ultrasound.  Could be related to multilocular cystic  right ovarian mass or be related to the right uterine tube.  Malignancy favored.  Incidental 6 mm pulmonary nodule right lower lobe of undetermined significance    -3/7/2022 appointment with Loki Abdalla,gynecologist.  Had postmenopausal bleeding for the previous month.  3/2/2022 ultrasound showed 7.7 cm right adnexal mass and 6.9 cm left adnexal mass with  elevated at 38.4 upper limit of normal 38.1.  CEA normal 1.9.  Endometrial biopsy benign.    -3/10/2022 CT chest compared to CT abdomen 3/4/2022 showed stable 6 mm noncalcified right lower lobe pulmonary nodule with suggestion of follow-up in 6-12 months for stability.    -3/17/2022 JANET/BSO Dr. Fam.  Bilateral benign adenofibroma's of the ovaries.  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  No bessy involvement found.  No lymph vascular invasion.  Pathological stage IIIc.    -4/8/2022 Ca1 25 normal at 21.  CBC unremarkable save for hematocrit 51.3%.  CMP sodium 129 otherwise unremarkable.    -4/18/2022 gynecologic oncology postoperative evaluation.  She had an R0 resection 3/17/2022 for high-grade serous carcinoma.  Plans for Caris MI profile, genetic testing, and adjuvant carboplatin Taxol.    -4/18/2022 [facility] medical oncology follow-up visit: She has had a complex year since last I saw her about a year ago.  She is under care of Dr. Fam for stage III fallopian tube serous carcinoma for which she is due for adjuvant carbotaxol.  Genetic testing is ordered.  Caris MI profile sent but not back.  From my standpoint, the monoclonal gammopathy given stability over time and with unremarkable calcium and creatinine and quantitative immunoglobulins is very likely just benign and we will continue to check this annually.  Her serum light chains actually have gone down over time with no interventions.  No  further work-up from that standpoint.  With reference to her erythrocytosis, I would not put her through work-up for myeloproliferative disorder as I doubt that that is operative especially with recent staging CTs not showing splenomegaly.  She does have small right lower lobe pulmonary nodule that does need follow-up but I am certain that she will be getting routine radiographic/CT follow-up along with serum tumor markers with Dr. Fam and company.  I will remain available to her in the interim but will not increase the frequency of hematology follow-up referable to the newly diagnosed oncologic gynecologic issue that is being expertly managed by Dr. Fam and her team.  It is reassuring that her CA-125 normalized after JANET/BSO and omentectomy etc.    -4/11/2022 through 8/15/2022 adjuvant CarboTaxol x6.    -2/4/2023 office note from Dr. Christine Fam.  Indicates Ca1 25 normal.  2/1/2023 CT chest abdomen pelvis showed stable subpleural 6 mm nodule with suggested follow-up in 3-6 months with no new pulmonary nodules.  New soft tissue nodule right hepatic dome 12 mm concerning for metastasis.  Dr. Fam looked at the images and thought this to be hemangioma.  Dr. Fam plans follow-up imaging in 3-6 months depending on Ca1 25 and how she feels.    -3/29/2023 mammogram BI-RADS 2.    - 4/5/2023 serum M spike IgG lambda 400 mg/dL not rising.  Serum free lambda stable 102 with ratio 0.15.  Sedimentation rate normal 7.  C-reactive protein normal less than 0.3.  CMP unremarkable save for glucose 114.  CBC entirely normal.    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  She had pulmonary nodule follow-up that was stable and there was a reading of a possible liver metastasis that per review by Dr. Fam was felt most likely to be hemangioma and based  on patient's symptoms and desires and tumor markers which, today, have been normal, the plan was for follow-up imaging in 3 to 6 months.  Upon my discussion with her today, the patient registered her desire to have the CT of her abdomen repeated 3 months from the last scan and I will comply with that to hopefully have those results available near to the time of her follow-up with Dr. Fam.  Other than for the understandable concern relative to her malignancy, physically she is doing wonderfully and recent mammogram was also BI-RADS 2.  From the monoclonal gammopathy standpoint, the M spike is as low as it has ever been and I will continue annual labs as outlined.  With normal creatinine, hemoglobin, bone enzymes, total protein-albumin-globulin-ratios thereof, the odds of underlying plasma cell dyscrasia etc. extremely unlikely.  She is under the excellent care of Dr. Fam for her malignancy follow-up.    -4/27/2023 CT abdomen pelvis showed an enlarging enhancing nodular mass right lobe of the liver and right hemidiaphragm worsening malignancy suspected with enlarging right sided epicardial node also suspicious for malignancy.    -5/2/2023  22.3    - 6/2/2023-second opinion [facility] Dr. Cielo Orellana.  Patient asymptomatic for recurrent fallopian tube cancer and due to platinum shortage waiting to treat until symptomatic but patient anxious regarding this plan.  Molecular testing 4/19/2022 [facility] SPANGLER profile was PD-L1 positive ER +2+95% mismatch repair proficient with no other targetable mutations.  There was some concern that this was present within the 6-month timeframe of her original treatment but still technically platinum sensitive.   still did not have carboplatin.  Recommended CarboTaxol but patient desirous of keeping her hair.  She had discussed carboplatin and gemcitabine with Dr. Fam she expressed interest in staying at .    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  Initially diagnosed stage IIIc status post optimal TRS including JANET/BSO on 3/17/2022 followed by 6 cycles of adjuvant CarboTaxol ending 8/15/2022.  CT 4/27/2023 showed evidence of recurrence.  Therefore second opinion regarding national shortage of platinum chemotherapy.  Options of treatment included carbo gemcitabine plus or minus Adilene, carboplatin and Doxil, gemcitabine cisplatin plus or minus bevacizumab as well as clinical trials including the upgrade trial and the next trial she plans carboplatin Doxil closer to home and return for next trial after 4-6 cycles    -6/23/2023  37.6  -6/23/2023 started carbo every 21 days with gemcitabine weekly x 2 with Neulasta at .  Genetic testing negative.  -7/10/2023  39  -8/30/2023 Ca 125 18.6  -11/29/2023 Ca125 6.41    -9/25/2023 CT chest abdomen pelvis [facility] showed 11 mm centrally necrotic right epicardial node.  9 mm capsular surface dome of right hepatic lobe implant with large 18 mm implant more inferiorly.  Small 9 mm ill-defined low-attenuation left hepatic lobe likely cyst.    -10/13/2023 ending 11/3/2023 Elahere (Mirvetuximab soravtansine-gynx with ophthalmology monitoring.  Discontinued due to ocular toxicity.    -4/1/2024  39.2.    -4/3/2024 follow-up GYN oncology Dr. Orellana .  Patient on observation since discontinuation of Mirvetuximab after ocular toxicity following second cycle.  Patient desires continued observation rather than Doxil and remaining agnostic to her tumor markers.  Plan for follow-up in 3 months with .    -4/8/2024 CBC and CMP unremarkable.  Serum M spike IgG lambda stable 400 mg/dL with normal quantitative immunoglobulins and Serum lambda light chain 111.5 kappa 21.9 ratio 0.2    -4/25/2024 [facility] follow-up: From the monoclonal gammopathy standpoint she is overall stable and, in light of her recurrent  fallopian tube cancer, becomes a relatively moot point in the absence of significant changes in her labs or new signs or symptoms of plasma cell dyscrasia which is unlikely to develop at this junction.  She is currently on watchful waiting under the I have Dr. Orellana at .  Should therapy become necessary and she wants that closer to home she can have that at  or with Dr. Fam or even myself and I encouraged her not to worry about territory allergy.  We all of her and any of us would be glad to care for her and I told her I would remain available to her at a moments notice.  She is not desirous of knowing her tumor marker status.  She does state that Dr. Orellana plans scans in July.  I told her that she could consider a year from now based on life and health in general is going as to whether to continue the annual follow-up of the M panel but for now since she is clinically quite fit I will continue to monitor and she liked that idea.  I offered referral to Svitlana Degroot our oncology psychiatrist and she will keep that in mind.      Monoclonal gammopathy    Initial Diagnosis    Monoclonal gammopathy     11/29/2017 Imaging    Bone survey IMPRESSION:  Negative skeletal survey.     6/5/2018 -  Other Event    6/5/2018 labs:  CBC WBC 4350, hemoglobin 12.6, hematocrit 38.3%, platelet count 252,000.  CMP creatinine 0.8, serum calcium 9.4.  Ionized calcium 1.29.  Beta-2 microglobulin 2.3.  Sedimentation rate 9, CRP 0.02.  Kappa/lambda light chains-normal kappa light chains 16.6, lambda light chains elevated at 133.6, kappa/lambda ratio 0.12.  Serum immunoelectrophoresis M-spike stable 0.7 g/dL.       12/20/2018 -  Other Event    24-hour urine monoclonal protein showed no monoclonal protein.  Beta-2 microglobulin 2.5.  AST 43.  Creatinine 0.86.  Calcium 9.5.  Ionized calcium 1.3.  C-reactive protein less than 0.01.  Sedimentation rate 7.  Serum monoclonal protein 800 mg/dL of immunoglobulin G lambda.  Lambda  monoclonal protein down to 98.5 with a kappa to lambda ratio 0.18.  CBC is normal.  Total body bone survey done on the day of her visit 12/20/18 was not read by the time of her visit.     6/17/2019 -  Other Event     Lambda 129 with kappa 22 and ratio 0.17.  This is in the range that it has fluctuated since January 2017 upon first testing.  M spike stable 600 mg/dL immunoglobulin G lambda.  C-reactive protein 0.04 with sedimentation rate of 8.  Creatinine 0.86 with total calcium of 10 and ionized calcium 1.34 upper limit of normal 1.32.  Beta-2 microglobulin 2.8.  CBC unremarkable.     4/6/2020 -  Other Event    Myeloma panel:  CBC WBC 5400, hemoglobin 15.2, platelet count 225,000.  Beta-2 microglobulin 2.6.  Ionized calcium 1.26.  CMP creatinine 0.77, serum calcium 10.1 C-reactive protein 0.08, sedimentation rate 13.  Serum immunoelectrophoresis M spike stable 0.6 g/dL.  Serum free light chains, normal free kappa light chains 16.3, free lambda light chains 107.7, kappa/lambda ratio 0.15.  Kappa/lambda light chains Urine normal.     4/13/2021 -  Other Event    -4/13/2021 data: Beta-2 microglobulin 2.5 stable.  Hemoglobin 15.4 with normal CBC.  CMP unremarkable including creatinine 0.94, calcium 9.0, total protein 7.5, normal alkaline phosphatase 102.  Serum M spike 800 mg/dL IgG lambda stable with normal quantitative immunoglobulins.  Immunoglobulin free lambda light chains 131 mg/L with kappa 18.7 and ratio 0.14 stable x4 years.  Sedimentation rate 7.  C-reactive protein less than 0.3.  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     Adenocarcinoma of right fallopian tube   3/2/2022 Imaging    Patient presented to Dr. Abdalla with c/o postmenopausal bleeding.   Transvaginal ultrasound demonstrated a thickened endometrial stripe and bilateral adnexal masses.  Referred to Gyn Oncology due to concern for possible malignancy.      3/4/2022 Imaging    CT abdomen pelvis:  1.Beam hardening artifact from  the patient's right total hip arthroplasty makes evaluation of the right presacral/adnexal mass difficult. There is a tubular masslike structure measuring up to 9 x 6 cm with a left posterior (presacral) mural nodular  enhancing component as suggested on ultrasound. This could be related to a multilocular cystic right ovarian mass or be related to the right uterine tube. Differential favors malignant process in this age group.  2.Incidental 6 mm pulmonary nodule seen in the right lower lobe along the right hemidiaphragm. This is of uncertain clinical significance.     3/10/2022 Imaging    CT chest:  1.  No change in 6 mm noncalcified pulmonary nodule within the right lower lobe.  Follow-up noncontrast CT scan of the chest would be recommended in 6-12 months to document continued stability.     3/17/2022 Surgery    Diagnostic laparoscopy, RTLH/BSO, cancer staging, omentectomy, optimal tumor debulking (R=0), appendectomy by Dr. Christine Fam at [facility]    Final pathology showed high-grade serous carcinoma involving the lumen and wall. Right uterosacral ligament and omentum involved by tumor. Pelvic washings malignant, adenocarcinoma compatible with papillary serous carcinoma. Stage IIIC grade 3       3/17/2022 Cancer Staged    Staging form: Ovary, Fallopian Tube, And Primary Peritoneal Carcinoma, AJCC 8th Edition  - Pathologic stage from 3/17/2022: FIGO Stage IIIC (pT3c, pN0, cM0) - Signed by Christine Fam MD on 5/1/2023 4/11/2022 - 8/15/2022 Chemotherapy    OP OVARIAN PACLitaxel / CARBOplatin (Q21D)  - 6 cycles completed  - toxicities included nausea, dehydration, neuropathy, and low blood counts  - both drugs dose reduced at cycle #6 and cycle #6 delayed 2 weeks due to neutropenia and thrombocytopenia     4/19/2022 Molecular Testing    CARIS results:  PD-L1 positive  ER positive, 2+, 95%  MSI stable, MMR proficient, TMB low, genomic ISACC low  SC negative  ARID1A, BANDAR, BRAF, EGFR, RAD51 C/D  negative  BRCA1/2 negative  Her2/Patsy negative     8/16/2022 Imaging    Post-treatment scan:  CT chest:  1. Stable appearance of a 6 mm right lower lobe pulmonary nodule abutting the right hemidiaphragm.  2. No adenopathy within the chest.     CT abdomen and pelvis:  1. Postsurgical changes of hysterectomy and bilateral salpingo-oophorectomy. No evidence of residual disease within the abdomen or pelvis.  2. No evidence of lymphadenopathy within the abdomen or pelvis.  3. Stable small 9 mm lesion within the dome of the right hepatic lobe, favor benign etiology such as hemangioma. Continued attention on follow-up would be recommended. Additional benign simple cyst is present within the left hepatic lobe.       4/27/2023 Imaging    CT Abdomen  Impression:     1. Enlarging enhancing nodular mass position between the right lobe of the liver and right hemidiaphragm suspicious for worsening malignancy.  2. There is an enlarging right-sided epicardial lymph node also suspicious for malignancy.  3. Additional incidental findings as noted above.         HISTORY OF PRESENT ILLNESS:  The patient is a 68 y.o. female, here for follow up on management of monoclonal gammopathy now with recurrent fallopian tube cancer on watchful waiting presently after having had systemic therapy over the last year as outlined    Past Medical History:   Diagnosis Date    Adenocarcinoma of right fallopian tube 03/17/2022    Atrial fibrillation     Corneal abrasion     Hemorrhoids     bleeding hemorrhoids    History of anemia     History of transfusion     Hypertension     Hypothyroidism     LGSIL on Pap smear of cervix 03/10/2022    Osteoarthritis      Past Surgical History:   Procedure Laterality Date    AUGMENTATION MAMMAPLASTY Bilateral 2008    BONE MARROW BIOPSY Left 03/08/2017    Procedure: BONE MARROW BIOPSY;  Surgeon: Michel White MD;  Location: [facility];  Service:     COLONOSCOPY N/A 01/09/2017    Procedure: COLONOSCOPY;  Surgeon: Michel  "LUC White MD;  Location:  [facility];  Service:     ENDOSCOPY N/A 01/07/2017    Procedure: ESOPHAGOGASTRODUODENOSCOPY;  Surgeon: Abhishek Benton MD;  Location:  [facility];  Service:     HEMORRHOIDECTOMY N/A 03/08/2017    Procedure: HEMORRHOIDECTOMY,;  Surgeon: Michel White MD;  Location:  [facility];  Service:     HIP ARTHROSCOPY Right     2005 & 2012    JOINT REPLACEMENT      2 right hips    TONSILLECTOMY  1964    TOTAL LAPAROSCOPIC HYSTERECTOMY N/A 03/17/2022    Procedure: DIAGNOSTIC LAPAROSCOPY, ROBOTIC ASSISTED TOTAL LAPAROSCOPIC HYSTERECTOMY, BILATERAL SALPINGO-OOPHERECTOMY, CANCER STAGING, OMENTECTOMY,  OPTIMAL TUMOR DEBULKING, APPENDECTOMY;  Surgeon: Christine Fam MD;  Location:  [facility];  Service: Robotics - DaVinci;  Laterality: N/A;    VENOUS ACCESS DEVICE (PORT) INSERTION Left 04/08/2022       Allergies   Allergen Reactions    Sulfa Antibiotics Anaphylaxis       Family History and Social History reviewed and changed as necessary    REVIEW OF SYSTEM:   No current somatic complaints    PHYSICAL EXAM:  No jaundice icterus or pallor.  No respiratory distress.    Vitals:    04/25/24 1121   BP: 164/73   Pulse: 65   Resp: 16   Temp: 97.6 °F (36.4 °C)   SpO2: 98%   Weight: 68.9 kg (152 lb)   Height: 177.8 cm (70\")     Vitals:    04/25/24 1121   PainSc: 0-No pain          ECOG score: 0           Vitals reviewed.    ECOG: (0) Fully Active - Able to Carry On All Pre-disease Performance Without Restriction    Lab Results   Component Value Date    HGB 15.0 04/08/2024    HCT 45.8 04/08/2024    MCV 95.8 04/08/2024     04/08/2024    WBC 6.19 04/08/2024    NEUTROABS 3.37 04/08/2024    LYMPHSABS 2.05 04/08/2024    MONOSABS 0.58 04/08/2024    EOSABS 0.10 04/08/2024    BASOSABS 0.03 04/08/2024       Lab Results   Component Value Date    GLUCOSE 84 04/08/2024    BUN 16 04/08/2024    CREATININE 0.83 04/08/2024     04/08/2024    K 4.2 04/08/2024     04/08/2024    CO2 27.0 04/08/2024    " CALCIUM 9.1 04/08/2024    PROTEINTOT 6.9 04/08/2024    ALBUMIN 4.3 04/08/2024    ALBUMIN 3.8 04/08/2024    BILITOT 0.4 04/08/2024    ALKPHOS 117 04/08/2024    AST 32 04/08/2024    ALT 19 04/08/2024             ASSESSMENT & PLAN:  1.  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  Monoclonal gammopathy   3.  History of anemia, resolved after hemorrhoidectomy repair and on oral iron.  CBC on 6/13/2017 with a hemoglobin of 14.2, hematocrit 44.6%, MCV of 94.8.  4.  Atrial fibrillation  5.  Pulmonary nodule  6.  Erythrocytosis  7.  Fallopian tube serous carcinoma.      Monoclonal gammopathy and uterine cancer history timeline:    -March 2017 24-hour urine immunoelectrophoresis showed no monoclonal spike, lambda light chains 106 with kappa light chains 24 and a kappa to lambda ratio of 0.23 with normal IgA, G, and M levels.  January 2017 serum immunoelectrophoresis showed 600 mg/dL of immunoglobulin G lambda light chains in the serum.  3/8/17 bone marrow biopsy showed normal marrow with 3% plasma cells and a small lambda clonal population identified with no stainable iron.  There was erythroid expansion with mild maturation dyssynchrony and occasional atypical nuclear features that may represent the compensatory response to her anemia though myelodysplasia could not entirely be ruled out.  Had reactive aggregates of lymphoid tissue.     -6/13/2017 sedimentation rate 8, immunoglobulin free light chains free kappa light chains 25.7, free lambda light chains 173.4, kappa/lambda ratio 0.15.  Serum immunoelectrophoresis M-spike 0.9g/dL, C-reactive protein less than 0.01, CMP normal, ionized calcium 1.31, beta-2 microglobulin 3.1, CBC WBC 5500, hemoglobin 14.2, hematocrit 44.6%, MCV 94.8, platelets 259,000.  24-hour urine immunoelectrophoresis pending.   -6/17/2019 data: Lambda 129 with kappa 22 and ratio 0.17.  This is in the range that it has fluctuated since January 2017 upon first  testing.  M spike stable 600 mg/dL immunoglobulin G lambda.  C-reactive protein 0.04 with sedimentation rate of 8.  Creatinine 0.86 with total calcium of 10 and ionized calcium 1.34 upper limit of normal 1.32.  Beta-2 microglobulin 2.8.  CBC unremarkable.  -4/13/2021 data: Beta-2 microglobulin 2.5 stable.  Hemoglobin 15.4 with normal CBC.  CMP unremarkable including creatinine 0.94, calcium 9.0, total protein 7.5, normal alkaline phosphatase 102.  Serum M spike 800 mg/dL IgG lambda stable with normal quantitative immunoglobulins.  Immunoglobulin free lambda light chains 131 mg/L with kappa 18.7 and ratio 0.14 stable x4 years.  Sedimentation rate 7.  C-reactive protein less than 0.3.  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  -2/22/2022 through 2/25/2022 admitted by Dr. Khurram Novak for atrial fibrillation with rapid ventricular response with worsening exercise tolerance.  Started on dofetilide and converted to sinus rhythm after second dose.  Dose decreased due to lengthening of QT corrected.  No further atrial fib after conversion.  Discharged on Eliquis.  -2/11/2022 through 2/15/2022 admitted for atrial fibrillation with RVR and vaginal bleeding.  On Eliquis.    -3/4/2022 hematocrit 51.9% with hemoglobin 714.2 otherwise unremarkable CBC.  CMP unremarkable with normal creatinine 0.92, normal calcium 9.9, alkaline phosphatase minimally elevated 120 upper limit of normal 117, AST 44 otherwise unremarkable, otherwise unremarkable CMP.  IgG lambda stable 800 mg/dL with normal quantitative immunoglobulins and no significant change in M spike over the last 5 years.  Serum lambda light chain 85 continuing to come down from a high of 173 on presentation June 2017 with normal kappa and stable ratio over time at 0.17.  Sedimentation rate 7 with C-reactive protein less than 0.3.  Beta-2 microglobulin 2.9.    -3/4/2022 CT abdomen pelvis with and without contrastShowed right hip arthroplasty being  artifact but with tubular masslike structure 9 x 6 cm presacral as suggested on ultrasound.  Could be related to multilocular cystic right ovarian mass or be related to the right uterine tube.  Malignancy favored.  Incidental 6 mm pulmonary nodule right lower lobe of undetermined significance    -3/7/2022 appointment with Loki Abdalla,gynecologist.  Had postmenopausal bleeding for the previous month.  3/2/2022 ultrasound showed 7.7 cm right adnexal mass and 6.9 cm left adnexal mass with  elevated at 38.4 upper limit of normal 38.1.  CEA normal 1.9.  Endometrial biopsy benign.    -3/10/2022 CT chest compared to CT abdomen 3/4/2022 showed stable 6 mm noncalcified right lower lobe pulmonary nodule with suggestion of follow-up in 6-12 months for stability.    -3/17/2022 JANET/BSO Dr. Fam.  Bilateral benign adenofibroma's of the ovaries.  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  No bessy involvement found.  No lymph vascular invasion.  Pathological stage IIIc.    -4/8/2022 Ca1 25 normal at 21.  CBC unremarkable save for hematocrit 51.3%.  CMP sodium 129 otherwise unremarkable.    -4/18/2022 gynecologic oncology postoperative evaluation.  She had an R0 resection 3/17/2022 for high-grade serous carcinoma.  Plans for Caris MI profile, genetic testing, and adjuvant carboplatin Taxol.    -4/18/2022 [facility] medical oncology follow-up visit: She has had a complex year since last I saw her about a year ago.  She is under care of Dr. Fam for stage III fallopian tube serous carcinoma for which she is due for adjuvant carbotaxol.  Genetic testing is ordered.  Caris MI profile sent but not back.  From my standpoint, the monoclonal gammopathy given stability over time and with unremarkable calcium and creatinine and quantitative immunoglobulins is very likely just benign  and we will continue to check this annually.  Her serum light chains actually have gone down over time with no interventions.  No further work-up from that standpoint.  With reference to her erythrocytosis, I would not put her through work-up for myeloproliferative disorder as I doubt that that is operative especially with recent staging CTs not showing splenomegaly.  She does have small right lower lobe pulmonary nodule that does need follow-up but I am certain that she will be getting routine radiographic/CT follow-up along with serum tumor markers with Dr. Fam and company.  I will remain available to her in the interim but will not increase the frequency of hematology follow-up referable to the newly diagnosed oncologic gynecologic issue that is being expertly managed by Dr. Fam and her team.  It is reassuring that her CA-125 normalized after JANET/BSO and omentectomy etc.    -4/11/2022 through 8/15/2022 adjuvant CarboTaxol x6.    -2/4/2023 office note from Dr. Christine Fam.  Indicates Ca1 25 normal.  2/1/2023 CT chest abdomen pelvis showed stable subpleural 6 mm nodule with suggested follow-up in 3-6 months with no new pulmonary nodules.  New soft tissue nodule right hepatic dome 12 mm concerning for metastasis.  Dr. Fam looked at the images and thought this to be hemangioma.  Dr. Fam plans follow-up imaging in 3-6 months depending on Ca1 25 and how she feels.    -3/29/2023 mammogram BI-RADS 2.    - 4/5/2023 serum M spike IgG lambda 400 mg/dL not rising.  Serum free lambda stable 102 with ratio 0.15.  Sedimentation rate normal 7.  C-reactive protein normal less than 0.3.  CMP unremarkable save for glucose 114.  CBC entirely normal.    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  She had pulmonary nodule follow-up that was stable and  there was a reading of a possible liver metastasis that per review by Dr. Fam was felt most likely to be hemangioma and based on patient's symptoms and desires and tumor markers which, today, have been normal, the plan was for follow-up imaging in 3 to 6 months.  Upon my discussion with her today, the patient registered her desire to have the CT of her abdomen repeated 3 months from the last scan and I will comply with that to hopefully have those results available near to the time of her follow-up with Dr. Fam.  Other than for the understandable concern relative to her malignancy, physically she is doing wonderfully and recent mammogram was also BI-RADS 2.  From the monoclonal gammopathy standpoint, the M spike is as low as it has ever been and I will continue annual labs as outlined.  With normal creatinine, hemoglobin, bone enzymes, total protein-albumin-globulin-ratios thereof, the odds of underlying plasma cell dyscrasia etc. extremely unlikely.  She is under the excellent care of Dr. Fam for her malignancy follow-up.    -4/27/2023 CT abdomen pelvis showed an enlarging enhancing nodular mass right lobe of the liver and right hemidiaphragm worsening malignancy suspected with enlarging right sided epicardial node also suspicious for malignancy.    -5/2/2023  22.3    - 6/2/2023 second opinion [facility] Dr. Cielo Orellana.  Patient asymptomatic for recurrent fallopian tube cancer and due to platinum shortage waiting to treat until symptomatic but patient anxious regarding this plan.  Molecular testing 4/19/2022 [facility] profile was PD-L1 positive ER +2+95% mismatch repair proficient with no other targetable mutations.  There was some concern that this was present within the 6-month timeframe of her original treatment but still technically platinum sensitive.  [facility] still did not have carboplatin.  Recommended CarboTaxol but patient desirous of keeping her hair.  She had discussed  carboplatin and gemcitabine with Dr. Fam she expressed interest in staying at .    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  Initially diagnosed stage IIIc status post optimal TRS including JANET/BSO on 3/17/2022 followed by 6 cycles of adjuvant CarboTaxol ending 8/15/2022.  CT 4/27/2023 showed evidence of recurrence.  Therefore second opinion regarding national shortage of platinum chemotherapy.  Options of treatment included carbo gemcitabine plus or minus Adilene, carboplatin and Doxil, gemcitabine cisplatin plus or minus bevacizumab as well as clinical trials including the upgrade trial and the next trial she plans carboplatin Doxil closer to home and return for next trial after 4-6 cycles    -6/23/2023  37.6  -6/23/2023 started carbo every 21 days with gemcitabine weekly x 2 with Neulasta at .  Genetic testing negative.  -7/10/2023  39  -8/30/2023 Ca 125 18.6  -11/29/2023 Ca125 6.41    -9/25/2023 CT chest abdomen pelvis [facility] showed 11 mm centrally necrotic right epicardial node.  9 mm capsular surface dome of right hepatic lobe implant with large 18 mm implant more inferiorly.  Small 9 mm ill-defined low-attenuation left hepatic lobe likely cyst.    -10/13/2023 ending 11/3/2023 Elahere (Mirvetuximab soravtansine-gynx with ophthalmology monitoring.  Discontinued due to ocular toxicity.    -4/1/2024  39.2.    -4/3/2024 follow-up GYN oncology Dr. Orellana .  Patient on observation since discontinuation of Mirvetuximab after ocular toxicity following second cycle.  Patient desires continued observation rather than Doxil and remaining agnostic to her tumor markers.  Plan for follow-up in 3 months with .    -4/8/2024 CBC and CMP unremarkable.  Serum M spike IgG lambda stable 400 mg/dL with normal quantitative immunoglobulins andSerum lambda light chain 111.5 kappa 21.9 ratio 0.2    -4/25/2024  [facility] hematology follow-up: From the monoclonal gammopathy standpoint she is overall stable and, in light of her recurrent fallopian tube cancer, becomes a relatively moot point in the absence of significant changes in her labs or new signs or symptoms of plasma cell dyscrasia which is unlikely to develop at this junction.  She is currently on watchful waiting under the I have Dr. Orellana at .  Should therapy become necessary and she wants that closer to home she can have that at  or with Dr. Fam or even myself and I encouraged her not to worry about territory allergy.  We all of her and any of us would be glad to care for her and I told her I would remain available to her at a moments notice.  She is not desirous of knowing her tumor marker status.  She does state that Dr. Orellana plans scans in July.  I told her that she could consider a year from now based on life and health in general is going as to whether to continue the annual follow-up of the M panel but for now since she is clinically quite fit I will continue to monitor and she liked that idea.  I offered referral to Svitlana Degroot our oncology psychiatrist and she will keep that in mind.       Time of care today inclusive of time spent today prior to her arrival reviewing and cataloging multiple data sets over the last year regarding her recurrent cancer and her monoclonal gammopathy and during visit interviewing her as to signs or symptoms of her diseases and after visit putting forth a plan as outlined above took 47 minutes patient care time throughout the day today.  René Valencia MD    04/25/2024

## (undated) DEVICE — INTRO ACCSR BLNT TP

## (undated) DEVICE — APPL CHLORAPREP TINTED 26ML TEAL

## (undated) DEVICE — STRAP STIRUP WO/RNG 19X3.5IN DISP

## (undated) DEVICE — ANTIBACTERIAL UNDYED BRAIDED (POLYGLACTIN 910), SYNTHETIC ABSORBABLE SUTURE: Brand: COATED VICRYL

## (undated) DEVICE — ELECTRODE,ECG,FOAM, 3/PK: Brand: MEDLINE

## (undated) DEVICE — SYR CONTRL LUERLOK 10CC

## (undated) DEVICE — TIP COVER ACCESSORY

## (undated) DEVICE — ENDOGATOR HYBRID TUBING KIT FOR USE WITH ENDOGATOR IRRIGATION PUMP, OLYMPUS PUMP, GI4000 ESU, AND TORRENT IRRIGATION PUMP.: Brand: ENDOGATOR KIT

## (undated) DEVICE — "MH-443 SUCTION VALVE F/EVIS140 EVIS160": Brand: SUCTION VALVE

## (undated) DEVICE — TBG 02 CRUSH RESIST LF CLR 7FT

## (undated) DEVICE — THE BITE BLOCK MAXI, LATEX FREE STRAP IS USED TO PROTECT THE ENDOSCOPE INSERTION TUBE FROM BEING BITTEN BY THE PATIENT.

## (undated) DEVICE — COLUMN DRAPE

## (undated) DEVICE — DRAPE,UNDERBUTTOCKS,STERILE: Brand: MEDLINE

## (undated) DEVICE — AMD ANTIMICROBIAL GAUZE SPONGES,12 PLY USP TYPE VII, 0.2% POLYHEXAMETHYLENE BIGUANIDE HCI (PHMB): Brand: CURITY

## (undated) DEVICE — PATIENT RETURN ELECTRODE, SINGLE-USE, CONTACT QUALITY MONITORING, ADULT, WITH 9FT CORD, FOR PATIENTS WEIGING OVER 33LBS. (15KG): Brand: MEGADYNE

## (undated) DEVICE — ANTIBACTERIAL UNDYED BRAIDED (POLYGLACTIN 910), SYNTHETIC ABSORBABLE SURGICAL SUTURE: Brand: COATED VICRYL

## (undated) DEVICE — PAD ARMBRD SURG CONVOL 7.5X20X2IN

## (undated) DEVICE — ST TBG CONN PNEUMOCLEAR EVAC SMOKE HEAT/HUMID

## (undated) DEVICE — CANNULA SEAL

## (undated) DEVICE — SYNCHROSEAL: Brand: DA VINCI ENERGY

## (undated) DEVICE — SHEET, DRAPE, SPLIT, STERILE: Brand: MEDLINE

## (undated) DEVICE — NDL BLNT 18G 1 1/2IN

## (undated) DEVICE — GLV SURG SENSICARE MICRO PF LF 7.5 STRL

## (undated) DEVICE — THE ECHELON FLEX POWERED PLUS ARTICULATING ENDOSCOPIC LINEAR CUTTERS ARE STERILE, SINGLE PATIENT USE INSTRUMENTS THAT SIMULTANEOUSLYCUT AND STAPLE TISSUE. THERE ARE SIX STAGGERED ROWS OF STAPLES, THREE ON EITHER SIDE OF THE CUT LINE. THE ECHELON FLEX 45 POWERED PLUSINSTRUMENTS HAVE A STAPLE LINE THAT IS APPROXIMATELY 45 MM LONG AND A CUT LINE THAT IS APPROXIMATELY 42 MM LONG. THE SHAFT CAN ROTATE FREELYIN BOTH DIRECTIONS AND AN ARTICULATION MECHANISM ENABLES THE DISTAL PORTION OF THE SHAFT TO PIVOT TO FACILITATE LATERAL ACCESS TO THE OPERATIVESITE.THE INSTRUMENTS ARE PACKAGED WITH A PRIMARY LITHIUM BATTERY PACK THAT MUST BE INSTALLED PRIOR TO USE. THERE ARE SPECIFIC REQUIREMENTS FORDISPOSING OF THE BATTERY PACK. REFER TO THE BATTERY PACK DISPOSAL SECTION.THE INSTRUMENTS ARE PACKAGED WITHOUT A RELOAD AND MUST BE LOADED PRIOR TO USE. A STAPLE RETAINING CAP ON THE RELOAD PROTECTS THE STAPLE LEGPOINTS DURING SHIPPING AND TRANSPORTATION. THE INSTRUMENTS’ LOCK-OUT FEATURE IS DESIGNED TO PREVENT A USED OR IMPROPERLY INSTALLED RELOADFROM BEING REFIRED OR AN INSTRUMENT FROM BEING FIRED WITHOUT A RELOAD.: Brand: ECHELON FLEX

## (undated) DEVICE — CONN STD FOR/O2 TBG

## (undated) DEVICE — SINGLE-USE BIOPSY FORCEPS: Brand: RADIAL JAW 4

## (undated) DEVICE — SPNG GZ WOVN 4X4IN 12PLY 10/BX STRL

## (undated) DEVICE — ENDOPATH PNEUMONEEDLE INSUFFLATION NEEDLES WITH LUER LOCK CONNECTORS 120MM: Brand: ENDOPATH

## (undated) DEVICE — "MH-948 A/W CHANNEL CLEANING ADPTR -VIDEO": Brand: AW CHANNEL CLEANING ADAPTE

## (undated) DEVICE — BLADELESS OBTURATOR: Brand: WECK VISTA

## (undated) DEVICE — GOWN,PREVENTION PLUS,XXLARGE,STERILE: Brand: MEDLINE

## (undated) DEVICE — TRAP,MUCUS SPECIMEN,40CC: Brand: MEDLINE

## (undated) DEVICE — TRAP FLD MINIVAC MEGADYNE 100ML

## (undated) DEVICE — NDL SPINE 22G 31/2IN BLK

## (undated) DEVICE — ST INF 10DRP 4 PORT 4WY/STPCOCK 112IN

## (undated) DEVICE — SOL LR 1000ML

## (undated) DEVICE — LEGGINGS, PAIR, 29X43, STERILE: Brand: MEDLINE

## (undated) DEVICE — UNDERGLV SURG BIOGEL INDICAT PF 61/2 GRN

## (undated) DEVICE — "MH-438 A/W VLVE F/140 EVIS-140": Brand: AIR/WATER VALVE

## (undated) DEVICE — CANN NASL CO2 DIVIDED A/

## (undated) DEVICE — CONTN GRAD MEAS TRIANG 32OZ BLK

## (undated) DEVICE — ADHS SKIN PREMIERPRO EXOFIN TOPICAL HI/VISC .5ML

## (undated) DEVICE — DRP ADAPT ALLY UTER POSTN SYS 1P/U

## (undated) DEVICE — TP PAPR MICROPORE 2IN

## (undated) DEVICE — ARM DRAPE

## (undated) DEVICE — JELLY,LUBE,STERILE,FLIP TOP,TUBE,2-OZ: Brand: MEDLINE

## (undated) DEVICE — TAPE MICROFM 2IN LF

## (undated) DEVICE — Device: Brand: ENDOGATOR

## (undated) DEVICE — SHEET,DRAPE,40X58,STERILE: Brand: MEDLINE

## (undated) DEVICE — BLANKT WARM UPPR/BDY ARM/OUT 57X196CM

## (undated) DEVICE — MANIP UTER RUMI TP 5.1MM 6CM LAV

## (undated) DEVICE — SUT MNCRYL PLS ANTIB UD 3/0 PS2 27IN

## (undated) DEVICE — HDRST POSTIN FM CRDL TRACH SLOT 9X8X4IN

## (undated) DEVICE — SOL BETADINE 1GL

## (undated) DEVICE — BNDR ABD PREMIUM/UNIV 10IN 27TO48IN

## (undated) DEVICE — MANIP UTER RUMI 2 KOH EFFICIENT 2.5CM BL

## (undated) DEVICE — SYR LUERLOK 50ML

## (undated) DEVICE — GLV SURG SENSICARE PI MIC PF SZ6 LF STRL

## (undated) DEVICE — SCRB SURG BACTOSHIELD CHG 4PCT 4OZ

## (undated) DEVICE — PK MAJ GYN DAVINCI 10

## (undated) DEVICE — INTENDED FOR TISSUE SEPARATION, AND OTHER PROCEDURES THAT REQUIRE A SHARP SURGICAL BLADE TO PUNCTURE OR CUT.: Brand: BARD-PARKER ® STAINLESS STEEL BLADES

## (undated) DEVICE — BOWL UTIL STRL 32OZ